# Patient Record
Sex: FEMALE | Employment: UNEMPLOYED
[De-identification: names, ages, dates, MRNs, and addresses within clinical notes are randomized per-mention and may not be internally consistent; named-entity substitution may affect disease eponyms.]

---

## 2022-07-24 ENCOUNTER — HEALTH MAINTENANCE LETTER (OUTPATIENT)
Age: 37
End: 2022-07-24

## 2022-07-25 ENCOUNTER — VIRTUAL VISIT (OUTPATIENT)
Dept: FAMILY MEDICINE | Facility: CLINIC | Age: 37
End: 2022-07-25
Payer: COMMERCIAL

## 2022-07-25 DIAGNOSIS — F32.81 PMDD (PREMENSTRUAL DYSPHORIC DISORDER): Primary | ICD-10-CM

## 2022-07-25 DIAGNOSIS — Z97.5 IUD (INTRAUTERINE DEVICE) IN PLACE: ICD-10-CM

## 2022-07-25 PROCEDURE — 99203 OFFICE O/P NEW LOW 30 MIN: CPT | Mod: 95 | Performed by: FAMILY MEDICINE

## 2022-07-25 RX ORDER — SERTRALINE HYDROCHLORIDE 25 MG/1
25 TABLET, FILM COATED ORAL DAILY
COMMUNITY
Start: 2022-07-25

## 2022-07-25 NOTE — PROGRESS NOTES
"Veena is a 37 year old who is being evaluated via a billable video visit.      How would you like to obtain your AVS? {AVS Preference:870422}  If the video visit is dropped, the invitation should be resent by: {video visit invitation (Optional) :769309}  Will anyone else be joining your video visit? {:613718}  {If patient encounters technical issues they should call [phone] :791154}        {PROVIDER CHARTING PREFERENCE:278740}    Subjective   Veena is a 37 year old{ACCOMPANIED BY STATEMENT (Optional):845457}, presenting for the following health issues:  No chief complaint on file.      History of Present Illness       Reason for visit:  Pre-screening evaluation    She eats 0-1 servings of fruits and vegetables daily.She consumes 1 sweetened beverage(s) daily.She exercises with enough effort to increase her heart rate 30 to 60 minutes per day.  She exercises with enough effort to increase her heart rate 4 days per week.   She is taking medications regularly.       {SUPERLIST (Optional):972461}  {additonal problems for provider to add (Optional):777653}    Review of Systems   {ROS COMP (Optional):969620}      Objective           Vitals:  No vitals were obtained today due to virtual visit.    Physical Exam   {video visit exam brief selected:249921::\"GENERAL: Healthy, alert and no distress\",\"EYES: Eyes grossly normal to inspection.  No discharge or erythema, or obvious scleral/conjunctival abnormalities.\",\"RESP: No audible wheeze, cough, or visible cyanosis.  No visible retractions or increased work of breathing.  \",\"SKIN: Visible skin clear. No significant rash, abnormal pigmentation or lesions.\",\"NEURO: Cranial nerves grossly intact.  Mentation and speech appropriate for age.\",\"PSYCH: Mentation appears normal, affect normal/bright, judgement and insight intact, normal speech and appearance well-groomed.\"}    {Diagnostic Test Results (Optional):231626}    {AMBULATORY ATTESTATION (Optional):906403}        Video-Visit " "Details    Video Start Time: {video visit start/end time for provider to select:258119}    Type of service:  Video Visit    Video End Time:{video visit start/end time for provider to select:624026}    Originating Location (pt. Location): {video visit patient location:231025::\"Home\"}    Distant Location (provider location):  [facility] [place]     Platform used for Video Visit: {Virtual Visit Platforms:048580::\"[employer]\"}    .  ..  "

## 2022-07-25 NOTE — PROGRESS NOTES
"Veena is a 37 year old who is being evaluated via a billable video visit.      How would you like to obtain your AVS? MyChart  If the video visit is dropped, the invitation should be resent by:   Will anyone else be joining your video visit? No    Assessment & Plan   1. PMDD (premenstrual dysphoric disorder): Self stated diagnosis.  IUD in place making timing of symptoms difficult.  Would like referral to psychiatry.  Has therapy appointment upcoming.  Referral given.  Follow-up as needed.  - Adult Mental Health  Referral; Future    2. IUD (intrauterine device) in place: Noted.    Francisco Nails MD  [facility]    This chart is completed utilizing dictation software; typos and/or incorrect word substitutions may unintentionally occur.       Subjective   Veena is a 37 year old, presenting for the following health issues:  pre-screening and No Show    HPI   Patient states she has a diagnosis of PMDD. Really rough days.  Unfortunately she does not get her period due to IUD use and she can never really figure out what part of her cycle she is on.  She states when she does start feeling symptoms she switches from Claritin to hydroxyzine.  This seems to help her anxiety based symptoms.     She does have Zoloft 25 mg available to her but has been less good about taking this.  She finds that she is on an SSRI for too long it actually makes symptoms worse over the long run.    She has been diagnosed with depression since age 19 as well.  She does not seem to struggle with this as much anymore.  She is also try medication such as Prozac.  She states she is a \"free spirit \".  She is a hard time dealing with toxic relationships with people around her and situations that he sometimes thrown in.    Would like referral to psychiatry as she has not found family practice to be helpful with her management of this.    Has upcoming appointment with therapist in September.    Review of " Systems   Constitutional, HEENT, cardiovascular, pulmonary, gi and gu systems are negative, except as otherwise noted.      Objective       Vitals:  No vitals were obtained today due to virtual visit.    Physical Exam   healthy, alert and no distress  Psych: Alert and oriented times 3; coherent speech, normal   rate and volume, able to articulate logical thoughts, able   to abstract reason.  Respiratory: No audible wheezing. Normal speech without having to stop and take a breath.    Labs: none        Telephone-Visit Details    Type of service: Telephone    12 minutes.

## 2022-08-01 ENCOUNTER — TRANSFERRED RECORDS (OUTPATIENT)
Dept: HEALTH INFORMATION MANAGEMENT | Facility: CLINIC | Age: 37
End: 2022-08-01

## 2022-08-01 LAB
ALT SERPL-CCNC: 17 U/L (ref 15–60)
AST SERPL-CCNC: 14 U/L (ref 15–37)
CREATININE (EXTERNAL): 0.83 MG/DL (ref 0.55–1.3)
GFR ESTIMATED (EXTERNAL): >60 ML/MIN/1.73M2
GLUCOSE (EXTERNAL): 92 MG/DL (ref 74–106)
HPV ABSTRACT: NORMAL
PAP-ABSTRACT: NORMAL
POTASSIUM (EXTERNAL): 4.6 MMOL/L (ref 3.5–5.1)

## 2022-08-03 ENCOUNTER — VIRTUAL VISIT (OUTPATIENT)
Dept: PSYCHOLOGY | Facility: CLINIC | Age: 37
End: 2022-08-03
Payer: COMMERCIAL

## 2022-08-03 DIAGNOSIS — F33.1 MAJOR DEPRESSIVE DISORDER, RECURRENT EPISODE, MODERATE (H): Primary | ICD-10-CM

## 2022-08-03 DIAGNOSIS — F41.1 GENERALIZED ANXIETY DISORDER: ICD-10-CM

## 2022-08-03 DIAGNOSIS — F41.0 PANIC DISORDER WITHOUT AGORAPHOBIA: ICD-10-CM

## 2022-08-03 PROCEDURE — 90791 PSYCH DIAGNOSTIC EVALUATION: CPT | Mod: 95 | Performed by: MARRIAGE & FAMILY THERAPIST

## 2022-08-03 ASSESSMENT — COLUMBIA-SUICIDE SEVERITY RATING SCALE - C-SSRS
1. HAVE YOU WISHED YOU WERE DEAD OR WISHED YOU COULD GO TO SLEEP AND NOT WAKE UP?: NO
2. HAVE YOU ACTUALLY HAD ANY THOUGHTS OF KILLING YOURSELF?: NO
ATTEMPT LIFETIME: NO

## 2022-08-03 ASSESSMENT — PATIENT HEALTH QUESTIONNAIRE - PHQ9: SUM OF ALL RESPONSES TO PHQ QUESTIONS 1-9: 8

## 2022-08-03 NOTE — Clinical Note
Client is getting started in the counseling center and will be working on behavioral activation and managing PMDD.  Thank you, Lia Stiles

## 2022-08-03 NOTE — PROGRESS NOTES
"[facility]  Provider Name:  Lia Stiles     Credentials:  MA, LMFT    PATIENT'S NAME: Veena Parsons  PREFERRED NAME:Veena  PRONOUNS: She/ Her  MRN: [id number]  : 1985  ADDRESS: [address] [place] [address]  [place] [address]  ACCT. NUMBER:  [id number]  DATE OF SERVICE: 22  START TIME: 9am  END TIME: 950am  PREFERRED PHONE: [phone]  May we leave a program related message: Yes  SERVICE MODALITY:  Video Visit:- Had to switch to phone due to sound issues and connection issues       Provider verified identity through the following two step process.  Patient provided:  Patient  and Patient address    Telemedicine Visit: The patient's condition can be safely assessed and treated via synchronous audio and visual telemedicine encounter.      Reason for Telemedicine Visit: Patient has requested telehealth visit    Originating Site (Patient Location): Patient's home    Distant Site (Provider Location): Provider Remote Setting- Home Office    Consent:  The patient/guardian has verbally consented to: the potential risks and benefits of telemedicine (video visit) versus in person care; bill my insurance or make self-payment for services provided; and responsibility for payment of non-covered services.     Patient would like the video invitation sent by:  My Chart    Mode of Communication:  Video Conference via Amwell    As the provider I attest to compliance with applicable laws and regulations related to telemedicine.    UNIVERSAL ADULT Mental Health DIAGNOSTIC ASSESSMENT    Identifying Information:  Patient is a 37 year old,   individual.    Patient was referred for an assessment by primary care providerself.  Patient attended the session alone.    Chief Complaint:   The reason for seeking services at this time is: \"PMDD\".  The problem(s) began 3/14/2004.    Patient has attempted to resolve these concerns in the past through medication, support groups and western and eastern medicine " .    Social/Family History:  Patient reported they grew up in other [place].  They were raised by biological mother  .  Parents  / .  Patient reported that their childhood was okay.  Patient described their current relationships with family of origin as close with some but has had to distance herself from others.      The patient describes their cultural background as .  Cultural influences and impact on patient's life structure, values, norms, and healthcare: Naturally cynical family.  Contextual influences on patient's health include: none noted.    These factors will be addressed in the Preliminary Treatment plan. Patient identified their preferred language to be English. Patient reported they do not need the assistance of an  or other support involved in therapy.     Patient reported had no significant delays in developmental tasks.   Patient's highest education level was some college  .  Patient identified the following learning problems: none reported.  Modifications will not be used to assist communication in therapy.  Patient reports they are  able to understand written materials.    Patient reported the following relationship history dating and marriage.  Patient's current relationship status is .   Patient identified their sexual orientation as heterosexual.  Patient reported having 1 child(marielena). Patient identified friends as part of their support system.  Patient identified the quality of these relationships as inconsistent,     Patient's current living/housing situation involves staying in own home/apartment.  The immediate members of family and household include Star Parsons, 43,Spouse and daughter who is 16 years old and they report that housing is stable.    Patient is currently unemployed.  Patient reports their finances are obtained through spouse. Patient does identify finances as a current stressor.      Patient reported that they have been  involved with the legal system.  Divorce. Patient does not report being under probation/ parole/ jurisdiction..    Patient's Strengths and Limitations:  Patient identified the following strengths or resources that will help them succeed in treatment: commitment to health and well being, friends / good social support, insight, intelligence, motivation, strong social skills and work ethic. Things that may interfere with the patient's success in treatment include: none identified.     Assessments:  The following assessments were completed by patient for this visit:  PHQ2:   PHQ-2 ( 1999 [employer]) 7/25/2022   Q1: Little interest or pleasure in doing things 0   Q2: Feeling down, depressed or hopeless 1   PHQ-2 Score 1   Q1: Little interest or pleasure in doing things Not at all   Q2: Feeling down, depressed or hopeless Several days   PHQ-2 Score 1     PHQ9:   PHQ-9 SCORE 8/3/2022   PHQ-9 Total Score 8     GAD2:   YADI-2 7/28/2022   Feeling nervous, anxious, or on edge 1   Not being able to stop or control worrying 0   YADI-2 Total Score 1     GAD7: No flowsheet data found.  PROMIS 10-Global Health (all questions and answers displayed):   PROMIS 10 7/28/2022   In general, would you say your health is: Good   In general, would you say your quality of life is: Good   In general, how would you rate your physical health? Good   In general, how would you rate your mental health, including your mood and your ability to think? Good   In general, how would you rate your satisfaction with your social activities and relationships? Good   In general, please rate how well you carry out your usual social activities and roles Fair   To what extent are you able to carry out your everyday physical activities such as walking, climbing stairs, carrying groceries, or moving a chair? Mostly   How often have you been bothered by emotional problems such as feeling anxious, depressed or irritable? Sometimes   How would you rate your fatigue on  average? Mild   How would you rate your pain on average?   0 = No Pain  to  10 = Worst Imaginable Pain 3   In general, would you say your health is: 3   In general, would you say your quality of life is: 3   In general, how would you rate your physical health? 3   In general, how would you rate your mental health, including your mood and your ability to think? 3   In general, how would you rate your satisfaction with your social activities and relationships? 3   In general, please rate how well you carry out your usual social activities and roles. (This includes activities at home, at work and in your community, and responsibilities as a parent, child, spouse, employee, friend, etc.) 2   To what extent are you able to carry out your everyday physical activities such as walking, climbing stairs, carrying groceries, or moving a chair? 4   In the past 7 days, how often have you been bothered by emotional problems such as feeling anxious, depressed, or irritable? 3   In the past 7 days, how would you rate your fatigue on average? 2   In the past 7 days, how would you rate your pain on average, where 0 means no pain, and 10 means worst imaginable pain? 3   Global Mental Health Score 12   Global Physical Health Score 15   PROMIS TOTAL - SUBSCORES 27     [place] Suicide Severity Rating Scale (Lifetime/Recent)  [place] Suicide Severity Rating (Lifetime/Recent) 8/3/2022   1. Wish to be Dead (Lifetime) 0   2. Non-Specific Active Suicidal Thoughts (Lifetime) 0   Actual Attempt (Lifetime) 0   Has subject engaged in non-suicidal self-injurious behavior? (Lifetime) 0   Calculated C-SSRS Risk Score (Lifetime/Recent) No Risk Indicated       Personal and Family Medical History:  Patient does report a family history of mental health concerns.  Patient reports family history is not on file..     Patient does report Mental Health Diagnosis and/or Treatment.  Patient Patient reported the following previous diagnoses which include(s):  an Anxiety Disorder and Depression.  Patient reported symptoms began years ago.   Patient has received mental health services in the past: medication management .  Psychiatric Hospitalizations: None.  Patient denies a history of civil commitment.  Patient is receiving other mental health services.  These include primary care provider.         Patient has had a physical exam to rule out medical causes for current symptoms.  Date of last physical exam was within the past year. Client was encouraged to follow up with PCP if symptoms were to develop. The patient has a [facility] Primary Care Provider, who is named Kendrick Redd..  Patient reports the following current medical concerns: PMDD.  Patient denies any issues with pain..   There are not significant appetite / nutritional concerns / weight changes.   Patient does not report a history of head injury / trauma / cognitive impairment.      Patient reports current meds as:   See Epic record for information     Medication Adherence:  Patient reports taking.  taking prescribed medications as prescribed.    Patient Allergies:  Not on File    Medical History:  No past medical history on file.      Current Mental Status Exam:   Appearance:                            Appropriate   Eye Contact:                           Good   Psychomotor Behavior:          Normal   Attitude:                                   Cooperative   Orientation:                             All  Speech              Rate / Production:       Normal/ Responsive              Volume:                       Normal   Mood:                                      Anxious   Affect:                                      Appropriate   Thought Content:                    Clear   Thought Form:                        Goal Directed   Insight:                                     Good     Substance Use:  Patient did not report a family history of substance use concerns; see medical history section for details.  Patient has  not received chemical dependency treatment in the past.  Patient has not ever been to detox.      Patient is not currently receiving any chemical dependency treatment.           Substance History of use Age of first use Date of last use     Pattern and duration of use (include amounts and frequency)   Alcohol currently use   19 8/1/2022    Cannabis   currently use 19 8/3/2022      Amphetamines   never used        Cocaine/crack    never used          Hallucinogens never used            Inhalants never used            Heroin never used            Other Opiates never used        Benzodiazepine   never used        Barbiturates never used        Over the counter meds never used        Caffeine currently use 17      Nicotine  never used        Other substances not listed above:  Identify:  never used          Patient reported the following problems as a result of their substance use: no problems, not applicable.    Substance Use: No symptoms    Based on the negative CAGE score and clinical interview there  are not indications of drug or alcohol abuse.      Significant Losses / Trauma / Abuse / Neglect Issues:   Patient did not serve in the .  There are indications or report of significant loss, trauma, abuse or neglect issues related to: Toxic relatives and family members.  Concerns for possible neglect are not present     Safety Assessment:   Patient denies current homicidal ideation and behaviors.  Patient denies current self-injurious ideation and behaviors.    Patient denied risk behaviors associated with substance use.  Patient denies any high risk behaviors associated with mental health symptoms.  Patient reports the following current concerns for their personal safety: None.  Patient reports there are firearms in the house.     yes, they are secured. The firearms are secured in a locked space.    History of Safety Concerns:  Patient denied a history of homicidal ideation.     Patient denied a history of  personal safety concerns.    Patient denied a history of assaultive behaviors.    Patient denied a history of sexual assault behaviors.     Patient denied a history of risk behaviors associated with substance use.  Patient denies any history of high risk behaviors associated with mental health symptoms.  Patient reports the following protective factors: safe and stable environment; effectively controls impulses; regular physical activity; help seeking behaviors when distressed; commitment to well being; sense of personal control or determination; access to a variety of clinical interventions and pets    Risk Plan:  See Recommendations for Safety and Risk Management Plan    Review of Symptoms per patient report:  Depression: Change in sleep, Lack of interest, Excessive or inappropriate guilt, Change in energy level, Difficulties concentrating, Psychomotor slowing or agitation, Feelings of hopelessness, Feelings of helplessness, Irritability, Feeling sad, down, or depressed, Withdrawn, Frequent crying and Anger outbursts  Kaitlynn:  Irritability, Racing thoughts, Restlessness and Distractibility  Psychosis: No Symptoms  Anxiety: Excessive worry, Nervousness, Sleep disturbance, Psychomotor agitation, Ruminations, Poor concentration, Irritability and Anger outbursts  Panic:  Palpitations, Shortness of breath, Tingling, Numbness and Sense of impending doom  Post Traumatic Stress Disorder:  Experienced traumatic event toxic family members    Eating Disorder: No Symptoms  ADD / ADHD:  No symptoms  Conduct Disorder: No symptoms  Autism Spectrum Disorder: No symptoms  Obsessive Compulsive Disorder: No Symptoms    Patient reports the following compulsive behaviors and treatment history: None/ does not apply     Diagnostic Criteria:   Generalized Anxiety Disorder  A. Excessive anxiety and worry about a number of events or activities (such as work or school performance).   B. The person finds it difficult to control the worry.  C.  Select 3 or more symptoms (required for diagnosis). Only one item is required in children.   - Restlessness or feeling keyed up or on edge.    - Being easily fatigued.    - Difficulty concentrating or mind going blank.    - Irritability.    - Muscle tension.    - Sleep disturbance (difficulty falling or staying asleep, or restless unsatisfying sleep).   D. The focus of the anxiety and worry is not confined to features of an Axis I disorder.  E. The anxiety, worry, or physical symptoms cause clinically significant distress or impairment in social, occupational, or other important areas of functioning.   F. The disturbance is not due to the direct physiological effects of a substance (e.g., a drug of abuse, a medication) or a general medical condition (e.g., hyperthyroidism) and does not occur exclusively during a Mood Disorder, a Psychotic Disorder, or a Pervasive Developmental Disorder.  Panic Disorder, A.Recurrent unexpected panic attacks. A panic attack is an abrupt surge of intense fear or intense discomfort that reaches a peak within minutes, and during which time four (or more) of the following symptoms occur: Chest pain , Chill / hot flashes, Feeling dizzy, unsteady, light-headed, or faint, Nausea or abdominal distress, Increased heart rate/ Palpitations, Paresthesias (numbness or tingling sensations), Shortness of breath, Sweating and Trembling / shaking, B.At least one of the attacks has been followed by 1 month (or more) of Persistent concern or worry about additional panic attacks or their consequences, C.The disturbance is not attributable to the physiological effects of a substance (e.g., a drug of abuse, a medication) or another medical condition (e.g., hyperthyroidism, cardiopulmonary disorders). and D.The disturbance is not better explained by another mental disorder  Major Depressive Disorder  A) Recurrent episode(s) - symptoms have been present during the same 2-week period and represent a change  "from previous functioning 5 or more symptoms (required for diagnosis)   - Depressed mood. Note: In children and adolescents, can be irritable mood.     - Diminished interest or pleasure in all, or almost all, activities.    - Decreased sleep.    - Psychomotor activity retardation.    - Fatigue or loss of energy.    - Feelings of worthlessness or excessive guilt.    - Diminished ability to think or concentrate, or indecisiveness.    - Recurrent thoughts of death (not just fear of dying), recurrent suicidal ideation without a specific plan, or a suicide attempt or a specific plan for committing suicide.   B) The symptoms cause clinically significant distress or impairment in social, occupational, or other important areas of functioning  C) The episode is not attributable to the physiological effects of a substance or to another medical condition  D) The occurence of major depressive episode is not better explained by other thought / psychotic disorders  E) There has never been a manic episode or hypomanic episode        Functional Status:  Patient reports the following functional impairments:  health maintenance, home life with family , relationship(s), self-care, social interactions and work / vocational responsibilities.     Nonprogrammatic care:  Patient is requesting basic services to address current mental health concerns.    Clinical Summary:  1. Reason for assessment: PMDD  .  2. Psychosocial, Cultural and Contextual Factors: \"Cynical Family.\"  3. Principal DSM5 Diagnoses  (Sustained by DSM5 Criteria Listed Above):                 296.32 (F33.1) Major Depressive Disorder, Recurrent Episode, Moderate _ and With mixed features  300.01 (F41.0) Panic Disorder  300.02 (F41.1) Generalized Anxiety Disorder   4. Other Diagnoses that is relevant to services:  PMDD  5. Provisional Diagnosis:  None  6. Prognosis: Expect Improvement.  7. Likely consequences of symptoms if not treated: Continued struggles with depression, " anxiety and PMDD.  8. Client strengths include:  caring, creative, educated, empathetic, goal-focused, good listener, insightful, intelligent, motivated, open to learning, open to suggestions / feedback, responsible parent, supportive, wants to learn and willing to ask questions .     Recommendations:     1. Plan for Safety and Risk Management:   Safety and Risk: Recommended that patient call 911 or go to the local ED should there be a change in any of these risk factors..          Report to child / adult protection services was NA.     2. Patient's identified no other concerns      3. Initial Treatment will focus on:    Developing coping skills   Managing symptoms of PMDD   Addressing some communication and relational patterns      4. Resources/Service Plan:    services are not indicated.   Modifications to assist communication are not indicated.   Additional disability accommodations are not indicated.      5. Collaboration:   Collaboration / coordination of treatment will be initiated with the following  support professionals: primary care physician.      6.  Referrals:   The following referral(s) will be initiated: None at this time     A Release of Information has been obtained for the following: primary care physician.     Emergency Contact was obtained.     7. CHI:    CHI:  Discussed the general effects of drugs and alcohol on health and well-being. Provider gave patient printed information about the effects of chemical use on their health and well being. Recommendations:  None .     8. Records:   These were reviewed at time of assessment.   Information in this assessment was obtained from the medical record and  provided by patient who is a good historian.    Patient will have open access to their mental health medical record.        Provider Name/ Credentials:  Lia Stiles MA, LMFT  August 4, 2022                        [facility]   Mental Health & Addiction Services      Progress Note - Initial Visit    Patient  Name:  Veena Parsons Date: 8-3-22         Service Type: Individual     Visit Start Time: 9am  Visit End Time: 950am    Visit #: 1    Attendees: Client attended alone    Service Modality:  Video Visit:-Had to switch to phone due to connection problems       Provider verified identity through the following two step process.  Patient provided:  Patient  and Patient address    Telemedicine Visit: The patient's condition can be safely assessed and treated via synchronous audio and visual telemedicine encounter.      Reason for Telemedicine Visit: Patient has requested telehealth visit    Originating Site (Patient Location): Patient's home    Distant Site (Provider Location): Provider Remote Setting- Home Office    Consent:  The patient/guardian has verbally consented to: the potential risks and benefits of telemedicine (video visit) versus in person care; bill my insurance or make self-payment for services provided; and responsibility for payment of non-covered services.     Patient would like the video invitation sent by:  My Chart    Mode of Communication:  Video Conference via Amwell    As the provider I attest to compliance with applicable laws and regulations related to telemedicine.       DATA:   Interactive Complexity: No   Crisis: No     Presenting Concerns/  Current Stressors:   -History of depression and anxiety   -PMDD that has been difficult to manage since being a teenager.     -Has great support from a social media support group.   -Rough days have decreased with medication regiment.     -Has not been able to work in four years due to symptoms.      ASSESSMENT:  Mental Status Assessment:  Appearance:   Appropriate   Eye Contact:   Good   Psychomotor Behavior: Normal   Attitude:   Cooperative   Orientation:   All  Speech   Rate / Production: Normal/ Responsive   Volume:  Normal   Mood:    Anxious   Affect:    Appropriate   Thought Content:  Clear    Thought Form:  Goal Directed   Insight:    Good       Safety Issues and Plan for Safety and Risk Management:     [place] Suicide Severity Rating Scale (Lifetime/Recent)  [place] Suicide Severity Rating (Lifetime/Recent) 8/3/2022   1. Wish to be Dead (Lifetime) 0   2. Non-Specific Active Suicidal Thoughts (Lifetime) 0   Actual Attempt (Lifetime) 0   Has subject engaged in non-suicidal self-injurious behavior? (Lifetime) 0   Calculated C-SSRS Risk Score (Lifetime/Recent) No Risk Indicated     Patient denies current fears or concerns for personal safety.  Patient denies current or recent suicidal ideation or behaviors.  Patient denies current or recent homicidal ideation or behaviors.  Patient denies current or recent self injurious behavior or ideation.  Patient denies other safety concerns.  Recommended that patient call 911 or go to the local ED should there be a change in any of these risk factors.  Patient reports there are firearms in the house. The firearms are secured in a locked space.     Diagnostic Criteria:  Generalized Anxiety Disorder  A. Excessive anxiety and worry about a number of events or activities (such as work or school performance).   B. The person finds it difficult to control the worry.  C. Select 3 or more symptoms (required for diagnosis). Only one item is required in children.   - Restlessness or feeling keyed up or on edge.    - Being easily fatigued.    - Difficulty concentrating or mind going blank.    - Irritability.    - Muscle tension.    - Sleep disturbance (difficulty falling or staying asleep, or restless unsatisfying sleep).   D. The focus of the anxiety and worry is not confined to features of an Axis I disorder.  E. The anxiety, worry, or physical symptoms cause clinically significant distress or impairment in social, occupational, or other important areas of functioning.   F. The disturbance is not due to the direct physiological effects of a substance (e.g., a drug of  abuse, a medication) or a general medical condition (e.g., hyperthyroidism) and does not occur exclusively during a Mood Disorder, a Psychotic Disorder, or a Pervasive Developmental Disorder.  Panic Disorder, A.Recurrent unexpected panic attacks. A panic attack is an abrupt surge of intense fear or intense discomfort that reaches a peak within minutes, and during which time four (or more) of the following symptoms occur: Chest pain , Chill / hot flashes, Feeling dizzy, unsteady, light-headed, or faint, Nausea or abdominal distress, Increased heart rate/ Palpitations, Paresthesias (numbness or tingling sensations), Shortness of breath, Sweating and Trembling / shaking, B.At least one of the attacks has been followed by 1 month (or more) of Persistent concern or worry about additional panic attacks or their consequences, C.The disturbance is not attributable to the physiological effects of a substance (e.g., a drug of abuse, a medication) or another medical condition (e.g., hyperthyroidism, cardiopulmonary disorders). and D.The disturbance is not better explained by another mental disorder  Major Depressive Disorder  A) Recurrent episode(s) - symptoms have been present during the same 2-week period and represent a change from previous functioning 5 or more symptoms (required for diagnosis)   - Depressed mood. Note: In children and adolescents, can be irritable mood.     - Diminished interest or pleasure in all, or almost all, activities.    - Decreased sleep.    - Psychomotor activity retardation.    - Fatigue or loss of energy.    - Feelings of worthlessness or excessive guilt.    - Diminished ability to think or concentrate, or indecisiveness.    - Recurrent thoughts of death (not just fear of dying), recurrent suicidal ideation without a specific plan, or a suicide attempt or a specific plan for committing suicide.   B) The symptoms cause clinically significant distress or impairment in social, occupational, or  other important areas of functioning  C) The episode is not attributable to the physiological effects of a substance or to another medical condition  D) The occurence of major depressive episode is not better explained by other thought / psychotic disorders  E) There has never been a manic episode or hypomanic episode      DSM5 Diagnoses: (Sustained by DSM5 Criteria Listed Above)  Diagnoses: 296.32 (F33.1) Major Depressive Disorder, Recurrent Episode, Moderate _ and With mixed features  300.01 (F41.0) Panic Disorder  300.02 (F41.1) Generalized Anxiety Disorder   Premenstrual Dysphoric Disorder   Psychosocial & Contextual Factors: Not able to work due to symptoms   WHODAS 2.0 (12 item):   WHODAS 2.0 Total Score 8/3/2022   Total Score 32   Total Score MyChart 32     Intervention:   Educated on treatment planning and started identifying goals and interventions for treatment plan  Collateral Reports Completed:  Routed note to PCP      PLAN: (Homework, other):  1. Provider will continue Diagnostic Assessment.  Patient was given the following to do until next session:  Continue to use supports through social media and follow medication regiment.  Review behavioral activation.      2. Provider recommended the following referrals: None at this time.      3.  Suicide Risk and Safety Concerns were assessed for Veena Parsons.    Patient meets the following risk assessment and triage: Patient denied any current/recent/lifetime history of suicidal ideation and/or behaviors.  No safety plan indicated at this time.       Lia Stiles, TH  August 3, 2022

## 2022-09-07 ENCOUNTER — VIRTUAL VISIT (OUTPATIENT)
Dept: PSYCHOLOGY | Facility: CLINIC | Age: 37
End: 2022-09-07
Payer: COMMERCIAL

## 2022-09-07 DIAGNOSIS — F41.0 PANIC DISORDER WITHOUT AGORAPHOBIA: ICD-10-CM

## 2022-09-07 DIAGNOSIS — F33.1 MAJOR DEPRESSIVE DISORDER, RECURRENT EPISODE, MODERATE (H): Primary | ICD-10-CM

## 2022-09-07 DIAGNOSIS — F41.1 GENERALIZED ANXIETY DISORDER: ICD-10-CM

## 2022-09-07 PROCEDURE — 90834 PSYTX W PT 45 MINUTES: CPT | Mod: 95 | Performed by: MARRIAGE & FAMILY THERAPIST

## 2022-09-07 NOTE — PROGRESS NOTES
[facility]                                     Progress Note    Patient Name: Veena Parsons  Date: 22         Service Type: Individual      Session Start Time: 9am  Session End Time: 950am     Session Length: 50min    Session #: 2    Attendees: Client attended alone    Service Modality:  Video Visit:      Provider verified identity through the following two step process.  Patient provided:  Patient  and Patient address    Telemedicine Visit: The patient's condition can be safely assessed and treated via synchronous audio and visual telemedicine encounter.      Reason for Telemedicine Visit: Patient has requested telehealth visit    Originating Site (Patient Location): Patient's home    Distant Site (Provider Location): Provider Remote Setting- Home Office    Consent:  The patient/guardian has verbally consented to: the potential risks and benefits of telemedicine (video visit) versus in person care; bill my insurance or make self-payment for services provided; and responsibility for payment of non-covered services.     Patient would like the video invitation sent by:  My Chart    Mode of Communication:  Video Conference via Amwell    As the provider I attest to compliance with applicable laws and regulations related to telemedicine.    DATA  Interactive Complexity: No  Crisis: No        Progress Since Last Session (Related to Symptoms / Goals / Homework):   Symptoms: Improving - Client has been working on managing symptoms of PMDD with success.  Working also on communication patterns with spouse.      Homework: Achieved / completed to satisfaction  Completed in session      Episode of Care Goals: Minimal progress - ACTION (Actively working towards change); Intervened by reinforcing change plan / affirming steps taken     Current / Ongoing Stressors and Concerns:   -Has struggled with PMDD for a number of years.   -Working on communication issues with spouse and finding shared  activities.     -Has some hard days in which some of the irritability will creep up.  Working on managing and setting personal boundaries.       Treatment Objective(s) Addressed in This Session:   Patient will develop a plan to help manage PMDD  Patient will work on ways to communicate patterns        Intervention:   Continue to use hydroxyzine and the allergy pill.     Bring up things she would like to do.  Encourage spouse to be more adventurous.     Doing really well identifying when there is a trauma trigger for her spouse and how his household growing up contributes to a lot of his personal struggles.      Assessments completed prior to visit:  The following assessments were completed by patient for this visit:  PHQ2:   PHQ-2 ( 1999 [employer]) 7/25/2022   Q1: Little interest or pleasure in doing things 0   Q2: Feeling down, depressed or hopeless 1   PHQ-2 Score 1   Q1: Little interest or pleasure in doing things Not at all   Q2: Feeling down, depressed or hopeless Several days   PHQ-2 Score 1     PHQ9:   PHQ-9 SCORE 8/3/2022   PHQ-9 Total Score 8     GAD2:   YADI-2 7/28/2022   Feeling nervous, anxious, or on edge 1   Not being able to stop or control worrying 0   YADI-2 Total Score 1     GAD7: No flowsheet data found.  PROMIS 10-Global Health (all questions and answers displayed):   PROMIS 10 7/28/2022   In general, would you say your health is: Good   In general, would you say your quality of life is: Good   In general, how would you rate your physical health? Good   In general, how would you rate your mental health, including your mood and your ability to think? Good   In general, how would you rate your satisfaction with your social activities and relationships? Good   In general, please rate how well you carry out your usual social activities and roles Fair   To what extent are you able to carry out your everyday physical activities such as walking, climbing stairs, carrying groceries, or moving a chair? Mostly    How often have you been bothered by emotional problems such as feeling anxious, depressed or irritable? Sometimes   How would you rate your fatigue on average? Mild   How would you rate your pain on average?   0 = No Pain  to  10 = Worst Imaginable Pain 3   In general, would you say your health is: 3   In general, would you say your quality of life is: 3   In general, how would you rate your physical health? 3   In general, how would you rate your mental health, including your mood and your ability to think? 3   In general, how would you rate your satisfaction with your social activities and relationships? 3   In general, please rate how well you carry out your usual social activities and roles. (This includes activities at home, at work and in your community, and responsibilities as a parent, child, spouse, employee, friend, etc.) 2   To what extent are you able to carry out your everyday physical activities such as walking, climbing stairs, carrying groceries, or moving a chair? 4   In the past 7 days, how often have you been bothered by emotional problems such as feeling anxious, depressed, or irritable? 3   In the past 7 days, how would you rate your fatigue on average? 2   In the past 7 days, how would you rate your pain on average, where 0 means no pain, and 10 means worst imaginable pain? 3   Global Mental Health Score 12   Global Physical Health Score 15   PROMIS TOTAL - SUBSCORES 27     [place] Suicide Severity Rating Scale (Lifetime/Recent)  [place] Suicide Severity Rating (Lifetime/Recent) 8/3/2022   1. Wish to be Dead (Lifetime) 0   2. Non-Specific Active Suicidal Thoughts (Lifetime) 0   Actual Attempt (Lifetime) 0   Has subject engaged in non-suicidal self-injurious behavior? (Lifetime) 0   Calculated C-SSRS Risk Score (Lifetime/Recent) No Risk Indicated         ASSESSMENT: Current Emotional / Mental Status (status of significant symptoms):   Risk status (Self / Other harm or suicidal  ideation)   Patient denies current fears or concerns for personal safety.   Patient denies current or recent suicidal ideation or behaviors.   Patient denies current or recent homicidal ideation or behaviors.   Patient denies current or recent self injurious behavior or ideation.   Patient denies other safety concerns.   Patient reports there has been no change in risk factors since their last session.     Patient reports there has been no change in protective factors since their last session.     Recommended that patient call 911 or go to the local ED should there be a change in any of these risk factors.     Appearance:   Appropriate    Eye Contact:   Good    Psychomotor Behavior: Normal    Attitude:   Cooperative    Orientation:   All   Speech    Rate / Production: Normal     Volume:  Normal    Mood:    Normal   Affect:    Appropriate    Thought Content:  Clear    Thought Form:  Coherent  Logical    Insight:    Good      Medication Review:   No changes to current psychiatric medication(s)     Medication Compliance:   Yes     Changes in Health Issues:   None reported     Chemical Use Review:   Substance Use: Chemical use reviewed, no active concerns identified      Tobacco Use: No current tobacco use.      Diagnosis:  296.32 (F33.1) Major Depressive Disorder, Recurrent Episode, Moderate _ and With mixed features  300.01 (F41.0) Panic Disorder  300.02 (F41.1) Generalized Anxiety Disorder   PMDD      Collateral Reports Completed:   Not Applicable    PLAN: (Patient Tasks / Therapist Tasks / Other)  Client will continue to work on the following goals:  -Continue with medication management for PMDD.  -Find more shared activities with spouse/ encourage him to be adventurous.    -Set some concrete boundaries.          NONA Hernandez                                                         ______________________________________________________________________    Individual Treatment Plan    Patient's Name: Veena RENETTA  Jaqueline  YOB: 1985    Date of Creation: 9-7-22  Date Treatment Plan Last Reviewed/Revised: 9-7-22    DSM5 Diagnoses:  296.32 (F33.1) Major Depressive Disorder, Recurrent Episode, Moderate _ and With mixed features  300.01 (F41.0) Panic Disorder  300.02 (F41.1) Generalized Anxiety Disorder   PMDD  Psychosocial / Contextual Factors: Some relational issues   PROMIS (reviewed every 90 days): 27    Referral / Collaboration:  Referral to another professional/service is not indicated at this time..    Anticipated number of session for this episode of care: 6-9 sessions  Anticipation frequency of session: Biweekly  Anticipated Duration of each session: 38-52 minutes  Treatment plan will be reviewed in 90 days or when goals have been changed.       MeasurableTreatment Goal(s) related to diagnosis / functional impairment(s)  Goal 1: Patient will address struggles with anxiety, depression and PMDD.    I will know I've met my goal when I am feeling less reactive through the month with the symptoms.      Objective #A (Patient Action)    Patient will work on establishing a concrete routine with self-care.  Status: New - Date: 9-7-22     Intervention(s)  Therapist will use CBT and motivational interviewing.      Objective #B  Patient will develop a plan to help manage PMDD  Status: New - Date: 9-7-22     Intervention(s)  Therapist will use solution focused therapy.    Objective #C  Patient will work on ways to communicate with narcissistic individuals.  Status: New - Date: 9-7-22     Intervention(s)  Therapist will teach communication skills.          Patient has reviewed and agreed to the above plan.      Lia Stiles, TH  September 7, 2022

## 2022-09-21 ENCOUNTER — VIRTUAL VISIT (OUTPATIENT)
Dept: PSYCHOLOGY | Facility: CLINIC | Age: 37
End: 2022-09-21
Payer: COMMERCIAL

## 2022-09-21 DIAGNOSIS — F41.0 PANIC DISORDER WITHOUT AGORAPHOBIA: ICD-10-CM

## 2022-09-21 DIAGNOSIS — F41.1 GENERALIZED ANXIETY DISORDER: ICD-10-CM

## 2022-09-21 DIAGNOSIS — F33.1 MAJOR DEPRESSIVE DISORDER, RECURRENT EPISODE, MODERATE (H): Primary | ICD-10-CM

## 2022-09-21 PROCEDURE — 90834 PSYTX W PT 45 MINUTES: CPT | Mod: 95 | Performed by: MARRIAGE & FAMILY THERAPIST

## 2022-09-21 NOTE — PROGRESS NOTES
[facility]                                     Progress Note    Patient Name: Veena Parsons  Date: 22         Service Type: Individual      Session Start Time: 10am  Session End Time: 1050am     Session Length: 50min    Session #: 3    Attendees: Client attended alone    Service Modality:  Video Visit:      Provider verified identity through the following two step process.  Patient provided:  Patient  and Patient address    Telemedicine Visit: The patient's condition can be safely assessed and treated via synchronous audio and visual telemedicine encounter.      Reason for Telemedicine Visit: Patient has requested telehealth visit    Originating Site (Patient Location): Patient's home    Distant Site (Provider Location): Provider Remote Setting- Home Office    Consent:  The patient/guardian has verbally consented to: the potential risks and benefits of telemedicine (video visit) versus in person care; bill my insurance or make self-payment for services provided; and responsibility for payment of non-covered services.     Patient would like the video invitation sent by:  My Chart    Mode of Communication:  Video Conference via Amwell    As the provider I attest to compliance with applicable laws and regulations related to telemedicine.    Treatment Plan- 22  CGI- 9  Phq-9 and YADI-7- See Epic  Promis- -22    DATA  Interactive Complexity: No  Crisis: No        Progress Since Last Session (Related to Symptoms / Goals / Homework):   Symptoms: Client reports it has been a hard couple of weeks with more symptoms of depression, anxiety and PMDD.  She has had to be in bed some days feeling non functional.      Homework: Achieved / completed to satisfaction  Completed in session      Episode of Care Goals: Minimal progress - ACTION (Actively working towards change); Intervened by reinforcing change plan / affirming steps taken     Current / Ongoing Stressors and Concerns:   -Has  struggled with PMDD for a number of years.  Has been working with chiropractic and message therapy on some holistic treatment options.     -Working on communication issues with spouse and finding shared activities.  Looking forward to some quality time this weekend while spouse is hunting, client is there for support.     -Has some hard days this month when symptoms are so strong, client cannot get out of bed and feels like all she can do is sleep.  Is very tearful during this time and can cry for hours.     -In the process of applying for disability.  Client is very driven but does not feel she could work a full time job due to her symptoms.         Treatment Objective(s) Addressed in This Session:   Patient will develop a plan to help manage PMDD  Patient will work on ways to communicate patterns   Self-care        Intervention:   Continue to use hydroxyzine and the allergy pill.     Work with chiropractic and message therapy on a holistic plan.     Plan to spend some quality time with spouse this weekend.       Continue to work on mindfulness around trauma triggers.      Assessments completed prior to visit:  The following assessments were completed by patient for this visit:  PHQ2:   PHQ-2 ( 1999 [employer]) 7/25/2022   Q1: Little interest or pleasure in doing things 0   Q2: Feeling down, depressed or hopeless 1   PHQ-2 Score 1   Q1: Little interest or pleasure in doing things Not at all   Q2: Feeling down, depressed or hopeless Several days   PHQ-2 Score 1     PHQ9:   PHQ-9 SCORE 8/3/2022   PHQ-9 Total Score 8     GAD2:   YADI-2 7/28/2022   Feeling nervous, anxious, or on edge 1   Not being able to stop or control worrying 0   YADI-2 Total Score 1     GAD7: No flowsheet data found.  PROMIS 10-Global Health (all questions and answers displayed):   PROMIS 10 7/28/2022   In general, would you say your health is: Good   In general, would you say your quality of life is: Good   In general, how would you rate your physical  health? Good   In general, how would you rate your mental health, including your mood and your ability to think? Good   In general, how would you rate your satisfaction with your social activities and relationships? Good   In general, please rate how well you carry out your usual social activities and roles Fair   To what extent are you able to carry out your everyday physical activities such as walking, climbing stairs, carrying groceries, or moving a chair? Mostly   How often have you been bothered by emotional problems such as feeling anxious, depressed or irritable? Sometimes   How would you rate your fatigue on average? Mild   How would you rate your pain on average?   0 = No Pain  to  10 = Worst Imaginable Pain 3   In general, would you say your health is: 3   In general, would you say your quality of life is: 3   In general, how would you rate your physical health? 3   In general, how would you rate your mental health, including your mood and your ability to think? 3   In general, how would you rate your satisfaction with your social activities and relationships? 3   In general, please rate how well you carry out your usual social activities and roles. (This includes activities at home, at work and in your community, and responsibilities as a parent, child, spouse, employee, friend, etc.) 2   To what extent are you able to carry out your everyday physical activities such as walking, climbing stairs, carrying groceries, or moving a chair? 4   In the past 7 days, how often have you been bothered by emotional problems such as feeling anxious, depressed, or irritable? 3   In the past 7 days, how would you rate your fatigue on average? 2   In the past 7 days, how would you rate your pain on average, where 0 means no pain, and 10 means worst imaginable pain? 3   Global Mental Health Score 12   Global Physical Health Score 15   PROMIS TOTAL - SUBSCORES 27     [place] Suicide Severity Rating Scale  (Lifetime/Recent)  [place] Suicide Severity Rating (Lifetime/Recent) 8/3/2022   1. Wish to be Dead (Lifetime) 0   2. Non-Specific Active Suicidal Thoughts (Lifetime) 0   Actual Attempt (Lifetime) 0   Has subject engaged in non-suicidal self-injurious behavior? (Lifetime) 0   Calculated C-SSRS Risk Score (Lifetime/Recent) No Risk Indicated         ASSESSMENT: Current Emotional / Mental Status (status of significant symptoms):   Risk status (Self / Other harm or suicidal ideation)   Patient denies current fears or concerns for personal safety.   Patient denies current or recent suicidal ideation or behaviors.   Patient denies current or recent homicidal ideation or behaviors.   Patient denies current or recent self injurious behavior or ideation.   Patient denies other safety concerns.   Patient reports there has been no change in risk factors since their last session.     Patient reports there has been no change in protective factors since their last session.     Recommended that patient call 911 or go to the local ED should there be a change in any of these risk factors.     Appearance:   Appropriate    Eye Contact:   Good    Psychomotor Behavior: Normal    Attitude:   Cooperative    Orientation:   All   Speech    Rate / Production: Normal     Volume:  Normal    Mood:    Anxious    Affect:    Tearful Worrisome    Thought Content:  Clear    Thought Form:  Coherent  Logical    Insight:    Good      Medication Review:   No changes to current psychiatric medication(s)     Medication Compliance:   Yes     Changes in Health Issues:   None reported     Chemical Use Review:   Substance Use: Chemical use reviewed, no active concerns identified      Tobacco Use: No current tobacco use.      Diagnosis:  296.32 (F33.1) Major Depressive Disorder, Recurrent Episode, Moderate _ and With mixed features  300.01 (F41.0) Panic Disorder  300.02 (F41.1) Generalized Anxiety Disorder   PMDD      Collateral Reports Completed:   Not  Applicable    PLAN: (Patient Tasks / Therapist Tasks / Other)  Client will continue to work on the following goals:  -Continue with medication management for PMDD, chiropractic and message therapy.    -Spend quality time with spouse this weekend.    -Set some concrete boundaries.    -On the really bad days, give self permission to relax.    -Continue to work on mindfulness around trauma triggers.          Lia Stiles,                                                          ______________________________________________________________________    Individual Treatment Plan    Patient's Name: Veena Parsons  YOB: 1985    Date of Creation: 9-7-22  Date Treatment Plan Last Reviewed/Revised: 9-7-22    DSM5 Diagnoses:  296.32 (F33.1) Major Depressive Disorder, Recurrent Episode, Moderate _ and With mixed features  300.01 (F41.0) Panic Disorder  300.02 (F41.1) Generalized Anxiety Disorder   PMDD  Psychosocial / Contextual Factors: Some relational issues   PROMIS (reviewed every 90 days): 27    Referral / Collaboration:  Referral to another professional/service is not indicated at this time..    Anticipated number of session for this episode of care: 6-9 sessions  Anticipation frequency of session: Biweekly  Anticipated Duration of each session: 38-52 minutes  Treatment plan will be reviewed in 90 days or when goals have been changed.       MeasurableTreatment Goal(s) related to diagnosis / functional impairment(s)  Goal 1: Patient will address struggles with anxiety, depression and PMDD.    I will know I've met my goal when I am feeling less reactive through the month with the symptoms.      Objective #A (Patient Action)    Patient will work on establishing a concrete routine with self-care.  Status: New - Date: 9-7-22     Intervention(s)  Therapist will use CBT and motivational interviewing.      Objective #B  Patient will develop a plan to help manage PMDD  Status: New - Date: 9-7-22      Intervention(s)  Therapist will use solution focused therapy.    Objective #C  Patient will work on ways to communicate with narcissistic individuals.  Status: New - Date: 9-7-22     Intervention(s)  Therapist will teach communication skills.          Patient has reviewed and agreed to the above plan.      Lia Stiles, TH  September 7, 2022

## 2022-09-29 ENCOUNTER — TELEPHONE (OUTPATIENT)
Dept: FAMILY MEDICINE | Facility: CLINIC | Age: 37
End: 2022-09-29

## 2022-09-29 NOTE — TELEPHONE ENCOUNTER
Abstract pap and hpv from [facility] 8/1/22.    Francisco Nails MD  [facility]

## 2022-10-03 ENCOUNTER — HEALTH MAINTENANCE LETTER (OUTPATIENT)
Age: 37
End: 2022-10-03

## 2022-10-05 ENCOUNTER — VIRTUAL VISIT (OUTPATIENT)
Dept: PSYCHOLOGY | Facility: CLINIC | Age: 37
End: 2022-10-05
Payer: COMMERCIAL

## 2022-10-05 DIAGNOSIS — F41.0 PANIC DISORDER WITHOUT AGORAPHOBIA: ICD-10-CM

## 2022-10-05 DIAGNOSIS — F33.1 MAJOR DEPRESSIVE DISORDER, RECURRENT EPISODE, MODERATE (H): Primary | ICD-10-CM

## 2022-10-05 DIAGNOSIS — F41.1 GENERALIZED ANXIETY DISORDER: ICD-10-CM

## 2022-10-05 PROCEDURE — 90834 PSYTX W PT 45 MINUTES: CPT | Mod: 95 | Performed by: MARRIAGE & FAMILY THERAPIST

## 2022-10-05 NOTE — PROGRESS NOTES
[facility]                                     Progress Note    Patient Name: Veena Parsons  Date: 10-5-22         Service Type: Individual      Session Start Time: 9am  Session End Time: 950am     Session Length: 50min    Session #: 4    Attendees: Client attended alone    Service Modality:  Video Visit:      Provider verified identity through the following two step process.  Patient provided:  Patient  and Patient address    Telemedicine Visit: The patient's condition can be safely assessed and treated via synchronous audio and visual telemedicine encounter.      Reason for Telemedicine Visit: Patient has requested telehealth visit    Originating Site (Patient Location): Patient's home    Distant Site (Provider Location): Provider Remote Setting- Home Office    Consent:  The patient/guardian has verbally consented to: the potential risks and benefits of telemedicine (video visit) versus in person care; bill my insurance or make self-payment for services provided; and responsibility for payment of non-covered services.     Patient would like the video invitation sent by:  My Chart    Mode of Communication:  Video Conference via Amwell    As the provider I attest to compliance with applicable laws and regulations related to telemedicine.    Treatment Plan- 9--22  CGI- 9--  Phq-9 and YADI-7- See Epic  Promis- 7-28-22    DATA  Interactive Complexity: No  Crisis: No        Progress Since Last Session (Related to Symptoms / Goals / Homework):   Symptoms: Client reports it has been a hard couple of days with more symptoms of depression, anxiety and PMDD.  She has been working on preparing for a trip with her daughter knowing that she will be in the PMDD time frame during the trip.      Homework: Achieved / completed to satisfaction  Completed in session      Episode of Care Goals: Minimal progress - ACTION (Actively working towards change); Intervened by reinforcing change plan /  affirming steps taken     Current / Ongoing Stressors and Concerns:   -Has struggled with PMDD for a number of years.  Has been working with chiropractic and message therapy on some holistic treatment options  Is entering into the week in which there is more intense symptoms and is going with her daughter to an event in [place] that she needs to prepare for.     -Working on communication issues with spouse and finding shared activities.     -Has some hard days this month when symptoms are so strong, client cannot get out of bed and feels like all she can do is sleep.  Is very tearful during this time and can cry for hours.     -In the process of applying for disability.  Client is very driven but does not feel she could work a full time job due to her symptoms.       -Feels that the PMDD became very intense after she suffered the loss of a miscarriage and her grandmother started to have more health issues and some family members put her in memory care.       Treatment Objective(s) Addressed in This Session:   Patient will develop a plan to help manage PMDD  Patient will work on ways to communicate patterns   Self-care        Intervention:   Continue to use hydroxyzine and the allergy pill.     Work with chiropractic and message therapy on a holistic plan.     Plan to spend some quality time with daughter and have some tools to use on the trip this weekend.        Continue to work on mindfulness around trauma triggers.  Able to process through having a miscarriage and when her grandmother became more ill.      Assessments completed prior to visit:  The following assessments were completed by patient for this visit:  PHQ2:   PHQ-2 ( 1999 [employer]) 7/25/2022   Q1: Little interest or pleasure in doing things 0   Q2: Feeling down, depressed or hopeless 1   PHQ-2 Score 1   Q1: Little interest or pleasure in doing things Not at all   Q2: Feeling down, depressed or hopeless Several days   PHQ-2 Score 1     PHQ9:   PHQ-9  SCORE 8/3/2022   PHQ-9 Total Score 8     GAD2:   YADI-2 7/28/2022   Feeling nervous, anxious, or on edge 1   Not being able to stop or control worrying 0   YADI-2 Total Score 1     GAD7: No flowsheet data found.  PROMIS 10-Global Health (all questions and answers displayed):   PROMIS 10 7/28/2022   In general, would you say your health is: Good   In general, would you say your quality of life is: Good   In general, how would you rate your physical health? Good   In general, how would you rate your mental health, including your mood and your ability to think? Good   In general, how would you rate your satisfaction with your social activities and relationships? Good   In general, please rate how well you carry out your usual social activities and roles Fair   To what extent are you able to carry out your everyday physical activities such as walking, climbing stairs, carrying groceries, or moving a chair? Mostly   How often have you been bothered by emotional problems such as feeling anxious, depressed or irritable? Sometimes   How would you rate your fatigue on average? Mild   How would you rate your pain on average?   0 = No Pain  to  10 = Worst Imaginable Pain 3   In general, would you say your health is: 3   In general, would you say your quality of life is: 3   In general, how would you rate your physical health? 3   In general, how would you rate your mental health, including your mood and your ability to think? 3   In general, how would you rate your satisfaction with your social activities and relationships? 3   In general, please rate how well you carry out your usual social activities and roles. (This includes activities at home, at work and in your community, and responsibilities as a parent, child, spouse, employee, friend, etc.) 2   To what extent are you able to carry out your everyday physical activities such as walking, climbing stairs, carrying groceries, or moving a chair? 4   In the past 7 days, how  often have you been bothered by emotional problems such as feeling anxious, depressed, or irritable? 3   In the past 7 days, how would you rate your fatigue on average? 2   In the past 7 days, how would you rate your pain on average, where 0 means no pain, and 10 means worst imaginable pain? 3   Global Mental Health Score 12   Global Physical Health Score 15   PROMIS TOTAL - SUBSCORES 27     [place] Suicide Severity Rating Scale (Lifetime/Recent)  [place] Suicide Severity Rating (Lifetime/Recent) 8/3/2022   1. Wish to be Dead (Lifetime) 0   2. Non-Specific Active Suicidal Thoughts (Lifetime) 0   Actual Attempt (Lifetime) 0   Has subject engaged in non-suicidal self-injurious behavior? (Lifetime) 0   Calculated C-SSRS Risk Score (Lifetime/Recent) No Risk Indicated         ASSESSMENT: Current Emotional / Mental Status (status of significant symptoms):   Risk status (Self / Other harm or suicidal ideation)   Patient denies current fears or concerns for personal safety.   Patient denies current or recent suicidal ideation or behaviors.   Patient denies current or recent homicidal ideation or behaviors.   Patient denies current or recent self injurious behavior or ideation.   Patient denies other safety concerns.   Patient reports there has been no change in risk factors since their last session.     Patient reports there has been no change in protective factors since their last session.     Recommended that patient call 911 or go to the local ED should there be a change in any of these risk factors.     Appearance:   Appropriate    Eye Contact:   Good    Psychomotor Behavior: Normal    Attitude:   Cooperative    Orientation:   All   Speech    Rate / Production: Normal     Volume:  Normal    Mood:    Anxious Depressed    Affect:    Tearful Worrisome    Thought Content:  Clear    Thought Form:  Coherent  Logical    Insight:    Good      Medication Review:   No changes to current psychiatric medication(s)     Medication  Compliance:   Yes     Changes in Health Issues:   None reported     Chemical Use Review:   Substance Use: Chemical use reviewed, no active concerns identified      Tobacco Use: No current tobacco use.      Diagnosis:  296.32 (F33.1) Major Depressive Disorder, Recurrent Episode, Moderate _ and With mixed features  300.01 (F41.0) Panic Disorder  300.02 (F41.1) Generalized Anxiety Disorder   PMDD      Collateral Reports Completed:   Not Applicable    PLAN: (Patient Tasks / Therapist Tasks / Other)  Client will continue to work on the following goals:  -Continue with medication management for PMDD, chiropractic and message therapy.    -Spend quality time with daughter this weekend.     -Set some concrete boundaries.    -On the really bad days, give self permission to relax.    -Continue to work on mindfulness around trauma triggers.    -Consider some volunteer opportunities.          Lia Stiles,                                                          ______________________________________________________________________    Individual Treatment Plan    Patient's Name: Veena Parsons  YOB: 1985    Date of Creation: 9-7-22  Date Treatment Plan Last Reviewed/Revised: 9-7-22    DSM5 Diagnoses:  296.32 (F33.1) Major Depressive Disorder, Recurrent Episode, Moderate _ and With mixed features  300.01 (F41.0) Panic Disorder  300.02 (F41.1) Generalized Anxiety Disorder   PMDD  Psychosocial / Contextual Factors: Some relational issues   PROMIS (reviewed every 90 days): 27    Referral / Collaboration:  Referral to another professional/service is not indicated at this time..    Anticipated number of session for this episode of care: 6-9 sessions  Anticipation frequency of session: Biweekly  Anticipated Duration of each session: 38-52 minutes  Treatment plan will be reviewed in 90 days or when goals have been changed.       MeasurableTreatment Goal(s) related to diagnosis / functional impairment(s)  Goal 1:  Patient will address struggles with anxiety, depression and PMDD.    I will know I've met my goal when I am feeling less reactive through the month with the symptoms.      Objective #A (Patient Action)    Patient will work on establishing a concrete routine with self-care.  Status: New - Date: 9-7-22     Intervention(s)  Therapist will use CBT and motivational interviewing.      Objective #B  Patient will develop a plan to help manage PMDD  Status: New - Date: 9-7-22     Intervention(s)  Therapist will use solution focused therapy.    Objective #C  Patient will work on ways to communicate with narcissistic individuals.  Status: New - Date: 9-7-22     Intervention(s)  Therapist will teach communication skills.          Patient has reviewed and agreed to the above plan.      Lia Stiles, TH September 7, 2022

## 2022-10-26 ENCOUNTER — VIRTUAL VISIT (OUTPATIENT)
Dept: PSYCHOLOGY | Facility: CLINIC | Age: 37
End: 2022-10-26
Payer: COMMERCIAL

## 2022-10-26 DIAGNOSIS — F33.1 MAJOR DEPRESSIVE DISORDER, RECURRENT EPISODE, MODERATE (H): Primary | ICD-10-CM

## 2022-10-26 DIAGNOSIS — F41.1 GENERALIZED ANXIETY DISORDER: ICD-10-CM

## 2022-10-26 DIAGNOSIS — F41.0 PANIC DISORDER WITHOUT AGORAPHOBIA: ICD-10-CM

## 2022-10-26 PROCEDURE — 90832 PSYTX W PT 30 MINUTES: CPT | Mod: 95 | Performed by: MARRIAGE & FAMILY THERAPIST

## 2022-10-26 ASSESSMENT — PATIENT HEALTH QUESTIONNAIRE - PHQ9
SUM OF ALL RESPONSES TO PHQ QUESTIONS 1-9: 14
10. IF YOU CHECKED OFF ANY PROBLEMS, HOW DIFFICULT HAVE THESE PROBLEMS MADE IT FOR YOU TO DO YOUR WORK, TAKE CARE OF THINGS AT HOME, OR GET ALONG WITH OTHER PEOPLE: EXTREMELY DIFFICULT
SUM OF ALL RESPONSES TO PHQ QUESTIONS 1-9: 14

## 2022-10-26 NOTE — PROGRESS NOTES
[facility]                                     Progress Note    Patient Name: Veena Parsons  Date: 10-26-22         Service Type: Individual      Session Start Time: 1030am  Session End Time: 11am     Session Length: 30min    Session #: 5    Attendees: Client attended alone    Service Modality:  Video Visit:Had to switch to phone as client was in the car       Provider verified identity through the following two step process.  Patient provided:  Patient  and Patient address    Telemedicine Visit: The patient's condition can be safely assessed and treated via synchronous audio and visual telemedicine encounter.      Reason for Telemedicine Visit: Patient has requested telehealth visit    Originating Site (Patient Location): Patient's home    Distant Site (Provider Location): Provider Remote Setting- Home Office    Consent:  The patient/guardian has verbally consented to: the potential risks and benefits of telemedicine (video visit) versus in person care; bill my insurance or make self-payment for services provided; and responsibility for payment of non-covered services.     Patient would like the video invitation sent by:  My Chart    Mode of Communication:  Video Conference via Amwell    As the provider I attest to compliance with applicable laws and regulations related to telemedicine.    Treatment Plan- 9-7-22  CGI- 9-7-22  Phq-9 and YADI-7- See Epic  Promis- 7-28-22    DATA  Interactive Complexity: No  Crisis: No        Progress Since Last Session (Related to Symptoms / Goals / Homework):   Symptoms: Client reports it has been a hard couple of days with more symptoms of depression, anxiety and PMDD.  She is in the process of getting ready to head to [place] as her mother is in the ICU.    Homework: Achieved / completed to satisfaction  Completed in session      Episode of Care Goals: Minimal progress - ACTION (Actively working towards change); Intervened by reinforcing change plan /  affirming steps taken     Current / Ongoing Stressors and Concerns:   -Has struggled with PMDD for a number of years.  Has been working with chiropractic and message therapy on some holistic treatment options       -Only able to do a short visit today as mother is in the ICU in [place] and client is headed down.    -Concerned for mother who now will have some ongoing health issues to confront.     -In the process of applying for disability.  Client is very driven but does not feel she could work a full time job due to her symptoms.       -Feels that the PMDD is now in a flare with all the unexpected stress.     -Worries that family will be negative when she arrives in [place].  Feels her decisions are often criticized.      Treatment Objective(s) Addressed in This Session:   Patient will develop a plan to help manage PMDD  Patient will work on ways to communicate patterns   Self-care   Setting limits with family members        Intervention:   Continue to use hydroxyzine and the allergy pill.     Stay hydrated in the drive down to [place].     Work with chiropractic and message therapy on a holistic plan.     Discussed body being in fight or flight mode.    Set some boundaries with family if they are being negative upon arrival.     Communicate with spouse that sometimes the constant questions can be too much.      Continue to work on mindfulness around trauma triggers.     Assessments completed prior to visit:  The following assessments were completed by patient for this visit:  PHQ2:   PHQ-2 ( 1999 [employer]) 10/26/2022 7/25/2022   Q1: Little interest or pleasure in doing things 2 0   Q2: Feeling down, depressed or hopeless 2 1   PHQ-2 Score 4 1   Q1: Little interest or pleasure in doing things More than half the days Not at all   Q2: Feeling down, depressed or hopeless More than half the days Several days   PHQ-2 Score 4 1     PHQ9:   PHQ-9 SCORE 8/3/2022 10/26/2022   PHQ-9 Total Score MyChart - 14 (Moderate  depression)   PHQ-9 Total Score 8 14     GAD2:   YADI-2 7/28/2022   Feeling nervous, anxious, or on edge 1   Not being able to stop or control worrying 0   YADI-2 Total Score 1     GAD7: No flowsheet data found.  PROMIS 10-Global Health (all questions and answers displayed):   PROMIS 10 7/28/2022   In general, would you say your health is: Good   In general, would you say your quality of life is: Good   In general, how would you rate your physical health? Good   In general, how would you rate your mental health, including your mood and your ability to think? Good   In general, how would you rate your satisfaction with your social activities and relationships? Good   In general, please rate how well you carry out your usual social activities and roles Fair   To what extent are you able to carry out your everyday physical activities such as walking, climbing stairs, carrying groceries, or moving a chair? Mostly   How often have you been bothered by emotional problems such as feeling anxious, depressed or irritable? Sometimes   How would you rate your fatigue on average? Mild   How would you rate your pain on average?   0 = No Pain  to  10 = Worst Imaginable Pain 3   In general, would you say your health is: 3   In general, would you say your quality of life is: 3   In general, how would you rate your physical health? 3   In general, how would you rate your mental health, including your mood and your ability to think? 3   In general, how would you rate your satisfaction with your social activities and relationships? 3   In general, please rate how well you carry out your usual social activities and roles. (This includes activities at home, at work and in your community, and responsibilities as a parent, child, spouse, employee, friend, etc.) 2   To what extent are you able to carry out your everyday physical activities such as walking, climbing stairs, carrying groceries, or moving a chair? 4   In the past 7 days, how  often have you been bothered by emotional problems such as feeling anxious, depressed, or irritable? 3   In the past 7 days, how would you rate your fatigue on average? 2   In the past 7 days, how would you rate your pain on average, where 0 means no pain, and 10 means worst imaginable pain? 3   Global Mental Health Score 12   Global Physical Health Score 15   PROMIS TOTAL - SUBSCORES 27     [place] Suicide Severity Rating Scale (Lifetime/Recent)  [place] Suicide Severity Rating (Lifetime/Recent) 8/3/2022   1. Wish to be Dead (Lifetime) 0   2. Non-Specific Active Suicidal Thoughts (Lifetime) 0   Actual Attempt (Lifetime) 0   Has subject engaged in non-suicidal self-injurious behavior? (Lifetime) 0   Calculated C-SSRS Risk Score (Lifetime/Recent) No Risk Indicated         ASSESSMENT: Current Emotional / Mental Status (status of significant symptoms):   Risk status (Self / Other harm or suicidal ideation)   Patient denies current fears or concerns for personal safety.   Patient denies current or recent suicidal ideation or behaviors.   Patient denies current or recent homicidal ideation or behaviors.   Patient denies current or recent self injurious behavior or ideation.   Patient denies other safety concerns.   Patient reports there has been no change in risk factors since their last session.     Patient reports there has been no change in protective factors since their last session.     Recommended that patient call 911 or go to the local ED should there be a change in any of these risk factors.     Appearance:   Appropriate    Eye Contact:   Good    Psychomotor Behavior: Normal    Attitude:   Cooperative    Orientation:   All   Speech    Rate / Production: Normal     Volume:  Normal    Mood:    Anxious Depressed    Affect:    Worrisome    Thought Content:  Clear    Thought Form:  Coherent  Logical    Insight:    Good      Medication Review:   No changes to current psychiatric medication(s)     Medication  Compliance:   Yes     Changes in Health Issues:   None reported     Chemical Use Review:   Substance Use: Chemical use reviewed, no active concerns identified      Tobacco Use: No current tobacco use.      Diagnosis:  296.32 (F33.1) Major Depressive Disorder, Recurrent Episode, Moderate _ and With mixed features  300.01 (F41.0) Panic Disorder  300.02 (F41.1) Generalized Anxiety Disorder   PMDD      Collateral Reports Completed:   Not Applicable    PLAN: (Patient Tasks / Therapist Tasks / Other)  Client will continue to work on the following goals:  -Continue with medication management for PMDD, chiropractic and message therapy.    -Stay hydrated on the way down to [place].    -Set some concrete boundaries.    -On the really bad days, give self permission to relax.    -Continue to work on mindfulness around trauma triggers.    -Be assertive with family if they are giving her a hard time about driving and not flying to [place].          Lia Stiles,                                                          ______________________________________________________________________    Individual Treatment Plan    Patient's Name: Veena Parsons  YOB: 1985    Date of Creation: 9-7-22  Date Treatment Plan Last Reviewed/Revised: 9-7-22    DSM5 Diagnoses:  296.32 (F33.1) Major Depressive Disorder, Recurrent Episode, Moderate _ and With mixed features  300.01 (F41.0) Panic Disorder  300.02 (F41.1) Generalized Anxiety Disorder   PMDD  Psychosocial / Contextual Factors: Some relational issues   PROMIS (reviewed every 90 days): 27    Referral / Collaboration:  Referral to another professional/service is not indicated at this time..    Anticipated number of session for this episode of care: 6-9 sessions  Anticipation frequency of session: Biweekly  Anticipated Duration of each session: 38-52 minutes  Treatment plan will be reviewed in 90 days or when goals have been changed.       MeasurableTreatment Goal(s)  related to diagnosis / functional impairment(s)  Goal 1: Patient will address struggles with anxiety, depression and PMDD.    I will know I've met my goal when I am feeling less reactive through the month with the symptoms.      Objective #A (Patient Action)    Patient will work on establishing a concrete routine with self-care.  Status: New - Date: 9-7-22     Intervention(s)  Therapist will use CBT and motivational interviewing.      Objective #B  Patient will develop a plan to help manage PMDD  Status: New - Date: 9-7-22     Intervention(s)  Therapist will use solution focused therapy.    Objective #C  Patient will work on ways to communicate with narcissistic individuals.  Status: New - Date: 9-7-22     Intervention(s)  Therapist will teach communication skills.          Patient has reviewed and agreed to the above plan.      Lia Stiles, TH September 7, 2022

## 2022-11-27 ASSESSMENT — ANXIETY QUESTIONNAIRES
8. IF YOU CHECKED OFF ANY PROBLEMS, HOW DIFFICULT HAVE THESE MADE IT FOR YOU TO DO YOUR WORK, TAKE CARE OF THINGS AT HOME, OR GET ALONG WITH OTHER PEOPLE?: EXTREMELY DIFFICULT
GAD7 TOTAL SCORE: 17
7. FEELING AFRAID AS IF SOMETHING AWFUL MIGHT HAPPEN: NEARLY EVERY DAY
5. BEING SO RESTLESS THAT IT IS HARD TO SIT STILL: MORE THAN HALF THE DAYS
4. TROUBLE RELAXING: SEVERAL DAYS
GAD7 TOTAL SCORE: 17
7. FEELING AFRAID AS IF SOMETHING AWFUL MIGHT HAPPEN: NEARLY EVERY DAY
IF YOU CHECKED OFF ANY PROBLEMS ON THIS QUESTIONNAIRE, HOW DIFFICULT HAVE THESE PROBLEMS MADE IT FOR YOU TO DO YOUR WORK, TAKE CARE OF THINGS AT HOME, OR GET ALONG WITH OTHER PEOPLE: EXTREMELY DIFFICULT
GAD7 TOTAL SCORE: 17
2. NOT BEING ABLE TO STOP OR CONTROL WORRYING: NEARLY EVERY DAY
3. WORRYING TOO MUCH ABOUT DIFFERENT THINGS: MORE THAN HALF THE DAYS
1. FEELING NERVOUS, ANXIOUS, OR ON EDGE: NEARLY EVERY DAY
6. BECOMING EASILY ANNOYED OR IRRITABLE: NEARLY EVERY DAY

## 2022-11-29 ASSESSMENT — PATIENT HEALTH QUESTIONNAIRE - PHQ9
SUM OF ALL RESPONSES TO PHQ QUESTIONS 1-9: 21
10. IF YOU CHECKED OFF ANY PROBLEMS, HOW DIFFICULT HAVE THESE PROBLEMS MADE IT FOR YOU TO DO YOUR WORK, TAKE CARE OF THINGS AT HOME, OR GET ALONG WITH OTHER PEOPLE: EXTREMELY DIFFICULT
SUM OF ALL RESPONSES TO PHQ QUESTIONS 1-9: 21

## 2022-11-30 ENCOUNTER — VIRTUAL VISIT (OUTPATIENT)
Dept: PSYCHOLOGY | Facility: CLINIC | Age: 37
End: 2022-11-30
Payer: COMMERCIAL

## 2022-11-30 DIAGNOSIS — F33.1 MAJOR DEPRESSIVE DISORDER, RECURRENT EPISODE, MODERATE (H): Primary | ICD-10-CM

## 2022-11-30 DIAGNOSIS — F41.0 PANIC DISORDER WITHOUT AGORAPHOBIA: ICD-10-CM

## 2022-11-30 DIAGNOSIS — F41.1 GENERALIZED ANXIETY DISORDER: ICD-10-CM

## 2022-11-30 PROCEDURE — 90834 PSYTX W PT 45 MINUTES: CPT | Mod: 95 | Performed by: MARRIAGE & FAMILY THERAPIST

## 2022-11-30 NOTE — PROGRESS NOTES
[facility]                                     Progress Note    Patient Name: Veena Parsons  Date: 11-30-22         Service Type: Individual      Session Start Time: 10am  Session End Time: 1050am     Session Length: 50min    Session #: 6    Attendees: Client attended alone    Service Modality:  Video Visit:      Telemedicine Visit: The patient's condition can be safely assessed and treated via synchronous audio and visual telemedicine encounter.      Reason for Telemedicine Visit: Patient has requested telehealth visit    Originating Site (Patient Location): Patient's home        Distant Location (provider location):  Off-site    Consent:  The patient/guardian has verbally consented to: the potential risks and benefits of telemedicine (video visit) versus in person care; bill my insurance or make self-payment for services provided; and responsibility for payment of non-covered services.     Mode of Communication:  Video Conference via [employer]    As the provider I attest to compliance with applicable laws and regulations related to telemedicine.  Treatment Plan- 9-7-22  CGI- 9-7-22  Phq-9 and YADI-7- See Epic  Promis- 11-27-22    DATA  Interactive Complexity: No  Crisis: No        Progress Since Last Session (Related to Symptoms / Goals / Homework):   Symptoms: Client reports it has been a hard couple of weeks.  Mother passed away, there was a relational conflict with her sister and spouse has not been supportive.      Homework: Achieved / completed to satisfaction  Completed in session      Episode of Care Goals: Minimal progress - ACTION (Actively working towards change); Intervened by reinforcing change plan / affirming steps taken     Current / Ongoing Stressors and Concerns:   -Has struggled with PMDD for a number of years.  Has been working with chiropractic and message therapy on some holistic treatment options      -Has had some suicidal thoughts tied to feeling overwhelmed,  loss of mom and not feeling supported.      -Mother did pass away.    -There was a relational conflict with sister while in [place] and a lot of hurtful comments were said.    -In the process of applying for disability.  Client is very driven but does not feel she could work a full time job due to her symptoms.       -Feels that the PMDD has been extreme the last couple of weeks.    -Has felt a lack of accomplishments due to PMDD.      Treatment Objective(s) Addressed in This Session:   Safety planning   Patient will develop a plan to help manage PMDD  Patient will work on ways to communicate patterns   Self-care   Setting limits with family members        Intervention:   Follow safety plan and use crisis resources.   Use support system.    Processed through the situation with her sister. Setting boundaries and being assertive.   Have spouse attend the next session.    Continue to use hydroxyzine and the allergy pill.      Work with chiropractic and message therapy on a holistic plan.      Communicate with spouse about the ongoing family stress and tieing up mothers affairs.      Continue to work on mindfulness around trauma triggers.     Assessments completed prior to visit:  The following assessments were completed by patient for this visit:  PHQ2:   PHQ-2 ( 1999 [employer]) 11/29/2022 10/26/2022 7/25/2022   Q1: Little interest or pleasure in doing things 3 2 0   Q2: Feeling down, depressed or hopeless 3 2 1   PHQ-2 Score 6 4 1   Q1: Little interest or pleasure in doing things Nearly every day More than half the days Not at all   Q2: Feeling down, depressed or hopeless Nearly every day More than half the days Several days   PHQ-2 Score 6 4 1     PHQ9:   PHQ-9 SCORE 8/3/2022 10/26/2022 11/29/2022   PHQ-9 Total Score MyChart - 14 (Moderate depression) 21 (Severe depression)   PHQ-9 Total Score 8 14 21     GAD2:   YADI-2 7/28/2022 11/27/2022   Feeling nervous, anxious, or on edge 1 3   Not being able to stop or control  worrying 0 3   YADI-2 Total Score 1 6     GAD7:   YADI-7 SCORE 11/27/2022   Total Score 17 (severe anxiety)   Total Score 17     PROMIS 10-Global Health (all questions and answers displayed):   PROMIS 10 7/28/2022 11/27/2022   In general, would you say your health is: Good Good   In general, would you say your quality of life is: Good Fair   In general, how would you rate your physical health? Good Good   In general, how would you rate your mental health, including your mood and your ability to think? Good Poor   In general, how would you rate your satisfaction with your social activities and relationships? Good Poor   In general, please rate how well you carry out your usual social activities and roles Fair Poor   To what extent are you able to carry out your everyday physical activities such as walking, climbing stairs, carrying groceries, or moving a chair? Mostly A little   How often have you been bothered by emotional problems such as feeling anxious, depressed or irritable? Sometimes Always   How would you rate your fatigue on average? Mild Moderate   How would you rate your pain on average?   0 = No Pain  to  10 = Worst Imaginable Pain 3 4   In general, would you say your health is: 3 3   In general, would you say your quality of life is: 3 2   In general, how would you rate your physical health? 3 3   In general, how would you rate your mental health, including your mood and your ability to think? 3 1   In general, how would you rate your satisfaction with your social activities and relationships? 3 1   In general, please rate how well you carry out your usual social activities and roles. (This includes activities at home, at work and in your community, and responsibilities as a parent, child, spouse, employee, friend, etc.) 2 1   To what extent are you able to carry out your everyday physical activities such as walking, climbing stairs, carrying groceries, or moving a chair? 4 2   In the past 7 days, how  often have you been bothered by emotional problems such as feeling anxious, depressed, or irritable? 3 5   In the past 7 days, how would you rate your fatigue on average? 2 3   In the past 7 days, how would you rate your pain on average, where 0 means no pain, and 10 means worst imaginable pain? 3 4   Global Mental Health Score 12 5   Global Physical Health Score 15 11   PROMIS TOTAL - SUBSCORES 27 16     [place] Suicide Severity Rating Scale (Lifetime/Recent)  [place] Suicide Severity Rating (Lifetime/Recent) 8/3/2022   1. Wish to be Dead (Lifetime) 0   2. Non-Specific Active Suicidal Thoughts (Lifetime) 0   Actual Attempt (Lifetime) 0   Has subject engaged in non-suicidal self-injurious behavior? (Lifetime) 0   Calculated C-SSRS Risk Score (Lifetime/Recent) No Risk Indicated         ASSESSMENT: Current Emotional / Mental Status (status of significant symptoms):   Risk status (Self / Other harm or suicidal ideation)   Patient denies current fears or concerns for personal safety.   Patient reports the following current or recent suicidal ideation or behaviors: -.Suicidal thoughts.  Triggers are recent loss, relational issues with spouse and feeling disregarded.     Patient denies current or recent homicidal ideation or behaviors.   Patient denies current or recent self injurious behavior or ideation.   Patient denies other safety concerns.   Patient reports there has been no change in risk factors since their last session.     Patient reports there has been no change in protective factors since their last session.     A safety and risk management plan has been developed including: Patient consented to co-developed safety plan on 11-30-22.  Safety and risk management plan was reviewed.   Patient agreed to use safety plan should any safety concerns arise.  A copy was made available to the patient.         [facility]                                       Veena Parsons     SAFETY PLAN:  Step 1:  "Warning signs / cues (Thoughts, images, mood, situation, behavior) that a crisis may be developing:    Thoughts: \"I don't matter\", \"People would be better off without me\", \"I'm a burden\", \"I can't do this anymore\", \"I just want this to end\" and \"Nothing makes it better\"    Images: flashbacks    Thinking Processes: ruminations (can't stop thinking about my problems): PMDD and loss of mother, racing thoughts and highly critical and negative thoughts: I cant do anything right    Mood: worsening depression, hopelessness, helplessness, intense worry, agitation and mood swings    Behaviors: isolating/withdrawing , can't stop crying, not taking care of myself, not taking care of my responsibilities, sleeping too much, not sleeping enough and increasing frequency and duration of dissociation    Situations: relationship problems, trauma  and medical condition / diagnosis: PMDD   Step 2: Coping strategies - Things I can do to take my mind off of my problems without contacting another person (relaxation technique, physical activity):    Distress Tolerance Strategies:  relaxation activities, play with my pet , listen to positive and upbeat music, sensory based activities/self-soothe with five senses, watch a funny movie, read a book, change body temperature (ice pack/cold water)  and paced breathing/progressive muscle relaxation    Physical Activities: go for a walk, gardening, meditation, deep breathing and stretching     Focus on helpful thoughts:  \"This is temporary\", \"I will get through this\", \"It always passes\" and \"Ride the wave\"  Step 3: People and social settings that provide distraction:   Name: Spouse Star   Name:  Spending time with daughter       movie theater, pet store/humane society and coffee shop   Step 4: Remind myself of people and things that are important to me and worth living for:    Daughter   Spouse  Pets     Step 5: When I am in crisis, I can ask these people to help me use my safety plan:   Name: " Spouse  Phone: [phone]      Step 6: Making the environment safe:     remove things I could use to hurt myself and be around others  Step 7: Professionals or agencies I can contact during a crisis:    Suicide Prevention Lifeline: Call or Text 214   Local Crisis Services:  [facility] [phone]      Call 911 or go to my nearest emergency department.   I helped develop this safety plan and agree to use it when needed.  I have been given a copy of this plan.      Client signature _________________________________________________________________  Today s date:  11/30/2022  Completed by Provider Name/ Credentials:  Lia Stiles MA, LMFT  November 30, 2022  Adapted from Safety Plan Template 2008 Eleonora Capone and Danny Carmona is reprinted with the express permission of the authors.  No portion of the Safety Plan Template may be reproduced without the express, written permission.  You can contact the authors at [email or web address] or [email or web address].    Name: Veena Parsons  YOB: 1985  Date: November 30, 2022   My primary care provider: Francisco Nails  My primary care clinic: [facility]   My prescriber: PCP  Other care team support:  Holistic medical provider    My Triggers:    Relational problems  Loss of mother recently  Financial stress      Additional People, Places, and Things that I can access for support:   Spouse  Daughter          What is important to me and makes life worth living: Family         GREEN    Good Control  1. I feel good  2. No suicidal thoughts   3. Can work, sleep and play      Action Steps  1. Self-care: balanced meals, exercising, sleep practices, etc.  2. Take your medications as prescribed.  3. Continue meetings with therapist and prescriber.  4.  Do the healthy things that I enjoy.             YELLO Getting Worse  I have ANY of these:  1. I do not feel good  2. Difficulty Concentrating  3. Sleep is changing  4. Increase/Change in my  thoughts to hurt self and/or others, but I can still manage and not act on it.   5. Not taking care of self.               Action Steps (in addition to the above):  1. Inform your therapist and psychiatric prescriber/PCP.  2. Keep taking your medications as prescribed.    3. Turn to people you can ask for help.  4. Use internal coping strategies -see below.  5. Create safe environment: Removing items I can hurt self with              RED  Get Help  If I have ANY of these:  1. Current and uncontrollable thoughts and/or behaviors to hurt self and/or others.   2. Crazy buzzing and spinning.     Actions to manage my safety  1. Contact your emergency person Star  2. Call or Text 095  3. Call my crisis team- [facility] [phone]  3. Or Call 951 or go to the emergency room right away        My Internal Coping Strategies include the following:  take a bath, belly breathing, arts and crafts, play with my pet, use my coping box, exercise and use my coping skills    [End for Brief Safety Plan]     Safety Concerns  How To Identify Situations That Make Your Mental Health Worse:  Triggers are things that make your mental health worse.  Look at the list below to help you find your triggers and what you can do about them.     1. Identify Early Warning Signs:    Sometimes symptoms return, even when people do their best to stay well. Symptoms can develop over a short period of time with little or no warning, but most of the time they emerge gradually over several weeks.  Early warning signs are changes that people experience when a relapse is starting. Some early warning signs are common and others are not as common.   Common Early Warning Signs:    Feeling tense or nervous, Eating less or eating more, Trouble sleeping -either too much or too little sleep, Feeling depressed or low, Feeling irritable, Feeling like not being around other people, Trouble concentrating and Urges to harm self     2. Identify action steps to  take when warning signs are noticed:    Taking Action- It is important to take action if you are experiencing early warning signs of a relapse.  The faster you act, the more likely it is that you can avoid a full relapse.  It is helpful to identify several specific ways to cope with symptoms.      The following is my list of symptoms and coping strategies that I can use when they are present:    Symptom Coping Strategies   Anxiety -Talk with someone in your support system and let him or her know how you are feeling.  -Use relaxation techniques such as deep breathing or imagery.  -Use positive affirmations to counteract negative self-talk such as  I am learning to let go of worry.    Depression - Schedule your day; include activities you have to do and activities you enjoy doing.  - Get some exercise - walk, run, bike, or swim.  - Give yourself credit for even the smallest things you get done.   Sleep Difficulties   - Go to sleep at the same time every day.  - Do something relaxing before bed, such as drinking herbal tea or listening to music.  - Avoid having discussions about upsetting topics before going to bed.   Delusions   - Distract yourself from the disturbing thought by doing something that requires your attention such as a puzzle.  - Check out your beliefs by talking to someone you trust.    Hallucinations   - Use headphones to listen to music.  - Tell voices to  stop  or say to yourself,  I am safe.   - Ignore the hallucinations as much as possible; focus on other things.   Concentration Difficulties - Minimize distractions so there is only one thing for you to focus on at a time.    - Ask the person you are having a conversation with to slow down or repeat things you are unsure of.            Appearance:   Appropriate    Eye Contact:   Good    Psychomotor Behavior: Normal    Attitude:   Cooperative    Orientation:   All   Speech    Rate / Production: Normal     Volume:  Normal    Mood:    Anxious Depressed  "Sad   Affect:    Worrisome Tearful   Thought Content:  Clear    Thought Form:  Coherent  Logical    Insight:    Good      Medication Review:   No changes to current psychiatric medication(s)     Medication Compliance:   Yes     Changes in Health Issues:   None reported     Chemical Use Review:   Substance Use: Chemical use reviewed, no active concerns identified      Tobacco Use: No current tobacco use.      Diagnosis:  296.32 (F33.1) Major Depressive Disorder, Recurrent Episode, Moderate _ and With mixed features  300.01 (F41.0) Panic Disorder  300.02 (F41.1) Generalized Anxiety Disorder   PMDD      Collateral Reports Completed:   Not Applicable    PLAN: (Patient Tasks / Therapist Tasks / Other)  Client will continue to work on the following goals:  -Follow safety plan and use crisis resources.    -Have Star attend next session/ bring up accountability and being on the same page- why does this upset him?  \"What do you want to do?\"  Having a reciprocal conversation.  How can we have a happier family.  \"Colaborate.\"  -Continue with medication management for PMDD, chiropractic and message therapy.    -Allow self to grieve the loss of mother.   -Set some concrete boundaries.    -Continue to work on mindfulness around trauma triggers.          Lia Stiles,                                                          ______________________________________________________________________    Individual Treatment Plan    Patient's Name: Veena Parsons  YOB: 1985    Date of Creation: 9-7-22  Date Treatment Plan Last Reviewed/Revised: 9-7-22    DSM5 Diagnoses:  296.32 (F33.1) Major Depressive Disorder, Recurrent Episode, Moderate _ and With mixed features  300.01 (F41.0) Panic Disorder  300.02 (F41.1) Generalized Anxiety Disorder   PMDD  Psychosocial / Contextual Factors: Some relational issues   PROMIS (reviewed every 90 days): 27    Referral / Collaboration:  Referral to another professional/service is " not indicated at this time..    Anticipated number of session for this episode of care: 6-9 sessions  Anticipation frequency of session: Biweekly  Anticipated Duration of each session: 38-52 minutes  Treatment plan will be reviewed in 90 days or when goals have been changed.       MeasurableTreatment Goal(s) related to diagnosis / functional impairment(s)  Goal 1: Patient will address struggles with anxiety, depression and PMDD.    I will know I've met my goal when I am feeling less reactive through the month with the symptoms.      Objective #A (Patient Action)    Patient will work on establishing a concrete routine with self-care.  Status: New - Date: 9-7-22     Intervention(s)  Therapist will use CBT and motivational interviewing.      Objective #B  Patient will develop a plan to help manage PMDD  Status: New - Date: 9-7-22     Intervention(s)  Therapist will use solution focused therapy.    Objective #C  Patient will work on ways to communicate with narcissistic individuals.  Status: New - Date: 9-7-22     Intervention(s)  Therapist will teach communication skills.          Patient has reviewed and agreed to the above plan.      Lia Stiles, TH September 7, 2022

## 2022-12-05 ENCOUNTER — VIRTUAL VISIT (OUTPATIENT)
Dept: PSYCHOLOGY | Facility: CLINIC | Age: 37
End: 2022-12-05
Payer: COMMERCIAL

## 2022-12-05 DIAGNOSIS — F41.1 GENERALIZED ANXIETY DISORDER: ICD-10-CM

## 2022-12-05 DIAGNOSIS — F33.1 MAJOR DEPRESSIVE DISORDER, RECURRENT EPISODE, MODERATE (H): Primary | ICD-10-CM

## 2022-12-05 DIAGNOSIS — F41.0 PANIC DISORDER WITHOUT AGORAPHOBIA: ICD-10-CM

## 2022-12-05 PROCEDURE — 90834 PSYTX W PT 45 MINUTES: CPT | Mod: 95 | Performed by: MARRIAGE & FAMILY THERAPIST
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
## 2022-12-14 ENCOUNTER — VIRTUAL VISIT (OUTPATIENT)
Dept: PSYCHOLOGY | Facility: CLINIC | Age: 37
End: 2022-12-14
Payer: COMMERCIAL

## 2022-12-14 DIAGNOSIS — F33.1 MAJOR DEPRESSIVE DISORDER, RECURRENT EPISODE, MODERATE (H): Primary | ICD-10-CM

## 2022-12-14 DIAGNOSIS — F41.1 GENERALIZED ANXIETY DISORDER: ICD-10-CM

## 2022-12-14 DIAGNOSIS — F41.0 PANIC DISORDER WITHOUT AGORAPHOBIA: ICD-10-CM

## 2022-12-14 PROCEDURE — 90834 PSYTX W PT 45 MINUTES: CPT | Mod: 95 | Performed by: MARRIAGE & FAMILY THERAPIST

## 2022-12-14 NOTE — PROGRESS NOTES
[facility]                                     Progress Note    Patient Name: Veena Parsons  Date: 12-14-22         Service Type: Individual      Session Start Time: 11am  Session End Time: 1150am     Session Length: 50min    Session #: 8    Attendees: Client and Spouse / Significant Other    Service Modality:  Video Visit:      Telemedicine Visit: The patient's condition can be safely assessed and treated via synchronous audio and visual telemedicine encounter.      Reason for Telemedicine Visit: Patient has requested telehealth visit    Originating Site (Patient Location): Patient's home        Distant Location (provider location):  Off-site    Consent:  The patient/guardian has verbally consented to: the potential risks and benefits of telemedicine (video visit) versus in person care; bill my insurance or make self-payment for services provided; and responsibility for payment of non-covered services.     Mode of Communication:  Video Conference via [employer]    As the provider I attest to compliance with applicable laws and regulations related to telemedicine.      Treatment Plan- 9-7-22  CGI- 9-7-22  Phq-9 and YADI-7- See Epic  Promis- 11-27-22    DATA  Interactive Complexity: No  Crisis: No        Progress Since Last Session (Related to Symptoms / Goals / Homework):   Symptoms: Client reports it has been a hard couple of weeks.  Spouse Star joins us today to discuss some of the difficulties.      Homework: Achieved / completed to satisfaction  Completed in session      Episode of Care Goals: Minimal progress - ACTION (Actively working towards change); Intervened by reinforcing change plan / affirming steps taken     Current / Ongoing Stressors and Concerns:   -Has struggled with PMDD for a number of years.  Has been working with chiropractic and message therapy on some holistic treatment options      -Has been working on managing mood but feels there is a lot of negativity in the  home and a lot of separation even when everyone is together.    -Sister is coming to visit for the holiday and there was the issue while they were down in [place].     -In the process of applying for disability.        -Feels that the PMDD has been extreme the last couple of weeks.    -Has been working on communication with spouse and communicating feelings.  Star agrees as a family they need to make some changes.     -Experiencing high financial strain.       Treatment Objective(s) Addressed in This Session:   Safety planning   Patient will develop a plan to help manage PMDD  Patient will work on ways to communicate patterns   Self-care   Setting limits with family members        Intervention:   Follow safety plan and use crisis resources.   Use support system.    Have sister over while she is here, but in her own home and with a planned activity.     Focus on family time on Tuesdays and no phones at the dinner table.     Continue to use hydroxyzine and the allergy pill.      Work with chiropractic and message therapy on a holistic plan.         Continue to work on mindfulness around trauma triggers.    Plan some fun gamed for the family holiday knowing that the celebration may need to adjust due to finances.       Assessments completed prior to visit:  The following assessments were completed by patient for this visit:  PHQ2:   PHQ-2 ( 1999 [employer]) 11/29/2022 10/26/2022 7/25/2022   Q1: Little interest or pleasure in doing things 3 2 0   Q2: Feeling down, depressed or hopeless 3 2 1   PHQ-2 Score 6 4 1   Q1: Little interest or pleasure in doing things Nearly every day More than half the days Not at all   Q2: Feeling down, depressed or hopeless Nearly every day More than half the days Several days   PHQ-2 Score 6 4 1     PHQ9:   PHQ-9 SCORE 8/3/2022 10/26/2022 11/29/2022   PHQ-9 Total Score MyChart - 14 (Moderate depression) 21 (Severe depression)   PHQ-9 Total Score 8 14 21     GAD2:   YADI-2 7/28/2022 11/27/2022    Feeling nervous, anxious, or on edge 1 3   Not being able to stop or control worrying 0 3   YADI-2 Total Score 1 6     GAD7:   YADI-7 SCORE 11/27/2022   Total Score 17 (severe anxiety)   Total Score 17     PROMIS 10-Global Health (all questions and answers displayed):   PROMIS 10 7/28/2022 11/27/2022   In general, would you say your health is: Good Good   In general, would you say your quality of life is: Good Fair   In general, how would you rate your physical health? Good Good   In general, how would you rate your mental health, including your mood and your ability to think? Good Poor   In general, how would you rate your satisfaction with your social activities and relationships? Good Poor   In general, please rate how well you carry out your usual social activities and roles Fair Poor   To what extent are you able to carry out your everyday physical activities such as walking, climbing stairs, carrying groceries, or moving a chair? Mostly A little   How often have you been bothered by emotional problems such as feeling anxious, depressed or irritable? Sometimes Always   How would you rate your fatigue on average? Mild Moderate   How would you rate your pain on average?   0 = No Pain  to  10 = Worst Imaginable Pain 3 4   In general, would you say your health is: 3 3   In general, would you say your quality of life is: 3 2   In general, how would you rate your physical health? 3 3   In general, how would you rate your mental health, including your mood and your ability to think? 3 1   In general, how would you rate your satisfaction with your social activities and relationships? 3 1   In general, please rate how well you carry out your usual social activities and roles. (This includes activities at home, at work and in your community, and responsibilities as a parent, child, spouse, employee, friend, etc.) 2 1   To what extent are you able to carry out your everyday physical activities such as walking, climbing  stairs, carrying groceries, or moving a chair? 4 2   In the past 7 days, how often have you been bothered by emotional problems such as feeling anxious, depressed, or irritable? 3 5   In the past 7 days, how would you rate your fatigue on average? 2 3   In the past 7 days, how would you rate your pain on average, where 0 means no pain, and 10 means worst imaginable pain? 3 4   Global Mental Health Score 12 5   Global Physical Health Score 15 11   PROMIS TOTAL - SUBSCORES 27 16     [place] Suicide Severity Rating Scale (Lifetime/Recent)  [place] Suicide Severity Rating (Lifetime/Recent) 8/3/2022   1. Wish to be Dead (Lifetime) 0   2. Non-Specific Active Suicidal Thoughts (Lifetime) 0   Actual Attempt (Lifetime) 0   Has subject engaged in non-suicidal self-injurious behavior? (Lifetime) 0   Calculated C-SSRS Risk Score (Lifetime/Recent) No Risk Indicated         ASSESSMENT: Current Emotional / Mental Status (status of significant symptoms):   Risk status (Self / Other harm or suicidal ideation)   Patient denies current fears or concerns for personal safety.   Patient denies current or recent suicidal ideation or behaviors.   Patient denies current or recent homicidal ideation or behaviors.   Patient denies current or recent self injurious behavior or ideation.   Patient denies other safety concerns.   Patient reports there has been no change in risk factors since their last session.     Patient reports there has been no change in protective factors since their last session.     A safety and risk management plan has been developed including: Patient consented to co-developed safety plan on 11-30-22.  Safety and risk management plan was reviewed.   Patient agreed to use safety plan should any safety concerns arise.  A copy was made available to the patient. Reviewed 12-14-22        [facility]                                       Veena Parsons     SAFETY PLAN:  Step 1: Warning signs / cues  "(Thoughts, images, mood, situation, behavior) that a crisis may be developing:    Thoughts: \"I don't matter\", \"People would be better off without me\", \"I'm a burden\", \"I can't do this anymore\", \"I just want this to end\" and \"Nothing makes it better\"    Images: flashbacks    Thinking Processes: ruminations (can't stop thinking about my problems): PMDD and loss of mother, racing thoughts and highly critical and negative thoughts: I cant do anything right    Mood: worsening depression, hopelessness, helplessness, intense worry, agitation and mood swings    Behaviors: isolating/withdrawing , can't stop crying, not taking care of myself, not taking care of my responsibilities, sleeping too much, not sleeping enough and increasing frequency and duration of dissociation    Situations: relationship problems, trauma  and medical condition / diagnosis: PMDD   Step 2: Coping strategies - Things I can do to take my mind off of my problems without contacting another person (relaxation technique, physical activity):    Distress Tolerance Strategies:  relaxation activities, play with my pet , listen to positive and upbeat music, sensory based activities/self-soothe with five senses, watch a funny movie, read a book, change body temperature (ice pack/cold water)  and paced breathing/progressive muscle relaxation    Physical Activities: go for a walk, gardening, meditation, deep breathing and stretching     Focus on helpful thoughts:  \"This is temporary\", \"I will get through this\", \"It always passes\" and \"Ride the wave\"  Step 3: People and social settings that provide distraction:   Name: Spouse Star   Name:  Spending time with daughter       movie theater, pet store/humane society and coffee shop   Step 4: Remind myself of people and things that are important to me and worth living for:    Daughter   Spouse  Pets     Step 5: When I am in crisis, I can ask these people to help me use my safety plan:   Name: Spouse  Phone: " [phone]      Step 6: Making the environment safe:     remove things I could use to hurt myself and be around others  Step 7: Professionals or agencies I can contact during a crisis:    Suicide Prevention Lifeline: Call or Text 691   Local Crisis Services:  [facility] [phone]      Call 911 or go to my nearest emergency department.   I helped develop this safety plan and agree to use it when needed.  I have been given a copy of this plan.      Client signature _________________________________________________________________  Today s date:  11/30/2022  Completed by Provider Name/ Credentials:  Lia Stiles MA, LMFT  November 30, 2022  Adapted from Safety Plan Template 2008 Eleonora Capone and Danny Carmona is reprinted with the express permission of the authors.  No portion of the Safety Plan Template may be reproduced without the express, written permission.  You can contact the authors at [email or web address] or [email or web address].    Name: Veena Parsons  YOB: 1985  Date: November 30, 2022   My primary care provider: Francisco Nails  My primary care clinic: [facility]   My prescriber: PCP  Other care team support:  Holistic medical provider    My Triggers:    Relational problems  Loss of mother recently  Financial stress      Additional People, Places, and Things that I can access for support:   Spouse  Daughter          What is important to me and makes life worth living: Family         GREEN    Good Control  1. I feel good  2. No suicidal thoughts   3. Can work, sleep and play      Action Steps  1. Self-care: balanced meals, exercising, sleep practices, etc.  2. Take your medications as prescribed.  3. Continue meetings with therapist and prescriber.  4.  Do the healthy things that I enjoy.             YELLO Getting Worse  I have ANY of these:  1. I do not feel good  2. Difficulty Concentrating  3. Sleep is changing  4. Increase/Change in my thoughts to  hurt self and/or others, but I can still manage and not act on it.   5. Not taking care of self.               Action Steps (in addition to the above):  1. Inform your therapist and psychiatric prescriber/PCP.  2. Keep taking your medications as prescribed.    3. Turn to people you can ask for help.  4. Use internal coping strategies -see below.  5. Create safe environment: Removing items I can hurt self with              RED  Get Help  If I have ANY of these:  1. Current and uncontrollable thoughts and/or behaviors to hurt self and/or others.   2. Crazy buzzing and spinning.     Actions to manage my safety  1. Contact your emergency person Star  2. Call or Text 802  3. Call my crisis team- [facility] [phone]  3. Or Call [phone] or go to the emergency room right away        My Internal Coping Strategies include the following:  take a bath, belly breathing, arts and crafts, play with my pet, use my coping box, exercise and use my coping skills    [End for Brief Safety Plan]     Safety Concerns  How To Identify Situations That Make Your Mental Health Worse:  Triggers are things that make your mental health worse.  Look at the list below to help you find your triggers and what you can do about them.     1. Identify Early Warning Signs:    Sometimes symptoms return, even when people do their best to stay well. Symptoms can develop over a short period of time with little or no warning, but most of the time they emerge gradually over several weeks.  Early warning signs are changes that people experience when a relapse is starting. Some early warning signs are common and others are not as common.   Common Early Warning Signs:    Feeling tense or nervous, Eating less or eating more, Trouble sleeping -either too much or too little sleep, Feeling depressed or low, Feeling irritable, Feeling like not being around other people, Trouble concentrating and Urges to harm self     2. Identify action steps to take when  warning signs are noticed:    Taking Action- It is important to take action if you are experiencing early warning signs of a relapse.  The faster you act, the more likely it is that you can avoid a full relapse.  It is helpful to identify several specific ways to cope with symptoms.      The following is my list of symptoms and coping strategies that I can use when they are present:    Symptom Coping Strategies   Anxiety -Talk with someone in your support system and let him or her know how you are feeling.  -Use relaxation techniques such as deep breathing or imagery.  -Use positive affirmations to counteract negative self-talk such as  I am learning to let go of worry.    Depression - Schedule your day; include activities you have to do and activities you enjoy doing.  - Get some exercise - walk, run, bike, or swim.  - Give yourself credit for even the smallest things you get done.   Sleep Difficulties   - Go to sleep at the same time every day.  - Do something relaxing before bed, such as drinking herbal tea or listening to music.  - Avoid having discussions about upsetting topics before going to bed.   Delusions   - Distract yourself from the disturbing thought by doing something that requires your attention such as a puzzle.  - Check out your beliefs by talking to someone you trust.    Hallucinations   - Use headphones to listen to music.  - Tell voices to  stop  or say to yourself,  I am safe.   - Ignore the hallucinations as much as possible; focus on other things.   Concentration Difficulties - Minimize distractions so there is only one thing for you to focus on at a time.    - Ask the person you are having a conversation with to slow down or repeat things you are unsure of.            Appearance:   Appropriate    Eye Contact:   Good    Psychomotor Behavior: Normal    Attitude:   Cooperative    Orientation:   All   Speech    Rate / Production: Normal     Volume:  Normal    Mood:    Anxious  Sad   Affect:    Worrisome    Thought Content:  Clear    Thought Form:  Coherent  Logical    Insight:    Good      Medication Review:   No changes to current psychiatric medication(s)     Medication Compliance:   Yes     Changes in Health Issues:   None reported     Chemical Use Review:   Substance Use: Chemical use reviewed, no active concerns identified      Tobacco Use: No current tobacco use.      Diagnosis:  296.32 (F33.1) Major Depressive Disorder, Recurrent Episode, Moderate _ and With mixed features  300.01 (F41.0) Panic Disorder  300.02 (F41.1) Generalized Anxiety Disorder   PMDD      Collateral Reports Completed:   Not Applicable    PLAN: (Patient Tasks / Therapist Tasks / Other)  Client will continue to work on the following goals:  -Follow safety plan and use crisis resources.    -Have more family time on Tuesdays and have no phones at the dinner table.    -Continue with medication management for PMDD, chiropractic and message therapy.    -Allow self to grieve the loss of mother.   -Set boundaries with certain individuals.    -Have sister over but with a planned activity and time limit.          Lia Stiles,                                                          ______________________________________________________________________    Individual Treatment Plan    Patient's Name: Veena Parsons  YOB: 1985    Date of Creation: 9-7-22  Date Treatment Plan Last Reviewed/Revised: 9-7-22    DSM5 Diagnoses:  296.32 (F33.1) Major Depressive Disorder, Recurrent Episode, Moderate _ and With mixed features  300.01 (F41.0) Panic Disorder  300.02 (F41.1) Generalized Anxiety Disorder   PMDD  Psychosocial / Contextual Factors: Some relational issues   PROMIS (reviewed every 90 days): 27    Referral / Collaboration:  Referral to another professional/service is not indicated at this time..    Anticipated number of session for this episode of care: 6-9 sessions  Anticipation frequency of  session: Biweekly  Anticipated Duration of each session: 38-52 minutes  Treatment plan will be reviewed in 90 days or when goals have been changed.       MeasurableTreatment Goal(s) related to diagnosis / functional impairment(s)  Goal 1: Patient will address struggles with anxiety, depression and PMDD.    I will know I've met my goal when I am feeling less reactive through the month with the symptoms.      Objective #A (Patient Action)    Patient will work on establishing a concrete routine with self-care.  Status: New - Date: 9-7-22     Intervention(s)  Therapist will use CBT and motivational interviewing.      Objective #B  Patient will develop a plan to help manage PMDD  Status: New - Date: 9-7-22     Intervention(s)  Therapist will use solution focused therapy.    Objective #C  Patient will work on ways to communicate with narcissistic individuals.  Status: New - Date: 9-7-22     Intervention(s)  Therapist will teach communication skills.          Patient has reviewed and agreed to the above plan.      Lia Stiles, TH September 7, 2022

## 2023-01-05 ENCOUNTER — VIRTUAL VISIT (OUTPATIENT)
Dept: PSYCHOLOGY | Facility: CLINIC | Age: 38
End: 2023-01-05
Payer: COMMERCIAL

## 2023-01-05 DIAGNOSIS — F41.0 PANIC DISORDER WITHOUT AGORAPHOBIA: ICD-10-CM

## 2023-01-05 DIAGNOSIS — F33.1 MAJOR DEPRESSIVE DISORDER, RECURRENT EPISODE, MODERATE (H): Primary | ICD-10-CM

## 2023-01-05 DIAGNOSIS — F41.1 GENERALIZED ANXIETY DISORDER: ICD-10-CM

## 2023-01-05 PROCEDURE — 90834 PSYTX W PT 45 MINUTES: CPT | Mod: 95 | Performed by: MARRIAGE & FAMILY THERAPIST

## 2023-01-05 ASSESSMENT — PATIENT HEALTH QUESTIONNAIRE - PHQ9
SUM OF ALL RESPONSES TO PHQ QUESTIONS 1-9: 16
SUM OF ALL RESPONSES TO PHQ QUESTIONS 1-9: 16
10. IF YOU CHECKED OFF ANY PROBLEMS, HOW DIFFICULT HAVE THESE PROBLEMS MADE IT FOR YOU TO DO YOUR WORK, TAKE CARE OF THINGS AT HOME, OR GET ALONG WITH OTHER PEOPLE: EXTREMELY DIFFICULT

## 2023-01-05 NOTE — PROGRESS NOTES
[facility]                                     Progress Note    Patient Name: Veena Parsons  Date: 1-5-23         Service Type: Individual      Session Start Time: 2pm Session End Time: 250pm     Session Length: 50min    Session #: 9    Attendees: Client    Service Modality:  Video Visit:      Telemedicine Visit: The patient's condition can be safely assessed and treated via synchronous audio and visual telemedicine encounter.      Reason for Telemedicine Visit: Patient has requested telehealth visit    Originating Site (Patient Location): Patient's home        Distant Location (provider location):  On-site    Consent:  The patient/guardian has verbally consented to: the potential risks and benefits of telemedicine (video visit) versus in person care; bill my insurance or make self-payment for services provided; and responsibility for payment of non-covered services.     Mode of Communication:  Video Conference via [employer]    As the provider I attest to compliance with applicable laws and regulations related to telemedicine.      Treatment Plan- 1-5-23  CGI- 1-5-23  Phq-9 and YADI-7- 1-5-23  Promis- 11-27-22    DATA  Interactive Complexity: No  Crisis: No        Progress Since Last Session (Related to Symptoms / Goals / Homework):   Symptoms: Client reports it has been a hard couple of weeks tied to Holiday stress and her sister visiting.      Homework: Achieved / completed to satisfaction  Completed in session      Episode of Care Goals: Minimal progress - ACTION (Actively working towards change); Intervened by reinforcing change plan / affirming steps taken     Current / Ongoing Stressors and Concerns:   -Has struggled with PMDD for a number of years.  Has been working with chiropractic and message therapy on some holistic treatment options      -Sister was negative over the holidays about cleaning out mothers home and was demanding and manipulative about the situation.  She also  "crossed the line with a [place] ornament that was special between clients daughter and grandmother.     -Look into reading the book 'My Love Cup Has A Hole In It.\"   -In the process of applying for disability.  Working on the next step to the process.    -Feels that the PMDD has been extreme and is debilitating two weeks out of the month.     -Has been working on communication with spouse and communicating feelings.     -Past trauma triggering thoughts about Star.   -Experiencing high financial strain.     -Suicidal thoughts but no plan or intent.  Triggers are financial strain, medical issues and relational stress.      Treatment Objective(s) Addressed in This Session:   Safety planning   Patient will develop a plan to help manage PMDD  Patient will work on ways to communicate patterns   Self-care   Setting limits with family members        Intervention:   Follow safety plan and use crisis resources.   Use support system.    Start to read the book There is a Hole in My Love Cup with Star.   Focus on family time on Tuesdays and no phones at the dinner table.     Continue to use hydroxyzine and the allergy pill.      Work with chiropractic and message therapy on a holistic plan.         Identify outloud when something is a trauma trigger.         Assessments completed prior to visit:  The following assessments were completed by patient for this visit:  PHQ2:   PHQ-2 ( 1999 [employer]) 1/5/2023 11/29/2022 10/26/2022 7/25/2022   Q1: Little interest or pleasure in doing things 2 3 2 0   Q2: Feeling down, depressed or hopeless 3 3 2 1   PHQ-2 Score 5 6 4 1   Q1: Little interest or pleasure in doing things More than half the days Nearly every day More than half the days Not at all   Q2: Feeling down, depressed or hopeless Nearly every day Nearly every day More than half the days Several days   PHQ-2 Score 5 6 4 1     PHQ9:   PHQ-9 SCORE 8/3/2022 10/26/2022 11/29/2022 1/5/2023   PHQ-9 Total Score MyChart - 14 (Moderate " depression) 21 (Severe depression) 16 (Moderately severe depression)   PHQ-9 Total Score 8 14 21 16     GAD2:   YADI-2 7/28/2022 11/27/2022   Feeling nervous, anxious, or on edge 1 3   Not being able to stop or control worrying 0 3   YADI-2 Total Score 1 6     GAD7:   YADI-7 SCORE 11/27/2022   Total Score 17 (severe anxiety)   Total Score 17     PROMIS 10-Global Health (all questions and answers displayed):   PROMIS 10 7/28/2022 11/27/2022   In general, would you say your health is: Good Good   In general, would you say your quality of life is: Good Fair   In general, how would you rate your physical health? Good Good   In general, how would you rate your mental health, including your mood and your ability to think? Good Poor   In general, how would you rate your satisfaction with your social activities and relationships? Good Poor   In general, please rate how well you carry out your usual social activities and roles Fair Poor   To what extent are you able to carry out your everyday physical activities such as walking, climbing stairs, carrying groceries, or moving a chair? Mostly A little   How often have you been bothered by emotional problems such as feeling anxious, depressed or irritable? Sometimes Always   How would you rate your fatigue on average? Mild Moderate   How would you rate your pain on average?   0 = No Pain  to  10 = Worst Imaginable Pain 3 4   In general, would you say your health is: 3 3   In general, would you say your quality of life is: 3 2   In general, how would you rate your physical health? 3 3   In general, how would you rate your mental health, including your mood and your ability to think? 3 1   In general, how would you rate your satisfaction with your social activities and relationships? 3 1   In general, please rate how well you carry out your usual social activities and roles. (This includes activities at home, at work and in your community, and responsibilities as a parent, child,  spouse, employee, friend, etc.) 2 1   To what extent are you able to carry out your everyday physical activities such as walking, climbing stairs, carrying groceries, or moving a chair? 4 2   In the past 7 days, how often have you been bothered by emotional problems such as feeling anxious, depressed, or irritable? 3 5   In the past 7 days, how would you rate your fatigue on average? 2 3   In the past 7 days, how would you rate your pain on average, where 0 means no pain, and 10 means worst imaginable pain? 3 4   Global Mental Health Score 12 5   Global Physical Health Score 15 11   PROMIS TOTAL - SUBSCORES 27 16     [place] Suicide Severity Rating Scale (Lifetime/Recent)  [place] Suicide Severity Rating (Lifetime/Recent) 8/3/2022   1. Wish to be Dead (Lifetime) 0   2. Non-Specific Active Suicidal Thoughts (Lifetime) 0   Actual Attempt (Lifetime) 0   Has subject engaged in non-suicidal self-injurious behavior? (Lifetime) 0   Calculated C-SSRS Risk Score (Lifetime/Recent) No Risk Indicated         ASSESSMENT: Current Emotional / Mental Status (status of significant symptoms):   Risk status (Self / Other harm or suicidal ideation)   Patient denies current fears or concerns for personal safety.   Patient reports the following current or recent suicidal ideation or behaviors: Trigers are relational stressors, trauma and finances .   Patient denies current or recent homicidal ideation or behaviors.   Patient denies current or recent self injurious behavior or ideation.   Patient denies other safety concerns.   Patient reports there has been no change in risk factors since their last session.     Patient reports there has been no change in protective factors since their last session.     A safety and risk management plan has been developed including: Patient consented to co-developed safety plan on 11-30-22.  Safety and risk management plan was reviewed.   Patient agreed to use safety plan should any safety concerns  "arise.  A copy was made available to the patient. Reviewed 1-5-22        [facility]                                       Veena Parsons     SAFETY PLAN:  Step 1: Warning signs / cues (Thoughts, images, mood, situation, behavior) that a crisis may be developing:    Thoughts: \"I don't matter\", \"People would be better off without me\", \"I'm a burden\", \"I can't do this anymore\", \"I just want this to end\" and \"Nothing makes it better\"    Images: flashbacks    Thinking Processes: ruminations (can't stop thinking about my problems): PMDD and loss of mother, racing thoughts and highly critical and negative thoughts: I cant do anything right    Mood: worsening depression, hopelessness, helplessness, intense worry, agitation and mood swings    Behaviors: isolating/withdrawing , can't stop crying, not taking care of myself, not taking care of my responsibilities, sleeping too much, not sleeping enough and increasing frequency and duration of dissociation    Situations: relationship problems, trauma  and medical condition / diagnosis: PMDD   Step 2: Coping strategies - Things I can do to take my mind off of my problems without contacting another person (relaxation technique, physical activity):    Distress Tolerance Strategies:  relaxation activities, play with my pet , listen to positive and upbeat music, sensory based activities/self-soothe with five senses, watch a funny movie, read a book, change body temperature (ice pack/cold water)  and paced breathing/progressive muscle relaxation    Physical Activities: go for a walk, gardening, meditation, deep breathing and stretching     Focus on helpful thoughts:  \"This is temporary\", \"I will get through this\", \"It always passes\" and \"Ride the wave\"  Step 3: People and social settings that provide distraction:   Name: Spouse Star   Name:  Spending time with daughter       movie theater, pet store/humane society and coffee shop   Step 4: Remind myself of people " and things that are important to me and worth living for:    Daughter   Spouse  Pets     Step 5: When I am in crisis, I can ask these people to help me use my safety plan:   Name: Spouse  Phone: [phone]      Step 6: Making the environment safe:     remove things I could use to hurt myself and be around others  Step 7: Professionals or agencies I can contact during a crisis:    Suicide Prevention Lifeline: Call or Text 925   Local Crisis Services:  [facility] [phone]      Call 911 or go to my nearest emergency department.   I helped develop this safety plan and agree to use it when needed.  I have been given a copy of this plan.      Client signature _________________________________________________________________  Today s date:  11/30/2022  Completed by Provider Name/ Credentials:  Lia Stiles MA, LMFT  November 30, 2022  Adapted from Safety Plan Template 2008 Eleonora Capone and Danny Carmona is reprinted with the express permission of the authors.  No portion of the Safety Plan Template may be reproduced without the express, written permission.  You can contact the authors at [email or web address] or [email or web address].    Name: Veena Parsons  YOB: 1985  Date: November 30, 2022   My primary care provider: Francisco Nails  My primary care clinic: [facility]   My prescriber: PCP  Other care team support:  Holistic medical provider    My Triggers:    Relational problems  Loss of mother recently  Financial stress      Additional People, Places, and Things that I can access for support:   Spouse  Daughter          What is important to me and makes life worth living: Family         GREEN    Good Control  1. I feel good  2. No suicidal thoughts   3. Can work, sleep and play      Action Steps  1. Self-care: balanced meals, exercising, sleep practices, etc.  2. Take your medications as prescribed.  3. Continue meetings with therapist and prescriber.  4.  Do the  healthy things that I enjoy.             YELLO Getting Worse  I have ANY of these:  1. I do not feel good  2. Difficulty Concentrating  3. Sleep is changing  4. Increase/Change in my thoughts to hurt self and/or others, but I can still manage and not act on it.   5. Not taking care of self.               Action Steps (in addition to the above):  1. Inform your therapist and psychiatric prescriber/PCP.  2. Keep taking your medications as prescribed.    3. Turn to people you can ask for help.  4. Use internal coping strategies -see below.  5. Create safe environment: Removing items I can hurt self with              RED  Get Help  If I have ANY of these:  1. Current and uncontrollable thoughts and/or behaviors to hurt self and/or others.   2. Crazy buzzing and spinning.     Actions to manage my safety  1. Contact your emergency person Star  2. Call or Text [phone]  3. Call my crisis team- [facility] [phone]  3. Or Call [phone] or go to the emergency room right away        My Internal Coping Strategies include the following:  take a bath, belly breathing, arts and crafts, play with my pet, use my coping box, exercise and use my coping skills    [End for Brief Safety Plan]     Safety Concerns  How To Identify Situations That Make Your Mental Health Worse:  Triggers are things that make your mental health worse.  Look at the list below to help you find your triggers and what you can do about them.     1. Identify Early Warning Signs:    Sometimes symptoms return, even when people do their best to stay well. Symptoms can develop over a short period of time with little or no warning, but most of the time they emerge gradually over several weeks.  Early warning signs are changes that people experience when a relapse is starting. Some early warning signs are common and others are not as common.   Common Early Warning Signs:    Feeling tense or nervous, Eating less or eating more, Trouble sleeping -either too much or too  little sleep, Feeling depressed or low, Feeling irritable, Feeling like not being around other people, Trouble concentrating and Urges to harm self     2. Identify action steps to take when warning signs are noticed:    Taking Action- It is important to take action if you are experiencing early warning signs of a relapse.  The faster you act, the more likely it is that you can avoid a full relapse.  It is helpful to identify several specific ways to cope with symptoms.      The following is my list of symptoms and coping strategies that I can use when they are present:    Symptom Coping Strategies   Anxiety -Talk with someone in your support system and let him or her know how you are feeling.  -Use relaxation techniques such as deep breathing or imagery.  -Use positive affirmations to counteract negative self-talk such as  I am learning to let go of worry.    Depression - Schedule your day; include activities you have to do and activities you enjoy doing.  - Get some exercise - walk, run, bike, or swim.  - Give yourself credit for even the smallest things you get done.   Sleep Difficulties   - Go to sleep at the same time every day.  - Do something relaxing before bed, such as drinking herbal tea or listening to music.  - Avoid having discussions about upsetting topics before going to bed.   Delusions   - Distract yourself from the disturbing thought by doing something that requires your attention such as a puzzle.  - Check out your beliefs by talking to someone you trust.    Hallucinations   - Use headphones to listen to music.  - Tell voices to  stop  or say to yourself,  I am safe.   - Ignore the hallucinations as much as possible; focus on other things.   Concentration Difficulties - Minimize distractions so there is only one thing for you to focus on at a time.    - Ask the person you are having a conversation with to slow down or repeat things you are unsure of.            Appearance:   Appropriate    Eye  Contact:   Good    Psychomotor Behavior: Normal    Attitude:   Cooperative    Orientation:   All   Speech    Rate / Production: Normal     Volume:  Normal    Mood:    Anxious Sad   Affect:    Worrisome    Thought Content:  Clear    Thought Form:  Coherent  Logical    Insight:    Good      Medication Review:   No changes to current psychiatric medication(s)     Medication Compliance:   Yes     Changes in Health Issues:   None reported     Chemical Use Review:   Substance Use: Chemical use reviewed, no active concerns identified      Tobacco Use: No current tobacco use.      Diagnosis:  296.32 (F33.1) Major Depressive Disorder, Recurrent Episode, Moderate _ and With mixed features  300.01 (F41.0) Panic Disorder  300.02 (F41.1) Generalized Anxiety Disorder   PMDD      Collateral Reports Completed:   Not Applicable    PLAN: (Patient Tasks / Therapist Tasks / Other)  Client will continue to work on the following goals:  -Follow safety plan and use crisis resources.    -Have more family time on Tuesdays and have no phones at the dinner table.    -Start to read the book with Star reading it as well.    -Allow self to grieve the loss of mother.   -Set boundaries with certain individuals.    -Recognize when something is a trauma trigger.          Lia Stiles,                                                          ______________________________________________________________________    Individual Treatment Plan    Patient's Name: Veena Parsons  YOB: 1985    Date of Creation: 9-7-22  Date Treatment Plan Last Reviewed/Revised: 1-5-23    DSM5 Diagnoses:  296.32 (F33.1) Major Depressive Disorder, Recurrent Episode, Moderate _ and With mixed features  300.01 (F41.0) Panic Disorder  300.02 (F41.1) Generalized Anxiety Disorder   PMDD  Psychosocial / Contextual Factors: Some relational issues   PROMIS (reviewed every 90 days): 27    Referral / Collaboration:  Referral to another professional/service is  not indicated at this time..    Anticipated number of session for this episode of care: 6-9 sessions  Anticipation frequency of session: Biweekly  Anticipated Duration of each session: 38-52 minutes  Treatment plan will be reviewed in 90 days or when goals have been changed.       MeasurableTreatment Goal(s) related to diagnosis / functional impairment(s)  Goal 1: Patient will address struggles with anxiety, depression and PMDD.    I will know I've met my goal when I am feeling less reactive through the month with the symptoms.      Objective #A (Patient Action)    Patient will work on establishing a concrete routine with self-care.  Status: Continued - Date(s): 1-5-23    Intervention(s)  Therapist will use CBT and motivational interviewing.      Objective #B  Patient will develop a plan to help manage PMDD  Status: Continued - Date(s): 1-5-23    Intervention(s)  Therapist will use solution focused therapy.    Objective #C  Patient will work on ways to communicate with narcissistic individuals.  Status: Continued - Date(s): 1-5-23    Intervention(s)  Therapist will teach communication skills.          Patient has reviewed and agreed to the above plan.      Lia Stiles, TH September 7, 2022

## 2023-01-18 ENCOUNTER — VIRTUAL VISIT (OUTPATIENT)
Dept: PSYCHOLOGY | Facility: CLINIC | Age: 38
End: 2023-01-18
Payer: COMMERCIAL

## 2023-01-18 DIAGNOSIS — F33.1 MAJOR DEPRESSIVE DISORDER, RECURRENT EPISODE, MODERATE (H): Primary | ICD-10-CM

## 2023-01-18 DIAGNOSIS — F41.1 GENERALIZED ANXIETY DISORDER: ICD-10-CM

## 2023-01-18 DIAGNOSIS — F41.0 PANIC DISORDER WITHOUT AGORAPHOBIA: ICD-10-CM

## 2023-01-18 PROCEDURE — 90834 PSYTX W PT 45 MINUTES: CPT | Mod: 95 | Performed by: MARRIAGE & FAMILY THERAPIST

## 2023-01-18 NOTE — PROGRESS NOTES
"[facility]                                     Progress Note    Patient Name: Veena Parsons  Date: 1-18-23         Service Type: Individual      Session Start Time: 11am Session End Time: 1150am     Session Length: 50min    Session #: 10    Attendees: Client    Service Modality:  Video Visit:      Telemedicine Visit: The patient's condition can be safely assessed and treated via synchronous audio and visual telemedicine encounter.      Reason for Telemedicine Visit: Patient has requested telehealth visit    Originating Site (Patient Location): Patient's home        Distant Location (provider location):  Off-site    Consent:  The patient/guardian has verbally consented to: the potential risks and benefits of telemedicine (video visit) versus in person care; bill my insurance or make self-payment for services provided; and responsibility for payment of non-covered services.     Mode of Communication:  Video Conference via Anvato    As the provider I attest to compliance with applicable laws and regulations related to telemedicine.      Treatment Plan- 1-5-23  CGI- 1-5-23  Phq-9 and YADI-7- 1-5-23  Promis- 11-27-22    DATA  Interactive Complexity: No  Crisis: No        Progress Since Last Session (Related to Symptoms / Goals / Homework):   Symptoms: Client reports she has felt more irritable and has been crying more often.      Homework: Achieved / completed to satisfaction  Completed in session      Episode of Care Goals: Minimal progress - ACTION (Actively working towards change); Intervened by reinforcing change plan / affirming steps taken     Current / Ongoing Stressors and Concerns:   -Has struggled with PMDD for a number of years.  Has been working with chiropractic and message therapy on some holistic treatment options- Continue    -Feels spouse Star can be \"All about himself.\"  Feels like expressing feelings becomes \"Her issue.\"  Gave an example of an issue the developed over " "dinner.     -Feels there is no collaboration at home.    -Look into reading the book 'My Love Cup Has A Hole In It.\"   -In the process of applying for disability.  Working on the next step to the process.    -Feels that the PMDD has continued to cause disruption in everyday life.      -Spouse Star is starting testosterone and patient is worried about side effects.    -Experiencing high financial strain.     -Suicidal thoughts are present but no plan or intent.    -Feels dismissed about various topics with spouse.  When she get upset, then that becomes the issue.      Treatment Objective(s) Addressed in This Session:  Safety planning   Patient will develop a plan to help manage PMDD  Patient will work on ways to communicate patterns   Self-care   Setting limits with family members        Intervention:   Follow safety plan and use crisis resources.   Use support system- Continued   Support Star in getting his own individual therapist.    Start to read the book There is a Hole in My Love Cup with Star.   Focus on family time on Tuesdays and no phones at the dinner table-Continued    Continue to use hydroxyzine and the allergy pill.      Work with chiropractic and message therapy on a holistic plan.         Say out loud \"This is important to me.'    Consider couples therapy again.            Assessments completed prior to visit:  The following assessments were completed by patient for this visit:  PHQ2:   PHQ-2 ( 1999 [employer]) 1/5/2023 11/29/2022 10/26/2022 7/25/2022   Q1: Little interest or pleasure in doing things 2 3 2 0   Q2: Feeling down, depressed or hopeless 3 3 2 1   PHQ-2 Score 5 6 4 1   Q1: Little interest or pleasure in doing things More than half the days Nearly every day More than half the days Not at all   Q2: Feeling down, depressed or hopeless Nearly every day Nearly every day More than half the days Several days   PHQ-2 Score 5 6 4 1     PHQ9:   PHQ-9 SCORE 8/3/2022 10/26/2022 11/29/2022 1/5/2023   PHQ-9 " Total Score MyChart - 14 (Moderate depression) 21 (Severe depression) 16 (Moderately severe depression)   PHQ-9 Total Score 8 14 21 16     GAD2:   YADI-2 7/28/2022 11/27/2022   Feeling nervous, anxious, or on edge 1 3   Not being able to stop or control worrying 0 3   YADI-2 Total Score 1 6     GAD7:   YADI-7 SCORE 11/27/2022   Total Score 17 (severe anxiety)   Total Score 17     PROMIS 10-Global Health (all questions and answers displayed):   PROMIS 10 7/28/2022 11/27/2022   In general, would you say your health is: Good Good   In general, would you say your quality of life is: Good Fair   In general, how would you rate your physical health? Good Good   In general, how would you rate your mental health, including your mood and your ability to think? Good Poor   In general, how would you rate your satisfaction with your social activities and relationships? Good Poor   In general, please rate how well you carry out your usual social activities and roles Fair Poor   To what extent are you able to carry out your everyday physical activities such as walking, climbing stairs, carrying groceries, or moving a chair? Mostly A little   How often have you been bothered by emotional problems such as feeling anxious, depressed or irritable? Sometimes Always   How would you rate your fatigue on average? Mild Moderate   How would you rate your pain on average?   0 = No Pain  to  10 = Worst Imaginable Pain 3 4   In general, would you say your health is: 3 3   In general, would you say your quality of life is: 3 2   In general, how would you rate your physical health? 3 3   In general, how would you rate your mental health, including your mood and your ability to think? 3 1   In general, how would you rate your satisfaction with your social activities and relationships? 3 1   In general, please rate how well you carry out your usual social activities and roles. (This includes activities at home, at work and in your community, and  responsibilities as a parent, child, spouse, employee, friend, etc.) 2 1   To what extent are you able to carry out your everyday physical activities such as walking, climbing stairs, carrying groceries, or moving a chair? 4 2   In the past 7 days, how often have you been bothered by emotional problems such as feeling anxious, depressed, or irritable? 3 5   In the past 7 days, how would you rate your fatigue on average? 2 3   In the past 7 days, how would you rate your pain on average, where 0 means no pain, and 10 means worst imaginable pain? 3 4   Global Mental Health Score 12 5   Global Physical Health Score 15 11   PROMIS TOTAL - SUBSCORES 27 16     [place] Suicide Severity Rating Scale (Lifetime/Recent)  [place] Suicide Severity Rating (Lifetime/Recent) 8/3/2022   1. Wish to be Dead (Lifetime) 0   2. Non-Specific Active Suicidal Thoughts (Lifetime) 0   Actual Attempt (Lifetime) 0   Has subject engaged in non-suicidal self-injurious behavior? (Lifetime) 0   Calculated C-SSRS Risk Score (Lifetime/Recent) No Risk Indicated         ASSESSMENT: Current Emotional / Mental Status (status of significant symptoms):   Risk status (Self / Other harm or suicidal ideation)   Patient denies current fears or concerns for personal safety.   Patient reports the following current or recent suicidal ideation or behaviors: Trigers are relational stressors, trauma and finances .     Patient denies current or recent homicidal ideation or behaviors.   Patient denies current or recent self injurious behavior or ideation.   Patient denies other safety concerns.   Patient reports there has been no change in risk factors since their last session.     Patient reports there has been no change in protective factors since their last session.     A safety and risk management plan has been developed including: Patient consented to co-developed safety plan on 11-30-22.  Safety and risk management plan was reviewed.   Patient agreed to use  "safety plan should any safety concerns arise.  A copy was made available to the patient. Reviewed 1-18-22        [facility]                                       Veena Parsons     SAFETY PLAN:  Step 1: Warning signs / cues (Thoughts, images, mood, situation, behavior) that a crisis may be developing:    Thoughts: \"I don't matter\", \"People would be better off without me\", \"I'm a burden\", \"I can't do this anymore\", \"I just want this to end\" and \"Nothing makes it better\"    Images: flashbacks    Thinking Processes: ruminations (can't stop thinking about my problems): PMDD and loss of mother, racing thoughts and highly critical and negative thoughts: I cant do anything right    Mood: worsening depression, hopelessness, helplessness, intense worry, agitation and mood swings    Behaviors: isolating/withdrawing , can't stop crying, not taking care of myself, not taking care of my responsibilities, sleeping too much, not sleeping enough and increasing frequency and duration of dissociation    Situations: relationship problems, trauma  and medical condition / diagnosis: PMDD   Step 2: Coping strategies - Things I can do to take my mind off of my problems without contacting another person (relaxation technique, physical activity):    Distress Tolerance Strategies:  relaxation activities, play with my pet , listen to positive and upbeat music, sensory based activities/self-soothe with five senses, watch a funny movie, read a book, change body temperature (ice pack/cold water)  and paced breathing/progressive muscle relaxation    Physical Activities: go for a walk, gardening, meditation, deep breathing and stretching     Focus on helpful thoughts:  \"This is temporary\", \"I will get through this\", \"It always passes\" and \"Ride the wave\"  Step 3: People and social settings that provide distraction:   Name: Spouse Star   Name:  Spending time with daughter       movie theater, pet store/humane society and " coffee shop   Step 4: Remind myself of people and things that are important to me and worth living for:    Daughter   Spouse  Pets     Step 5: When I am in crisis, I can ask these people to help me use my safety plan:   Name: Spouse  Phone: [phone]      Step 6: Making the environment safe:     remove things I could use to hurt myself and be around others  Step 7: Professionals or agencies I can contact during a crisis:    Suicide Prevention Lifeline: Call or Text 776   Local Crisis Services:  [facility] [phone]      Call 911 or go to my nearest emergency department.   I helped develop this safety plan and agree to use it when needed.  I have been given a copy of this plan.      Client signature _________________________________________________________________  Today s date:  11/30/2022  Completed by Provider Name/ Credentials:  Lia Stiles MA, LMFT  November 30, 2022  Adapted from Safety Plan Template 2008 Eleonora Capone and Danny Carmona is reprinted with the express permission of the authors.  No portion of the Safety Plan Template may be reproduced without the express, written permission.  You can contact the authors at [email or web address] or [email or web address].    Name: Veena Parsons  YOB: 1985  Date: November 30, 2022   My primary care provider: Francisco Nails  [facility]   My prescriber: PCP  Other care team support:  Holistic medical provider    My Triggers:    Relational problems  Loss of mother recently  Financial stress      Additional People, Places, and Things that I can access for support:   Spouse  Daughter          What is important to me and makes life worth living: Family         GREEN    Good Control  1. I feel good  2. No suicidal thoughts   3. Can work, sleep and play      Action Steps  1. Self-care: balanced meals, exercising, sleep practices, etc.  2. Take your medications as prescribed.  3. Continue meetings  with therapist and prescriber.  4.  Do the healthy things that I enjoy.             YELLO Getting Worse  I have ANY of these:  1. I do not feel good  2. Difficulty Concentrating  3. Sleep is changing  4. Increase/Change in my thoughts to hurt self and/or others, but I can still manage and not act on it.   5. Not taking care of self.               Action Steps (in addition to the above):  1. Inform your therapist and psychiatric prescriber/PCP.  2. Keep taking your medications as prescribed.    3. Turn to people you can ask for help.  4. Use internal coping strategies -see below.  5. Create safe environment: Removing items I can hurt self with              RED  Get Help  If I have ANY of these:  1. Current and uncontrollable thoughts and/or behaviors to hurt self and/or others.   2. Crazy buzzing and spinning.     Actions to manage my safety  1. Contact your emergency person Star  2. Call or Text 185  3. Call my crisis team- [facility] [phone]  3. Or Call 911 or go to the emergency room right away        My Internal Coping Strategies include the following:  take a bath, belly breathing, arts and crafts, play with my pet, use my coping box, exercise and use my coping skills    [End for Brief Safety Plan]     Safety Concerns  How To Identify Situations That Make Your Mental Health Worse:  Triggers are things that make your mental health worse.  Look at the list below to help you find your triggers and what you can do about them.     1. Identify Early Warning Signs:    Sometimes symptoms return, even when people do their best to stay well. Symptoms can develop over a short period of time with little or no warning, but most of the time they emerge gradually over several weeks.  Early warning signs are changes that people experience when a relapse is starting. Some early warning signs are common and others are not as common.   Common Early Warning Signs:    Feeling tense or nervous, Eating less or eating  more, Trouble sleeping -either too much or too little sleep, Feeling depressed or low, Feeling irritable, Feeling like not being around other people, Trouble concentrating and Urges to harm self     2. Identify action steps to take when warning signs are noticed:    Taking Action- It is important to take action if you are experiencing early warning signs of a relapse.  The faster you act, the more likely it is that you can avoid a full relapse.  It is helpful to identify several specific ways to cope with symptoms.      The following is my list of symptoms and coping strategies that I can use when they are present:    Symptom Coping Strategies   Anxiety -Talk with someone in your support system and let him or her know how you are feeling.  -Use relaxation techniques such as deep breathing or imagery.  -Use positive affirmations to counteract negative self-talk such as  I am learning to let go of worry.    Depression - Schedule your day; include activities you have to do and activities you enjoy doing.  - Get some exercise - walk, run, bike, or swim.  - Give yourself credit for even the smallest things you get done.   Sleep Difficulties   - Go to sleep at the same time every day.  - Do something relaxing before bed, such as drinking herbal tea or listening to music.  - Avoid having discussions about upsetting topics before going to bed.   Delusions   - Distract yourself from the disturbing thought by doing something that requires your attention such as a puzzle.  - Check out your beliefs by talking to someone you trust.    Hallucinations   - Use headphones to listen to music.  - Tell voices to  stop  or say to yourself,  I am safe.   - Ignore the hallucinations as much as possible; focus on other things.   Concentration Difficulties - Minimize distractions so there is only one thing for you to focus on at a time.    - Ask the person you are having a conversation with to slow down or repeat things you are unsure of.   "          Appearance:   Appropriate    Eye Contact:   Good    Psychomotor Behavior: Normal    Attitude:   Cooperative    Orientation:   All   Speech    Rate / Production: Normal     Volume:  Normal    Mood:    Anxious Sad Depressed    Affect:    Worrisome Tearful    Thought Content:  Clear    Thought Form:  Coherent  Logical    Insight:    Good      Medication Review:   No changes to current psychiatric medication(s)     Medication Compliance:   Yes     Changes in Health Issues:   None reported     Chemical Use Review:   Substance Use: Chemical use reviewed, no active concerns identified      Tobacco Use: No current tobacco use.      Diagnosis:  296.32 (F33.1) Major Depressive Disorder, Recurrent Episode, Moderate _ and With mixed features  300.01 (F41.0) Panic Disorder  300.02 (F41.1) Generalized Anxiety Disorder   PMDD      Collateral Reports Completed:   Not Applicable    PLAN: (Patient Tasks / Therapist Tasks / Other)  Client will continue to work on the following goals:  -Follow safety plan and use crisis resources-Continued   -Consider couples therapy.  -Support spouse with getting individual therapy.   -Have more family time on Tuesdays and have no phones at the dinner table.    -Start to read the book with Star reading it as well.    -Allow self to grieve the loss of mother.   -Say out loud \"This is important to me.\"        Lia Stiles,                                                          ______________________________________________________________________    Individual Treatment Plan    Patient's Name: Veena Parsons  YOB: 1985    Date of Creation: 9-7-22  Date Treatment Plan Last Reviewed/Revised: 1-5-23    DSM5 Diagnoses:  296.32 (F33.1) Major Depressive Disorder, Recurrent Episode, Moderate _ and With mixed features  300.01 (F41.0) Panic Disorder  300.02 (F41.1) Generalized Anxiety Disorder   PMDD  Psychosocial / Contextual Factors: Some relational issues   PROMIS (reviewed " every 90 days): 27    Referral / Collaboration:  Referral to another professional/service is not indicated at this time..    Anticipated number of session for this episode of care: 6-9 sessions  Anticipation frequency of session: Biweekly  Anticipated Duration of each session: 38-52 minutes  Treatment plan will be reviewed in 90 days or when goals have been changed.       MeasurableTreatment Goal(s) related to diagnosis / functional impairment(s)  Goal 1: Patient will address struggles with anxiety, depression and PMDD.    I will know I've met my goal when I am feeling less reactive through the month with the symptoms.      Objective #A (Patient Action)    Patient will work on establishing a concrete routine with self-care.  Status: Continued - Date(s): 1-5-23    Intervention(s)  Therapist will use CBT and motivational interviewing.      Objective #B  Patient will develop a plan to help manage PMDD  Status: Continued - Date(s): 1-5-23    Intervention(s)  Therapist will use solution focused therapy.    Objective #C  Patient will work on ways to communicate with narcissistic individuals.  Status: Continued - Date(s): 1-5-23    Intervention(s)  Therapist will teach communication skills.          Patient has reviewed and agreed to the above plan.      Lia Stiles, TH September 7, 2022

## 2023-01-25 ENCOUNTER — VIRTUAL VISIT (OUTPATIENT)
Dept: PSYCHOLOGY | Facility: CLINIC | Age: 38
End: 2023-01-25
Payer: COMMERCIAL

## 2023-01-25 DIAGNOSIS — F41.0 PANIC DISORDER WITHOUT AGORAPHOBIA: ICD-10-CM

## 2023-01-25 DIAGNOSIS — F41.1 GENERALIZED ANXIETY DISORDER: ICD-10-CM

## 2023-01-25 DIAGNOSIS — F33.1 MAJOR DEPRESSIVE DISORDER, RECURRENT EPISODE, MODERATE (H): Primary | ICD-10-CM

## 2023-01-25 PROCEDURE — 90834 PSYTX W PT 45 MINUTES: CPT | Mod: 95 | Performed by: MARRIAGE & FAMILY THERAPIST

## 2023-01-25 NOTE — PROGRESS NOTES
[facility]                                     Progress Note    Patient Name: Veena Parsons  Date: 1-25-23         Service Type: Individual      Session Start Time: 11am Session End Time: 1150am     Session Length: 50min    Session #: 11    Attendees: Client    Service Modality:  Video Visit:      Telemedicine Visit: The patient's condition can be safely assessed and treated via synchronous audio and visual telemedicine encounter.      Reason for Telemedicine Visit: Patient has requested telehealth visit    Originating Site (Patient Location): Patient's home        Distant Location (provider location):  Off-site    Consent:  The patient/guardian has verbally consented to: the potential risks and benefits of telemedicine (video visit) versus in person care; bill my insurance or make self-payment for services provided; and responsibility for payment of non-covered services.     Mode of Communication:  Video Conference via [email or web address]    As the provider I attest to compliance with applicable laws and regulations related to telemedicine.      Treatment Plan- 1-5-23  CGI- 1-5-23  Phq-9 and YADI-7- 1-5-23  Promis- 11-27-22    DATA  Interactive Complexity: No  Crisis: No        Progress Since Last Session (Related to Symptoms / Goals / Homework):   Symptoms: Client reports symptoms of depressed mood and anxiety.     Homework: Achieved / completed to satisfaction  Completed in session      Episode of Care Goals: Minimal progress - ACTION (Actively working towards change); Intervened by reinforcing change plan / affirming steps taken     Current / Ongoing Stressors and Concerns:   -Has struggled with PMDD for a number of years.  Has been working with chiropractic and message therapy on some holistic treatment options- Continue    -Client has a court date on April 4th for disability.     -Feels Star has been communicating more at home and reaching out during the day.     -Feels that the PMDD has  "continued to cause disruption in everyday life.   Feels this really takes a toll on her family.     -Experiencing high financial strain.     -Suicidal thoughts are present but no plan or intent.    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    -Working on an outline for her court case.      Treatment Objective(s) Addressed in This Session:  Safety planning   Patient will develop a plan to help manage PMDD  Patient will work on ways to communicate patterns   Self-care   Preparing for court date        Intervention:   Follow safety plan and use crisis resources.   Use support system- Continued   Complete outline for disability court case.    Start to read the book There is a Hole in My Love Cup with Star.   Focus on family time on Tuesdays and no phones at the dinner table-Continued    Continue to use hydroxyzine and the allergy pill.      Work with chiropractic and message therapy on a holistic plan.         Say out loud \"This is important to me.' Check in with Star throughout the day.    Consider couples therapy again.     Will work more on defining a new purpose.           Assessments completed prior to visit:  The following assessments were completed by patient for this visit:  PHQ2:   PHQ-2 ( 1999 [employer]) 1/5/2023 11/29/2022 10/26/2022 7/25/2022   Q1: Little interest or pleasure in doing things 2 3 2 0   Q2: Feeling down, depressed or hopeless 3 3 2 1   PHQ-2 Score 5 6 4 1   Q1: Little interest or pleasure in doing things More than half the days Nearly every day More than half the days Not at all   Q2: Feeling down, depressed or hopeless Nearly every day Nearly every day More than half the days Several days   PHQ-2 Score 5 6 4 1     PHQ9:   PHQ-9 SCORE 8/3/2022 10/26/2022 11/29/2022 1/5/2023   PHQ-9 Total Score MyChart - 14 (Moderate depression) 21 (Severe depression) 16 (Moderately severe depression)   PHQ-9 Total Score 8 14 21 16     GAD2:   YADI-2 7/28/2022 " 11/27/2022   Feeling nervous, anxious, or on edge 1 3   Not being able to stop or control worrying 0 3   YADI-2 Total Score 1 6     GAD7:   YADI-7 SCORE 11/27/2022   Total Score 17 (severe anxiety)   Total Score 17     PROMIS 10-Global Health (all questions and answers displayed):   PROMIS 10 7/28/2022 11/27/2022   In general, would you say your health is: Good Good   In general, would you say your quality of life is: Good Fair   In general, how would you rate your physical health? Good Good   In general, how would you rate your mental health, including your mood and your ability to think? Good Poor   In general, how would you rate your satisfaction with your social activities and relationships? Good Poor   In general, please rate how well you carry out your usual social activities and roles Fair Poor   To what extent are you able to carry out your everyday physical activities such as walking, climbing stairs, carrying groceries, or moving a chair? Mostly A little   How often have you been bothered by emotional problems such as feeling anxious, depressed or irritable? Sometimes Always   How would you rate your fatigue on average? Mild Moderate   How would you rate your pain on average?   0 = No Pain  to  10 = Worst Imaginable Pain 3 4   In general, would you say your health is: 3 3   In general, would you say your quality of life is: 3 2   In general, how would you rate your physical health? 3 3   In general, how would you rate your mental health, including your mood and your ability to think? 3 1   In general, how would you rate your satisfaction with your social activities and relationships? 3 1   In general, please rate how well you carry out your usual social activities and roles. (This includes activities at home, at work and in your community, and responsibilities as a parent, child, spouse, employee, friend, etc.) 2 1   To what extent are you able to carry out your everyday physical activities such as walking,  climbing stairs, carrying groceries, or moving a chair? 4 2   In the past 7 days, how often have you been bothered by emotional problems such as feeling anxious, depressed, or irritable? 3 5   In the past 7 days, how would you rate your fatigue on average? 2 3   In the past 7 days, how would you rate your pain on average, where 0 means no pain, and 10 means worst imaginable pain? 3 4   Global Mental Health Score 12 5   Global Physical Health Score 15 11   PROMIS TOTAL - SUBSCORES 27 16     [place] Suicide Severity Rating Scale (Lifetime/Recent)  [place] Suicide Severity Rating (Lifetime/Recent) 8/3/2022   1. Wish to be Dead (Lifetime) 0   2. Non-Specific Active Suicidal Thoughts (Lifetime) 0   Actual Attempt (Lifetime) 0   Has subject engaged in non-suicidal self-injurious behavior? (Lifetime) 0   Calculated C-SSRS Risk Score (Lifetime/Recent) No Risk Indicated         ASSESSMENT: Current Emotional / Mental Status (status of significant symptoms):   Risk status (Self / Other harm or suicidal ideation)   Patient denies current fears or concerns for personal safety.   Patient reports the following current or recent suicidal ideation or behaviors: Trigers are relational stressors, trauma and finances .     Patient denies current or recent homicidal ideation or behaviors.   Patient denies current or recent self injurious behavior or ideation.   Patient denies other safety concerns.   Patient reports there has been no change in risk factors since their last session.     Patient reports there has been no change in protective factors since their last session.     A safety and risk management plan has been developed including: Patient consented to co-developed safety plan on 11-30-22.  Safety and risk management plan was reviewed.   Patient agreed to use safety plan should any safety concerns arise.  A copy was made available to the patient. Reviewed 1-25-23        [facility]                               "Veena RENETTA Parsons     SAFETY PLAN:  Step 1: Warning signs / cues (Thoughts, images, mood, situation, behavior) that a crisis may be developing:    Thoughts: \"I don't matter\", \"People would be better off without me\", \"I'm a burden\", \"I can't do this anymore\", \"I just want this to end\" and \"Nothing makes it better\"    Images: flashbacks    Thinking Processes: ruminations (can't stop thinking about my problems): PMDD and loss of mother, racing thoughts and highly critical and negative thoughts: I cant do anything right    Mood: worsening depression, hopelessness, helplessness, intense worry, agitation and mood swings    Behaviors: isolating/withdrawing , can't stop crying, not taking care of myself, not taking care of my responsibilities, sleeping too much, not sleeping enough and increasing frequency and duration of dissociation    Situations: relationship problems, trauma  and medical condition / diagnosis: PMDD   Step 2: Coping strategies - Things I can do to take my mind off of my problems without contacting another person (relaxation technique, physical activity):    Distress Tolerance Strategies:  relaxation activities, play with my pet , listen to positive and upbeat music, sensory based activities/self-soothe with five senses, watch a funny movie, read a book, change body temperature (ice pack/cold water)  and paced breathing/progressive muscle relaxation    Physical Activities: go for a walk, gardening, meditation, deep breathing and stretching     Focus on helpful thoughts:  \"This is temporary\", \"I will get through this\", \"It always passes\" and \"Ride the wave\"  Step 3: People and social settings that provide distraction:   Name: Spouse Star   Name:  Spending time with daughter       movie theater, pet store/humane society and coffee shop   Step 4: Remind myself of people and things that are important to me and worth living for:    Daughter   Spouse  Pets     Step 5: When I am in crisis, I can ask " these people to help me use my safety plan:   Name: Spouse  Phone: [phone]      Step 6: Making the environment safe:     remove things I could use to hurt myself and be around others  Step 7: Professionals or agencies I can contact during a crisis:    Suicide Prevention Lifeline: Call or Text 339   Local Crisis Services:  [facility] [phone]      Call 911 or go to my nearest emergency department.   I helped develop this safety plan and agree to use it when needed.  I have been given a copy of this plan.      Client signature _________________________________________________________________  Today s date:  11/30/2022  Completed by Provider Name/ Credentials:  Lia Stiles MA, LMFT  November 30, 2022  Adapted from Safety Plan Template 2008 Eleonora Capone and Danny Carmona is reprinted with the express permission of the authors.  No portion of the Safety Plan Template may be reproduced without the express, written permission.  You can contact the authors at [email or web address] or [email or web address].    Name: Veena Parsons  YOB: 1985  Date: November 30, 2022   My primary care provider: Francisco Nails  My primary care clinic: [facility]   My prescriber: PCP  Other care team support:  Holistic medical provider    My Triggers:    Relational problems  Loss of mother recently  Financial stress      Additional People, Places, and Things that I can access for support:   Spouse  Daughter          What is important to me and makes life worth living: Family         GREEN    Good Control  1. I feel good  2. No suicidal thoughts   3. Can work, sleep and play      Action Steps  1. Self-care: balanced meals, exercising, sleep practices, etc.  2. Take your medications as prescribed.  3. Continue meetings with therapist and prescriber.  4.  Do the healthy things that I enjoy.             YELLO Getting Worse  I have ANY of these:  1. I do not feel good  2. Difficulty  Concentrating  3. Sleep is changing  4. Increase/Change in my thoughts to hurt self and/or others, but I can still manage and not act on it.   5. Not taking care of self.               Action Steps (in addition to the above):  1. Inform your therapist and psychiatric prescriber/PCP.  2. Keep taking your medications as prescribed.    3. Turn to people you can ask for help.  4. Use internal coping strategies -see below.  5. Create safe environment: Removing items I can hurt self with              RED  Get Help  If I have ANY of these:  1. Current and uncontrollable thoughts and/or behaviors to hurt self and/or others.   2. Crazy buzzing and spinning.     Actions to manage my safety  1. Contact your emergency person Star  2. Call or Text [phone]  3. Call my crisis team- [facility] [phone]  3. Or Call 321 or go to the emergency room right away        My Internal Coping Strategies include the following:  take a bath, belly breathing, arts and crafts, play with my pet, use my coping box, exercise and use my coping skills    [End for Brief Safety Plan]     Safety Concerns  How To Identify Situations That Make Your Mental Health Worse:  Triggers are things that make your mental health worse.  Look at the list below to help you find your triggers and what you can do about them.     1. Identify Early Warning Signs:    Sometimes symptoms return, even when people do their best to stay well. Symptoms can develop over a short period of time with little or no warning, but most of the time they emerge gradually over several weeks.  Early warning signs are changes that people experience when a relapse is starting. Some early warning signs are common and others are not as common.   Common Early Warning Signs:    Feeling tense or nervous, Eating less or eating more, Trouble sleeping -either too much or too little sleep, Feeling depressed or low, Feeling irritable, Feeling like not being around other people, Trouble  concentrating and Urges to harm self     2. Identify action steps to take when warning signs are noticed:    Taking Action- It is important to take action if you are experiencing early warning signs of a relapse.  The faster you act, the more likely it is that you can avoid a full relapse.  It is helpful to identify several specific ways to cope with symptoms.      The following is my list of symptoms and coping strategies that I can use when they are present:    Symptom Coping Strategies   Anxiety -Talk with someone in your support system and let him or her know how you are feeling.  -Use relaxation techniques such as deep breathing or imagery.  -Use positive affirmations to counteract negative self-talk such as  I am learning to let go of worry.    Depression - Schedule your day; include activities you have to do and activities you enjoy doing.  - Get some exercise - walk, run, bike, or swim.  - Give yourself credit for even the smallest things you get done.   Sleep Difficulties   - Go to sleep at the same time every day.  - Do something relaxing before bed, such as drinking herbal tea or listening to music.  - Avoid having discussions about upsetting topics before going to bed.   Delusions   - Distract yourself from the disturbing thought by doing something that requires your attention such as a puzzle.  - Check out your beliefs by talking to someone you trust.    Hallucinations   - Use headphones to listen to music.  - Tell voices to  stop  or say to yourself,  I am safe.   - Ignore the hallucinations as much as possible; focus on other things.   Concentration Difficulties - Minimize distractions so there is only one thing for you to focus on at a time.    - Ask the person you are having a conversation with to slow down or repeat things you are unsure of.            Appearance:   Appropriate    Eye Contact:   Good    Psychomotor Behavior: Normal    Attitude:   Cooperative    Orientation:   All   Speech    Rate /  "Production: Normal     Volume:  Normal    Mood:    Anxious Sad Depressed    Affect:    Worrisome Tearful    Thought Content:  Clear    Thought Form:  Coherent  Logical    Insight:    Good      Medication Review:   No changes to current psychiatric medication(s)     Medication Compliance:   Yes     Changes in Health Issues:   None reported     Chemical Use Review:   Substance Use: Chemical use reviewed, no active concerns identified      Tobacco Use: No current tobacco use.      Diagnosis:  296.32 (F33.1) Major Depressive Disorder, Recurrent Episode, Moderate _ and With mixed features  300.01 (F41.0) Panic Disorder  300.02 (F41.1) Generalized Anxiety Disorder   PMDD      Collateral Reports Completed:   Not Applicable    PLAN: (Patient Tasks / Therapist Tasks / Other)  Client will continue to work on the following goals:    -Follow safety plan and use crisis resources-Continued   -Consider couples therapy.  -Make an outline for the social security court date.   -Check in with Star throughout the day.   -Have more family time on Tuesdays and have no phones at the dinner table.    -Allow self to grieve the loss of mother.   -Say out loud \"This is important to me.\"        Lia Stiles,                                                          ______________________________________________________________________    Individual Treatment Plan    Patient's Name: Veena Parsons  YOB: 1985    Date of Creation: 9-7-22  Date Treatment Plan Last Reviewed/Revised: 1-5-23    DSM5 Diagnoses:  296.32 (F33.1) Major Depressive Disorder, Recurrent Episode, Moderate _ and With mixed features  300.01 (F41.0) Panic Disorder  300.02 (F41.1) Generalized Anxiety Disorder   PMDD  Psychosocial / Contextual Factors: Some relational issues   PROMIS (reviewed every 90 days): 27    Referral / Collaboration:  Referral to another professional/service is not indicated at this time..    Anticipated number of session for this " episode of care: 6-9 sessions  Anticipation frequency of session: Biweekly  Anticipated Duration of each session: 38-52 minutes  Treatment plan will be reviewed in 90 days or when goals have been changed.       MeasurableTreatment Goal(s) related to diagnosis / functional impairment(s)  Goal 1: Patient will address struggles with anxiety, depression and PMDD.    I will know I've met my goal when I am feeling less reactive through the month with the symptoms.      Objective #A (Patient Action)    Patient will work on establishing a concrete routine with self-care.  Status: Continued - Date(s): 1-5-23    Intervention(s)  Therapist will use CBT and motivational interviewing.      Objective #B  Patient will develop a plan to help manage PMDD  Status: Continued - Date(s): 1-5-23    Intervention(s)  Therapist will use solution focused therapy.    Objective #C  Patient will work on ways to communicate with narcissistic individuals.  Status: Continued - Date(s): 1-5-23    Intervention(s)  Therapist will teach communication skills.          Patient has reviewed and agreed to the above plan.      Lia Stiles, TH September 7, 2022

## 2023-02-02 ENCOUNTER — VIRTUAL VISIT (OUTPATIENT)
Dept: PSYCHOLOGY | Facility: CLINIC | Age: 38
End: 2023-02-02
Payer: COMMERCIAL

## 2023-02-02 DIAGNOSIS — F41.0 PANIC DISORDER WITHOUT AGORAPHOBIA: ICD-10-CM

## 2023-02-02 DIAGNOSIS — F33.1 MAJOR DEPRESSIVE DISORDER, RECURRENT EPISODE, MODERATE (H): Primary | ICD-10-CM

## 2023-02-02 DIAGNOSIS — F41.1 GENERALIZED ANXIETY DISORDER: ICD-10-CM

## 2023-02-02 PROCEDURE — 90834 PSYTX W PT 45 MINUTES: CPT | Mod: 95 | Performed by: MARRIAGE & FAMILY THERAPIST

## 2023-02-02 ASSESSMENT — ANXIETY QUESTIONNAIRES
3. WORRYING TOO MUCH ABOUT DIFFERENT THINGS: MORE THAN HALF THE DAYS
GAD7 TOTAL SCORE: 13
6. BECOMING EASILY ANNOYED OR IRRITABLE: SEVERAL DAYS
2. NOT BEING ABLE TO STOP OR CONTROL WORRYING: MORE THAN HALF THE DAYS
5. BEING SO RESTLESS THAT IT IS HARD TO SIT STILL: MORE THAN HALF THE DAYS
IF YOU CHECKED OFF ANY PROBLEMS ON THIS QUESTIONNAIRE, HOW DIFFICULT HAVE THESE PROBLEMS MADE IT FOR YOU TO DO YOUR WORK, TAKE CARE OF THINGS AT HOME, OR GET ALONG WITH OTHER PEOPLE: SOMEWHAT DIFFICULT
7. FEELING AFRAID AS IF SOMETHING AWFUL MIGHT HAPPEN: MORE THAN HALF THE DAYS
GAD7 TOTAL SCORE: 13
1. FEELING NERVOUS, ANXIOUS, OR ON EDGE: MORE THAN HALF THE DAYS

## 2023-02-02 ASSESSMENT — PATIENT HEALTH QUESTIONNAIRE - PHQ9
5. POOR APPETITE OR OVEREATING: MORE THAN HALF THE DAYS
SUM OF ALL RESPONSES TO PHQ QUESTIONS 1-9: 12

## 2023-02-02 NOTE — PROGRESS NOTES
[facility]                                     Progress Note    Patient Name: Veena Parsons  Date: 2-2-23         Service Type: Individual      Session Start Time: 10am Session End Time: 1050am     Session Length: 50min    Session #: 12    Attendees: Client    Service Modality:  Video Visit:      Telemedicine Visit: The patient's condition can be safely assessed and treated via synchronous audio and visual telemedicine encounter.      Reason for Telemedicine Visit: Patient has requested telehealth visit    Originating Site (Patient Location): Patient's home        Distant Location (provider location):  On-site    Consent:  The patient/guardian has verbally consented to: the potential risks and benefits of telemedicine (video visit) versus in person care; bill my insurance or make self-payment for services provided; and responsibility for payment of non-covered services.     Mode of Communication:  Video Conference via [employer]    As the provider I attest to compliance with applicable laws and regulations related to telemedicine.      Treatment Plan- 1-5-23  CGI- 1-5-23  Phq-9 and YADI-7- 2-2-23  Promis- 11-27-22    DATA  Interactive Complexity: No  Crisis: No        Progress Since Last Session (Related to Symptoms / Goals / Homework):   Symptoms: Client reports symptoms of depressed mood and anxiety. Had a more intense conversation with her daughter about their relationship.     Homework: Achieved / completed to satisfaction  Completed in session      Episode of Care Goals: Minimal progress - ACTION (Actively working towards change); Intervened by reinforcing change plan / affirming steps taken     Current / Ongoing Stressors and Concerns:   -Has struggled with PMDD for a number of years.  Has been working with chiropractic and message therapy on some holistic treatment options- Continue    -Had a more intense conversation with daughter Deborah about their relationship.    -Client has a  court date on April 4th for disability.     -Feels Star is very good at manipulating situations. Can make an event all about him very quickly.    -Experiencing high financial strain.  Worried utilities will be turned off in the Spring.    -Suicidal thoughts are better this week.    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    -Working on an outline for her court case.      Treatment Objective(s) Addressed in This Session:  Safety planning   Patient will develop a plan to help manage PMDD  Patient will work on ways to communicate patterns   Self-care   Preparing for court date        Intervention:   Follow safety plan and use crisis resources.   Use support system- Continued   Complete outline for disability court case.    Set some clear boundaries and be directly assertive with Star.     Focus on family time on Tuesdays and no phones at the dinner table-Continued    Continue to use hydroxyzine and the allergy pill.      Work with chiropractic and message therapy on a holistic plan.         Practice very direct communication.    Consider couples therapy again.     Will work more on defining a new purpose.           Assessments completed prior to visit:  The following assessments were completed by patient for this visit:  PHQ2:   PHQ-2 ( 1999 [employer]) 1/5/2023 11/29/2022 10/26/2022 7/25/2022   Q1: Little interest or pleasure in doing things 2 3 2 0   Q2: Feeling down, depressed or hopeless 3 3 2 1   PHQ-2 Score 5 6 4 1   Q1: Little interest or pleasure in doing things More than half the days Nearly every day More than half the days Not at all   Q2: Feeling down, depressed or hopeless Nearly every day Nearly every day More than half the days Several days   PHQ-2 Score 5 6 4 1     PHQ9:   PHQ-9 SCORE 8/3/2022 10/26/2022 11/29/2022 1/5/2023 2/2/2023   PHQ-9 Total Score MyChart - 14 (Moderate depression) 21 (Severe depression) 16 (Moderately severe depression) -   PHQ-9  Total Score 8 14 21 16 12     GAD2:   YADI-2 7/28/2022 11/27/2022   Feeling nervous, anxious, or on edge 1 3   Not being able to stop or control worrying 0 3   YADI-2 Total Score 1 6     GAD7:   YADI-7 SCORE 11/27/2022 2/2/2023   Total Score 17 (severe anxiety) -   Total Score 17 13     PROMIS 10-Global Health (all questions and answers displayed):   PROMIS 10 7/28/2022 11/27/2022   In general, would you say your health is: Good Good   In general, would you say your quality of life is: Good Fair   In general, how would you rate your physical health? Good Good   In general, how would you rate your mental health, including your mood and your ability to think? Good Poor   In general, how would you rate your satisfaction with your social activities and relationships? Good Poor   In general, please rate how well you carry out your usual social activities and roles Fair Poor   To what extent are you able to carry out your everyday physical activities such as walking, climbing stairs, carrying groceries, or moving a chair? Mostly A little   How often have you been bothered by emotional problems such as feeling anxious, depressed or irritable? Sometimes Always   How would you rate your fatigue on average? Mild Moderate   How would you rate your pain on average?   0 = No Pain  to  10 = Worst Imaginable Pain 3 4   In general, would you say your health is: 3 3   In general, would you say your quality of life is: 3 2   In general, how would you rate your physical health? 3 3   In general, how would you rate your mental health, including your mood and your ability to think? 3 1   In general, how would you rate your satisfaction with your social activities and relationships? 3 1   In general, please rate how well you carry out your usual social activities and roles. (This includes activities at home, at work and in your community, and responsibilities as a parent, child, spouse, employee, friend, etc.) 2 1   To what extent are you  able to carry out your everyday physical activities such as walking, climbing stairs, carrying groceries, or moving a chair? 4 2   In the past 7 days, how often have you been bothered by emotional problems such as feeling anxious, depressed, or irritable? 3 5   In the past 7 days, how would you rate your fatigue on average? 2 3   In the past 7 days, how would you rate your pain on average, where 0 means no pain, and 10 means worst imaginable pain? 3 4   Global Mental Health Score 12 5   Global Physical Health Score 15 11   PROMIS TOTAL - SUBSCORES 27 16     [place] Suicide Severity Rating Scale (Lifetime/Recent)  [place] Suicide Severity Rating (Lifetime/Recent) 8/3/2022   1. Wish to be Dead (Lifetime) 0   2. Non-Specific Active Suicidal Thoughts (Lifetime) 0   Actual Attempt (Lifetime) 0   Has subject engaged in non-suicidal self-injurious behavior? (Lifetime) 0   Calculated C-SSRS Risk Score (Lifetime/Recent) No Risk Indicated         ASSESSMENT: Current Emotional / Mental Status (status of significant symptoms):   Risk status (Self / Other harm or suicidal ideation)   Patient denies current fears or concerns for personal safety.   Patient denies current or recent suicidal ideation or behaviors.     Patient denies current or recent homicidal ideation or behaviors.   Patient denies current or recent self injurious behavior or ideation.   Patient denies other safety concerns.   Patient reports there has been no change in risk factors since their last session.     Patient reports there has been no change in protective factors since their last session.     A safety and risk management plan has been developed including: Patient consented to co-developed safety plan on 11-30-22.  Safety and risk management plan was reviewed.   Patient agreed to use safety plan should any safety concerns arise.  A copy was made available to the patient. Reviewed 2-2-23        [facility]                                  "Veena JACKSON Sujathaluis carloslaurel     SAFETY PLAN:  Step 1: Warning signs / cues (Thoughts, images, mood, situation, behavior) that a crisis may be developing:    Thoughts: \"I don't matter\", \"People would be better off without me\", \"I'm a burden\", \"I can't do this anymore\", \"I just want this to end\" and \"Nothing makes it better\"    Images: flashbacks    Thinking Processes: ruminations (can't stop thinking about my problems): PMDD and loss of mother, racing thoughts and highly critical and negative thoughts: I cant do anything right    Mood: worsening depression, hopelessness, helplessness, intense worry, agitation and mood swings    Behaviors: isolating/withdrawing , can't stop crying, not taking care of myself, not taking care of my responsibilities, sleeping too much, not sleeping enough and increasing frequency and duration of dissociation    Situations: relationship problems, trauma  and medical condition / diagnosis: PMDD   Step 2: Coping strategies - Things I can do to take my mind off of my problems without contacting another person (relaxation technique, physical activity):    Distress Tolerance Strategies:  relaxation activities, play with my pet , listen to positive and upbeat music, sensory based activities/self-soothe with five senses, watch a funny movie, read a book, change body temperature (ice pack/cold water)  and paced breathing/progressive muscle relaxation    Physical Activities: go for a walk, gardening, meditation, deep breathing and stretching     Focus on helpful thoughts:  \"This is temporary\", \"I will get through this\", \"It always passes\" and \"Ride the wave\"  Step 3: People and social settings that provide distraction:   Name: Spouse Star   Name:  Spending time with daughter       movie theater, pet store/humane society and coffee shop   Step 4: Remind myself of people and things that are important to me and worth living for:    Daughter   Spouse  Pets     Step 5: When I am in crisis, I can ask these " people to help me use my safety plan:   Name: Spouse  Phone: [phone]      Step 6: Making the environment safe:     remove things I could use to hurt myself and be around others  Step 7: Professionals or agencies I can contact during a crisis:    Suicide Prevention Lifeline: Call or Text 577   Local Crisis Services:  [facility] [phone]      Call 911 or go to my nearest emergency department.   I helped develop this safety plan and agree to use it when needed.  I have been given a copy of this plan.      Client signature _________________________________________________________________  Today s date:  11/30/2022  Completed by Provider Name/ Credentials:  Lia Stiles MA, LMFT  November 30, 2022  Adapted from Safety Plan Template 2008 Eleonora Capone and Danny Carmona is reprinted with the express permission of the authors.  No portion of the Safety Plan Template may be reproduced without the express, written permission.  You can contact the authors at [email or web address] or [email or web address].    Name: Veena Parsons  YOB: 1985  Date: November 30, 2022   My primary care provider: Francisco Nails  My primary care clinic: [facility]   My prescriber: PCP  Other care team support:  Holistic medical provider    My Triggers:    Relational problems  Loss of mother recently  Financial stress      Additional People, Places, and Things that I can access for support:   Spouse  Daughter          What is important to me and makes life worth living: Family         GREEN    Good Control  1. I feel good  2. No suicidal thoughts   3. Can work, sleep and play      Action Steps  1. Self-care: balanced meals, exercising, sleep practices, etc.  2. Take your medications as prescribed.  3. Continue meetings with therapist and prescriber.  4.  Do the healthy things that I enjoy.             YELLO Getting Worse  I have ANY of these:  1. I do not feel good  2. Difficulty  Concentrating  3. Sleep is changing  4. Increase/Change in my thoughts to hurt self and/or others, but I can still manage and not act on it.   5. Not taking care of self.               Action Steps (in addition to the above):  1. Inform your therapist and psychiatric prescriber/PCP.  2. Keep taking your medications as prescribed.    3. Turn to people you can ask for help.  4. Use internal coping strategies -see below.  5. Create safe environment: Removing items I can hurt self with              RED  Get Help  If I have ANY of these:  1. Current and uncontrollable thoughts and/or behaviors to hurt self and/or others.   2. Crazy buzzing and spinning.     Actions to manage my safety  1. Contact your emergency person Star  2. Call or Text 473  3. Call my crisis team- [facility] [phone]  3. Or Call [phone] or go to the emergency room right away        My Internal Coping Strategies include the following:  take a bath, belly breathing, arts and crafts, play with my pet, use my coping box, exercise and use my coping skills    [End for Brief Safety Plan]     Safety Concerns  How To Identify Situations That Make Your Mental Health Worse:  Triggers are things that make your mental health worse.  Look at the list below to help you find your triggers and what you can do about them.     1. Identify Early Warning Signs:    Sometimes symptoms return, even when people do their best to stay well. Symptoms can develop over a short period of time with little or no warning, but most of the time they emerge gradually over several weeks.  Early warning signs are changes that people experience when a relapse is starting. Some early warning signs are common and others are not as common.   Common Early Warning Signs:    Feeling tense or nervous, Eating less or eating more, Trouble sleeping -either too much or too little sleep, Feeling depressed or low, Feeling irritable, Feeling like not being around other people, Trouble  concentrating and Urges to harm self     2. Identify action steps to take when warning signs are noticed:    Taking Action- It is important to take action if you are experiencing early warning signs of a relapse.  The faster you act, the more likely it is that you can avoid a full relapse.  It is helpful to identify several specific ways to cope with symptoms.      The following is my list of symptoms and coping strategies that I can use when they are present:    Symptom Coping Strategies   Anxiety -Talk with someone in your support system and let him or her know how you are feeling.  -Use relaxation techniques such as deep breathing or imagery.  -Use positive affirmations to counteract negative self-talk such as  I am learning to let go of worry.    Depression - Schedule your day; include activities you have to do and activities you enjoy doing.  - Get some exercise - walk, run, bike, or swim.  - Give yourself credit for even the smallest things you get done.   Sleep Difficulties   - Go to sleep at the same time every day.  - Do something relaxing before bed, such as drinking herbal tea or listening to music.  - Avoid having discussions about upsetting topics before going to bed.   Delusions   - Distract yourself from the disturbing thought by doing something that requires your attention such as a puzzle.  - Check out your beliefs by talking to someone you trust.    Hallucinations   - Use headphones to listen to music.  - Tell voices to  stop  or say to yourself,  I am safe.   - Ignore the hallucinations as much as possible; focus on other things.   Concentration Difficulties - Minimize distractions so there is only one thing for you to focus on at a time.    - Ask the person you are having a conversation with to slow down or repeat things you are unsure of.            Appearance:   Appropriate    Eye Contact:   Good    Psychomotor Behavior: Normal    Attitude:   Cooperative    Orientation:   All   Speech    Rate /  Production: Normal     Volume:  Normal    Mood:    Anxious Depressed     Affect:    Worrisome    Thought Content:  Clear    Thought Form:  Coherent  Logical    Insight:    Good      Medication Review:   No changes to current psychiatric medication(s)     Medication Compliance:   Yes     Changes in Health Issues:   None reported     Chemical Use Review:   Substance Use: Chemical use reviewed, no active concerns identified      Tobacco Use: No current tobacco use.      Diagnosis:  296.32 (F33.1) Major Depressive Disorder, Recurrent Episode, Moderate _ and With mixed features  300.01 (F41.0) Panic Disorder  300.02 (F41.1) Generalized Anxiety Disorder   PMDD      Collateral Reports Completed:   Not Applicable    PLAN: (Patient Tasks / Therapist Tasks / Other)  Client will continue to work on the following goals:    -Follow safety plan and use crisis resources-Continued   -Consider couples therapy.  -Make an outline for the social security court date.   -Practice very direct communication with Star.   -Have more family time on Tuesdays and have no phones at the dinner table.    -Allow self to grieve the loss of mother.   -Continue with online support groups.         Lia Stiles,                                                          ______________________________________________________________________    Individual Treatment Plan    Patient's Name: Veena Parsons  YOB: 1985    Date of Creation: 9-7-22  Date Treatment Plan Last Reviewed/Revised: 1-5-23    DSM5 Diagnoses:  296.32 (F33.1) Major Depressive Disorder, Recurrent Episode, Moderate _ and With mixed features  300.01 (F41.0) Panic Disorder  300.02 (F41.1) Generalized Anxiety Disorder   PMDD  Psychosocial / Contextual Factors: Some relational issues   PROMIS (reviewed every 90 days): 27    Referral / Collaboration:  Referral to another professional/service is not indicated at this time..    Anticipated number of session for this episode  of care: 6-9 sessions  Anticipation frequency of session: Biweekly  Anticipated Duration of each session: 38-52 minutes  Treatment plan will be reviewed in 90 days or when goals have been changed.       MeasurableTreatment Goal(s) related to diagnosis / functional impairment(s)  Goal 1: Patient will address struggles with anxiety, depression and PMDD.    I will know I've met my goal when I am feeling less reactive through the month with the symptoms.      Objective #A (Patient Action)    Patient will work on establishing a concrete routine with self-care.  Status: Continued - Date(s): 1-5-23    Intervention(s)  Therapist will use CBT and motivational interviewing.      Objective #B  Patient will develop a plan to help manage PMDD  Status: Continued - Date(s): 1-5-23    Intervention(s)  Therapist will use solution focused therapy.    Objective #C  Patient will work on ways to communicate with narcissistic individuals.  Status: Continued - Date(s): 1-5-23    Intervention(s)  Therapist will teach communication skills.          Patient has reviewed and agreed to the above plan.      Lia Stiles, TH September 7, 2022

## 2023-02-15 ENCOUNTER — VIRTUAL VISIT (OUTPATIENT)
Dept: PSYCHOLOGY | Facility: CLINIC | Age: 38
End: 2023-02-15
Payer: COMMERCIAL

## 2023-02-15 DIAGNOSIS — F41.0 PANIC DISORDER WITHOUT AGORAPHOBIA: ICD-10-CM

## 2023-02-15 DIAGNOSIS — F41.1 GENERALIZED ANXIETY DISORDER: ICD-10-CM

## 2023-02-15 DIAGNOSIS — F33.1 MAJOR DEPRESSIVE DISORDER, RECURRENT EPISODE, MODERATE (H): Primary | ICD-10-CM

## 2023-02-15 PROCEDURE — 90834 PSYTX W PT 45 MINUTES: CPT | Mod: VID | Performed by: MARRIAGE & FAMILY THERAPIST

## 2023-02-15 NOTE — PROGRESS NOTES
"[facility]                                     Progress Note    Patient Name: Veena Parsons  Date: 2-15-23         Service Type: Individual      Session Start Time: 2pm Session End Time: 250pm     Session Length: 50min    Session #: 13    Attendees: Client    Service Modality:  Video Visit:      Telemedicine Visit: The patient's condition can be safely assessed and treated via synchronous audio and visual telemedicine encounter.      Reason for Telemedicine Visit: Patient has requested telehealth visit    Originating Site (Patient Location): Patient's home        Distant Location (provider location):  Off-site    Consent:  The patient/guardian has verbally consented to: the potential risks and benefits of telemedicine (video visit) versus in person care; bill my insurance or make self-payment for services provided; and responsibility for payment of non-covered services.     Mode of Communication:  Video Conference via [employer]    As the provider I attest to compliance with applicable laws and regulations related to telemedicine.      Treatment Plan- 1-5-23  CGI- 1-5-23  Phq-9 and YADI-7- 2-2-23  Promis- 11-27-22    DATA  Interactive Complexity: No  Crisis: No        Progress Since Last Session (Related to Symptoms / Goals / Homework):   Symptoms: Client reports symptoms of depressed mood and anxiety. Has been working on her response to certain subject matter.     Homework: Achieved / completed to satisfaction  Completed in session      Episode of Care Goals: Minimal progress - ACTION (Actively working towards change); Intervened by reinforcing change plan / affirming steps taken     Current / Ongoing Stressors and Concerns:   -Spouse got fired from job last week over thinking a rule was \"stupid.\"   -Has struggled with PMDD for a number of years.  Has been working with chiropractic and message therapy on some holistic treatment options- Continue    -Working on relationship with daughter. "    -Client has a court date on April 4th for disability.     -Feels Star is very good at manipulating situations. Does not take responsibility for his part of situations.     -Experiencing high financial strain.   -No suicidal thoughts this past week.      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    -Working on an outline for her court case.      Treatment Objective(s) Addressed in This Session:  Safety planning   Patient will develop a plan to help manage PMDD  Patient will work on ways to communicate patterns   Self-care   Preparing for court date        Intervention:   Follow safety plan and use crisis resources.   Use support system- Continued   Complete outline for disability court case.    Help support spouse with finding a new job.    Set some clear boundaries and be directly assertive with Star.     Focus on family time on Tuesdays and no phones at the dinner table-Continued    Continue to use hydroxyzine and the allergy pill.      Use some holistic approaches to health.        Practice very direct communication.    Consider couples therapy again.     Will work more on defining a new purpose.    Working on responding and not reacting.           Assessments completed prior to visit:  The following assessments were completed by patient for this visit:  PHQ2:   PHQ-2 ( 1999 [employer]) 1/5/2023 11/29/2022 10/26/2022 7/25/2022   Q1: Little interest or pleasure in doing things 2 3 2 0   Q2: Feeling down, depressed or hopeless 3 3 2 1   PHQ-2 Score 5 6 4 1   Q1: Little interest or pleasure in doing things More than half the days Nearly every day More than half the days Not at all   Q2: Feeling down, depressed or hopeless Nearly every day Nearly every day More than half the days Several days   PHQ-2 Score 5 6 4 1     PHQ9:   PHQ-9 SCORE 8/3/2022 10/26/2022 11/29/2022 1/5/2023 2/2/2023   PHQ-9 Total Score MyChart - 14 (Moderate depression) 21 (Severe depression) 16  (Moderately severe depression) -   PHQ-9 Total Score 8 14 21 16 12     GAD2:   YADI-2 7/28/2022 11/27/2022   Feeling nervous, anxious, or on edge 1 3   Not being able to stop or control worrying 0 3   YADI-2 Total Score 1 6     GAD7:   YADI-7 SCORE 11/27/2022 2/2/2023   Total Score 17 (severe anxiety) -   Total Score 17 13     PROMIS 10-Global Health (all questions and answers displayed):   PROMIS 10 7/28/2022 11/27/2022   In general, would you say your health is: Good Good   In general, would you say your quality of life is: Good Fair   In general, how would you rate your physical health? Good Good   In general, how would you rate your mental health, including your mood and your ability to think? Good Poor   In general, how would you rate your satisfaction with your social activities and relationships? Good Poor   In general, please rate how well you carry out your usual social activities and roles Fair Poor   To what extent are you able to carry out your everyday physical activities such as walking, climbing stairs, carrying groceries, or moving a chair? Mostly A little   How often have you been bothered by emotional problems such as feeling anxious, depressed or irritable? Sometimes Always   How would you rate your fatigue on average? Mild Moderate   How would you rate your pain on average?   0 = No Pain  to  10 = Worst Imaginable Pain 3 4   In general, would you say your health is: 3 3   In general, would you say your quality of life is: 3 2   In general, how would you rate your physical health? 3 3   In general, how would you rate your mental health, including your mood and your ability to think? 3 1   In general, how would you rate your satisfaction with your social activities and relationships? 3 1   In general, please rate how well you carry out your usual social activities and roles. (This includes activities at home, at work and in your community, and responsibilities as a parent, child, spouse, employee,  friend, etc.) 2 1   To what extent are you able to carry out your everyday physical activities such as walking, climbing stairs, carrying groceries, or moving a chair? 4 2   In the past 7 days, how often have you been bothered by emotional problems such as feeling anxious, depressed, or irritable? 3 5   In the past 7 days, how would you rate your fatigue on average? 2 3   In the past 7 days, how would you rate your pain on average, where 0 means no pain, and 10 means worst imaginable pain? 3 4   Global Mental Health Score 12 5   Global Physical Health Score 15 11   PROMIS TOTAL - SUBSCORES 27 16     Davie Suicide Severity Rating Scale (Lifetime/Recent)  Davie Suicide Severity Rating (Lifetime/Recent) 8/3/2022   1. Wish to be Dead (Lifetime) 0   2. Non-Specific Active Suicidal Thoughts (Lifetime) 0   Actual Attempt (Lifetime) 0   Has subject engaged in non-suicidal self-injurious behavior? (Lifetime) 0   Calculated C-SSRS Risk Score (Lifetime/Recent) No Risk Indicated         ASSESSMENT: Current Emotional / Mental Status (status of significant symptoms):   Risk status (Self / Other harm or suicidal ideation)   Patient denies current fears or concerns for personal safety.   Patient denies current or recent suicidal ideation or behaviors.     Patient denies current or recent homicidal ideation or behaviors.   Patient denies current or recent self injurious behavior or ideation.   Patient denies other safety concerns.   Patient reports there has been no change in risk factors since their last session.     Patient reports there has been no change in protective factors since their last session.     A safety and risk management plan has been developed including: Patient consented to co-developed safety plan on 11-30-22.  Safety and risk management plan was reviewed.   Patient agreed to use safety plan should any safety concerns arise.  A copy was made available to the patient. Reviewed 2-15-23        [facility]  "[facility]                                       Veena Parsons     SAFETY PLAN:  Step 1: Warning signs / cues (Thoughts, images, mood, situation, behavior) that a crisis may be developing:    Thoughts: \"I don't matter\", \"People would be better off without me\", \"I'm a burden\", \"I can't do this anymore\", \"I just want this to end\" and \"Nothing makes it better\"    Images: flashbacks    Thinking Processes: ruminations (can't stop thinking about my problems): PMDD and loss of mother, racing thoughts and highly critical and negative thoughts: I cant do anything right    Mood: worsening depression, hopelessness, helplessness, intense worry, agitation and mood swings    Behaviors: isolating/withdrawing , can't stop crying, not taking care of myself, not taking care of my responsibilities, sleeping too much, not sleeping enough and increasing frequency and duration of dissociation    Situations: relationship problems, trauma  and medical condition / diagnosis: PMDD   Step 2: Coping strategies - Things I can do to take my mind off of my problems without contacting another person (relaxation technique, physical activity):    Distress Tolerance Strategies:  relaxation activities, play with my pet , listen to positive and upbeat music, sensory based activities/self-soothe with five senses, watch a funny movie, read a book, change body temperature (ice pack/cold water)  and paced breathing/progressive muscle relaxation    Physical Activities: go for a walk, gardening, meditation, deep breathing and stretching     Focus on helpful thoughts:  \"This is temporary\", \"I will get through this\", \"It always passes\" and \"Ride the wave\"  Step 3: People and social settings that provide distraction:   Name: Spouse Star   Name:  Spending time with daughter       movie theater, pet store/humane society and coffee shop   Step 4: Remind myself of people and things that are important to me and worth living for:    Daughter "   Spouse  Pets     Step 5: When I am in crisis, I can ask these people to help me use my safety plan:   Name: Spouse  Phone: [phone]      Step 6: Making the environment safe:     remove things I could use to hurt myself and be around others  Step 7: Professionals or agencies I can contact during a crisis:    Suicide Prevention Lifeline: Call or Text 982   Local Crisis Services:  [facility] [phone]      Call 911 or go to my nearest emergency department.   I helped develop this safety plan and agree to use it when needed.  I have been given a copy of this plan.      Client signature _________________________________________________________________  Today s date:  11/30/2022  Completed by Provider Name/ Credentials:  Lia Stiles MA, LMFT  November 30, 2022  Adapted from Safety Plan Template 2008 Eleonora Capone and Danny Carmona is reprinted with the express permission of the authors.  No portion of the Safety Plan Template may be reproduced without the express, written permission.  You can contact the authors at [email or web address] or [email or web address].    Name: Veena Parsons  YOB: 1985  Date: November 30, 2022   My primary care provider: Francisco Nails  [facility]   My prescriber: PCP  Other care team support:  Holistic medical provider    My Triggers:    Relational problems  Loss of mother recently  Financial stress      Additional People, Places, and Things that I can access for support:   Spouse  Daughter          What is important to me and makes life worth living: Family         GREEN    Good Control  1. I feel good  2. No suicidal thoughts   3. Can work, sleep and play      Action Steps  1. Self-care: balanced meals, exercising, sleep practices, etc.  2. Take your medications as prescribed.  3. Continue meetings with therapist and prescriber.  4.  Do the healthy things that I enjoy.             YELLO Getting Worse  I have ANY  of these:  1. I do not feel good  2. Difficulty Concentrating  3. Sleep is changing  4. Increase/Change in my thoughts to hurt self and/or others, but I can still manage and not act on it.   5. Not taking care of self.               Action Steps (in addition to the above):  1. Inform your therapist and psychiatric prescriber/PCP.  2. Keep taking your medications as prescribed.    3. Turn to people you can ask for help.  4. Use internal coping strategies -see below.  5. Create safe environment: Removing items I can hurt self with              RED  Get Help  If I have ANY of these:  1. Current and uncontrollable thoughts and/or behaviors to hurt self and/or others.   2. Crazy buzzing and spinning.     Actions to manage my safety  1. Contact your emergency person Star  2. Call or Text 477  3. Call my crisis team- [facility] [phone]  3. Or Call [phone] or go to the emergency room right away        My Internal Coping Strategies include the following:  take a bath, belly breathing, arts and crafts, play with my pet, use my coping box, exercise and use my coping skills    [End for Brief Safety Plan]     Safety Concerns  How To Identify Situations That Make Your Mental Health Worse:  Triggers are things that make your mental health worse.  Look at the list below to help you find your triggers and what you can do about them.     1. Identify Early Warning Signs:    Sometimes symptoms return, even when people do their best to stay well. Symptoms can develop over a short period of time with little or no warning, but most of the time they emerge gradually over several weeks.  Early warning signs are changes that people experience when a relapse is starting. Some early warning signs are common and others are not as common.   Common Early Warning Signs:    Feeling tense or nervous, Eating less or eating more, Trouble sleeping -either too much or too little sleep, Feeling depressed or low, Feeling irritable, Feeling like  not being around other people, Trouble concentrating and Urges to harm self     2. Identify action steps to take when warning signs are noticed:    Taking Action- It is important to take action if you are experiencing early warning signs of a relapse.  The faster you act, the more likely it is that you can avoid a full relapse.  It is helpful to identify several specific ways to cope with symptoms.      The following is my list of symptoms and coping strategies that I can use when they are present:    Symptom Coping Strategies   Anxiety -Talk with someone in your support system and let him or her know how you are feeling.  -Use relaxation techniques such as deep breathing or imagery.  -Use positive affirmations to counteract negative self-talk such as  I am learning to let go of worry.    Depression - Schedule your day; include activities you have to do and activities you enjoy doing.  - Get some exercise - walk, run, bike, or swim.  - Give yourself credit for even the smallest things you get done.   Sleep Difficulties   - Go to sleep at the same time every day.  - Do something relaxing before bed, such as drinking herbal tea or listening to music.  - Avoid having discussions about upsetting topics before going to bed.   Delusions   - Distract yourself from the disturbing thought by doing something that requires your attention such as a puzzle.  - Check out your beliefs by talking to someone you trust.    Hallucinations   - Use headphones to listen to music.  - Tell voices to  stop  or say to yourself,  I am safe.   - Ignore the hallucinations as much as possible; focus on other things.   Concentration Difficulties - Minimize distractions so there is only one thing for you to focus on at a time.    - Ask the person you are having a conversation with to slow down or repeat things you are unsure of.            Appearance:   Appropriate    Eye Contact:   Good    Psychomotor Behavior: Normal    Attitude:   Cooperative     Orientation:   All   Speech    Rate / Production: Normal     Volume:  Normal    Mood:    Anxious Depressed     Affect:    Worrisome    Thought Content:  Clear    Thought Form:  Coherent  Logical    Insight:    Good      Medication Review:   No changes to current psychiatric medication(s)     Medication Compliance:   Yes     Changes in Health Issues:   None reported     Chemical Use Review:   Substance Use: Chemical use reviewed, no active concerns identified      Tobacco Use: No current tobacco use.      Diagnosis:  296.32 (F33.1) Major Depressive Disorder, Recurrent Episode, Moderate _ and With mixed features  300.01 (F41.0) Panic Disorder  300.02 (F41.1) Generalized Anxiety Disorder   PMDD      Collateral Reports Completed:   Not Applicable    PLAN: (Patient Tasks / Therapist Tasks / Other)  Client will continue to work on the following goals:    -Follow safety plan and use crisis resources-Continued   -Work on responding and not reacting.   -Consider couples therapy.  -Make an outline for the social security court date.   -Practice very direct communication with Star.   -Support Star with his job [employer].   -Have more family time on Tuesdays and have no phones at the dinner table.    -Allow self to grieve the loss of mother.   -Continue with online support groups.         Lia Stiles,                                                          ______________________________________________________________________    Individual Treatment Plan    Patient's Name: Veena Parsons  YOB: 1985    Date of Creation: 9-7-22  Date Treatment Plan Last Reviewed/Revised: 1-5-23    DSM5 Diagnoses:  296.32 (F33.1) Major Depressive Disorder, Recurrent Episode, Moderate _ and With mixed features  300.01 (F41.0) Panic Disorder  300.02 (F41.1) Generalized Anxiety Disorder   PMDD  Psychosocial / Contextual Factors: Some relational issues   PROMIS (reviewed every 90 days): 27    Referral / Collaboration:  Referral  to another professional/service is not indicated at this time..    Anticipated number of session for this episode of care: 6-9 sessions  Anticipation frequency of session: Biweekly  Anticipated Duration of each session: 38-52 minutes  Treatment plan will be reviewed in 90 days or when goals have been changed.       MeasurableTreatment Goal(s) related to diagnosis / functional impairment(s)  Goal 1: Patient will address struggles with anxiety, depression and PMDD.    I will know I've met my goal when I am feeling less reactive through the month with the symptoms.      Objective #A (Patient Action)    Patient will work on establishing a concrete routine with self-care.  Status: Continued - Date(s): 1-5-23    Intervention(s)  Therapist will use CBT and motivational interviewing.      Objective #B  Patient will develop a plan to help manage PMDD  Status: Continued - Date(s): 1-5-23    Intervention(s)  Therapist will use solution focused therapy.    Objective #C  Patient will work on ways to communicate with narcissistic individuals.  Status: Continued - Date(s): 1-5-23    Intervention(s)  Therapist will teach communication skills.          Patient has reviewed and agreed to the above plan.      Lia Stiles, TH September 7, 2022

## 2023-02-23 ENCOUNTER — VIRTUAL VISIT (OUTPATIENT)
Dept: PSYCHOLOGY | Facility: CLINIC | Age: 38
End: 2023-02-23
Payer: COMMERCIAL

## 2023-02-23 DIAGNOSIS — F41.1 GENERALIZED ANXIETY DISORDER: ICD-10-CM

## 2023-02-23 DIAGNOSIS — F41.0 PANIC DISORDER WITHOUT AGORAPHOBIA: ICD-10-CM

## 2023-02-23 DIAGNOSIS — F33.1 MAJOR DEPRESSIVE DISORDER, RECURRENT EPISODE, MODERATE (H): Primary | ICD-10-CM

## 2023-02-23 PROCEDURE — 90834 PSYTX W PT 45 MINUTES: CPT | Mod: VID | Performed by: MARRIAGE & FAMILY THERAPIST

## 2023-02-23 NOTE — PROGRESS NOTES
[facility]                                     Progress Note    Patient Name: Veena Parsons  Date: 2-23-23         Service Type: Individual      Session Start Time: 9am Session End Time: 950am     Session Length: 50min    Session #: 14    Attendees: Client    Service Modality:  Video Visit:      Telemedicine Visit: The patient's condition can be safely assessed and treated via synchronous audio and visual telemedicine encounter.      Reason for Telemedicine Visit: Patient has requested telehealth visit    Originating Site (Patient Location): Patient's home        Distant Location (provider location):  Off-site    Consent:  The patient/guardian has verbally consented to: the potential risks and benefits of telemedicine (video visit) versus in person care; bill my insurance or make self-payment for services provided; and responsibility for payment of non-covered services.     Mode of Communication:  Video Conference via [employer]    As the provider I attest to compliance with applicable laws and regulations related to telemedicine.      Treatment Plan- 1-5-23  CGI- 1-5-23  Phq-9 and YADI-7- 2-2-23  Promis- 11-27-22    DATA  Interactive Complexity: No  Crisis: No        Progress Since Last Session (Related to Symptoms / Goals / Homework):   Symptoms: Client reports symptoms of depressed mood and anxiety but has had some improvements this week.     Homework: Achieved / completed to satisfaction  Completed in session      Episode of Care Goals: Minimal progress - ACTION (Actively working towards change); Intervened by reinforcing change plan / affirming steps taken     Current / Ongoing Stressors and Concerns:   -Spouse has been interviewing for new positions.    -Has struggled with PMDD for a number of years.  Has been working with chiropractic and message therapy on some holistic treatment options- Continue    -Working on relationship with daughter and having more positive and directional  conversation.    -Client has a court date on April 4th for disability.     -Grew up with a mother who was a behavioral analysis and feels this really shaped her responses to situations.    -Experiencing high financial strain.   -No suicidal thoughts this past week.      -Working on an outline for her court case.    -Working on assertiveness skills.      Treatment Objective(s) Addressed in This Session:  Safety planning   Patient will develop a plan to help manage PMDD  Patient will work on ways to communicate patterns   Self-care   Preparing for court date        Intervention:   Follow safety plan and use crisis resources.   Use support system- Continued   Complete outline for disability court case and take notes.    Help support spouse with finding a new job.    Continuing with medication management.    Open communication with Star and Deborah at home.    Focus on family time on Tuesdays and no phones at the dinner table-Continued    Use some holistic approaches to health.        Consider couples therapy again.     Will work more on defining a new purpose.    Working on responding and not reacting and having personal control.        Assessments completed prior to visit:  The following assessments were completed by patient for this visit:  PHQ2:   PHQ-2 ( 1999 [employer]) 1/5/2023 11/29/2022 10/26/2022 7/25/2022   Q1: Little interest or pleasure in doing things 2 3 2 0   Q2: Feeling down, depressed or hopeless 3 3 2 1   PHQ-2 Score 5 6 4 1   Q1: Little interest or pleasure in doing things More than half the days Nearly every day More than half the days Not at all   Q2: Feeling down, depressed or hopeless Nearly every day Nearly every day More than half the days Several days   PHQ-2 Score 5 6 4 1     PHQ9:   PHQ-9 SCORE 8/3/2022 10/26/2022 11/29/2022 1/5/2023 2/2/2023   PHQ-9 Total Score MyChart - 14 (Moderate depression) 21 (Severe depression) 16 (Moderately severe depression) -   PHQ-9 Total Score 8 14 21 16 12     GAD2:    YADI-2 7/28/2022 11/27/2022   Feeling nervous, anxious, or on edge 1 3   Not being able to stop or control worrying 0 3   YADI-2 Total Score 1 6     GAD7:   YADI-7 SCORE 11/27/2022 2/2/2023   Total Score 17 (severe anxiety) -   Total Score 17 13     PROMIS 10-Global Health (all questions and answers displayed):   PROMIS 10 7/28/2022 11/27/2022   In general, would you say your health is: Good Good   In general, would you say your quality of life is: Good Fair   In general, how would you rate your physical health? Good Good   In general, how would you rate your mental health, including your mood and your ability to think? Good Poor   In general, how would you rate your satisfaction with your social activities and relationships? Good Poor   In general, please rate how well you carry out your usual social activities and roles Fair Poor   To what extent are you able to carry out your everyday physical activities such as walking, climbing stairs, carrying groceries, or moving a chair? Mostly A little   How often have you been bothered by emotional problems such as feeling anxious, depressed or irritable? Sometimes Always   How would you rate your fatigue on average? Mild Moderate   How would you rate your pain on average?   0 = No Pain  to  10 = Worst Imaginable Pain 3 4   In general, would you say your health is: 3 3   In general, would you say your quality of life is: 3 2   In general, how would you rate your physical health? 3 3   In general, how would you rate your mental health, including your mood and your ability to think? 3 1   In general, how would you rate your satisfaction with your social activities and relationships? 3 1   In general, please rate how well you carry out your usual social activities and roles. (This includes activities at home, at work and in your community, and responsibilities as a parent, child, spouse, employee, friend, etc.) 2 1   To what extent are you able to carry out your everyday  physical activities such as walking, climbing stairs, carrying groceries, or moving a chair? 4 2   In the past 7 days, how often have you been bothered by emotional problems such as feeling anxious, depressed, or irritable? 3 5   In the past 7 days, how would you rate your fatigue on average? 2 3   In the past 7 days, how would you rate your pain on average, where 0 means no pain, and 10 means worst imaginable pain? 3 4   Global Mental Health Score 12 5   Global Physical Health Score 15 11   PROMIS TOTAL - SUBSCORES 27 16     [place] Suicide Severity Rating Scale (Lifetime/Recent)  [place] Suicide Severity Rating (Lifetime/Recent) 8/3/2022   1. Wish to be Dead (Lifetime) 0   2. Non-Specific Active Suicidal Thoughts (Lifetime) 0   Actual Attempt (Lifetime) 0   Has subject engaged in non-suicidal self-injurious behavior? (Lifetime) 0   Calculated C-SSRS Risk Score (Lifetime/Recent) No Risk Indicated         ASSESSMENT: Current Emotional / Mental Status (status of significant symptoms):   Risk status (Self / Other harm or suicidal ideation)   Patient denies current fears or concerns for personal safety.   Patient denies current or recent suicidal ideation or behaviors.     Patient denies current or recent homicidal ideation or behaviors.   Patient denies current or recent self injurious behavior or ideation.   Patient denies other safety concerns.   Patient reports there has been no change in risk factors since their last session.     Patient reports there has been no change in protective factors since their last session.     A safety and risk management plan has been developed including: Patient consented to co-developed safety plan on 11-30-22.  Safety and risk management plan was reviewed.   Patient agreed to use safety plan should any safety concerns arise.  A copy was made available to the patient. Reviewed 2-23-23        [facility]                                       Veena JACKSON  "[facility]     SAFETY PLAN:  Step 1: Warning signs / cues (Thoughts, images, mood, situation, behavior) that a crisis may be developing:    Thoughts: \"I don't matter\", \"People would be better off without me\", \"I'm a burden\", \"I can't do this anymore\", \"I just want this to end\" and \"Nothing makes it better\"    Images: flashbacks    Thinking Processes: ruminations (can't stop thinking about my problems): PMDD and loss of mother, racing thoughts and highly critical and negative thoughts: I cant do anything right    Mood: worsening depression, hopelessness, helplessness, intense worry, agitation and mood swings    Behaviors: isolating/withdrawing , can't stop crying, not taking care of myself, not taking care of my responsibilities, sleeping too much, not sleeping enough and increasing frequency and duration of dissociation    Situations: relationship problems, trauma  and medical condition / diagnosis: PMDD   Step 2: Coping strategies - Things I can do to take my mind off of my problems without contacting another person (relaxation technique, physical activity):    Distress Tolerance Strategies:  relaxation activities, play with my pet , listen to positive and upbeat music, sensory based activities/self-soothe with five senses, watch a funny movie, read a book, change body temperature (ice pack/cold water)  and paced breathing/progressive muscle relaxation    Physical Activities: go for a walk, gardening, meditation, deep breathing and stretching     Focus on helpful thoughts:  \"This is temporary\", \"I will get through this\", \"It always passes\" and \"Ride the wave\"  Step 3: People and social settings that provide distraction:   Name: Spouse Star   Name:  Spending time with daughter       movie theater, pet store/humane society and coffee shop   Step 4: Remind myself of people and things that are important to me and worth living for:    Daughter   Spouse  Pets     Step 5: When I am in crisis, I can ask these people to " help me use my safety plan:   Name: Spouse  Phone: [phone]      Step 6: Making the environment safe:     remove things I could use to hurt myself and be around others  Step 7: Professionals or agencies I can contact during a crisis:    Suicide Prevention Lifeline: Call or Text 398   Local Crisis Services:  [facility] [phone]      Call 911 or go to my nearest emergency department.   I helped develop this safety plan and agree to use it when needed.  I have been given a copy of this plan.      Client signature _________________________________________________________________  Today s date:  11/30/2022  Completed by Provider Name/ Credentials:  Lia Stiles MA, LMFT  November 30, 2022  Adapted from Safety Plan Template 2008 Eleonora Capone and Danny Carmona is reprinted with the express permission of the authors.  No portion of the Safety Plan Template may be reproduced without the express, written permission.  You can contact the authors at [email or web address] or [email or web address].    Name: Veena Parsons  YOB: 1985  Date: November 30, 2022   My primary care provider: Francisco Nails  My primary care clinic: [facility]   My prescriber: PCP  Other care team support:  Holistic medical provider    My Triggers:    Relational problems  Loss of mother recently  Financial stress      Additional People, Places, and Things that I can access for support:   Spouse  Daughter          What is important to me and makes life worth living: Family         GREEN    Good Control  1. I feel good  2. No suicidal thoughts   3. Can work, sleep and play      Action Steps  1. Self-care: balanced meals, exercising, sleep practices, etc.  2. Take your medications as prescribed.  3. Continue meetings with therapist and prescriber.  4.  Do the healthy things that I enjoy.             YELLO Getting Worse  I have ANY of these:  1. I do not feel good  2. Difficulty Concentrating  3. Sleep is  changing  4. Increase/Change in my thoughts to hurt self and/or others, but I can still manage and not act on it.   5. Not taking care of self.               Action Steps (in addition to the above):  1. Inform your therapist and psychiatric prescriber/PCP.  2. Keep taking your medications as prescribed.    3. Turn to people you can ask for help.  4. Use internal coping strategies -see below.  5. Create safe environment: Removing items I can hurt self with              RED  Get Help  If I have ANY of these:  1. Current and uncontrollable thoughts and/or behaviors to hurt self and/or others.   2. Crazy buzzing and spinning.     Actions to manage my safety  1. Contact your emergency person Star  2. Call or Text [phone]  3. Call my crisis team- [facility] [phone]  3. Or Call 491 or go to the emergency room right away        My Internal Coping Strategies include the following:  take a bath, belly breathing, arts and crafts, play with my pet, use my coping box, exercise and use my coping skills    [End for Brief Safety Plan]     Safety Concerns  How To Identify Situations That Make Your Mental Health Worse:  Triggers are things that make your mental health worse.  Look at the list below to help you find your triggers and what you can do about them.     1. Identify Early Warning Signs:    Sometimes symptoms return, even when people do their best to stay well. Symptoms can develop over a short period of time with little or no warning, but most of the time they emerge gradually over several weeks.  Early warning signs are changes that people experience when a relapse is starting. Some early warning signs are common and others are not as common.   Common Early Warning Signs:    Feeling tense or nervous, Eating less or eating more, Trouble sleeping -either too much or too little sleep, Feeling depressed or low, Feeling irritable, Feeling like not being around other people, Trouble concentrating and Urges to harm  self     2. Identify action steps to take when warning signs are noticed:    Taking Action- It is important to take action if you are experiencing early warning signs of a relapse.  The faster you act, the more likely it is that you can avoid a full relapse.  It is helpful to identify several specific ways to cope with symptoms.      The following is my list of symptoms and coping strategies that I can use when they are present:    Symptom Coping Strategies   Anxiety -Talk with someone in your support system and let him or her know how you are feeling.  -Use relaxation techniques such as deep breathing or imagery.  -Use positive affirmations to counteract negative self-talk such as  I am learning to let go of worry.    Depression - Schedule your day; include activities you have to do and activities you enjoy doing.  - Get some exercise - walk, run, bike, or swim.  - Give yourself credit for even the smallest things you get done.   Sleep Difficulties   - Go to sleep at the same time every day.  - Do something relaxing before bed, such as drinking herbal tea or listening to music.  - Avoid having discussions about upsetting topics before going to bed.   Delusions   - Distract yourself from the disturbing thought by doing something that requires your attention such as a puzzle.  - Check out your beliefs by talking to someone you trust.    Hallucinations   - Use headphones to listen to music.  - Tell voices to  stop  or say to yourself,  I am safe.   - Ignore the hallucinations as much as possible; focus on other things.   Concentration Difficulties - Minimize distractions so there is only one thing for you to focus on at a time.    - Ask the person you are having a conversation with to slow down or repeat things you are unsure of.            Appearance:   Appropriate    Eye Contact:   Good    Psychomotor Behavior: Normal    Attitude:   Cooperative    Orientation:   All   Speech    Rate / Production: Normal      Volume:  Normal    Mood:    Anxious    Affect:    Worrisome    Thought Content:  Clear    Thought Form:  Coherent  Logical    Insight:    Good      Medication Review:   No changes to current psychiatric medication(s)     Medication Compliance:   Yes     Changes in Health Issues:   None reported     Chemical Use Review:   Substance Use: Chemical use reviewed, no active concerns identified      Tobacco Use: No current tobacco use.      Diagnosis:  296.32 (F33.1) Major Depressive Disorder, Recurrent Episode, Moderate _ and With mixed features  300.01 (F41.0) Panic Disorder  300.02 (F41.1) Generalized Anxiety Disorder   PMDD      Collateral Reports Completed:   Not Applicable    PLAN: (Patient Tasks / Therapist Tasks / Other)  Client will continue to work on the following goals:    -Follow safety plan and use crisis resources-Continued   -Work on responding and not reacting. Feeling more in control.   -Consider couples therapy.  -Make an outline for the social security court date and track symptoms.    -Practice very direct communication with Star.   -Have more in depth conversations with Deborah.  -Support Star with his job [employer]za.   -Have more family time on Tuesdays and have no phones at the dinner table.    -Allow self to grieve the loss of mother.   -Continue with online support groups.         Lia Stiles,                                                          ______________________________________________________________________    Individual Treatment Plan    Patient's Name: Veena Parsons  YOB: 1985    Date of Creation: 9-7-22  Date Treatment Plan Last Reviewed/Revised: 1-5-23    DSM5 Diagnoses:  296.32 (F33.1) Major Depressive Disorder, Recurrent Episode, Moderate _ and With mixed features  300.01 (F41.0) Panic Disorder  300.02 (F41.1) Generalized Anxiety Disorder   PMDD  Psychosocial / Contextual Factors: Some relational issues   PROMIS (reviewed every 90 days): 27    Referral /  Collaboration:  Referral to another professional/service is not indicated at this time..    Anticipated number of session for this episode of care: 6-9 sessions  Anticipation frequency of session: Biweekly  Anticipated Duration of each session: 38-52 minutes  Treatment plan will be reviewed in 90 days or when goals have been changed.       MeasurableTreatment Goal(s) related to diagnosis / functional impairment(s)  Goal 1: Patient will address struggles with anxiety, depression and PMDD.    I will know I've met my goal when I am feeling less reactive through the month with the symptoms.      Objective #A (Patient Action)    Patient will work on establishing a concrete routine with self-care.  Status: Continued - Date(s): 1-5-23    Intervention(s)  Therapist will use CBT and motivational interviewing.      Objective #B  Patient will develop a plan to help manage PMDD  Status: Continued - Date(s): 1-5-23    Intervention(s)  Therapist will use solution focused therapy.    Objective #C  Patient will work on ways to communicate with narcissistic individuals.  Status: Continued - Date(s): 1-5-23    Intervention(s)  Therapist will teach communication skills.          Patient has reviewed and agreed to the above plan.      Lia Stiles, TH September 7, 2022

## 2023-04-12 ENCOUNTER — VIRTUAL VISIT (OUTPATIENT)
Dept: PSYCHOLOGY | Facility: CLINIC | Age: 38
End: 2023-04-12
Payer: COMMERCIAL

## 2023-04-12 DIAGNOSIS — F33.1 MAJOR DEPRESSIVE DISORDER, RECURRENT EPISODE, MODERATE (H): Primary | ICD-10-CM

## 2023-04-12 DIAGNOSIS — F41.1 GENERALIZED ANXIETY DISORDER: ICD-10-CM

## 2023-04-12 DIAGNOSIS — F41.0 PANIC DISORDER WITHOUT AGORAPHOBIA: ICD-10-CM

## 2023-04-12 PROCEDURE — 90834 PSYTX W PT 45 MINUTES: CPT | Mod: VID | Performed by: MARRIAGE & FAMILY THERAPIST

## 2023-04-12 NOTE — PROGRESS NOTES
[facility]                                     Progress Note    Patient Name: Veena Parsons  Date: 4-12-23         Service Type: Individual      Session Start Time: 11am Session End Time: 1150am     Session Length: 50min    Session #: 15    Attendees: Client    Service Modality:  Video Visit:      Telemedicine Visit: The patient's condition can be safely assessed and treated via synchronous audio and visual telemedicine encounter.      Reason for Telemedicine Visit: Patient has requested telehealth visit    Originating Site (Patient Location): Patient's home        Distant Location (provider location):  Off-site    Consent:  The patient/guardian has verbally consented to: the potential risks and benefits of telemedicine (video visit) versus in person care; bill my insurance or make self-payment for services provided; and responsibility for payment of non-covered services.     Mode of Communication:  Video Conference via [employer]    As the provider I attest to compliance with applicable laws and regulations related to telemedicine.      Treatment Plan- 4-12-23  CGI- 4-12-23  Phq-9 and YADI-7- 4-12-23  Promis- 4-6-23    DATA  Interactive Complexity: No  Crisis: No        Progress Since Last Session (Related to Symptoms / Goals / Homework):   Symptoms: Client reports symptoms of depressed mood and anxiety and had a hard day on ITema.     Homework: Achieved / completed to satisfaction  Completed in session      Episode of Care Goals: Minimal progress - ACTION (Actively working towards change); Intervened by reinforcing change plan / affirming steps taken     Current / Ongoing Stressors and Concerns:   -Spouse has a new job but hours are mid day and this makes scheduling hard.    -Has struggled with PMDD for a number of years.  Has been working with chiropractic and message therapy on some holistic treatment options- Continue    -Had social security hearing and felt it went well.     -Has  done some volunteering at her daughters school but finds she cannot go in as often as she would like.    -Grew up with a mother who was a behavioral analysis and feels this really shaped her responses to situations.    -Experiencing high financial strain.   -No suicidal thoughts this past week.      -Working on assertiveness skills.    -Feeling she has been indecisive a lot this month.    -Electricity may be cut off tomorrow.    -Really missing her mom and finding herself crying every morning.       Treatment Objective(s) Addressed in This Session:  Safety planning   Patient will develop a plan to help manage PMDD  Patient will work on ways to communicate patterns   Self-care   Grief and loss       Intervention:   Follow safety plan and use crisis resources.   Use support system- Continued   Communicate with  on the hearing.    Volunteer at the school when able.    Help support spouse with his new position.    Continuing with medication management.    Open communication with Tessie at home.    Focus on family time on Tuesdays and no phones at the dinner table-Continued    Use some holistic approaches to health.         Will work more on defining a new purpose.    Working on responding and not reacting and having personal control.    Connect with [facility] on crisis assistance.    Consider a grief group either in person or on social media       Assessments completed prior to visit:  The following assessments were completed by patient for this visit:  PHQ2:       4/11/2023     9:00 PM 1/5/2023     1:35 PM 11/29/2022    12:07 PM 10/26/2022     8:26 AM 7/25/2022     2:10 PM   PHQ-2 ( 1999 [employer])   Q1: Little interest or pleasure in doing things 1 2 3 2 0   Q2: Feeling down, depressed or hopeless 1 3 3 2 1   PHQ-2 Score 2 5 6 4 1   Q1: Little interest or pleasure in doing things Several days More than half the days Nearly every day More than half the days Not at all   Q2: Feeling down, depressed or hopeless  Several days Nearly every day Nearly every day More than half the days Several days   PHQ-2 Score 2 5 6 4 1     PHQ9:       8/3/2022     9:14 AM 10/26/2022     8:26 AM 11/29/2022    12:07 PM 1/5/2023     1:35 PM 2/2/2023    10:13 AM   PHQ-9 SCORE   PHQ-9 Total Score MyChart  14 (Moderate depression) 21 (Severe depression) 16 (Moderately severe depression)    PHQ-9 Total Score 8 14 21 16 12     GAD2:       7/28/2022     8:28 AM 11/27/2022     5:03 AM 4/6/2023     8:12 AM   YADI-2   Feeling nervous, anxious, or on edge 1 3 1   Not being able to stop or control worrying 0 3 1   YADI-2 Total Score 1 6 2     GAD7:       11/27/2022     5:03 AM 2/2/2023    10:13 AM   YADI-7 SCORE   Total Score 17 (severe anxiety)    Total Score 17 13     PROMIS 10-Global Health (all questions and answers displayed):       7/28/2022     8:34 AM 11/27/2022     5:04 AM 4/6/2023     8:14 AM   PROMIS 10   In general, would you say your health is: Good Good Good   In general, would you say your quality of life is: Good Fair Good   In general, how would you rate your physical health? Good Good Good   In general, how would you rate your mental health, including your mood and your ability to think? Good Poor Fair   In general, how would you rate your satisfaction with your social activities and relationships? Good Poor Fair   In general, please rate how well you carry out your usual social activities and roles Fair Poor Poor   To what extent are you able to carry out your everyday physical activities such as walking, climbing stairs, carrying groceries, or moving a chair? Mostly A little Mostly   In the past 7 days, how often have you been bothered by emotional problems such as feeling anxious, depressed, or irritable? Sometimes Always Sometimes   In the past 7 days, how would you rate your fatigue on average? Mild Moderate Mild   In the past 7 days, how would you rate your pain on average, where 0 means no pain, and 10 means worst imaginable pain? 3  4 2   In general, would you say your health is: 3 3 3   In general, would you say your quality of life is: 3 2 3   In general, how would you rate your physical health? 3 3 3   In general, how would you rate your mental health, including your mood and your ability to think? 3 1 2   In general, how would you rate your satisfaction with your social activities and relationships? 3 1 2   In general, please rate how well you carry out your usual social activities and roles. (This includes activities at home, at work and in your community, and responsibilities as a parent, child, spouse, employee, friend, etc.) 2 1 1   To what extent are you able to carry out your everyday physical activities such as walking, climbing stairs, carrying groceries, or moving a chair? 4 2 4   In the past 7 days, how often have you been bothered by emotional problems such as feeling anxious, depressed, or irritable? 3 5 3   In the past 7 days, how would you rate your fatigue on average? 2 3 2   In the past 7 days, how would you rate your pain on average, where 0 means no pain, and 10 means worst imaginable pain? 3 4 2   Global Mental Health Score 12 5 10   Global Physical Health Score 15 11 15   PROMIS TOTAL - SUBSCORES 27 16 25     [place] Suicide Severity Rating Scale (Lifetime/Recent)      8/3/2022     9:17 AM   [place] Suicide Severity Rating (Lifetime/Recent)   1. Wish to be Dead (Lifetime) N   2. Non-Specific Active Suicidal Thoughts (Lifetime) N   Actual Attempt (Lifetime) N   Has subject engaged in non-suicidal self-injurious behavior? (Lifetime) N   Calculated C-SSRS Risk Score (Lifetime/Recent) No Risk Indicated         ASSESSMENT: Current Emotional / Mental Status (status of significant symptoms):   Risk status (Self / Other harm or suicidal ideation)   Patient denies current fears or concerns for personal safety.   Patient denies current or recent suicidal ideation or behaviors.     Patient denies current or recent homicidal  "ideation or behaviors.   Patient denies current or recent self injurious behavior or ideation.   Patient denies other safety concerns.   Patient reports there has been no change in risk factors since their last session.     Patient reports there has been no change in protective factors since their last session.     A safety and risk management plan has been developed including: Patient consented to co-developed safety plan on 11-30-22.  Safety and risk management plan was reviewed.   Patient agreed to use safety plan should any safety concerns arise.  A copy was made available to the patient. Reviewed 4-12-23        [facility]                                       Veena Parsons     SAFETY PLAN:  Step 1: Warning signs / cues (Thoughts, images, mood, situation, behavior) that a crisis may be developing:    Thoughts: \"I don't matter\", \"People would be better off without me\", \"I'm a burden\", \"I can't do this anymore\", \"I just want this to end\" and \"Nothing makes it better\"    Images: flashbacks    Thinking Processes: ruminations (can't stop thinking about my problems): PMDD and loss of mother, racing thoughts and highly critical and negative thoughts: I cant do anything right    Mood: worsening depression, hopelessness, helplessness, intense worry, agitation and mood swings    Behaviors: isolating/withdrawing , can't stop crying, not taking care of myself, not taking care of my responsibilities, sleeping too much, not sleeping enough and increasing frequency and duration of dissociation    Situations: relationship problems, trauma  and medical condition / diagnosis: PMDD   Step 2: Coping strategies - Things I can do to take my mind off of my problems without contacting another person (relaxation technique, physical activity):    Distress Tolerance Strategies:  relaxation activities, play with my pet , listen to positive and upbeat music, sensory based activities/self-soothe with five senses, watch " "a funny movie, read a book, change body temperature (ice pack/cold water)  and paced breathing/progressive muscle relaxation    Physical Activities: go for a walk, gardening, meditation, deep breathing and stretching     Focus on helpful thoughts:  \"This is temporary\", \"I will get through this\", \"It always passes\" and \"Ride the wave\"  Step 3: People and social settings that provide distraction:   Name: Spouse Star   Name:  Spending time with daughter       movie theater, pet store/humane society and coffee shop   Step 4: Remind myself of people and things that are important to me and worth living for:    Daughter   Spouse  Pets     Step 5: When I am in crisis, I can ask these people to help me use my safety plan:   Name: Spouse  Phone: [phone]      Step 6: Making the environment safe:     remove things I could use to hurt myself and be around others  Step 7: Professionals or agencies I can contact during a crisis:    Suicide Prevention Lifeline: Call or Text 680   Local Crisis Services:  [facility] [phone]      Call 911 or go to my nearest emergency department.   I helped develop this safety plan and agree to use it when needed.  I have been given a copy of this plan.      Client signature _________________________________________________________________  Today s date:  11/30/2022  Completed by Provider Name/ Credentials:  Lia Stiles MA, LMFT  November 30, 2022  Adapted from Safety Plan Template 2008 Eleonora Carmona is reprinted with the express permission of the authors.  No portion of the Safety Plan Template may be reproduced without the express, written permission.  You can contact the authors at [email or web address] or [email or web address].    Name: Veena Parsons  YOB: 1985  Date: November 30, 2022   My primary care provider: Francisco Nails  My primary care clinic: [facility]   My prescriber: PCP  Other care team support:  Holistic " medical provider    My Triggers:    Relational problems  Loss of mother recently  Financial stress      Additional People, Places, and Things that I can access for support:   Spouse  Daughter          What is important to me and makes life worth living: Family         GREEN    Good Control  1. I feel good  2. No suicidal thoughts   3. Can work, sleep and play      Action Steps  1. Self-care: balanced meals, exercising, sleep practices, etc.  2. Take your medications as prescribed.  3. Continue meetings with therapist and prescriber.  4.  Do the healthy things that I enjoy.             YELLO Getting Worse  I have ANY of these:  1. I do not feel good  2. Difficulty Concentrating  3. Sleep is changing  4. Increase/Change in my thoughts to hurt self and/or others, but I can still manage and not act on it.   5. Not taking care of self.               Action Steps (in addition to the above):  1. Inform your therapist and psychiatric prescriber/PCP.  2. Keep taking your medications as prescribed.    3. Turn to people you can ask for help.  4. Use internal coping strategies -see below.  5. Create safe environment: Removing items I can hurt self with              RED  Get Help  If I have ANY of these:  1. Current and uncontrollable thoughts and/or behaviors to hurt self and/or others.   2. Crazy buzzing and spinning.     Actions to manage my safety  1. Contact your emergency person Star  2. Call or Text 402  3. Call my crisis team- [facility] [phone]  3. Or Call [phone] or go to the emergency room right away        My Internal Coping Strategies include the following:  take a bath, belly breathing, arts and crafts, play with my pet, use my coping box, exercise and use my coping skills    [End for Brief Safety Plan]     Safety Concerns  How To Identify Situations That Make Your Mental Health Worse:  Triggers are things that make your mental health worse.  Look at the list below to help you find your triggers and what  you can do about them.     1. Identify Early Warning Signs:    Sometimes symptoms return, even when people do their best to stay well. Symptoms can develop over a short period of time with little or no warning, but most of the time they emerge gradually over several weeks.  Early warning signs are changes that people experience when a relapse is starting. Some early warning signs are common and others are not as common.   Common Early Warning Signs:    Feeling tense or nervous, Eating less or eating more, Trouble sleeping -either too much or too little sleep, Feeling depressed or low, Feeling irritable, Feeling like not being around other people, Trouble concentrating and Urges to harm self     2. Identify action steps to take when warning signs are noticed:    Taking Action- It is important to take action if you are experiencing early warning signs of a relapse.  The faster you act, the more likely it is that you can avoid a full relapse.  It is helpful to identify several specific ways to cope with symptoms.      The following is my list of symptoms and coping strategies that I can use when they are present:    Symptom Coping Strategies   Anxiety -Talk with someone in your support system and let him or her know how you are feeling.  -Use relaxation techniques such as deep breathing or imagery.  -Use positive affirmations to counteract negative self-talk such as  I am learning to let go of worry.    Depression - Schedule your day; include activities you have to do and activities you enjoy doing.  - Get some exercise - walk, run, bike, or swim.  - Give yourself credit for even the smallest things you get done.   Sleep Difficulties   - Go to sleep at the same time every day.  - Do something relaxing before bed, such as drinking herbal tea or listening to music.  - Avoid having discussions about upsetting topics before going to bed.   Delusions   - Distract yourself from the disturbing thought by doing something that  requires your attention such as a puzzle.  - Check out your beliefs by talking to someone you trust.    Hallucinations   - Use headphones to listen to music.  - Tell voices to  stop  or say to yourself,  I am safe.   - Ignore the hallucinations as much as possible; focus on other things.   Concentration Difficulties - Minimize distractions so there is only one thing for you to focus on at a time.    - Ask the person you are having a conversation with to slow down or repeat things you are unsure of.            Appearance:   Appropriate    Eye Contact:   Good    Psychomotor Behavior: Normal    Attitude:   Cooperative    Orientation:   All   Speech    Rate / Production: Normal     Volume:  Normal    Mood:    Anxious Depressed    Affect:    Worrisome    Thought Content:  Clear    Thought Form:  Coherent  Logical    Insight:    Good      Medication Review:   No changes to current psychiatric medication(s)     Medication Compliance:   Yes     Changes in Health Issues:   None reported     Chemical Use Review:   Substance Use: Chemical use reviewed, no active concerns identified      Tobacco Use: No current tobacco use.      Diagnosis:  296.32 (F33.1) Major Depressive Disorder, Recurrent Episode, Moderate _ and With mixed features  300.01 (F41.0) Panic Disorder  300.02 (F41.1) Generalized Anxiety Disorder   PMDD      Collateral Reports Completed:   Not Applicable    PLAN: (Patient Tasks / Therapist Tasks / Other)  Client will continue to work on the following goals:    -Follow safety plan and use crisis resources-Continued   -Work on responding and not reacting. Feeling more in control.   -Consider a grief group or social media grief group.   -Follow up with .   -Practice very direct communication with Star.   -Have more in depth conversations with Deborah.  -Support Star with new schedule.   -Have more family time on Tuesdays and have no phones at the dinner table.    -Volunteer at the school when body allows.    -Consider something that is for self around grief and loss- A bench or a tree.          Lia LOOJane Falconpranav, LMFT                                                         ______________________________________________________________________    Individual Treatment Plan    Patient's Name: Veena Parsons  YOB: 1985    Date of Creation: 9-7-22  Date Treatment Plan Last Reviewed/Revised: 4-12-23    DSM5 Diagnoses:  296.32 (F33.1) Major Depressive Disorder, Recurrent Episode, Moderate _ and With mixed features  300.01 (F41.0) Panic Disorder  300.02 (F41.1) Generalized Anxiety Disorder   PMDD  Psychosocial / Contextual Factors: Some relational issues   PROMIS (reviewed every 90 days): 25    Referral / Collaboration:  Referral to another professional/service is not indicated at this time..    Anticipated number of session for this episode of care: 6-9 sessions  Anticipation frequency of session: Biweekly  Anticipated Duration of each session: 38-52 minutes  Treatment plan will be reviewed in 90 days or when goals have been changed.       MeasurableTreatment Goal(s) related to diagnosis / functional impairment(s)  Goal 1: Patient will address struggles with anxiety, depression and PMDD.    I will know I've met my goal when I am feeling less reactive through the month with the symptoms.      Objective #A (Patient Action)    Patient will work on establishing a concrete routine with self-care.  Status: Continued - Date(s): 4-12-23    Intervention(s)  Therapist will use CBT and motivational interviewing.      Objective #B  Patient will develop a plan to help manage PMDD  Status: Continued - Date(s): 4-12-23    Intervention(s)  Therapist will use solution focused therapy.    Objective #C  Patient will work on ways to communicate with narcissistic individuals.  Status: Continued - Date(s): 4-12-23    Intervention(s)  Therapist will teach communication skills.          Patient has reviewed and agreed to the above  plan.      Lia Stiles, [facility]  September 7, 2022

## 2023-04-26 ENCOUNTER — VIRTUAL VISIT (OUTPATIENT)
Dept: PSYCHOLOGY | Facility: CLINIC | Age: 38
End: 2023-04-26
Payer: COMMERCIAL

## 2023-04-26 DIAGNOSIS — F41.1 GENERALIZED ANXIETY DISORDER: ICD-10-CM

## 2023-04-26 DIAGNOSIS — F33.1 MAJOR DEPRESSIVE DISORDER, RECURRENT EPISODE, MODERATE (H): Primary | ICD-10-CM

## 2023-04-26 DIAGNOSIS — F41.0 PANIC DISORDER WITHOUT AGORAPHOBIA: ICD-10-CM

## 2023-04-26 PROCEDURE — 90834 PSYTX W PT 45 MINUTES: CPT | Mod: VID | Performed by: MARRIAGE & FAMILY THERAPIST

## 2023-04-26 NOTE — PROGRESS NOTES
[facility]                                     Progress Note    Patient Name: Veena Parsons  Date: 4-26-23         Service Type: Individual      Session Start Time: 11am Session End Time: 1150am     Session Length: 50min    Session #: 16    Attendees: Client    Service Modality:  Video Visit:      Telemedicine Visit: The patient's condition can be safely assessed and treated via synchronous audio and visual telemedicine encounter.      Reason for Telemedicine Visit: Patient has requested telehealth visit    Originating Site (Patient Location): Patient's home        Distant Location (provider location):  Off-site    Consent:  The patient/guardian has verbally consented to: the potential risks and benefits of telemedicine (video visit) versus in person care; bill my insurance or make self-payment for services provided; and responsibility for payment of non-covered services.     Mode of Communication:  Video Conference via [employer]    As the provider I attest to compliance with applicable laws and regulations related to telemedicine.      Treatment Plan- 4-12-23  CGI- 4-12-23  Phq-9 and YADI-7- 4-12-23  Promis- 4-6-23    DATA  Interactive Complexity: No  Crisis: No        Progress Since Last Session (Related to Symptoms / Goals / Homework):   Symptoms: Client reports symptoms of depressed mood and anxiety and has been working on a concrete daily schedule.      Homework: Achieved / completed to satisfaction  Completed in session      Episode of Care Goals: Minimal progress - ACTION (Actively working towards change); Intervened by reinforcing change plan / affirming steps taken     Current / Ongoing Stressors and Concerns:   -Spouse is enjoying his new job.    -Has struggled with PMDD for a number of years.  Has been working with chiropractic and message therapy on some holistic treatment options- Continue    -Had social security hearing and waiting on the outcome.    -Has felt relationship  with spouse has been on a better page.    -Grew up with a mother who was a behavioral analysis and feels this really shaped her responses to situations.    -Experiencing high financial strain.   -No suicidal thoughts this past week.      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    -Really missing her mom and finding herself crying every morning.     -On mothers side of family, there is a lot of manipulation.      Treatment Objective(s) Addressed in This Session:  Safety planning   Patient will develop a plan to help manage PMDD  Patient will work on ways to communicate patterns   Self-care   Grief and loss       Intervention:   Follow safety plan and use crisis resources.   Use support system- Continued   Waiting for outcome on  The hearing.    Volunteer at the school when able.    Take time with spouse on his breaks to see each other in the evening.    Continuing with medication management.    Open communication with Tessie at home.    Focus on family time on Tuesdays and no phones at the dinner table-Continued    Use some holistic approaches to health.         Will do door dash when body allows.    Working on responding and not reacting and having personal control.    Connect with [facility] on crisis assistance.        Assessments completed prior to visit:  The following assessments were completed by patient for this visit:  PHQ2:       4/11/2023     9:00 PM 1/5/2023     1:35 PM 11/29/2022    12:07 PM 10/26/2022     8:26 AM 7/25/2022     2:10 PM   PHQ-2 ( 1999 [employer])   Q1: Little interest or pleasure in doing things 1 2 3 2 0   Q2: Feeling down, depressed or hopeless 1 3 3 2 1   PHQ-2 Score 2 5 6 4 1   Q1: Little interest or pleasure in doing things Several days More than half the days Nearly every day More than half the days Not at all   Q2: Feeling down, depressed or hopeless Several days Nearly every day Nearly every day More than half the days Several days   PHQ-2 Score 2 5 6 4 1      PHQ9:       8/3/2022     9:14 AM 10/26/2022     8:26 AM 11/29/2022    12:07 PM 1/5/2023     1:35 PM 2/2/2023    10:13 AM   PHQ-9 SCORE   PHQ-9 Total Score Justina  14 (Moderate depression) 21 (Severe depression) 16 (Moderately severe depression)    PHQ-9 Total Score 8 14 21 16 12     GAD2:       7/28/2022     8:28 AM 11/27/2022     5:03 AM 4/6/2023     8:12 AM   YADI-2   Feeling nervous, anxious, or on edge 1 3 1   Not being able to stop or control worrying 0 3 1   YADI-2 Total Score 1 6 2     GAD7:       11/27/2022     5:03 AM 2/2/2023    10:13 AM   YADI-7 SCORE   Total Score 17 (severe anxiety)    Total Score 17 13     PROMIS 10-Global Health (all questions and answers displayed):       7/28/2022     8:34 AM 11/27/2022     5:04 AM 4/6/2023     8:14 AM   PROMIS 10   In general, would you say your health is: Good Good Good   In general, would you say your quality of life is: Good Fair Good   In general, how would you rate your physical health? Good Good Good   In general, how would you rate your mental health, including your mood and your ability to think? Good Poor Fair   In general, how would you rate your satisfaction with your social activities and relationships? Good Poor Fair   In general, please rate how well you carry out your usual social activities and roles Fair Poor Poor   To what extent are you able to carry out your everyday physical activities such as walking, climbing stairs, carrying groceries, or moving a chair? Mostly A little Mostly   In the past 7 days, how often have you been bothered by emotional problems such as feeling anxious, depressed, or irritable? Sometimes Always Sometimes   In the past 7 days, how would you rate your fatigue on average? Mild Moderate Mild   In the past 7 days, how would you rate your pain on average, where 0 means no pain, and 10 means worst imaginable pain? 3 4 2   In general, would you say your health is: 3 3 3   In general, would you say your quality of life is: 3  2 3   In general, how would you rate your physical health? 3 3 3   In general, how would you rate your mental health, including your mood and your ability to think? 3 1 2   In general, how would you rate your satisfaction with your social activities and relationships? 3 1 2   In general, please rate how well you carry out your usual social activities and roles. (This includes activities at home, at work and in your community, and responsibilities as a parent, child, spouse, employee, friend, etc.) 2 1 1   To what extent are you able to carry out your everyday physical activities such as walking, climbing stairs, carrying groceries, or moving a chair? 4 2 4   In the past 7 days, how often have you been bothered by emotional problems such as feeling anxious, depressed, or irritable? 3 5 3   In the past 7 days, how would you rate your fatigue on average? 2 3 2   In the past 7 days, how would you rate your pain on average, where 0 means no pain, and 10 means worst imaginable pain? 3 4 2   Global Mental Health Score 12 5 10   Global Physical Health Score 15 11 15   PROMIS TOTAL - SUBSCORES 27 16 25     Assumption Suicide Severity Rating Scale (Lifetime/Recent)      8/3/2022     9:17 AM   Assumption Suicide Severity Rating (Lifetime/Recent)   1. Wish to be Dead (Lifetime) N   2. Non-Specific Active Suicidal Thoughts (Lifetime) N   Actual Attempt (Lifetime) N   Has subject engaged in non-suicidal self-injurious behavior? (Lifetime) N   Calculated C-SSRS Risk Score (Lifetime/Recent) No Risk Indicated         ASSESSMENT: Current Emotional / Mental Status (status of significant symptoms):   Risk status (Self / Other harm or suicidal ideation)   Patient denies current fears or concerns for personal safety.   Patient denies current or recent suicidal ideation or behaviors.     Patient denies current or recent homicidal ideation or behaviors.   Patient denies current or recent self injurious behavior or ideation.   Patient denies  "other safety concerns.   Patient reports there has been no change in risk factors since their last session.     Patient reports there has been no change in protective factors since their last session.     A safety and risk management plan has been developed including: Patient consented to co-developed safety plan on 11-30-22.  Safety and risk management plan was reviewed.   Patient agreed to use safety plan should any safety concerns arise.  A copy was made available to the patient. Reviewed 4-26-24        [facility]                                       Veena Parsons     SAFETY PLAN:  Step 1: Warning signs / cues (Thoughts, images, mood, situation, behavior) that a crisis may be developing:    Thoughts: \"I don't matter\", \"People would be better off without me\", \"I'm a burden\", \"I can't do this anymore\", \"I just want this to end\" and \"Nothing makes it better\"    Images: flashbacks    Thinking Processes: ruminations (can't stop thinking about my problems): PMDD and loss of mother, racing thoughts and highly critical and negative thoughts: I cant do anything right    Mood: worsening depression, hopelessness, helplessness, intense worry, agitation and mood swings    Behaviors: isolating/withdrawing , can't stop crying, not taking care of myself, not taking care of my responsibilities, sleeping too much, not sleeping enough and increasing frequency and duration of dissociation    Situations: relationship problems, trauma  and medical condition / diagnosis: PMDD   Step 2: Coping strategies - Things I can do to take my mind off of my problems without contacting another person (relaxation technique, physical activity):    Distress Tolerance Strategies:  relaxation activities, play with my pet , listen to positive and upbeat music, sensory based activities/self-soothe with five senses, watch a funny movie, read a book, change body temperature (ice pack/cold water)  and paced breathing/progressive " "muscle relaxation    Physical Activities: go for a walk, gardening, meditation, deep breathing and stretching     Focus on helpful thoughts:  \"This is temporary\", \"I will get through this\", \"It always passes\" and \"Ride the wave\"  Step 3: People and social settings that provide distraction:   Name: Sarah Graves   Name:  Spending time with daughter       movie theater, pet store/humane society and coffee shop   Step 4: Remind myself of people and things that are important to me and worth living for:    Daughter   Spouse  Pets     Step 5: When I am in crisis, I can ask these people to help me use my safety plan:   Name: Spouse  Phone: [phone]      Step 6: Making the environment safe:     remove things I could use to hurt myself and be around others  Step 7: Professionals or agencies I can contact during a crisis:    Suicide Prevention Lifeline: Call or Text 980   Local Crisis Services:  [facility] [phone]      Call 911 or go to my nearest emergency department.   I helped develop this safety plan and agree to use it when needed.  I have been given a copy of this plan.      Client signature _________________________________________________________________  Today s date:  11/30/2022  Completed by Provider Name/ Credentials:  Lia Stiles MA, LMFT  November 30, 2022  Adapted from Safety Plan Template 2008 Eleonora Capone and Danny Carmona is reprinted with the express permission of the authors.  No portion of the Safety Plan Template may be reproduced without the express, written permission.  You can contact the authors at [email or web address] or [email or web address].    Name: Veena Parsons  YOB: 1985  Date: November 30, 2022   My primary care provider: Francisco Nails  My primary care clinic: [facility]   My prescriber: PCP  Other care team support:  Holistic medical provider    My Triggers:    Relational problems  Loss of mother recently  Financial stress      " Additional People, Places, and Things that I can access for support:   Spouse  Daughter          What is important to me and makes life worth living: Family         GREEN    Good Control  1. I feel good  2. No suicidal thoughts   3. Can work, sleep and play      Action Steps  1. Self-care: balanced meals, exercising, sleep practices, etc.  2. Take your medications as prescribed.  3. Continue meetings with therapist and prescriber.  4.  Do the healthy things that I enjoy.             YELLO Getting Worse  I have ANY of these:  1. I do not feel good  2. Difficulty Concentrating  3. Sleep is changing  4. Increase/Change in my thoughts to hurt self and/or others, but I can still manage and not act on it.   5. Not taking care of self.               Action Steps (in addition to the above):  1. Inform your therapist and psychiatric prescriber/PCP.  2. Keep taking your medications as prescribed.    3. Turn to people you can ask for help.  4. Use internal coping strategies -see below.  5. Create safe environment: Removing items I can hurt self with              RED  Get Help  If I have ANY of these:  1. Current and uncontrollable thoughts and/or behaviors to hurt self and/or others.   2. Crazy buzzing and spinning.     Actions to manage my safety  1. Contact your emergency person Star  2. Call or Text 694  3. Call my crisis team- [facility] [phone]  3. Or Call 941 or go to the emergency room right away        My Internal Coping Strategies include the following:  take a bath, belly breathing, arts and crafts, play with my pet, use my coping box, exercise and use my coping skills    [End for Brief Safety Plan]     Safety Concerns  How To Identify Situations That Make Your Mental Health Worse:  Triggers are things that make your mental health worse.  Look at the list below to help you find your triggers and what you can do about them.     1. Identify Early Warning Signs:    Sometimes symptoms return, even when  people do their best to stay well. Symptoms can develop over a short period of time with little or no warning, but most of the time they emerge gradually over several weeks.  Early warning signs are changes that people experience when a relapse is starting. Some early warning signs are common and others are not as common.   Common Early Warning Signs:    Feeling tense or nervous, Eating less or eating more, Trouble sleeping -either too much or too little sleep, Feeling depressed or low, Feeling irritable, Feeling like not being around other people, Trouble concentrating and Urges to harm self     2. Identify action steps to take when warning signs are noticed:    Taking Action- It is important to take action if you are experiencing early warning signs of a relapse.  The faster you act, the more likely it is that you can avoid a full relapse.  It is helpful to identify several specific ways to cope with symptoms.      The following is my list of symptoms and coping strategies that I can use when they are present:    Symptom Coping Strategies   Anxiety -Talk with someone in your support system and let him or her know how you are feeling.  -Use relaxation techniques such as deep breathing or imagery.  -Use positive affirmations to counteract negative self-talk such as  I am learning to let go of worry.    Depression - Schedule your day; include activities you have to do and activities you enjoy doing.  - Get some exercise - walk, run, bike, or swim.  - Give yourself credit for even the smallest things you get done.   Sleep Difficulties   - Go to sleep at the same time every day.  - Do something relaxing before bed, such as drinking herbal tea or listening to music.  - Avoid having discussions about upsetting topics before going to bed.   Delusions   - Distract yourself from the disturbing thought by doing something that requires your attention such as a puzzle.  - Check out your beliefs by talking to someone you trust.     Hallucinations   - Use headphones to listen to music.  - Tell voices to  stop  or say to yourself,  I am safe.   - Ignore the hallucinations as much as possible; focus on other things.   Concentration Difficulties - Minimize distractions so there is only one thing for you to focus on at a time.    - Ask the person you are having a conversation with to slow down or repeat things you are unsure of.            Appearance:   Appropriate    Eye Contact:   Good    Psychomotor Behavior: Normal    Attitude:   Cooperative    Orientation:   All   Speech    Rate / Production: Normal     Volume:  Normal    Mood:    Anxious Depressed    Affect:    Worrisome    Thought Content:  Clear    Thought Form:  Coherent  Logical    Insight:    Good      Medication Review:   No changes to current psychiatric medication(s)     Medication Compliance:   Yes     Changes in Health Issues:   None reported     Chemical Use Review:   Substance Use: Chemical use reviewed, no active concerns identified      Tobacco Use: No current tobacco use.      Diagnosis:  296.32 (F33.1) Major Depressive Disorder, Recurrent Episode, Moderate _ and With mixed features  300.01 (F41.0) Panic Disorder  300.02 (F41.1) Generalized Anxiety Disorder   PMDD      Collateral Reports Completed:   Not Applicable    PLAN: (Patient Tasks / Therapist Tasks / Other)  Client will continue to work on the following goals:    -Follow safety plan and use crisis resources-Continued   -Work on responding and not reacting. Feeling more in control.  -Get support on grief and loss.  -Follow up with .   -Spend time with Star on his breaks.   -Have more in depth conversations with Deborah.  -Have a good concrete daily schedule.    -Have more family time on Tuesdays and have no phones at the dinner table.    -Volunteer at the school when body allows.   -Consider support networks for grief.   -Consider couples therapy for some unresolved issues.         Lia Stiles, NATACHA                                                          ______________________________________________________________________    Individual Treatment Plan    Patient's Name: Veena Parsons  YOB: 1985    Date of Creation: 9-7-22  Date Treatment Plan Last Reviewed/Revised: 4-12-23    DSM5 Diagnoses:  296.32 (F33.1) Major Depressive Disorder, Recurrent Episode, Moderate _ and With mixed features  300.01 (F41.0) Panic Disorder  300.02 (F41.1) Generalized Anxiety Disorder   PMDD  Psychosocial / Contextual Factors: Some relational issues   PROMIS (reviewed every 90 days): 25    Referral / Collaboration:  Referral to another professional/service is not indicated at this time..    Anticipated number of session for this episode of care: 6-9 sessions  Anticipation frequency of session: Biweekly  Anticipated Duration of each session: 38-52 minutes  Treatment plan will be reviewed in 90 days or when goals have been changed.       MeasurableTreatment Goal(s) related to diagnosis / functional impairment(s)  Goal 1: Patient will address struggles with anxiety, depression and PMDD.    I will know I've met my goal when I am feeling less reactive through the month with the symptoms.      Objective #A (Patient Action)    Patient will work on establishing a concrete routine with self-care.  Status: Continued - Date(s): 4-12-23    Intervention(s)  Therapist will use CBT and motivational interviewing.      Objective #B  Patient will develop a plan to help manage PMDD  Status: Continued - Date(s): 4-12-23    Intervention(s)  Therapist will use solution focused therapy.    Objective #C  Patient will work on ways to communicate with narcissistic individuals.  Status: Continued - Date(s): 4-12-23    Intervention(s)  Therapist will teach communication skills.          Patient has reviewed and agreed to the above plan.      Lia Stiles, NATACHA  September 7, 2022

## 2023-05-10 ENCOUNTER — VIRTUAL VISIT (OUTPATIENT)
Dept: PSYCHOLOGY | Facility: CLINIC | Age: 38
End: 2023-05-10
Payer: COMMERCIAL

## 2023-05-10 DIAGNOSIS — F41.0 PANIC DISORDER WITHOUT AGORAPHOBIA: ICD-10-CM

## 2023-05-10 DIAGNOSIS — F41.1 GENERALIZED ANXIETY DISORDER: ICD-10-CM

## 2023-05-10 DIAGNOSIS — F33.1 MAJOR DEPRESSIVE DISORDER, RECURRENT EPISODE, MODERATE (H): Primary | ICD-10-CM

## 2023-05-10 PROCEDURE — 90834 PSYTX W PT 45 MINUTES: CPT | Mod: VID | Performed by: MARRIAGE & FAMILY THERAPIST

## 2023-05-10 NOTE — PROGRESS NOTES
[facility]                                     Progress Note    Patient Name: Veena Parsons  Date: 5-10-23         Service Type: Individual      Session Start Time: 1120am Session End Time: 12pm     Session Length: 40min    Session #: 17    Attendees: Client    Service Modality:  Video Visit:      Telemedicine Visit: The patient's condition can be safely assessed and treated via synchronous audio and visual telemedicine encounter.      Reason for Telemedicine Visit: Patient has requested telehealth visit    Originating Site (Patient Location): Patient's home        Distant Location (provider location):  Off-site    Consent:  The patient/guardian has verbally consented to: the potential risks and benefits of telemedicine (video visit) versus in person care; bill my insurance or make self-payment for services provided; and responsibility for payment of non-covered services.     Mode of Communication:  Video Conference via Mapp    As the provider I attest to compliance with applicable laws and regulations related to telemedicine.      Treatment Plan- 4-12-23  CGI- 4-12-23  Phq-9 and YADI-7- 4-12-23  Promis- 4-6-23    DATA  Interactive Complexity: No  Crisis: No        Progress Since Last Session (Related to Symptoms / Goals / Homework):   Symptoms: Client reports symptoms of depressed mood and anxiety.  She was approved for disability.      Homework: Achieved / completed to satisfaction  Completed in session      Episode of Care Goals: Minimal progress - ACTION (Actively working towards change); Intervened by reinforcing change plan / affirming steps taken     Current / Ongoing Stressors and Concerns:   -Veena did get approved for disability.     -Has struggled with PMDD for a number of years.  Has been working with chiropractic and message therapy on some holistic treatment options- Continue    -Extended family is visiting for mothers service and to help with clean out of a property.   Worried about what this will look like and what issues may pop up.    -Has felt relationship with spouse has been on a better page.   -Experiencing high financial strain but will be getting back pay.    -No suicidal thoughts this past week.      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   -Really missing her mom.     Treatment Objective(s) Addressed in This Session:  Safety planning   Patient will develop a plan to help manage PMDD  Patient will work on ways to communicate patterns   Self-care   Grief and loss       Intervention:   Follow safety plan and use crisis resources.   Use support system- Continued   Able to process through wining her social security disability hearing.    Take time with spouse on his breaks to see each other in the evening.    Continuing with medication management.    Start to prepare for visit with extended family.    Focus on family time on Tuesdays and no phones at the dinner table-Continued    Use some holistic approaches to health.         Will do door dash when body allows.    Respond and not react.        Assessments completed prior to visit:  The following assessments were completed by patient for this visit:  PHQ2:       4/11/2023     9:00 PM 1/5/2023     1:35 PM 11/29/2022    12:07 PM 10/26/2022     8:26 AM 7/25/2022     2:10 PM   PHQ-2 ( 1999 [employer])   Q1: Little interest or pleasure in doing things 1 2 3 2 0   Q2: Feeling down, depressed or hopeless 1 3 3 2 1   PHQ-2 Score 2 5 6 4 1   Q1: Little interest or pleasure in doing things Several days More than half the days Nearly every day More than half the days Not at all   Q2: Feeling down, depressed or hopeless Several days Nearly every day Nearly every day More than half the days Several days   PHQ-2 Score 2 5 6 4 1     PHQ9:       8/3/2022     9:14 AM 10/26/2022     8:26 AM 11/29/2022    12:07 PM 1/5/2023     1:35 PM 2/2/2023    10:13 AM   PHQ-9 SCORE   PHQ-9 Total Score Justina  14 (Moderate depression) 21 (Severe depression) 16  (Moderately severe depression)    PHQ-9 Total Score 8 14 21 16 12     GAD2:       7/28/2022     8:28 AM 11/27/2022     5:03 AM 4/6/2023     8:12 AM   YADI-2   Feeling nervous, anxious, or on edge 1 3 1   Not being able to stop or control worrying 0 3 1   YADI-2 Total Score 1 6 2     GAD7:       11/27/2022     5:03 AM 2/2/2023    10:13 AM   YADI-7 SCORE   Total Score 17 (severe anxiety)    Total Score 17 13     PROMIS 10-Global Health (all questions and answers displayed):       7/28/2022     8:34 AM 11/27/2022     5:04 AM 4/6/2023     8:14 AM   PROMIS 10   In general, would you say your health is: Good Good Good   In general, would you say your quality of life is: Good Fair Good   In general, how would you rate your physical health? Good Good Good   In general, how would you rate your mental health, including your mood and your ability to think? Good Poor Fair   In general, how would you rate your satisfaction with your social activities and relationships? Good Poor Fair   In general, please rate how well you carry out your usual social activities and roles Fair Poor Poor   To what extent are you able to carry out your everyday physical activities such as walking, climbing stairs, carrying groceries, or moving a chair? Mostly A little Mostly   In the past 7 days, how often have you been bothered by emotional problems such as feeling anxious, depressed, or irritable? Sometimes Always Sometimes   In the past 7 days, how would you rate your fatigue on average? Mild Moderate Mild   In the past 7 days, how would you rate your pain on average, where 0 means no pain, and 10 means worst imaginable pain? 3 4 2   In general, would you say your health is: 3 3 3   In general, would you say your quality of life is: 3 2 3   In general, how would you rate your physical health? 3 3 3   In general, how would you rate your mental health, including your mood and your ability to think? 3 1 2   In general, how would you rate your  satisfaction with your social activities and relationships? 3 1 2   In general, please rate how well you carry out your usual social activities and roles. (This includes activities at home, at work and in your community, and responsibilities as a parent, child, spouse, employee, friend, etc.) 2 1 1   To what extent are you able to carry out your everyday physical activities such as walking, climbing stairs, carrying groceries, or moving a chair? 4 2 4   In the past 7 days, how often have you been bothered by emotional problems such as feeling anxious, depressed, or irritable? 3 5 3   In the past 7 days, how would you rate your fatigue on average? 2 3 2   In the past 7 days, how would you rate your pain on average, where 0 means no pain, and 10 means worst imaginable pain? 3 4 2   Global Mental Health Score 12 5 10   Global Physical Health Score 15 11 15   PROMIS TOTAL - SUBSCORES 27 16 25     [place] Suicide Severity Rating Scale (Lifetime/Recent)      8/3/2022     9:17 AM   [place] Suicide Severity Rating (Lifetime/Recent)   1. Wish to be Dead (Lifetime) N   2. Non-Specific Active Suicidal Thoughts (Lifetime) N   Actual Attempt (Lifetime) N   Has subject engaged in non-suicidal self-injurious behavior? (Lifetime) N   Calculated C-SSRS Risk Score (Lifetime/Recent) No Risk Indicated         ASSESSMENT: Current Emotional / Mental Status (status of significant symptoms):   Risk status (Self / Other harm or suicidal ideation)   Patient denies current fears or concerns for personal safety.   Patient denies current or recent suicidal ideation or behaviors.     Patient denies current or recent homicidal ideation or behaviors.   Patient denies current or recent self injurious behavior or ideation.   Patient denies other safety concerns.   Patient reports there has been no change in risk factors since their last session.     Patient reports there has been no change in protective factors since their last session.     A  "safety and risk management plan has been developed including: Patient consented to co-developed safety plan on 11-30-22.  Safety and risk management plan was reviewed.   Patient agreed to use safety plan should any safety concerns arise.  A copy was made available to the patient. Reviewed 5-10-23        [facility]                                       Veena Parsons     SAFETY PLAN:  Step 1: Warning signs / cues (Thoughts, images, mood, situation, behavior) that a crisis may be developing:    Thoughts: \"I don't matter\", \"People would be better off without me\", \"I'm a burden\", \"I can't do this anymore\", \"I just want this to end\" and \"Nothing makes it better\"    Images: flashbacks    Thinking Processes: ruminations (can't stop thinking about my problems): PMDD and loss of mother, racing thoughts and highly critical and negative thoughts: I cant do anything right    Mood: worsening depression, hopelessness, helplessness, intense worry, agitation and mood swings    Behaviors: isolating/withdrawing , can't stop crying, not taking care of myself, not taking care of my responsibilities, sleeping too much, not sleeping enough and increasing frequency and duration of dissociation    Situations: relationship problems, trauma  and medical condition / diagnosis: PMDD   Step 2: Coping strategies - Things I can do to take my mind off of my problems without contacting another person (relaxation technique, physical activity):    Distress Tolerance Strategies:  relaxation activities, play with my pet , listen to positive and upbeat music, sensory based activities/self-soothe with five senses, watch a funny movie, read a book, change body temperature (ice pack/cold water)  and paced breathing/progressive muscle relaxation    Physical Activities: go for a walk, gardening, meditation, deep breathing and stretching     Focus on helpful thoughts:  \"This is temporary\", \"I will get through this\", \"It always passes\" " "and \"Ride the wave\"  Step 3: People and social settings that provide distraction:   Name: Spouse Star   Name:  Spending time with daughter       movie theater, pet store/humane society and coffee shop   Step 4: Remind myself of people and things that are important to me and worth living for:    Daughter   Spouse  Pets     Step 5: When I am in crisis, I can ask these people to help me use my safety plan:   Name: Spouse  Phone: [phone]      Step 6: Making the environment safe:     remove things I could use to hurt myself and be around others  Step 7: Professionals or agencies I can contact during a crisis:    Suicide Prevention Lifeline: Call or Text 132   Local Crisis Services:  [facility] [phone]      Call 911 or go to my nearest emergency department.   I helped develop this safety plan and agree to use it when needed.  I have been given a copy of this plan.      Client signature _________________________________________________________________  Today s date:  11/30/2022  Completed by Provider Name/ Credentials:  Lia Stiles MA, LMFT  November 30, 2022  Adapted from Safety Plan Template 2008 Eleonora Capone and Danny Carmona is reprinted with the express permission of the authors.  No portion of the Safety Plan Template may be reproduced without the express, written permission.  You can contact the authors at [email or web address] or [email or web address].    Name: Veena Parsons  YOB: 1985  Date: November 30, 2022   My primary care provider: Francisco Nails  My primary care clinic:  [facility]   My prescriber: PCP  Other care team support:  Holistic medical provider    My Triggers:    Relational problems  Loss of mother recently  Financial stress      Additional People, Places, and Things that I can access for support:   Spouse  Daughter          What is important to me and makes life worth living: Family         GREEN    Good Control  1. I feel good  2. No " suicidal thoughts   3. Can work, sleep and play      Action Steps  1. Self-care: balanced meals, exercising, sleep practices, etc.  2. Take your medications as prescribed.  3. Continue meetings with therapist and prescriber.  4.  Do the healthy things that I enjoy.             YELLO Getting Worse  I have ANY of these:  1. I do not feel good  2. Difficulty Concentrating  3. Sleep is changing  4. Increase/Change in my thoughts to hurt self and/or others, but I can still manage and not act on it.   5. Not taking care of self.               Action Steps (in addition to the above):  1. Inform your therapist and psychiatric prescriber/PCP.  2. Keep taking your medications as prescribed.    3. Turn to people you can ask for help.  4. Use internal coping strategies -see below.  5. Create safe environment: Removing items I can hurt self with              RED  Get Help  If I have ANY of these:  1. Current and uncontrollable thoughts and/or behaviors to hurt self and/or others.   2. Crazy buzzing and spinning.     Actions to manage my safety  1. Contact your emergency person Star  2. Call or Text 210  3. Call my crisis team- [facility] [phone]  3. Or Call 911 or go to the emergency room right away        My Internal Coping Strategies include the following:  take a bath, belly breathing, arts and crafts, play with my pet, use my coping box, exercise and use my coping skills    [End for Brief Safety Plan]     Safety Concerns  How To Identify Situations That Make Your Mental Health Worse:  Triggers are things that make your mental health worse.  Look at the list below to help you find your triggers and what you can do about them.     1. Identify Early Warning Signs:    Sometimes symptoms return, even when people do their best to stay well. Symptoms can develop over a short period of time with little or no warning, but most of the time they emerge gradually over several weeks.  Early warning signs are changes that  people experience when a relapse is starting. Some early warning signs are common and others are not as common.   Common Early Warning Signs:    Feeling tense or nervous, Eating less or eating more, Trouble sleeping -either too much or too little sleep, Feeling depressed or low, Feeling irritable, Feeling like not being around other people, Trouble concentrating and Urges to harm self     2. Identify action steps to take when warning signs are noticed:    Taking Action- It is important to take action if you are experiencing early warning signs of a relapse.  The faster you act, the more likely it is that you can avoid a full relapse.  It is helpful to identify several specific ways to cope with symptoms.      The following is my list of symptoms and coping strategies that I can use when they are present:    Symptom Coping Strategies   Anxiety -Talk with someone in your support system and let him or her know how you are feeling.  -Use relaxation techniques such as deep breathing or imagery.  -Use positive affirmations to counteract negative self-talk such as  I am learning to let go of worry.    Depression - Schedule your day; include activities you have to do and activities you enjoy doing.  - Get some exercise - walk, run, bike, or swim.  - Give yourself credit for even the smallest things you get done.   Sleep Difficulties   - Go to sleep at the same time every day.  - Do something relaxing before bed, such as drinking herbal tea or listening to music.  - Avoid having discussions about upsetting topics before going to bed.   Delusions   - Distract yourself from the disturbing thought by doing something that requires your attention such as a puzzle.  - Check out your beliefs by talking to someone you trust.    Hallucinations   - Use headphones to listen to music.  - Tell voices to  stop  or say to yourself,  I am safe.   - Ignore the hallucinations as much as possible; focus on other things.   Concentration  Difficulties - Minimize distractions so there is only one thing for you to focus on at a time.    - Ask the person you are having a conversation with to slow down or repeat things you are unsure of.            Appearance:   Appropriate    Eye Contact:   Good    Psychomotor Behavior: Normal    Attitude:   Cooperative    Orientation:   All   Speech    Rate / Production: Normal     Volume:  Normal    Mood:    Anxious    Affect:    Worrisome    Thought Content:  Clear    Thought Form:  Coherent  Logical    Insight:    Good      Medication Review:   No changes to current psychiatric medication(s)     Medication Compliance:   Yes     Changes in Health Issues:   None reported     Chemical Use Review:   Substance Use: Chemical use reviewed, no active concerns identified      Tobacco Use: No current tobacco use.      Diagnosis:  296.32 (F33.1) Major Depressive Disorder, Recurrent Episode, Moderate _ and With mixed features  300.01 (F41.0) Panic Disorder  300.02 (F41.1) Generalized Anxiety Disorder   PMDD      Collateral Reports Completed:   Not Applicable    PLAN: (Patient Tasks / Therapist Tasks / Other)  Client will continue to work on the following goals:    -Follow safety plan and use crisis resources-Continued   -Work on responding and not reacting. Feeling more in control.  -Prepare for visit with extended family.   -Get support on grief and loss.  -Spend time with Star on his breaks.   -Start to plan for mothers service.    -Have a good concrete daily schedule.   -Consider support networks for grief.           NATACHA Hernandez                                                         ______________________________________________________________________    Individual Treatment Plan    Patient's Name: Veena Parsons  YOB: 1985    Date of Creation: 9-7-22  Date Treatment Plan Last Reviewed/Revised: 4-12-23    DSM5 Diagnoses:  296.32 (F33.1) Major Depressive Disorder, Recurrent Episode, Moderate _  and With mixed features  300.01 (F41.0) Panic Disorder  300.02 (F41.1) Generalized Anxiety Disorder   PMDD  Psychosocial / Contextual Factors: Some relational issues   PROMIS (reviewed every 90 days): 25    Referral / Collaboration:  Referral to another professional/service is not indicated at this time..    Anticipated number of session for this episode of care: 6-9 sessions  Anticipation frequency of session: Biweekly  Anticipated Duration of each session: 38-52 minutes  Treatment plan will be reviewed in 90 days or when goals have been changed.       MeasurableTreatment Goal(s) related to diagnosis / functional impairment(s)  Goal 1: Patient will address struggles with anxiety, depression and PMDD.    I will know I've met my goal when I am feeling less reactive through the month with the symptoms.      Objective #A (Patient Action)    Patient will work on establishing a concrete routine with self-care.  Status: Continued - Date(s): 4-12-23    Intervention(s)  Therapist will use CBT and motivational interviewing.      Objective #B  Patient will develop a plan to help manage PMDD  Status: Continued - Date(s): 4-12-23    Intervention(s)  Therapist will use solution focused therapy.    Objective #C  Patient will work on ways to communicate with narcissistic individuals.  Status: Continued - Date(s): 4-12-23    Intervention(s)  Therapist will teach communication skills.          Patient has reviewed and agreed to the above plan.      Lia Stiles, NATACHA  September 7, 2022

## 2023-05-24 ENCOUNTER — VIRTUAL VISIT (OUTPATIENT)
Dept: PSYCHOLOGY | Facility: CLINIC | Age: 38
End: 2023-05-24
Payer: COMMERCIAL

## 2023-05-24 DIAGNOSIS — F33.1 MAJOR DEPRESSIVE DISORDER, RECURRENT EPISODE, MODERATE (H): Primary | ICD-10-CM

## 2023-05-24 DIAGNOSIS — F41.1 GENERALIZED ANXIETY DISORDER: ICD-10-CM

## 2023-05-24 DIAGNOSIS — F41.0 PANIC DISORDER WITHOUT AGORAPHOBIA: ICD-10-CM

## 2023-05-24 PROCEDURE — 90832 PSYTX W PT 30 MINUTES: CPT | Mod: VID | Performed by: MARRIAGE & FAMILY THERAPIST

## 2023-05-24 NOTE — PROGRESS NOTES
[facility]                                     Progress Note    Patient Name: Veena Parsons  Date: 5-24-23         Service Type: Individual      Session Start Time: 12pm Session End Time: 1230pm     Session Length: 30min    Session #: 18    Attendees: Client    Service Modality:  Video Visit:      Telemedicine Visit: The patient's condition can be safely assessed and treated via synchronous audio and visual telemedicine encounter.      Reason for Telemedicine Visit: Patient has requested telehealth visit    Originating Site (Patient Location): Patient's home        Distant Location (provider location):  Off-site    Consent:  The patient/guardian has verbally consented to: the potential risks and benefits of telemedicine (video visit) versus in person care; bill my insurance or make self-payment for services provided; and responsibility for payment of non-covered services.     Mode of Communication:  Video Conference via [employer]    As the provider I attest to compliance with applicable laws and regulations related to telemedicine.      Treatment Plan- 4-12-23  CGI- 4-12-23  Phq-9 and YADI-7- 4-12-23  Promis- 4-6-23    DATA  Interactive Complexity: No  Crisis: No        Progress Since Last Session (Related to Symptoms / Goals / Homework):   Symptoms: Client reports she has been having a good week despite some family stressors.      Homework: Achieved / completed to satisfaction  Completed in session      Episode of Care Goals: Satisfactory progress - ACTION (Actively working towards change); Intervened by reinforcing change plan / affirming steps taken     Current / Ongoing Stressors and Concerns:   -Veena did get approved for disability.     -Has struggled with PMDD for a number of years.  Has been working with chiropractic and message therapy on some holistic treatment options- Continue    -Extended family is visiting for mothers service and they are cleaning out a property.  Sister was  not nice to her teenaged daughter and her daughter sent a text and said she is done with her aunt.     -Has felt relationship with spouse has been on a better page.   -Experiencing high financial strain but will be getting back pay.    -No suicidal thoughts this past week.      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   -Really missing her mom and has the service this weekend.       Treatment Objective(s) Addressed in This Session:  Safety planning   Patient will develop a plan to help manage PMDD  Patient will work on ways to communicate patterns   Self-care   Grief and loss       Intervention:   Follow safety plan and use crisis resources.   Use support system- Continued   Able to process through what she is doing to disengage from her sister.      Take time with spouse on his breaks to see each other in the evening.    Continuing with medication management.    Attend mothers service on Saturday.     Focus on family time on Tuesdays and no phones at the dinner table-Continued    Use some holistic approaches to health.         Will do door dash when body allows.    Respond and not react.        Assessments completed prior to visit:  The following assessments were completed by patient for this visit:  PHQ2:       4/11/2023     9:00 PM 1/5/2023     1:35 PM 11/29/2022    12:07 PM 10/26/2022     8:26 AM 7/25/2022     2:10 PM   PHQ-2 ( 1999 [employer])   Q1: Little interest or pleasure in doing things 1 2 3 2 0   Q2: Feeling down, depressed or hopeless 1 3 3 2 1   PHQ-2 Score 2 5 6 4 1   Q1: Little interest or pleasure in doing things Several days More than half the days Nearly every day More than half the days Not at all   Q2: Feeling down, depressed or hopeless Several days Nearly every day Nearly every day More than half the days Several days   PHQ-2 Score 2 5 6 4 1     PHQ9:       8/3/2022     9:14 AM 10/26/2022     8:26 AM 11/29/2022    12:07 PM 1/5/2023     1:35 PM 2/2/2023    10:13 AM   PHQ-9 SCORE   PHQ-9 Total Score  Justina  14 (Moderate depression) 21 (Severe depression) 16 (Moderately severe depression)    PHQ-9 Total Score 8 14 21 16 12     GAD2:       7/28/2022     8:28 AM 11/27/2022     5:03 AM 4/6/2023     8:12 AM   YADI-2   Feeling nervous, anxious, or on edge 1 3 1   Not being able to stop or control worrying 0 3 1   YADI-2 Total Score 1 6 2     GAD7:       11/27/2022     5:03 AM 2/2/2023    10:13 AM   YADI-7 SCORE   Total Score 17 (severe anxiety)    Total Score 17 13     PROMIS 10-Global Health (all questions and answers displayed):       7/28/2022     8:34 AM 11/27/2022     5:04 AM 4/6/2023     8:14 AM   PROMIS 10   In general, would you say your health is: Good Good Good   In general, would you say your quality of life is: Good Fair Good   In general, how would you rate your physical health? Good Good Good   In general, how would you rate your mental health, including your mood and your ability to think? Good Poor Fair   In general, how would you rate your satisfaction with your social activities and relationships? Good Poor Fair   In general, please rate how well you carry out your usual social activities and roles Fair Poor Poor   To what extent are you able to carry out your everyday physical activities such as walking, climbing stairs, carrying groceries, or moving a chair? Mostly A little Mostly   In the past 7 days, how often have you been bothered by emotional problems such as feeling anxious, depressed, or irritable? Sometimes Always Sometimes   In the past 7 days, how would you rate your fatigue on average? Mild Moderate Mild   In the past 7 days, how would you rate your pain on average, where 0 means no pain, and 10 means worst imaginable pain? 3 4 2   In general, would you say your health is: 3 3 3   In general, would you say your quality of life is: 3 2 3   In general, how would you rate your physical health? 3 3 3   In general, how would you rate your mental health, including your mood and your ability to  think? 3 1 2   In general, how would you rate your satisfaction with your social activities and relationships? 3 1 2   In general, please rate how well you carry out your usual social activities and roles. (This includes activities at home, at work and in your community, and responsibilities as a parent, child, spouse, employee, friend, etc.) 2 1 1   To what extent are you able to carry out your everyday physical activities such as walking, climbing stairs, carrying groceries, or moving a chair? 4 2 4   In the past 7 days, how often have you been bothered by emotional problems such as feeling anxious, depressed, or irritable? 3 5 3   In the past 7 days, how would you rate your fatigue on average? 2 3 2   In the past 7 days, how would you rate your pain on average, where 0 means no pain, and 10 means worst imaginable pain? 3 4 2   Global Mental Health Score 12 5 10   Global Physical Health Score 15 11 15   PROMIS TOTAL - SUBSCORES 27 16 25     [place] Suicide Severity Rating Scale (Lifetime/Recent)      8/3/2022     9:17 AM   [place] Suicide Severity Rating (Lifetime/Recent)   1. Wish to be Dead (Lifetime) N   2. Non-Specific Active Suicidal Thoughts (Lifetime) N   Actual Attempt (Lifetime) N   Has subject engaged in non-suicidal self-injurious behavior? (Lifetime) N   Calculated C-SSRS Risk Score (Lifetime/Recent) No Risk Indicated         ASSESSMENT: Current Emotional / Mental Status (status of significant symptoms):   Risk status (Self / Other harm or suicidal ideation)   Patient denies current fears or concerns for personal safety.   Patient denies current or recent suicidal ideation or behaviors.     Patient denies current or recent homicidal ideation or behaviors.   Patient denies current or recent self injurious behavior or ideation.   Patient denies other safety concerns.   Patient reports there has been no change in risk factors since their last session.     Patient reports there has been no change in  "protective factors since their last session.     A safety and risk management plan has been developed including: Patient consented to co-developed safety plan on 11-30-22.  Safety and risk management plan was reviewed.   Patient agreed to use safety plan should any safety concerns arise.  A copy was made available to the patient. Reviewed 5-24-23        [facility]                                       Veena Parsons     SAFETY PLAN:  Step 1: Warning signs / cues (Thoughts, images, mood, situation, behavior) that a crisis may be developing:    Thoughts: \"I don't matter\", \"People would be better off without me\", \"I'm a burden\", \"I can't do this anymore\", \"I just want this to end\" and \"Nothing makes it better\"    Images: flashbacks    Thinking Processes: ruminations (can't stop thinking about my problems): PMDD and loss of mother, racing thoughts and highly critical and negative thoughts: I cant do anything right    Mood: worsening depression, hopelessness, helplessness, intense worry, agitation and mood swings    Behaviors: isolating/withdrawing , can't stop crying, not taking care of myself, not taking care of my responsibilities, sleeping too much, not sleeping enough and increasing frequency and duration of dissociation    Situations: relationship problems, trauma  and medical condition / diagnosis: PMDD   Step 2: Coping strategies - Things I can do to take my mind off of my problems without contacting another person (relaxation technique, physical activity):    Distress Tolerance Strategies:  relaxation activities, play with my pet , listen to positive and upbeat music, sensory based activities/self-soothe with five senses, watch a funny movie, read a book, change body temperature (ice pack/cold water)  and paced breathing/progressive muscle relaxation    Physical Activities: go for a walk, gardening, meditation, deep breathing and stretching     Focus on helpful thoughts:  \"This is " "temporary\", \"I will get through this\", \"It always passes\" and \"Ride the wave\"  Step 3: People and social settings that provide distraction:   Name: Sarah Graves   Name:  Spending time with daughter       movie theater, pet store/humane society and coffee shop   Step 4: Remind myself of people and things that are important to me and worth living for:    Daughter   Spouse  Pets     Step 5: When I am in crisis, I can ask these people to help me use my safety plan:   Name: Spouse  Phone: [phone]      Step 6: Making the environment safe:     remove things I could use to hurt myself and be around others  Step 7: Professionals or agencies I can contact during a crisis:    Suicide Prevention Lifeline: Call or Text 335   [facility] Crisis Services:  [facility] [phone]      Call 911 or go to my nearest emergency department.   I helped develop this safety plan and agree to use it when needed.  I have been given a copy of this plan.      Client signature _________________________________________________________________  Today s date:  11/30/2022  Completed by Provider Name/ Credentials:  Lia Stiles MA, LMFT  November 30, 2022  Adapted from Safety Plan Template 2008 Eleonora Capone and Danny Carmona is reprinted with the express permission of the authors.  No portion of the Safety Plan Template may be reproduced without the express, written permission.  You can contact the authors at [email or web address] or [email or web address].    Name: Veena Parsons  YOB: 1985  Date: November 30, 2022   My primary care provider: Francisco Nails  My primary care clinic:  [facility]   My prescriber: PCP  Other care team support:  Holistic medical provider    My Triggers:    Relational problems  Loss of mother recently  Financial stress      Additional People, Places, and Things that I can access for support:   Spouse  Daughter          What is important to me and makes life worth living: " Family         GREEN    Good Control  1. I feel good  2. No suicidal thoughts   3. Can work, sleep and play      Action Steps  1. Self-care: balanced meals, exercising, sleep practices, etc.  2. Take your medications as prescribed.  3. Continue meetings with therapist and prescriber.  4.  Do the healthy things that I enjoy.             YELLO Getting Worse  I have ANY of these:  1. I do not feel good  2. Difficulty Concentrating  3. Sleep is changing  4. Increase/Change in my thoughts to hurt self and/or others, but I can still manage and not act on it.   5. Not taking care of self.               Action Steps (in addition to the above):  1. Inform your therapist and psychiatric prescriber/PCP.  2. Keep taking your medications as prescribed.    3. Turn to people you can ask for help.  4. Use internal coping strategies -see below.  5. Create safe environment: Removing items I can hurt self with              RED  Get Help  If I have ANY of these:  1. Current and uncontrollable thoughts and/or behaviors to hurt self and/or others.   2. Crazy buzzing and spinning.     Actions to manage my safety  1. Contact your emergency person Star  2. Call or Text 305  3. Call my crisis team- [facility] [phone]  3. Or Call [phone] or go to the emergency room right away        My Internal Coping Strategies include the following:  take a bath, belly breathing, arts and crafts, play with my pet, use my coping box, exercise and use my coping skills    [End for Brief Safety Plan]     Safety Concerns  How To Identify Situations That Make Your Mental Health Worse:  Triggers are things that make your mental health worse.  Look at the list below to help you find your triggers and what you can do about them.     1. Identify Early Warning Signs:    Sometimes symptoms return, even when people do their best to stay well. Symptoms can develop over a short period of time with little or no warning, but most of the time they emerge gradually  over several weeks.  Early warning signs are changes that people experience when a relapse is starting. Some early warning signs are common and others are not as common.   Common Early Warning Signs:    Feeling tense or nervous, Eating less or eating more, Trouble sleeping -either too much or too little sleep, Feeling depressed or low, Feeling irritable, Feeling like not being around other people, Trouble concentrating and Urges to harm self     2. Identify action steps to take when warning signs are noticed:    Taking Action- It is important to take action if you are experiencing early warning signs of a relapse.  The faster you act, the more likely it is that you can avoid a full relapse.  It is helpful to identify several specific ways to cope with symptoms.      The following is my list of symptoms and coping strategies that I can use when they are present:    Symptom Coping Strategies   Anxiety -Talk with someone in your support system and let him or her know how you are feeling.  -Use relaxation techniques such as deep breathing or imagery.  -Use positive affirmations to counteract negative self-talk such as  I am learning to let go of worry.    Depression - Schedule your day; include activities you have to do and activities you enjoy doing.  - Get some exercise - walk, run, bike, or swim.  - Give yourself credit for even the smallest things you get done.   Sleep Difficulties   - Go to sleep at the same time every day.  - Do something relaxing before bed, such as drinking herbal tea or listening to music.  - Avoid having discussions about upsetting topics before going to bed.   Delusions   - Distract yourself from the disturbing thought by doing something that requires your attention such as a puzzle.  - Check out your beliefs by talking to someone you trust.    Hallucinations   - Use headphones to listen to music.  - Tell voices to  stop  or say to yourself,  I am safe.   - Ignore the hallucinations as much  as possible; focus on other things.   Concentration Difficulties - Minimize distractions so there is only one thing for you to focus on at a time.    - Ask the person you are having a conversation with to slow down or repeat things you are unsure of.            Appearance:   Appropriate    Eye Contact:   Good    Psychomotor Behavior: Normal    Attitude:   Cooperative    Orientation:   All   Speech    Rate / Production: Normal     Volume:  Normal    Mood:    Anxious    Affect:    Worrisome    Thought Content:  Clear    Thought Form:  Coherent  Logical    Insight:    Good      Medication Review:   No changes to current psychiatric medication(s)     Medication Compliance:   Yes     Changes in Health Issues:   None reported     Chemical Use Review:   Substance Use: Chemical use reviewed, no active concerns identified      Tobacco Use: No current tobacco use.      Diagnosis:  296.32 (F33.1) Major Depressive Disorder, Recurrent Episode, Moderate _ and With mixed features  300.01 (F41.0) Panic Disorder  300.02 (F41.1) Generalized Anxiety Disorder   PMDD      Collateral Reports Completed:   Not Applicable    PLAN: (Patient Tasks / Therapist Tasks / Other)  Client will continue to work on the following goals:    -Follow safety plan and use crisis resources-Continued   -Work on responding and not reacting. Feeling more in control.  -Continue to have boundaries with sister.    -Get support on grief and loss.  -Spend time with Star on his breaks.   -Attend mothers service on Saturday.    -Have a good concrete daily schedule.   -Consider support networks for grief.           NATACHA Hernandez                                                         ______________________________________________________________________    Individual Treatment Plan    Patient's Name: Veena Parsons  YOB: 1985    Date of Creation: 9-7-22  Date Treatment Plan Last Reviewed/Revised: 4-12-23    DSM5 Diagnoses:  296.32 (F33.1)  Major Depressive Disorder, Recurrent Episode, Moderate _ and With mixed features  300.01 (F41.0) Panic Disorder  300.02 (F41.1) Generalized Anxiety Disorder   PMDD  Psychosocial / Contextual Factors: Some relational issues   PROMIS (reviewed every 90 days): 25    Referral / Collaboration:  Referral to another professional/service is not indicated at this time..    Anticipated number of session for this episode of care: 6-9 sessions  Anticipation frequency of session: Biweekly  Anticipated Duration of each session: 38-52 minutes  Treatment plan will be reviewed in 90 days or when goals have been changed.       MeasurableTreatment Goal(s) related to diagnosis / functional impairment(s)  Goal 1: Patient will address struggles with anxiety, depression and PMDD.    I will know I've met my goal when I am feeling less reactive through the month with the symptoms.      Objective #A (Patient Action)    Patient will work on establishing a concrete routine with self-care.  Status: Continued - Date(s): 4-12-23    Intervention(s)  Therapist will use CBT and motivational interviewing.      Objective #B  Patient will develop a plan to help manage PMDD  Status: Continued - Date(s): 4-12-23    Intervention(s)  Therapist will use solution focused therapy.    Objective #C  Patient will work on ways to communicate with narcissistic individuals.  Status: Continued - Date(s): 4-12-23    Intervention(s)  Therapist will teach communication skills.          Patient has reviewed and agreed to the above plan.      Lia Stiles, NATACHA  September 7, 2022

## 2023-06-01 ENCOUNTER — VIRTUAL VISIT (OUTPATIENT)
Dept: PSYCHOLOGY | Facility: CLINIC | Age: 38
End: 2023-06-01
Payer: COMMERCIAL

## 2023-06-01 DIAGNOSIS — F33.1 MAJOR DEPRESSIVE DISORDER, RECURRENT EPISODE, MODERATE (H): Primary | ICD-10-CM

## 2023-06-01 DIAGNOSIS — F41.1 GENERALIZED ANXIETY DISORDER: ICD-10-CM

## 2023-06-01 DIAGNOSIS — F41.0 PANIC DISORDER WITHOUT AGORAPHOBIA: ICD-10-CM

## 2023-06-01 PROCEDURE — 90834 PSYTX W PT 45 MINUTES: CPT | Mod: VID | Performed by: MARRIAGE & FAMILY THERAPIST

## 2023-06-01 ASSESSMENT — ANXIETY QUESTIONNAIRES
1. FEELING NERVOUS, ANXIOUS, OR ON EDGE: MORE THAN HALF THE DAYS
6. BECOMING EASILY ANNOYED OR IRRITABLE: SEVERAL DAYS
IF YOU CHECKED OFF ANY PROBLEMS ON THIS QUESTIONNAIRE, HOW DIFFICULT HAVE THESE PROBLEMS MADE IT FOR YOU TO DO YOUR WORK, TAKE CARE OF THINGS AT HOME, OR GET ALONG WITH OTHER PEOPLE: SOMEWHAT DIFFICULT
3. WORRYING TOO MUCH ABOUT DIFFERENT THINGS: MORE THAN HALF THE DAYS
GAD7 TOTAL SCORE: 12
7. FEELING AFRAID AS IF SOMETHING AWFUL MIGHT HAPPEN: SEVERAL DAYS
GAD7 TOTAL SCORE: 12
5. BEING SO RESTLESS THAT IT IS HARD TO SIT STILL: MORE THAN HALF THE DAYS
2. NOT BEING ABLE TO STOP OR CONTROL WORRYING: MORE THAN HALF THE DAYS

## 2023-06-01 ASSESSMENT — PATIENT HEALTH QUESTIONNAIRE - PHQ9
SUM OF ALL RESPONSES TO PHQ QUESTIONS 1-9: 15
5. POOR APPETITE OR OVEREATING: MORE THAN HALF THE DAYS

## 2023-06-01 NOTE — PROGRESS NOTES
[facility]                                     Progress Note    Patient Name: Veena Parsons  Date: 6-1-23         Service Type: Individual      Session Start Time: 10am Session End Time: 1050am     Session Length: 50min    Session #: 19    Attendees: Client    Service Modality:  Video Visit:      Telemedicine Visit: The patient's condition can be safely assessed and treated via synchronous audio and visual telemedicine encounter.      Reason for Telemedicine Visit: Patient has requested telehealth visit    Originating Site (Patient Location): Patient's home        Distant Location (provider location):  On-site    Consent:  The patient/guardian has verbally consented to: the potential risks and benefits of telemedicine (video visit) versus in person care; bill my insurance or make self-payment for services provided; and responsibility for payment of non-covered services.     Mode of Communication:  Video Conference via [employer]    As the provider I attest to compliance with applicable laws and regulations related to telemedicine.      Treatment Plan- 4-12-23  CGI- 4-12-23  Phq-9 and YADI-7- 6-1-23  Promis- 4-6-23    DATA  Interactive Complexity: No  Crisis: No        Progress Since Last Session (Related to Symptoms / Goals / Homework):   Symptoms: Client reports she made it through a challenging week with her family and was able to participate in her mothers service.       Homework: Achieved / completed to satisfaction  Completed in session      Episode of Care Goals: Satisfactory progress - ACTION (Actively working towards change); Intervened by reinforcing change plan / affirming steps taken     Current / Ongoing Stressors and Concerns:   -Veena did get approved for disability.     -Has struggled with PMDD for a number of years.  Has been working with chiropractic and message therapy on some holistic treatment options- Continue    -Daughter has been having more issues with her menstrral  cycle.  Worried she could also have some of her issues developing.    -Veena felt she did a great job dealing with her sister Renae while she was here for the . Mothers service went well.    -Has felt relationship with spouse has been on a better page.    -No suicidal thoughts this past week.      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   -Daughter Deborah made a really cool memory plane ticket for grandma's .     -Had a nice surprise from Star for their anniversary.      Treatment Objective(s) Addressed in This Session:  Safety planning   Patient will develop a plan to help manage PMDD  Patient will work on ways to communicate patterns   Self-care   Grief and loss       Intervention:   Follow safety plan and use crisis resources.   Use support system- Continued   Acknowledged being proud of self on how she managed the relationship with her sister while she was hear last week.     Take time with spouse on his breaks to see each other in the evening.    Continuing with medication management.    Continue to grieve the loss of mother.    Focus on family time on  and no phones at the dinner table-Continued    Use some holistic approaches to health.         Will do door dash when body allows.    Respond and not react.        Assessments completed prior to visit:  The following assessments were completed by patient for this visit:  PHQ2:       2023     9:00 PM 2023     1:35 PM 2022    12:07 PM 10/26/2022     8:26 AM 2022     2:10 PM   PHQ-2 (  [employer])   Q1: Little interest or pleasure in doing things 1 2 3 2 0   Q2: Feeling down, depressed or hopeless 1 3 3 2 1   PHQ-2 Score 2 5 6 4 1   Q1: Little interest or pleasure in doing things Several days More than half the days Nearly every day More than half the days Not at all   Q2: Feeling down, depressed or hopeless Several days Nearly every day Nearly every day More than half the days Several days   PHQ-2 Score 2 5 6 4 1     PHQ9:        8/3/2022     9:14 AM 10/26/2022     8:26 AM 11/29/2022    12:07 PM 1/5/2023     1:35 PM 2/2/2023    10:13 AM 6/1/2023    10:07 AM   PHQ-9 SCORE   PHQ-9 Total Score Mattt  14 (Moderate depression) 21 (Severe depression) 16 (Moderately severe depression)     PHQ-9 Total Score 8 14 21 16 12 15     GAD2:       7/28/2022     8:28 AM 11/27/2022     5:03 AM 4/6/2023     8:12 AM   YADI-2   Feeling nervous, anxious, or on edge 1 3 1   Not being able to stop or control worrying 0 3 1   YADI-2 Total Score 1 6 2     GAD7:       11/27/2022     5:03 AM 2/2/2023    10:13 AM 6/1/2023    10:07 AM   YADI-7 SCORE   Total Score 17 (severe anxiety)     Total Score 17 13 12     PROMIS 10-Global Health (all questions and answers displayed):       7/28/2022     8:34 AM 11/27/2022     5:04 AM 4/6/2023     8:14 AM   PROMIS 10   In general, would you say your health is: Good Good Good   In general, would you say your quality of life is: Good Fair Good   In general, how would you rate your physical health? Good Good Good   In general, how would you rate your mental health, including your mood and your ability to think? Good Poor Fair   In general, how would you rate your satisfaction with your social activities and relationships? Good Poor Fair   In general, please rate how well you carry out your usual social activities and roles Fair Poor Poor   To what extent are you able to carry out your everyday physical activities such as walking, climbing stairs, carrying groceries, or moving a chair? Mostly A little Mostly   In the past 7 days, how often have you been bothered by emotional problems such as feeling anxious, depressed, or irritable? Sometimes Always Sometimes   In the past 7 days, how would you rate your fatigue on average? Mild Moderate Mild   In the past 7 days, how would you rate your pain on average, where 0 means no pain, and 10 means worst imaginable pain? 3 4 2   In general, would you say your health is: 3 3 3   In general, would  you say your quality of life is: 3 2 3   In general, how would you rate your physical health? 3 3 3   In general, how would you rate your mental health, including your mood and your ability to think? 3 1 2   In general, how would you rate your satisfaction with your social activities and relationships? 3 1 2   In general, please rate how well you carry out your usual social activities and roles. (This includes activities at home, at work and in your community, and responsibilities as a parent, child, spouse, employee, friend, etc.) 2 1 1   To what extent are you able to carry out your everyday physical activities such as walking, climbing stairs, carrying groceries, or moving a chair? 4 2 4   In the past 7 days, how often have you been bothered by emotional problems such as feeling anxious, depressed, or irritable? 3 5 3   In the past 7 days, how would you rate your fatigue on average? 2 3 2   In the past 7 days, how would you rate your pain on average, where 0 means no pain, and 10 means worst imaginable pain? 3 4 2   Global Mental Health Score 12 5 10   Global Physical Health Score 15 11 15   PROMIS TOTAL - SUBSCORES 27 16 25     [place] Suicide Severity Rating Scale (Lifetime/Recent)      8/3/2022     9:17 AM   [place] Suicide Severity Rating (Lifetime/Recent)   1. Wish to be Dead (Lifetime) N   2. Non-Specific Active Suicidal Thoughts (Lifetime) N   Actual Attempt (Lifetime) N   Has subject engaged in non-suicidal self-injurious behavior? (Lifetime) N   Calculated C-SSRS Risk Score (Lifetime/Recent) No Risk Indicated         ASSESSMENT: Current Emotional / Mental Status (status of significant symptoms):   Risk status (Self / Other harm or suicidal ideation)   Patient denies current fears or concerns for personal safety.   Patient denies current or recent suicidal ideation or behaviors.     Patient denies current or recent homicidal ideation or behaviors.   Patient denies current or recent self injurious  "behavior or ideation.   Patient denies other safety concerns.   Patient reports there has been no change in risk factors since their last session.     Patient reports there has been no change in protective factors since their last session.     A safety and risk management plan has been developed including: Patient consented to co-developed safety plan on 11-30-22.  Safety and risk management plan was reviewed.   Patient agreed to use safety plan should any safety concerns arise.  A copy was made available to the patient. Reviewed 6-1-23        [facility]                                       Veena Parsons     SAFETY PLAN:  Step 1: Warning signs / cues (Thoughts, images, mood, situation, behavior) that a crisis may be developing:    Thoughts: \"I don't matter\", \"People would be better off without me\", \"I'm a burden\", \"I can't do this anymore\", \"I just want this to end\" and \"Nothing makes it better\"    Images: flashbacks    Thinking Processes: ruminations (can't stop thinking about my problems): PMDD and loss of mother, racing thoughts and highly critical and negative thoughts: I cant do anything right    Mood: worsening depression, hopelessness, helplessness, intense worry, agitation and mood swings    Behaviors: isolating/withdrawing , can't stop crying, not taking care of myself, not taking care of my responsibilities, sleeping too much, not sleeping enough and increasing frequency and duration of dissociation    Situations: relationship problems, trauma  and medical condition / diagnosis: PMDD   Step 2: Coping strategies - Things I can do to take my mind off of my problems without contacting another person (relaxation technique, physical activity):    Distress Tolerance Strategies:  relaxation activities, play with my pet , listen to positive and upbeat music, sensory based activities/self-soothe with five senses, watch a funny movie, read a book, change body temperature (ice pack/cold water) " " and paced breathing/progressive muscle relaxation    Physical Activities: go for a walk, gardening, meditation, deep breathing and stretching     Focus on helpful thoughts:  \"This is temporary\", \"I will get through this\", \"It always passes\" and \"Ride the wave\"  Step 3: People and social settings that provide distraction:   Name: Sarah Graves   Name:  Spending time with daughter       movie theater, pet store/humane society and coffee shop   Step 4: Remind myself of people and things that are important to me and worth living for:    Daughter   Spouse  Pets     Step 5: When I am in crisis, I can ask these people to help me use my safety plan:   Name: Spouse  Phone: [phone]      Step 6: Making the environment safe:     remove things I could use to hurt myself and be around others  Step 7: Professionals or agencies I can contact during a crisis:    Suicide Prevention Lifeline: Call or Text 505   Local Crisis Services:  [facility] [phone]      Call 911 or go to my nearest emergency department.   I helped develop this safety plan and agree to use it when needed.  I have been given a copy of this plan.      Client signature _________________________________________________________________  Today s date:  11/30/2022  Completed by Provider Name/ Credentials:  Lia Stiles MA, LMFT  November 30, 2022  Adapted from Safety Plan Template 2008 Eleonora Capone and Danny Carmona is reprinted with the express permission of the authors.  No portion of the Safety Plan Template may be reproduced without the express, written permission.  You can contact the authors at [email or web address] or [email or web address].    Name: Veena Parsons  YOB: 1985  Date: November 30, 2022   My primary care provider: Francisco Nails  My primary care clinic: [facility]   My prescriber: PCP  Other care team support:  Holistic medical provider    My Triggers:    Relational problems  Loss of mother " recently  Financial stress      Additional People, Places, and Things that I can access for support:   Spouse  Daughter          What is important to me and makes life worth living: Family         GREEN    Good Control  1. I feel good  2. No suicidal thoughts   3. Can work, sleep and play      Action Steps  1. Self-care: balanced meals, exercising, sleep practices, etc.  2. Take your medications as prescribed.  3. Continue meetings with therapist and prescriber.  4.  Do the healthy things that I enjoy.             YELLO Getting Worse  I have ANY of these:  1. I do not feel good  2. Difficulty Concentrating  3. Sleep is changing  4. Increase/Change in my thoughts to hurt self and/or others, but I can still manage and not act on it.   5. Not taking care of self.               Action Steps (in addition to the above):  1. Inform your therapist and psychiatric prescriber/PCP.  2. Keep taking your medications as prescribed.    3. Turn to people you can ask for help.  4. Use internal coping strategies -see below.  5. Create safe environment: Removing items I can hurt self with              RED  Get Help  If I have ANY of these:  1. Current and uncontrollable thoughts and/or behaviors to hurt self and/or others.   2. Crazy buzzing and spinning.     Actions to manage my safety  1. Contact your emergency person Star  2. Call or Text [phone]  3. Call my crisis team- [facility] [phone]  3. Or Call [phone] or go to the emergency room right away        My Internal Coping Strategies include the following:  take a bath, belly breathing, arts and crafts, play with my pet, use my coping box, exercise and use my coping skills    [End for Brief Safety Plan]     Safety Concerns  How To Identify Situations That Make Your Mental Health Worse:  Triggers are things that make your mental health worse.  Look at the list below to help you find your triggers and what you can do about them.     1. Identify Early Warning Signs:    Sometimes  symptoms return, even when people do their best to stay well. Symptoms can develop over a short period of time with little or no warning, but most of the time they emerge gradually over several weeks.  Early warning signs are changes that people experience when a relapse is starting. Some early warning signs are common and others are not as common.   Common Early Warning Signs:    Feeling tense or nervous, Eating less or eating more, Trouble sleeping -either too much or too little sleep, Feeling depressed or low, Feeling irritable, Feeling like not being around other people, Trouble concentrating and Urges to harm self     2. Identify action steps to take when warning signs are noticed:    Taking Action- It is important to take action if you are experiencing early warning signs of a relapse.  The faster you act, the more likely it is that you can avoid a full relapse.  It is helpful to identify several specific ways to cope with symptoms.      The following is my list of symptoms and coping strategies that I can use when they are present:    Symptom Coping Strategies   Anxiety -Talk with someone in your support system and let him or her know how you are feeling.  -Use relaxation techniques such as deep breathing or imagery.  -Use positive affirmations to counteract negative self-talk such as  I am learning to let go of worry.    Depression - Schedule your day; include activities you have to do and activities you enjoy doing.  - Get some exercise - walk, run, bike, or swim.  - Give yourself credit for even the smallest things you get done.   Sleep Difficulties   - Go to sleep at the same time every day.  - Do something relaxing before bed, such as drinking herbal tea or listening to music.  - Avoid having discussions about upsetting topics before going to bed.   Delusions   - Distract yourself from the disturbing thought by doing something that requires your attention such as a puzzle.  - Check out your beliefs by  talking to someone you trust.    Hallucinations   - Use headphones to listen to music.  - Tell voices to  stop  or say to yourself,  I am safe.   - Ignore the hallucinations as much as possible; focus on other things.   Concentration Difficulties - Minimize distractions so there is only one thing for you to focus on at a time.    - Ask the person you are having a conversation with to slow down or repeat things you are unsure of.            Appearance:   Appropriate    Eye Contact:   Good    Psychomotor Behavior: Normal    Attitude:   Cooperative    Orientation:   All   Speech    Rate / Production: Normal     Volume:  Normal    Mood:    Anxious    Affect:    Worrisome    Thought Content:  Clear    Thought Form:  Coherent  Logical    Insight:    Good      Medication Review:   No changes to current psychiatric medication(s)     Medication Compliance:   Yes     Changes in Health Issues:   None reported     Chemical Use Review:   Substance Use: Chemical use reviewed, no active concerns identified      Tobacco Use: No current tobacco use.      Diagnosis:  296.32 (F33.1) Major Depressive Disorder, Recurrent Episode, Moderate _ and With mixed features  300.01 (F41.0) Panic Disorder  300.02 (F41.1) Generalized Anxiety Disorder   PMDD      Collateral Reports Completed:   Not Applicable    PLAN: (Patient Tasks / Therapist Tasks / Other)  Client will continue to work on the following goals:    -Follow safety plan and use crisis resources-Continued   -Work on responding and not reacting. Feeling more in control.  -Continue to have boundaries with sister.  Keep communication very simple.   -Get support on grief and loss.  -Spend time with Star on his breaks.  -Have a good concrete daily schedule.   -Consider support networks for grief.           NATACHA Hernandez                                                         ______________________________________________________________________    Individual Treatment  Plan    Patient's Name: Veena Parsons  YOB: 1985    Date of Creation: 9-7-22  Date Treatment Plan Last Reviewed/Revised: 4-12-23    DSM5 Diagnoses:  296.32 (F33.1) Major Depressive Disorder, Recurrent Episode, Moderate _ and With mixed features  300.01 (F41.0) Panic Disorder  300.02 (F41.1) Generalized Anxiety Disorder   PMDD  Psychosocial / Contextual Factors: Some relational issues   PROMIS (reviewed every 90 days): 25    Referral / Collaboration:  Referral to another professional/service is not indicated at this time..    Anticipated number of session for this episode of care: 6-9 sessions  Anticipation frequency of session: Biweekly  Anticipated Duration of each session: 38-52 minutes  Treatment plan will be reviewed in 90 days or when goals have been changed.       MeasurableTreatment Goal(s) related to diagnosis / functional impairment(s)  Goal 1: Patient will address struggles with anxiety, depression and PMDD.    I will know I've met my goal when I am feeling less reactive through the month with the symptoms.      Objective #A (Patient Action)    Patient will work on establishing a concrete routine with self-care.  Status: Continued - Date(s): 4-12-23    Intervention(s)  Therapist will use CBT and motivational interviewing.      Objective #B  Patient will develop a plan to help manage PMDD  Status: Continued - Date(s): 4-12-23    Intervention(s)  Therapist will use solution focused therapy.    Objective #C  Patient will work on ways to communicate with narcissistic individuals.  Status: Continued - Date(s): 4-12-23    Intervention(s)  Therapist will teach communication skills.          Patient has reviewed and agreed to the above plan.      Lia Stiles, NATACHA  September 7, 2022

## 2023-06-15 ENCOUNTER — VIRTUAL VISIT (OUTPATIENT)
Dept: PSYCHOLOGY | Facility: CLINIC | Age: 38
End: 2023-06-15
Payer: COMMERCIAL

## 2023-06-15 DIAGNOSIS — F33.1 MAJOR DEPRESSIVE DISORDER, RECURRENT EPISODE, MODERATE (H): Primary | ICD-10-CM

## 2023-06-15 DIAGNOSIS — F41.1 GENERALIZED ANXIETY DISORDER: ICD-10-CM

## 2023-06-15 DIAGNOSIS — F41.0 PANIC DISORDER WITHOUT AGORAPHOBIA: ICD-10-CM

## 2023-06-15 PROCEDURE — 90834 PSYTX W PT 45 MINUTES: CPT | Mod: VID | Performed by: MARRIAGE & FAMILY THERAPIST

## 2023-06-15 NOTE — PROGRESS NOTES
[facility]                                     Progress Note    Patient Name: Veena Parsons  Date: 6-15-23         Service Type: Individual      Session Start Time: 10am Session End Time: 1050am     Session Length: 50min    Session #: 20    Attendees: Client    Service Modality:  Video Visit:      Telemedicine Visit: The patient's condition can be safely assessed and treated via synchronous audio and visual telemedicine encounter.      Reason for Telemedicine Visit: Patient has requested telehealth visit    Originating Site (Patient Location): Patient's home        Distant Location (provider location):  On-site    Consent:  The patient/guardian has verbally consented to: the potential risks and benefits of telemedicine (video visit) versus in person care; bill my insurance or make self-payment for services provided; and responsibility for payment of non-covered services.     Mode of Communication:  Video Conference via [employer]    As the provider I attest to compliance with applicable laws and regulations related to telemedicine.      Treatment Plan- 4-12-23  CGI- 4-12-23  Phq-9 and YADI-7- 6-1-23  Promis- 4-6-23    DATA  Interactive Complexity: No  Crisis: No        Progress Since Last Session (Related to Symptoms / Goals / Homework):   Symptoms: Client reports confronting issues with anxiety and depressed mood.       Homework: Achieved / completed to satisfaction  Completed in session      Episode of Care Goals: Satisfactory progress - ACTION (Actively working towards change); Intervened by reinforcing change plan / affirming steps taken     Current / Ongoing Stressors and Concerns:   -Veena did get approved for disability.     -Has struggled with PMDD for a number of years.  Has been working with chiropractic and message therapy on some holistic treatment options- Continue    -Daughter is doing some advocacy work this week and got accepted to a week long program.   -Has felt  relationship with spouse has been on a better page but he did send a hurtful text yesterday when Veena thought she was trying to come up with a proactive plan with family working more as a team and appreciating time with one another. Veena feels he has a lot of control issues and has to be on the top tier.    -No suicidal thoughts this past week.      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        Treatment Objective(s) Addressed in This Session:  Safety planning   Patient will develop a plan to help manage PMDD  Patient will work on ways to communicate patterns   Self-care   Grief and loss       Intervention:   Follow safety plan and use crisis resources.   Use support system- Continued   Have a conversation with Star about if he is having a hard time with her making improvement.     Continuing with medication management.    Continue to grieve the loss of mother.    Encourage Star to get his own services.    Use some holistic approaches to health.         Will do door dash when body allows.    Respond and not react.        Assessments completed prior to visit:  The following assessments were completed by patient for this visit:  PHQ2:       4/11/2023     9:00 PM 1/5/2023     1:35 PM 11/29/2022    12:07 PM 10/26/2022     8:26 AM 7/25/2022     2:10 PM   PHQ-2 ( 1999 [employer])   Q1: Little interest or pleasure in doing things 1 2 3 2 0   Q2: Feeling down, depressed or hopeless 1 3 3 2 1   PHQ-2 Score 2 5 6 4 1   Q1: Little interest or pleasure in doing things Several days More than half the days Nearly every day More than half the days Not at all   Q2: Feeling down, depressed or hopeless Several days Nearly every day Nearly every day More than half the days Several days   PHQ-2 Score 2 5 6 4 1     PHQ9:       8/3/2022     9:14 AM 10/26/2022     8:26 AM 11/29/2022    12:07 PM 1/5/2023     1:35 PM 2/2/2023    10:13 AM 6/1/2023    10:07 AM   PHQ-9 SCORE   PHQ-9 Total Score [facility]  14 (Moderate depression) 21 (Severe  depression) 16 (Moderately severe depression)     PHQ-9 Total Score 8 14 21 16 12 15     GAD2:       7/28/2022     8:28 AM 11/27/2022     5:03 AM 4/6/2023     8:12 AM   YADI-2   Feeling nervous, anxious, or on edge 1 3 1   Not being able to stop or control worrying 0 3 1   YADI-2 Total Score 1 6 2     GAD7:       11/27/2022     5:03 AM 2/2/2023    10:13 AM 6/1/2023    10:07 AM   YADI-7 SCORE   Total Score 17 (severe anxiety)     Total Score 17 13 12     PROMIS 10-Global Health (all questions and answers displayed):       7/28/2022     8:34 AM 11/27/2022     5:04 AM 4/6/2023     8:14 AM   PROMIS 10   In general, would you say your health is: Good Good Good   In general, would you say your quality of life is: Good Fair Good   In general, how would you rate your physical health? Good Good Good   In general, how would you rate your mental health, including your mood and your ability to think? Good Poor Fair   In general, how would you rate your satisfaction with your social activities and relationships? Good Poor Fair   In general, please rate how well you carry out your usual social activities and roles Fair Poor Poor   To what extent are you able to carry out your everyday physical activities such as walking, climbing stairs, carrying groceries, or moving a chair? Mostly A little Mostly   In the past 7 days, how often have you been bothered by emotional problems such as feeling anxious, depressed, or irritable? Sometimes Always Sometimes   In the past 7 days, how would you rate your fatigue on average? Mild Moderate Mild   In the past 7 days, how would you rate your pain on average, where 0 means no pain, and 10 means worst imaginable pain? 3 4 2   In general, would you say your health is: 3 3 3   In general, would you say your quality of life is: 3 2 3   In general, how would you rate your physical health? 3 3 3   In general, how would you rate your mental health, including your mood and your ability to think? 3 1 2    In general, how would you rate your satisfaction with your social activities and relationships? 3 1 2   In general, please rate how well you carry out your usual social activities and roles. (This includes activities at home, at work and in your community, and responsibilities as a parent, child, spouse, employee, friend, etc.) 2 1 1   To what extent are you able to carry out your everyday physical activities such as walking, climbing stairs, carrying groceries, or moving a chair? 4 2 4   In the past 7 days, how often have you been bothered by emotional problems such as feeling anxious, depressed, or irritable? 3 5 3   In the past 7 days, how would you rate your fatigue on average? 2 3 2   In the past 7 days, how would you rate your pain on average, where 0 means no pain, and 10 means worst imaginable pain? 3 4 2   Global Mental Health Score 12 5 10   Global Physical Health Score 15 11 15   PROMIS TOTAL - SUBSCORES 27 16 25     [place] Suicide Severity Rating Scale (Lifetime/Recent)      8/3/2022     9:17 AM   [place] Suicide Severity Rating (Lifetime/Recent)   1. Wish to be Dead (Lifetime) N   2. Non-Specific Active Suicidal Thoughts (Lifetime) N   Actual Attempt (Lifetime) N   Has subject engaged in non-suicidal self-injurious behavior? (Lifetime) N   Calculated C-SSRS Risk Score (Lifetime/Recent) No Risk Indicated         ASSESSMENT: Current Emotional / Mental Status (status of significant symptoms):   Risk status (Self / Other harm or suicidal ideation)   Patient denies current fears or concerns for personal safety.   Patient denies current or recent suicidal ideation or behaviors.     Patient denies current or recent homicidal ideation or behaviors.   Patient denies current or recent self injurious behavior or ideation.   Patient denies other safety concerns.   Patient reports there has been no change in risk factors since their last session.     Patient reports there has been no change in protective  "factors since their last session.     A safety and risk management plan has been developed including: Patient consented to co-developed safety plan on 11-30-22.  Safety and risk management plan was reviewed.   Patient agreed to use safety plan should any safety concerns arise.  A copy was made available to the patient. Reviewed 6-15-23        [facility]                                       Veena Parsons     SAFETY PLAN:  Step 1: Warning signs / cues (Thoughts, images, mood, situation, behavior) that a crisis may be developing:    Thoughts: \"I don't matter\", \"People would be better off without me\", \"I'm a burden\", \"I can't do this anymore\", \"I just want this to end\" and \"Nothing makes it better\"    Images: flashbacks    Thinking Processes: ruminations (can't stop thinking about my problems): PMDD and loss of mother, racing thoughts and highly critical and negative thoughts: I cant do anything right    Mood: worsening depression, hopelessness, helplessness, intense worry, agitation and mood swings    Behaviors: isolating/withdrawing , can't stop crying, not taking care of myself, not taking care of my responsibilities, sleeping too much, not sleeping enough and increasing frequency and duration of dissociation    Situations: relationship problems, trauma  and medical condition / diagnosis: PMDD   Step 2: Coping strategies - Things I can do to take my mind off of my problems without contacting another person (relaxation technique, physical activity):    Distress Tolerance Strategies:  relaxation activities, play with my pet , listen to positive and upbeat music, sensory based activities/self-soothe with five senses, watch a funny movie, read a book, change body temperature (ice pack/cold water)  and paced breathing/progressive muscle relaxation    Physical Activities: go for a walk, gardening, meditation, deep breathing and stretching     Focus on helpful thoughts:  \"This is temporary\", \"I will " "get through this\", \"It always passes\" and \"Ride the wave\"  Step 3: People and social settings that provide distraction:   Name: Spouse Star   Name:  Spending time with daughter       movie theater, pet store/humane society and coffee shop   Step 4: Remind myself of people and things that are important to me and worth living for:    Daughter   Spouse  Pets     Step 5: When I am in crisis, I can ask these people to help me use my safety plan:   Name: Spouse  Phone: [phone]      Step 6: Making the environment safe:     remove things I could use to hurt myself and be around others  Step 7: Professionals or agencies I can contact during a crisis:    Suicide Prevention Lifeline: Call or Text 093   [facility]:  [facility] [phone]      Call 911 or go to my nearest emergency department.   I helped develop this safety plan and agree to use it when needed.  I have been given a copy of this plan.      Client signature _________________________________________________________________  Today s date:  11/30/2022  Completed by Provider Name/ Credentials:  Lia Stiles MA, LMFT  November 30, 2022  Adapted from Safety Plan Template 2008 Eleonora Capone and Danny Carmona is reprinted with the express permission of the authors.  No portion of the Safety Plan Template may be reproduced without the express, written permission.  You can contact the authors at [email or web address] or [email or web address].    Name: Veena Parsons  YOB: 1985  Date: November 30, 2022   My primary care provider: Francisco Nails  My primary care clinic: [facility]   My prescriber: PCP  Other care team support:  Holistic medical provider    My Triggers:    Relational problems  Loss of mother recently  Financial stress      Additional People, Places, and Things that I can access for support:   Spouse  Daughter          What is important to me and makes life worth living: Family         GREEN    " Good Control  1. I feel good  2. No suicidal thoughts   3. Can work, sleep and play      Action Steps  1. Self-care: balanced meals, exercising, sleep practices, etc.  2. Take your medications as prescribed.  3. Continue meetings with therapist and prescriber.  4.  Do the healthy things that I enjoy.             YELLO Getting Worse  I have ANY of these:  1. I do not feel good  2. Difficulty Concentrating  3. Sleep is changing  4. Increase/Change in my thoughts to hurt self and/or others, but I can still manage and not act on it.   5. Not taking care of self.               Action Steps (in addition to the above):  1. Inform your therapist and psychiatric prescriber/PCP.  2. Keep taking your medications as prescribed.    3. Turn to people you can ask for help.  4. Use internal coping strategies -see below.  5. Create safe environment: Removing items I can hurt self with              RED  Get Help  If I have ANY of these:  1. Current and uncontrollable thoughts and/or behaviors to hurt self and/or others.   2. Crazy buzzing and spinning.     Actions to manage my safety  1. Contact your emergency person Star  2. Call or Text 030  3. Call my crisis team- [facility] [phone]  3. Or Call 911 or go to the emergency room right away        My Internal Coping Strategies include the following:  take a bath, belly breathing, arts and crafts, play with my pet, use my coping box, exercise and use my coping skills    [End for Brief Safety Plan]     Safety Concerns  How To Identify Situations That Make Your Mental Health Worse:  Triggers are things that make your mental health worse.  Look at the list below to help you find your triggers and what you can do about them.     1. Identify Early Warning Signs:    Sometimes symptoms return, even when people do their best to stay well. Symptoms can develop over a short period of time with little or no warning, but most of the time they emerge gradually over several weeks.   Early warning signs are changes that people experience when a relapse is starting. Some early warning signs are common and others are not as common.   Common Early Warning Signs:    Feeling tense or nervous, Eating less or eating more, Trouble sleeping -either too much or too little sleep, Feeling depressed or low, Feeling irritable, Feeling like not being around other people, Trouble concentrating and Urges to harm self     2. Identify action steps to take when warning signs are noticed:    Taking Action- It is important to take action if you are experiencing early warning signs of a relapse.  The faster you act, the more likely it is that you can avoid a full relapse.  It is helpful to identify several specific ways to cope with symptoms.      The following is my list of symptoms and coping strategies that I can use when they are present:    Symptom Coping Strategies   Anxiety -Talk with someone in your support system and let him or her know how you are feeling.  -Use relaxation techniques such as deep breathing or imagery.  -Use positive affirmations to counteract negative self-talk such as  I am learning to let go of worry.    Depression - Schedule your day; include activities you have to do and activities you enjoy doing.  - Get some exercise - walk, run, bike, or swim.  - Give yourself credit for even the smallest things you get done.   Sleep Difficulties   - Go to sleep at the same time every day.  - Do something relaxing before bed, such as drinking herbal tea or listening to music.  - Avoid having discussions about upsetting topics before going to bed.   Delusions   - Distract yourself from the disturbing thought by doing something that requires your attention such as a puzzle.  - Check out your beliefs by talking to someone you trust.    Hallucinations   - Use headphones to listen to music.  - Tell voices to  stop  or say to yourself,  I am safe.   - Ignore the hallucinations as much as possible; focus on  other things.   Concentration Difficulties - Minimize distractions so there is only one thing for you to focus on at a time.    - Ask the person you are having a conversation with to slow down or repeat things you are unsure of.            Appearance:   Appropriate    Eye Contact:   Good    Psychomotor Behavior: Normal    Attitude:   Cooperative    Orientation:   All   Speech    Rate / Production: Normal     Volume:  Normal    Mood:    Anxious Upset   Affect:    Worrisome Tearful   Thought Content:  Clear    Thought Form:  Coherent  Logical    Insight:    Good      Medication Review:   No changes to current psychiatric medication(s)     Medication Compliance:   Yes     Changes in Health Issues:   None reported     Chemical Use Review:   Substance Use: Chemical use reviewed, no active concerns identified      Tobacco Use: No current tobacco use.      Diagnosis:  296.32 (F33.1) Major Depressive Disorder, Recurrent Episode, Moderate _ and With mixed features  300.01 (F41.0) Panic Disorder  300.02 (F41.1) Generalized Anxiety Disorder   PMDD      Collateral Reports Completed:   Not Applicable    PLAN: (Patient Tasks / Therapist Tasks / Other)  Client will continue to work on the following goals:    -Follow safety plan and use crisis resources-Continued   -Work on responding and not reacting. Feeling more in control.  -Continue to have boundaries with sister.  Keep communication very simple.   -Get support on grief and loss.  -Spend time with Star on his breaks.  -Have a good concrete daily schedule.   -Consider support networks for grief.           NATACHA Hernandez                                                         ______________________________________________________________________    Individual Treatment Plan    Patient's Name: Veena Parsons  YOB: 1985    Date of Creation: 9-7-22  Date Treatment Plan Last Reviewed/Revised: 4-12-23    DSM5 Diagnoses:  296.32 (F33.1) Major Depressive  Disorder, Recurrent Episode, Moderate _ and With mixed features  300.01 (F41.0) Panic Disorder  300.02 (F41.1) Generalized Anxiety Disorder   PMDD  Psychosocial / Contextual Factors: Some relational issues   PROMIS (reviewed every 90 days): 25    Referral / Collaboration:  Referral to another professional/service is not indicated at this time..    Anticipated number of session for this episode of care: 6-9 sessions  Anticipation frequency of session: Biweekly  Anticipated Duration of each session: 38-52 minutes  Treatment plan will be reviewed in 90 days or when goals have been changed.       MeasurableTreatment Goal(s) related to diagnosis / functional impairment(s)  Goal 1: Patient will address struggles with anxiety, depression and PMDD.    I will know I've met my goal when I am feeling less reactive through the month with the symptoms.      Objective #A (Patient Action)    Patient will work on establishing a concrete routine with self-care.  Status: Continued - Date(s): 4-12-23    Intervention(s)  Therapist will use CBT and motivational interviewing.      Objective #B  Patient will develop a plan to help manage PMDD  Status: Continued - Date(s): 4-12-23    Intervention(s)  Therapist will use solution focused therapy.    Objective #C  Patient will work on ways to communicate with narcissistic individuals.  Status: Continued - Date(s): 4-12-23    Intervention(s)  Therapist will teach communication skills.          Patient has reviewed and agreed to the above plan.      Lia Stiles, NATACHA  September 7, 2022

## 2023-06-19 ENCOUNTER — VIRTUAL VISIT (OUTPATIENT)
Dept: PSYCHOLOGY | Facility: CLINIC | Age: 38
End: 2023-06-19
Payer: COMMERCIAL

## 2023-06-19 DIAGNOSIS — F41.0 PANIC DISORDER WITHOUT AGORAPHOBIA: ICD-10-CM

## 2023-06-19 DIAGNOSIS — F33.1 MAJOR DEPRESSIVE DISORDER, RECURRENT EPISODE, MODERATE (H): Primary | ICD-10-CM

## 2023-06-19 DIAGNOSIS — F41.1 GENERALIZED ANXIETY DISORDER: ICD-10-CM

## 2023-06-19 PROCEDURE — 90832 PSYTX W PT 30 MINUTES: CPT | Mod: 95 | Performed by: MARRIAGE & FAMILY THERAPIST

## 2023-06-19 NOTE — PROGRESS NOTES
"    [facility]                                     Progress Note    Patient Name: Veena Parsons  Date: 23         Service Type: Individual      Session Start Time: 325pm Session End Time: 4pm     Session Length: 35min    Session #: 21    Attendees: Client    Service Modality:  Telephone      Provider verified identity through the following two step process.  Patient provided:  Patient  and Patient address    Telephone Visit: The patient's condition can be safely assessed and treated via synchronous audio telemedicine encounter.      Reason for Audio Telemedicine Visit: Patient has requested telehealth visit    Originating Site (Patient Location): Patient's home    Distant Site (Provider Location): Provider Remote Setting- Home Office    Consent:  The patient/guardian has verbally consented to:     1. The potential risks and benefits of telemedicine (telephone visit) versus in person care;    The patient has been notified of the following:      \"We have found that certain health care needs can be provided without the need for a face to face visit.  This service lets us provide the care you need with a phone conversation.       I will have full access to your [facility] medical record during this entire phone call.   I will be taking notes for your medical record.      Since this is like an office visit, we will bill your insurance company for this service.       There are potential benefits and risks of telephone visits (e.g. limits to patient confidentiality) that differ from in-person visits.?Confidentiality still applies for telephone services, and nobody will record the visit.  It is important to be in a quiet, private space that is free of distractions (including cell phone or other devices) during the visit.??      If during the course of the call I believe a telephone visit is not appropriate, you will not be charged for this service\"     Consent has been obtained for this " service by care team member: Yes   Treatment Plan- 4-12-23  CGI- 4-12-23  Phq-9 and YADI-7- 6-1-23  Promis- 4-6-23    DATA  Interactive Complexity: No  Crisis: No        Progress Since Last Session (Related to Symptoms / Goals / Homework):   Symptoms: Client reports confronting issues with anxiety and depressed mood.   She called to get in for a crisis fill in today.      Homework: Achieved / completed to satisfaction  Completed in session      Episode of Care Goals: Minimal progress - ACTION (Actively working towards change); Intervened by reinforcing change plan / affirming steps taken     Current / Ongoing Stressors and Concerns:   -Veena did get approved for disability.     -Has struggled with PMDD for a number of years.  Has been working with chiropractic and message therapy on some holistic treatment options- Continue    -A lot of stress over the weekend with spouse and fathers day.  None of the kids came to visit and he took this out on Veena. Veena felt she put a lot of effort into making the day positive.    -Suicidal thoughts but no plan or intent.    -Attempting to do some door dashing and some activities outdoors.          Treatment Objective(s) Addressed in This Session:  Safety planning   Patient will develop a plan to help manage PMDD  Patient will work on ways to communicate /patterns   Self-care   Relational issues        Intervention:   Follow safety plan and use crisis resources.   Use support system- Continued   Attempt to have some more serious conversations with Star.     Continuing with medication management.    Continue to grieve the loss of mother.    Encourage Star to get his own services.    Use some holistic approaches to health.         Will do door dash when body allows.    Respond and not react.    Attempt to get outside on nice days to do things in the yard.           Assessments completed prior to visit:  The following assessments were completed by patient for this visit:  PHQ2:        4/11/2023     9:00 PM 1/5/2023     1:35 PM 11/29/2022    12:07 PM 10/26/2022     8:26 AM 7/25/2022     2:10 PM   PHQ-2 ( 1999 [employer])   Q1: Little interest or pleasure in doing things 1 2 3 2 0   Q2: Feeling down, depressed or hopeless 1 3 3 2 1   PHQ-2 Score 2 5 6 4 1   Q1: Little interest or pleasure in doing things Several days More than half the days Nearly every day More than half the days Not at all   Q2: Feeling down, depressed or hopeless Several days Nearly every day Nearly every day More than half the days Several days   PHQ-2 Score 2 5 6 4 1     PHQ9:       8/3/2022     9:14 AM 10/26/2022     8:26 AM 11/29/2022    12:07 PM 1/5/2023     1:35 PM 2/2/2023    10:13 AM 6/1/2023    10:07 AM   PHQ-9 SCORE   PHQ-9 Total Score MyChart  14 (Moderate depression) 21 (Severe depression) 16 (Moderately severe depression)     PHQ-9 Total Score 8 14 21 16 12 15     GAD2:       7/28/2022     8:28 AM 11/27/2022     5:03 AM 4/6/2023     8:12 AM   YADI-2   Feeling nervous, anxious, or on edge 1 3 1   Not being able to stop or control worrying 0 3 1   YADI-2 Total Score 1 6 2     GAD7:       11/27/2022     5:03 AM 2/2/2023    10:13 AM 6/1/2023    10:07 AM   YADI-7 SCORE   Total Score 17 (severe anxiety)     Total Score 17 13 12     PROMIS 10-Global Health (all questions and answers displayed):       7/28/2022     8:34 AM 11/27/2022     5:04 AM 4/6/2023     8:14 AM   PROMIS 10   In general, would you say your health is: Good Good Good   In general, would you say your quality of life is: Good Fair Good   In general, how would you rate your physical health? Good Good Good   In general, how would you rate your mental health, including your mood and your ability to think? Good Poor Fair   In general, how would you rate your satisfaction with your social activities and relationships? Good Poor Fair   In general, please rate how well you carry out your usual social activities and roles Fair Poor Poor   To what extent are you able to  carry out your everyday physical activities such as walking, climbing stairs, carrying groceries, or moving a chair? Mostly A little Mostly   In the past 7 days, how often have you been bothered by emotional problems such as feeling anxious, depressed, or irritable? Sometimes Always Sometimes   In the past 7 days, how would you rate your fatigue on average? Mild Moderate Mild   In the past 7 days, how would you rate your pain on average, where 0 means no pain, and 10 means worst imaginable pain? 3 4 2   In general, would you say your health is: 3 3 3   In general, would you say your quality of life is: 3 2 3   In general, how would you rate your physical health? 3 3 3   In general, how would you rate your mental health, including your mood and your ability to think? 3 1 2   In general, how would you rate your satisfaction with your social activities and relationships? 3 1 2   In general, please rate how well you carry out your usual social activities and roles. (This includes activities at home, at work and in your community, and responsibilities as a parent, child, spouse, employee, friend, etc.) 2 1 1   To what extent are you able to carry out your everyday physical activities such as walking, climbing stairs, carrying groceries, or moving a chair? 4 2 4   In the past 7 days, how often have you been bothered by emotional problems such as feeling anxious, depressed, or irritable? 3 5 3   In the past 7 days, how would you rate your fatigue on average? 2 3 2   In the past 7 days, how would you rate your pain on average, where 0 means no pain, and 10 means worst imaginable pain? 3 4 2   Global Mental Health Score 12 5 10   Global Physical Health Score 15 11 15   PROMIS TOTAL - SUBSCORES 27 16 25     [place] Suicide Severity Rating Scale (Lifetime/Recent)      8/3/2022     9:17 AM   [place] Suicide Severity Rating (Lifetime/Recent)   1. Wish to be Dead (Lifetime) N   2. Non-Specific Active Suicidal Thoughts  "(Lifetime) N   Actual Attempt (Lifetime) N   Has subject engaged in non-suicidal self-injurious behavior? (Lifetime) N   Calculated C-SSRS Risk Score (Lifetime/Recent) No Risk Indicated         ASSESSMENT: Current Emotional / Mental Status (status of significant symptoms):   Risk status (Self / Other harm or suicidal ideation)   Patient denies current fears or concerns for personal safety.   Patient reports the following current or recent suicidal ideation or behaviors: -Suicidal thoughts with no plan or intent.  Triggers are relational issues with spouse.     Patient denies current or recent homicidal ideation or behaviors.   Patient denies current or recent self injurious behavior or ideation.   Patient denies other safety concerns.   Patient reports there has been no change in risk factors since their last session.     Patient reports there has been no change in protective factors since their last session.     A safety and risk management plan has been developed including: Patient consented to co-developed safety plan on 11-30-22.  Safety and risk management plan was reviewed.   Patient agreed to use safety plan should any safety concerns arise.  A copy was made available to the patient. Reviewed 6-19-23        [facility]                                       Veena Parsons     SAFETY PLAN:  Step 1: Warning signs / cues (Thoughts, images, mood, situation, behavior) that a crisis may be developing:    Thoughts: \"I don't matter\", \"People would be better off without me\", \"I'm a burden\", \"I can't do this anymore\", \"I just want this to end\" and \"Nothing makes it better\"    Images: flashbacks    Thinking Processes: ruminations (can't stop thinking about my problems): PMDD and loss of mother, racing thoughts and highly critical and negative thoughts: I cant do anything right    Mood: worsening depression, hopelessness, helplessness, intense worry, agitation and mood swings    Behaviors: " "isolating/withdrawing , can't stop crying, not taking care of myself, not taking care of my responsibilities, sleeping too much, not sleeping enough and increasing frequency and duration of dissociation    Situations: relationship problems, trauma  and medical condition / diagnosis: PMDD   Step 2: Coping strategies - Things I can do to take my mind off of my problems without contacting another person (relaxation technique, physical activity):    Distress Tolerance Strategies:  relaxation activities, play with my pet , listen to positive and upbeat music, sensory based activities/self-soothe with five senses, watch a funny movie, read a book, change body temperature (ice pack/cold water)  and paced breathing/progressive muscle relaxation    Physical Activities: go for a walk, gardening, meditation, deep breathing and stretching     Focus on helpful thoughts:  \"This is temporary\", \"I will get through this\", \"It always passes\" and \"Ride the wave\"  Step 3: People and social settings that provide distraction:   Name: Spouse Star   Name:  Spending time with daughter       movie theater, pet store/humane society and coffee shop   Step 4: Remind myself of people and things that are important to me and worth living for:    Daughter   Spouse  Pets     Step 5: When I am in crisis, I can ask these people to help me use my safety plan:   Name: Spouse  Phone: [phone]      Step 6: Making the environment safe:     remove things I could use to hurt myself and be around others  Step 7: Professionals or agencies I can contact during a crisis:    Suicide Prevention Lifeline: Call or Text 324   Local Crisis Services:  [facility] [phone]      Call 911 or go to my nearest emergency department.   I helped develop this safety plan and agree to use it when needed.  I have been given a copy of this plan.      Client signature _________________________________________________________________  Today s date:  " 11/30/2022  Completed by Provider Name/ Credentials:  Lia Stiles MA, LMFT  November 30, 2022  Adapted from Safety Plan Template 2008 Eleonora Capone and Danny Carmona is reprinted with the express permission of the authors.  No portion of the Safety Plan Template may be reproduced without the express, written permission.  You can contact the authors at [email or web address] or [email or web address].    Name: Veena Parsons  YOB: 1985  Date: November 30, 2022   My primary care provider: Francisco Nails  My primary care clinic: [facility]   My prescriber: PCP  Other care team support:  Holistic medical provider    My Triggers:    Relational problems  Loss of mother recently  Financial stress      Additional People, Places, and Things that I can access for support:   Spouse  Daughter          What is important to me and makes life worth living: Family         GREEN    Good Control  1. I feel good  2. No suicidal thoughts   3. Can work, sleep and play      Action Steps  1. Self-care: balanced meals, exercising, sleep practices, etc.  2. Take your medications as prescribed.  3. Continue meetings with therapist and prescriber.  4.  Do the healthy things that I enjoy.             YELLO Getting Worse  I have ANY of these:  1. I do not feel good  2. Difficulty Concentrating  3. Sleep is changing  4. Increase/Change in my thoughts to hurt self and/or others, but I can still manage and not act on it.   5. Not taking care of self.               Action Steps (in addition to the above):  1. Inform your therapist and psychiatric prescriber/PCP.  2. Keep taking your medications as prescribed.    3. Turn to people you can ask for help.  4. Use internal coping strategies -see below.  5. Create safe environment: Removing items I can hurt self with              RED  Get Help  If I have ANY of these:  1. Current and uncontrollable thoughts and/or behaviors to hurt self and/or others.   2. Crazy  buzzing and spinning.     Actions to manage my safety  1. Contact your emergency person Star  2. Call or Text [phone]  3. Call my crisis team- [facility] [phone]  3. Or Call 911 or go to the emergency room right away        My Internal Coping Strategies include the following:  take a bath, belly breathing, arts and crafts, play with my pet, use my coping box, exercise and use my coping skills    [End for Brief Safety Plan]     Safety Concerns  How To Identify Situations That Make Your Mental Health Worse:  Triggers are things that make your mental health worse.  Look at the list below to help you find your triggers and what you can do about them.     1. Identify Early Warning Signs:    Sometimes symptoms return, even when people do their best to stay well. Symptoms can develop over a short period of time with little or no warning, but most of the time they emerge gradually over several weeks.  Early warning signs are changes that people experience when a relapse is starting. Some early warning signs are common and others are not as common.   Common Early Warning Signs:    Feeling tense or nervous, Eating less or eating more, Trouble sleeping -either too much or too little sleep, Feeling depressed or low, Feeling irritable, Feeling like not being around other people, Trouble concentrating and Urges to harm self     2. Identify action steps to take when warning signs are noticed:    Taking Action- It is important to take action if you are experiencing early warning signs of a relapse.  The faster you act, the more likely it is that you can avoid a full relapse.  It is helpful to identify several specific ways to cope with symptoms.      The following is my list of symptoms and coping strategies that I can use when they are present:    Symptom Coping Strategies   Anxiety -Talk with someone in your support system and let him or her know how you are feeling.  -Use relaxation techniques such as deep breathing or  imagery.  -Use positive affirmations to counteract negative self-talk such as  I am learning to let go of worry.    Depression - Schedule your day; include activities you have to do and activities you enjoy doing.  - Get some exercise - walk, run, bike, or swim.  - Give yourself credit for even the smallest things you get done.   Sleep Difficulties   - Go to sleep at the same time every day.  - Do something relaxing before bed, such as drinking herbal tea or listening to music.  - Avoid having discussions about upsetting topics before going to bed.   Delusions   - Distract yourself from the disturbing thought by doing something that requires your attention such as a puzzle.  - Check out your beliefs by talking to someone you trust.    Hallucinations   - Use headphones to listen to music.  - Tell voices to  stop  or say to yourself,  I am safe.   - Ignore the hallucinations as much as possible; focus on other things.   Concentration Difficulties - Minimize distractions so there is only one thing for you to focus on at a time.    - Ask the person you are having a conversation with to slow down or repeat things you are unsure of.            Appearance:   Unable to assess    Eye Contact:   Unable to assess    Psychomotor Behavior: Unable to assess    Attitude:   Cooperative    Orientation:   All   Speech    Rate / Production: Normal     Volume:  Normal    Mood:    Anxious Upset   Affect:    Unable to assess    Thought Content:  Clear    Thought Form:  Coherent  Logical    Insight:    Good      Medication Review:   No changes to current psychiatric medication(s)     Medication Compliance:   Yes     Changes in Health Issues:   None reported     Chemical Use Review:   Substance Use: Chemical use reviewed, no active concerns identified      Tobacco Use: No current tobacco use.      Diagnosis:  296.32 (F33.1) Major Depressive Disorder, Recurrent Episode, Moderate _ and With mixed features  300.01 (F41.0) Panic  Disorder  300.02 (F41.1) Generalized Anxiety Disorder   PMDD      Collateral Reports Completed:   Not Applicable    PLAN: (Patient Tasks / Therapist Tasks / Other)  Client will continue to work on the following goals:    -Follow safety plan and use crisis resources-Continued   -Work on responding and not reacting. Feeling more in control.  -Attempt to have a more serious conversation with Star. Encourage Star to get his own services.   -Continue to have boundaries with sister.  Keep communication very simple.   -Get support on grief and loss.  -Have a good concrete daily schedule.   -Consider support networks for grief.           Lia Stiles, [facility]                                                         ______________________________________________________________________    Individual Treatment Plan    Patient's Name: Veena Parsons  YOB: 1985    Date of Creation: 9-7-22  Date Treatment Plan Last Reviewed/Revised: 4-12-23    DSM5 Diagnoses:  296.32 (F33.1) Major Depressive Disorder, Recurrent Episode, Moderate _ and With mixed features  300.01 (F41.0) Panic Disorder  300.02 (F41.1) Generalized Anxiety Disorder   PMDD  Psychosocial / Contextual Factors: Some relational issues   PROMIS (reviewed every 90 days): 25    Referral / Collaboration:  Referral to another professional/service is not indicated at this time..    Anticipated number of session for this episode of care: 6-9 sessions  Anticipation frequency of session: Biweekly  Anticipated Duration of each session: 38-52 minutes  Treatment plan will be reviewed in 90 days or when goals have been changed.       MeasurableTreatment Goal(s) related to diagnosis / functional impairment(s)  Goal 1: Patient will address struggles with anxiety, depression and PMDD.    I will know I've met my goal when I am feeling less reactive through the month with the symptoms.      Objective #A (Patient Action)    Patient will work on establishing a concrete  routine with self-care.  Status: Continued - Date(s): 4-12-23    Intervention(s)  Therapist will use CBT and motivational interviewing.      Objective #B  Patient will develop a plan to help manage PMDD  Status: Continued - Date(s): 4-12-23    Intervention(s)  Therapist will use solution focused therapy.    Objective #C  Patient will work on ways to communicate with narcissistic individuals.  Status: Continued - Date(s): 4-12-23    Intervention(s)  Therapist will teach communication skills.          Patient has reviewed and agreed to the above plan.      Lia Stiles, NATACHA  September 7, 2022

## 2023-06-22 ENCOUNTER — VIRTUAL VISIT (OUTPATIENT)
Dept: PSYCHOLOGY | Facility: CLINIC | Age: 38
End: 2023-06-22
Payer: COMMERCIAL

## 2023-06-22 DIAGNOSIS — F33.1 MAJOR DEPRESSIVE DISORDER, RECURRENT EPISODE, MODERATE (H): Primary | ICD-10-CM

## 2023-06-22 DIAGNOSIS — F41.1 GENERALIZED ANXIETY DISORDER: ICD-10-CM

## 2023-06-22 DIAGNOSIS — F41.0 PANIC DISORDER WITHOUT AGORAPHOBIA: ICD-10-CM

## 2023-06-22 PROCEDURE — 90834 PSYTX W PT 45 MINUTES: CPT | Mod: VID | Performed by: MARRIAGE & FAMILY THERAPIST

## 2023-06-22 NOTE — PROGRESS NOTES
[facility]                                     Progress Note    Patient Name: eVena Parsons  Date: 6-22-23         Service Type: Individual      Session Start Time: 9am Session End Time: 950am     Session Length: 50min    Session #: 22    Attendees: Client    Service Modality:  Video    Telemedicine Visit: The patient's condition can be safely assessed and treated via synchronous audio and visual telemedicine encounter.      Reason for Telemedicine Visit: Patient has requested telehealth visit    Originating Site (Patient Location): Patient's home        Distant Location (provider location):  On-site    Consent:  The patient/guardian has verbally consented to: the potential risks and benefits of telemedicine (video visit) versus in person care; bill my insurance or make self-payment for services provided; and responsibility for payment of non-covered services.     Mode of Communication:  Video Conference via [employer]    As the provider I attest to compliance with applicable laws and regulations related to telemedicine.      Treatment Plan- 4-12-23  CGI- 4-12-23  Phq-9 and YADI-7- 6-1-23  Promis- 4-6-23    DATA  Interactive Complexity: No  Crisis: No        Progress Since Last Session (Related to Symptoms / Goals / Homework):   Symptoms: Client reports confronting issues with anxiety and depressed mood.   She has been feeling a little better since the crisis fill in earlier this week.     Homework: Achieved / completed to satisfaction  Completed in session      Episode of Care Goals: Minimal progress - ACTION (Actively working towards change); Intervened by reinforcing change plan / affirming steps taken     Current / Ongoing Stressors and Concerns:   -Veena did get approved for disability.     -Has struggled with PMDD for a number of years.  Has been working with chiropractic and message therapy on some holistic treatment options- Continue    -Working on responding and not reacting this  week.    -Suicidal thoughts have improved through the week.    -Working on building relationship with daughter Deborah so she opens up more at home.    -Attempting to do some door dashing and some activities outdoors.          Treatment Objective(s) Addressed in This Session:  Safety planning   Patient will develop a plan to help manage PMDD  Patient will work on ways to communicate /patterns   Self-care   Relational issues        Intervention:   Follow safety plan and use crisis resources.   Use support system- Continued   Respond and not react to Star.    Continuing with medication management.    Continue to grieve the loss of mother.    Encourage Star to get his own services.    Have some more feelings conversations with Star.    Use some holistic approaches to health.         Will do door dash when body allows.    Attempt to get outside on nice days to do things in the yard.           Assessments completed prior to visit:  The following assessments were completed by patient for this visit:  PHQ2:       4/11/2023     9:00 PM 1/5/2023     1:35 PM 11/29/2022    12:07 PM 10/26/2022     8:26 AM 7/25/2022     2:10 PM   PHQ-2 ( 1999 [employer])   Q1: Little interest or pleasure in doing things 1 2 3 2 0   Q2: Feeling down, depressed or hopeless 1 3 3 2 1   PHQ-2 Score 2 5 6 4 1   Q1: Little interest or pleasure in doing things Several days More than half the days Nearly every day More than half the days Not at all   Q2: Feeling down, depressed or hopeless Several days Nearly every day Nearly every day More than half the days Several days   PHQ-2 Score 2 5 6 4 1     PHQ9:       8/3/2022     9:14 AM 10/26/2022     8:26 AM 11/29/2022    12:07 PM 1/5/2023     1:35 PM 2/2/2023    10:13 AM 6/1/2023    10:07 AM   PHQ-9 SCORE   PHQ-9 Total Score MyChart  14 (Moderate depression) 21 (Severe depression) 16 (Moderately severe depression)     PHQ-9 Total Score 8 14 21 16 12 15     GAD2:       7/28/2022     8:28 AM 11/27/2022     5:03 AM  4/6/2023     8:12 AM   YADI-2   Feeling nervous, anxious, or on edge 1 3 1   Not being able to stop or control worrying 0 3 1   YADI-2 Total Score 1 6 2     GAD7:       11/27/2022     5:03 AM 2/2/2023    10:13 AM 6/1/2023    10:07 AM   YADI-7 SCORE   Total Score 17 (severe anxiety)     Total Score 17 13 12     PROMIS 10-Global Health (all questions and answers displayed):       7/28/2022     8:34 AM 11/27/2022     5:04 AM 4/6/2023     8:14 AM   PROMIS 10   In general, would you say your health is: Good Good Good   In general, would you say your quality of life is: Good Fair Good   In general, how would you rate your physical health? Good Good Good   In general, how would you rate your mental health, including your mood and your ability to think? Good Poor Fair   In general, how would you rate your satisfaction with your social activities and relationships? Good Poor Fair   In general, please rate how well you carry out your usual social activities and roles Fair Poor Poor   To what extent are you able to carry out your everyday physical activities such as walking, climbing stairs, carrying groceries, or moving a chair? Mostly A little Mostly   In the past 7 days, how often have you been bothered by emotional problems such as feeling anxious, depressed, or irritable? Sometimes Always Sometimes   In the past 7 days, how would you rate your fatigue on average? Mild Moderate Mild   In the past 7 days, how would you rate your pain on average, where 0 means no pain, and 10 means worst imaginable pain? 3 4 2   In general, would you say your health is: 3 3 3   In general, would you say your quality of life is: 3 2 3   In general, how would you rate your physical health? 3 3 3   In general, how would you rate your mental health, including your mood and your ability to think? 3 1 2   In general, how would you rate your satisfaction with your social activities and relationships? 3 1 2   In general, please rate how well you carry  out your usual social activities and roles. (This includes activities at home, at work and in your community, and responsibilities as a parent, child, spouse, employee, friend, etc.) 2 1 1   To what extent are you able to carry out your everyday physical activities such as walking, climbing stairs, carrying groceries, or moving a chair? 4 2 4   In the past 7 days, how often have you been bothered by emotional problems such as feeling anxious, depressed, or irritable? 3 5 3   In the past 7 days, how would you rate your fatigue on average? 2 3 2   In the past 7 days, how would you rate your pain on average, where 0 means no pain, and 10 means worst imaginable pain? 3 4 2   Global Mental Health Score 12 5 10   Global Physical Health Score 15 11 15   PROMIS TOTAL - SUBSCORES 27 16 25     [place] Suicide Severity Rating Scale (Lifetime/Recent)      8/3/2022     9:17 AM   [place] Suicide Severity Rating (Lifetime/Recent)   1. Wish to be Dead (Lifetime) N   2. Non-Specific Active Suicidal Thoughts (Lifetime) N   Actual Attempt (Lifetime) N   Has subject engaged in non-suicidal self-injurious behavior? (Lifetime) N   Calculated C-SSRS Risk Score (Lifetime/Recent) No Risk Indicated         ASSESSMENT: Current Emotional / Mental Status (status of significant symptoms):   Risk status (Self / Other harm or suicidal ideation)   Patient denies current fears or concerns for personal safety.   Patient denies current or recent suicidal ideation or behaviors.     Patient denies current or recent homicidal ideation or behaviors.   Patient denies current or recent self injurious behavior or ideation.   Patient denies other safety concerns.   Patient reports there has been no change in risk factors since their last session.     Patient reports there has been no change in protective factors since their last session.     A safety and risk management plan has been developed including: Patient consented to co-developed safety plan on  "11-30-22.  Safety and risk management plan was reviewed.   Patient agreed to use safety plan should any safety concerns arise.  A copy was made available to the patient. Reviewed 6-22-23        [facility]                                       Veena Parsons     SAFETY PLAN:  Step 1: Warning signs / cues (Thoughts, images, mood, situation, behavior) that a crisis may be developing:    Thoughts: \"I don't matter\", \"People would be better off without me\", \"I'm a burden\", \"I can't do this anymore\", \"I just want this to end\" and \"Nothing makes it better\"    Images: flashbacks    Thinking Processes: ruminations (can't stop thinking about my problems): PMDD and loss of mother, racing thoughts and highly critical and negative thoughts: I cant do anything right    Mood: worsening depression, hopelessness, helplessness, intense worry, agitation and mood swings    Behaviors: isolating/withdrawing , can't stop crying, not taking care of myself, not taking care of my responsibilities, sleeping too much, not sleeping enough and increasing frequency and duration of dissociation    Situations: relationship problems, trauma  and medical condition / diagnosis: PMDD   Step 2: Coping strategies - Things I can do to take my mind off of my problems without contacting another person (relaxation technique, physical activity):    Distress Tolerance Strategies:  relaxation activities, play with my pet , listen to positive and upbeat music, sensory based activities/self-soothe with five senses, watch a funny movie, read a book, change body temperature (ice pack/cold water)  and paced breathing/progressive muscle relaxation    Physical Activities: go for a walk, gardening, meditation, deep breathing and stretching     Focus on helpful thoughts:  \"This is temporary\", \"I will get through this\", \"It always passes\" and \"Ride the wave\"  Step 3: People and social settings that provide distraction:   Name: Spouse Star   Name:  " Spending time with daughter       movie theater, pet store/humane society and coffee shop   Step 4: Remind myself of people and things that are important to me and worth living for:    Daughter   Spouse  Pets     Step 5: When I am in crisis, I can ask these people to help me use my safety plan:   Name: Spouse  Phone: [phone]      Step 6: Making the environment safe:     remove things I could use to hurt myself and be around others  Step 7: Professionals or agencies I can contact during a crisis:    Suicide Prevention Lifeline: Call or Text 524   Local Crisis Services:  [facility] [phone]      Call 911 or go to my nearest emergency department.   I helped develop this safety plan and agree to use it when needed.  I have been given a copy of this plan.      Client signature _________________________________________________________________  Today s date:  11/30/2022  Completed by Provider Name/ Credentials:  Lia Stiles MA, LMFT  November 30, 2022  Adapted from Safety Plan Template 2008 Eleonora Capone and Danny Carmona is reprinted with the express permission of the authors.  No portion of the Safety Plan Template may be reproduced without the express, written permission.  You can contact the authors at [email or web address] or [email or web address].    Name: Veena Parsons  YOB: 1985  Date: November 30, 2022   My primary care provider: Francisco Nails  My primary care clinic: [facility]   My prescriber: PCP  Other care team support:  Holistic medical provider    My Triggers:    Relational problems  Loss of mother recently  Financial stress      Additional People, Places, and Things that I can access for support:   Spouse  Daughter          What is important to me and makes life worth living: Family         GREEN    Good Control  1. I feel good  2. No suicidal thoughts   3. Can work, sleep and play      Action Steps  1. Self-care: balanced meals, exercising, sleep  practices, etc.  2. Take your medications as prescribed.  3. Continue meetings with therapist and prescriber.  4.  Do the healthy things that I enjoy.             YELLO Getting Worse  I have ANY of these:  1. I do not feel good  2. Difficulty Concentrating  3. Sleep is changing  4. Increase/Change in my thoughts to hurt self and/or others, but I can still manage and not act on it.   5. Not taking care of self.               Action Steps (in addition to the above):  1. Inform your therapist and psychiatric prescriber/PCP.  2. Keep taking your medications as prescribed.    3. Turn to people you can ask for help.  4. Use internal coping strategies -see below.  5. Create safe environment: Removing items I can hurt self with              RED  Get Help  If I have ANY of these:  1. Current and uncontrollable thoughts and/or behaviors to hurt self and/or others.   2. Crazy buzzing and spinning.     Actions to manage my safety  1. Contact your emergency person Star  2. Call or Text [phone]  3. Call my crisis team- [facility] [phone]  3. Or Call [phone] or go to the emergency room right away        My Internal Coping Strategies include the following:  take a bath, belly breathing, arts and crafts, play with my pet, use my coping box, exercise and use my coping skills    [End for Brief Safety Plan]     Safety Concerns  How To Identify Situations That Make Your Mental Health Worse:  Triggers are things that make your mental health worse.  Look at the list below to help you find your triggers and what you can do about them.     1. Identify Early Warning Signs:    Sometimes symptoms return, even when people do their best to stay well. Symptoms can develop over a short period of time with little or no warning, but most of the time they emerge gradually over several weeks.  Early warning signs are changes that people experience when a relapse is starting. Some early warning signs are common and others are not as common.    Common Early Warning Signs:    Feeling tense or nervous, Eating less or eating more, Trouble sleeping -either too much or too little sleep, Feeling depressed or low, Feeling irritable, Feeling like not being around other people, Trouble concentrating and Urges to harm self     2. Identify action steps to take when warning signs are noticed:    Taking Action- It is important to take action if you are experiencing early warning signs of a relapse.  The faster you act, the more likely it is that you can avoid a full relapse.  It is helpful to identify several specific ways to cope with symptoms.      The following is my list of symptoms and coping strategies that I can use when they are present:    Symptom Coping Strategies   Anxiety -Talk with someone in your support system and let him or her know how you are feeling.  -Use relaxation techniques such as deep breathing or imagery.  -Use positive affirmations to counteract negative self-talk such as  I am learning to let go of worry.    Depression - Schedule your day; include activities you have to do and activities you enjoy doing.  - Get some exercise - walk, run, bike, or swim.  - Give yourself credit for even the smallest things you get done.   Sleep Difficulties   - Go to sleep at the same time every day.  - Do something relaxing before bed, such as drinking herbal tea or listening to music.  - Avoid having discussions about upsetting topics before going to bed.   Delusions   - Distract yourself from the disturbing thought by doing something that requires your attention such as a puzzle.  - Check out your beliefs by talking to someone you trust.    Hallucinations   - Use headphones to listen to music.  - Tell voices to  stop  or say to yourself,  I am safe.   - Ignore the hallucinations as much as possible; focus on other things.   Concentration Difficulties - Minimize distractions so there is only one thing for you to focus on at a time.    - Ask the person you  are having a conversation with to slow down or repeat things you are unsure of.            Appearance:   Normal   Eye Contact:   Good   Psychomotor Behavior: Normal    Attitude:   Cooperative    Orientation:   All   Speech    Rate / Production: Normal     Volume:  Normal    Mood:    Anxious    Affect:    Normal    Thought Content:  Clear    Thought Form:  Coherent  Logical    Insight:    Good      Medication Review:   No changes to current psychiatric medication(s)     Medication Compliance:   Yes     Changes in Health Issues:   None reported     Chemical Use Review:   Substance Use: Chemical use reviewed, no active concerns identified      Tobacco Use: No current tobacco use.      Diagnosis:  296.32 (F33.1) Major Depressive Disorder, Recurrent Episode, Moderate _ and With mixed features  300.01 (F41.0) Panic Disorder  300.02 (F41.1) Generalized Anxiety Disorder   PMDD      Collateral Reports Completed:   Not Applicable    PLAN: (Patient Tasks / Therapist Tasks / Other)  Client will continue to work on the following goals:    -Follow safety plan and use crisis resources-Continued   -Work on responding and not reacting. Feeling more in control.  -Attempt to have some feelings conversations with Deborah.   -Continue to have boundaries with sister.  Keep communication very simple.   -Get support on grief and loss.  -Have a good concrete daily schedule.           NATACHA Hernandez                                                         ______________________________________________________________________    Individual Treatment Plan    Patient's Name: Veena Parsons  YOB: 1985    Date of Creation: 9-7-22  Date Treatment Plan Last Reviewed/Revised: 4-12-23    DSM5 Diagnoses:  296.32 (F33.1) Major Depressive Disorder, Recurrent Episode, Moderate _ and With mixed features  300.01 (F41.0) Panic Disorder  300.02 (F41.1) Generalized Anxiety Disorder   PMDD  Psychosocial / Contextual Factors: Some  relational issues   PROMIS (reviewed every 90 days): 25    Referral / Collaboration:  Referral to another professional/service is not indicated at this time..    Anticipated number of session for this episode of care: 6-9 sessions  Anticipation frequency of session: Biweekly  Anticipated Duration of each session: 38-52 minutes  Treatment plan will be reviewed in 90 days or when goals have been changed.       MeasurableTreatment Goal(s) related to diagnosis / functional impairment(s)  Goal 1: Patient will address struggles with anxiety, depression and PMDD.    I will know I've met my goal when I am feeling less reactive through the month with the symptoms.      Objective #A (Patient Action)    Patient will work on establishing a concrete routine with self-care.  Status: Continued - Date(s): 4-12-23    Intervention(s)  Therapist will use CBT and motivational interviewing.      Objective #B  Patient will develop a plan to help manage PMDD  Status: Continued - Date(s): 4-12-23    Intervention(s)  Therapist will use solution focused therapy.    Objective #C  Patient will work on ways to communicate with narcissistic individuals.  Status: Continued - Date(s): 4-12-23    Intervention(s)  Therapist will teach communication skills.          Patient has reviewed and agreed to the above plan.      Lia Stiles, TANIAFT  September 7, 2022

## 2023-07-05 ENCOUNTER — VIRTUAL VISIT (OUTPATIENT)
Dept: PSYCHOLOGY | Facility: CLINIC | Age: 38
End: 2023-07-05
Payer: COMMERCIAL

## 2023-07-05 DIAGNOSIS — F33.1 MAJOR DEPRESSIVE DISORDER, RECURRENT EPISODE, MODERATE (H): Primary | ICD-10-CM

## 2023-07-05 DIAGNOSIS — F41.1 GENERALIZED ANXIETY DISORDER: ICD-10-CM

## 2023-07-05 DIAGNOSIS — F41.0 PANIC DISORDER WITHOUT AGORAPHOBIA: ICD-10-CM

## 2023-07-05 PROCEDURE — 90834 PSYTX W PT 45 MINUTES: CPT | Mod: VID | Performed by: MARRIAGE & FAMILY THERAPIST

## 2023-07-05 NOTE — PROGRESS NOTES
[facility]                                     Progress Note    Patient Name: Veena Parsons  Date: 7-5-23         Service Type: Individual      Session Start Time:  2pm     Session End Time:    250pm     Session Length: 50min    Session #: 23    Attendees: Client    Service Modality:  Video    Telemedicine Visit: The patient's condition can be safely assessed and treated via synchronous audio and visual telemedicine encounter.      Reason for Telemedicine Visit: Patient has requested telehealth visit    Originating Site (Patient Location): Patient's home        Distant Location (provider location):  Off-site    Consent:  The patient/guardian has verbally consented to: the potential risks and benefits of telemedicine (video visit) versus in person care; bill my insurance or make self-payment for services provided; and responsibility for payment of non-covered services.     Mode of Communication:  Video Conference via [employer]    As the provider I attest to compliance with applicable laws and regulations related to telemedicine.      Treatment Plan- 4-12-23  CGI- 4-12-23  Phq-9 and YADI-7- 6-1-23  Promis- 4-6-23    DATA  Interactive Complexity: No  Crisis: No        Progress Since Last Session (Related to Symptoms / Goals / Homework):   Symptoms: Client reports confronting issues with anxiety and depressed mood.       Homework: Achieved / completed to satisfaction  Completed in session      Episode of Care Goals: Satisfactory progress - ACTION (Actively working towards change); Intervened by reinforcing change plan / affirming steps taken     Current / Ongoing Stressors and Concerns:   -Veena did get approved for disability.     -Has struggled with PMDD for a number of years.  Has been working with chiropractic and message therapy on some holistic treatment options- Continue    -Working on responding and not reacting this week. Recognizing progress with this with Star.    -Suicidal  thoughts- None this week.    -Working on building relationship with daughter Deborah so she opens up more at home.    -Attempting to do some door dashing and some activities outdoors.     -Working on some home improvement projects.         Treatment Objective(s) Addressed in This Session:  Safety planning   Patient will develop a plan to help manage PMDD  Patient will work on ways to communicate /patterns   Self-care   Relational issues        Intervention:   Follow safety plan and use crisis resources.   Use support system- Continued   Respond and not react to Star.  Continue to work on some projects together.    Continuing with medication management.   Encourage Star to get his own services.    Have some more feelings conversations with Star.    Use some holistic approaches to health.         Will do door dash when body allows.     Assessments completed prior to visit:  The following assessments were completed by patient for this visit:  PHQ2:       4/11/2023     9:00 PM 1/5/2023     1:35 PM 11/29/2022    12:07 PM 10/26/2022     8:26 AM 7/25/2022     2:10 PM   PHQ-2 ( 1999 [employer])   Q1: Little interest or pleasure in doing things 1 2 3 2 0   Q2: Feeling down, depressed or hopeless 1 3 3 2 1   PHQ-2 Score 2 5 6 4 1   Q1: Little interest or pleasure in doing things Several days More than half the days Nearly every day More than half the days Not at all   Q2: Feeling down, depressed or hopeless Several days Nearly every day Nearly every day More than half the days Several days   PHQ-2 Score 2 5 6 4 1     PHQ9:       8/3/2022     9:14 AM 10/26/2022     8:26 AM 11/29/2022    12:07 PM 1/5/2023     1:35 PM 2/2/2023    10:13 AM 6/1/2023    10:07 AM   PHQ-9 SCORE   PHQ-9 Total Score MyChart  14 (Moderate depression) 21 (Severe depression) 16 (Moderately severe depression)     PHQ-9 Total Score 8 14 21 16 12 15     GAD2:       7/28/2022     8:28 AM 11/27/2022     5:03 AM 4/6/2023     8:12 AM   YADI-2   Feeling nervous, anxious,  or on edge 1 3 1   Not being able to stop or control worrying 0 3 1   YADI-2 Total Score 1 6 2     GAD7:       11/27/2022     5:03 AM 2/2/2023    10:13 AM 6/1/2023    10:07 AM   YADI-7 SCORE   Total Score 17 (severe anxiety)     Total Score 17 13 12     PROMIS 10-Global Health (all questions and answers displayed):       7/28/2022     8:34 AM 11/27/2022     5:04 AM 4/6/2023     8:14 AM   PROMIS 10   In general, would you say your health is: Good Good Good   In general, would you say your quality of life is: Good Fair Good   In general, how would you rate your physical health? Good Good Good   In general, how would you rate your mental health, including your mood and your ability to think? Good Poor Fair   In general, how would you rate your satisfaction with your social activities and relationships? Good Poor Fair   In general, please rate how well you carry out your usual social activities and roles Fair Poor Poor   To what extent are you able to carry out your everyday physical activities such as walking, climbing stairs, carrying groceries, or moving a chair? Mostly A little Mostly   In the past 7 days, how often have you been bothered by emotional problems such as feeling anxious, depressed, or irritable? Sometimes Always Sometimes   In the past 7 days, how would you rate your fatigue on average? Mild Moderate Mild   In the past 7 days, how would you rate your pain on average, where 0 means no pain, and 10 means worst imaginable pain? 3 4 2   In general, would you say your health is: 3 3 3   In general, would you say your quality of life is: 3 2 3   In general, how would you rate your physical health? 3 3 3   In general, how would you rate your mental health, including your mood and your ability to think? 3 1 2   In general, how would you rate your satisfaction with your social activities and relationships? 3 1 2   In general, please rate how well you carry out your usual social activities and roles. (This  includes activities at home, at work and in your community, and responsibilities as a parent, child, spouse, employee, friend, etc.) 2 1 1   To what extent are you able to carry out your everyday physical activities such as walking, climbing stairs, carrying groceries, or moving a chair? 4 2 4   In the past 7 days, how often have you been bothered by emotional problems such as feeling anxious, depressed, or irritable? 3 5 3   In the past 7 days, how would you rate your fatigue on average? 2 3 2   In the past 7 days, how would you rate your pain on average, where 0 means no pain, and 10 means worst imaginable pain? 3 4 2   Global Mental Health Score 12 5 10   Global Physical Health Score 15 11 15   PROMIS TOTAL - SUBSCORES 27 16 25     [place] Suicide Severity Rating Scale (Lifetime/Recent)      8/3/2022     9:17 AM   [place] Suicide Severity Rating (Lifetime/Recent)   1. Wish to be Dead (Lifetime) N   2. Non-Specific Active Suicidal Thoughts (Lifetime) N   Actual Attempt (Lifetime) N   Has subject engaged in non-suicidal self-injurious behavior? (Lifetime) N   Calculated C-SSRS Risk Score (Lifetime/Recent) No Risk Indicated         ASSESSMENT: Current Emotional / Mental Status (status of significant symptoms):   Risk status (Self / Other harm or suicidal ideation)   Patient denies current fears or concerns for personal safety.   Patient denies current or recent suicidal ideation or behaviors.     Patient denies current or recent homicidal ideation or behaviors.   Patient denies current or recent self injurious behavior or ideation.   Patient denies other safety concerns.   Patient reports there has been no change in risk factors since their last session.     Patient reports there has been no change in protective factors since their last session.     A safety and risk management plan has been developed including: Patient consented to co-developed safety plan on 11-30-22.  Safety and risk management plan was  "reviewed.   Patient agreed to use safety plan should any safety concerns arise.  A copy was made available to the patient. Reviewed 7-5-23        [facility]                                       Veena Parsons     SAFETY PLAN:  Step 1: Warning signs / cues (Thoughts, images, mood, situation, behavior) that a crisis may be developing:    Thoughts: \"I don't matter\", \"People would be better off without me\", \"I'm a burden\", \"I can't do this anymore\", \"I just want this to end\" and \"Nothing makes it better\"    Images: flashbacks    Thinking Processes: ruminations (can't stop thinking about my problems): PMDD and loss of mother, racing thoughts and highly critical and negative thoughts: I cant do anything right    Mood: worsening depression, hopelessness, helplessness, intense worry, agitation and mood swings    Behaviors: isolating/withdrawing , can't stop crying, not taking care of myself, not taking care of my responsibilities, sleeping too much, not sleeping enough and increasing frequency and duration of dissociation    Situations: relationship problems, trauma  and medical condition / diagnosis: PMDD   Step 2: Coping strategies - Things I can do to take my mind off of my problems without contacting another person (relaxation technique, physical activity):    Distress Tolerance Strategies:  relaxation activities, play with my pet , listen to positive and upbeat music, sensory based activities/self-soothe with five senses, watch a funny movie, read a book, change body temperature (ice pack/cold water)  and paced breathing/progressive muscle relaxation    Physical Activities: go for a walk, gardening, meditation, deep breathing and stretching     Focus on helpful thoughts:  \"This is temporary\", \"I will get through this\", \"It always passes\" and \"Ride the wave\"  Step 3: People and social settings that provide distraction:   Name: Spouse tSar   Name:  Spending time with daughter       movie theater, " pet store/[employer] and coffee shop   Step 4: Remind myself of people and things that are important to me and worth living for:    Daughter   Spouse  Pets     Step 5: When I am in crisis, I can ask these people to help me use my safety plan:   Name: Spouse  Phone: [phone]      Step 6: Making the environment safe:     remove things I could use to hurt myself and be around others  Step 7: Professionals or agencies I can contact during a crisis:    Suicide Prevention Lifeline: Call or Text 119   Local Crisis Services:  [facility] [phone]      Call 911 or go to my nearest emergency department.   I helped develop this safety plan and agree to use it when needed.  I have been given a copy of this plan.      Client signature _________________________________________________________________  Today s date:  11/30/2022  Completed by Provider Name/ Credentials:  Lia Stiles MA, LMFT  November 30, 2022  Adapted from Safety Plan Template 2008 Eleonora Capone and Danny Carmona is reprinted with the express permission of the authors.  No portion of the Safety Plan Template may be reproduced without the express, written permission.  You can contact the authors at [email or web address] or [email or web address].    Name: Veena Parsons  YOB: 1985  Date: November 30, 2022   My primary care provider: Francisco Nails  My primary care clinic: [facility]   My prescriber: PCP  Other care team support:  Holistic medical provider    My Triggers:    Relational problems  Loss of mother recently  Financial stress      Additional People, Places, and Things that I can access for support:   Spouse  Daughter          What is important to me and makes life worth living: Family         GREEN    Good Control  1. I feel good  2. No suicidal thoughts   3. Can work, sleep and play      Action Steps  1. Self-care: balanced meals, exercising, sleep practices, etc.  2. Take your medications as  prescribed.  3. Continue meetings with therapist and prescriber.  4.  Do the healthy things that I enjoy.             YELLO Getting Worse  I have ANY of these:  1. I do not feel good  2. Difficulty Concentrating  3. Sleep is changing  4. Increase/Change in my thoughts to hurt self and/or others, but I can still manage and not act on it.   5. Not taking care of self.               Action Steps (in addition to the above):  1. Inform your therapist and psychiatric prescriber/PCP.  2. Keep taking your medications as prescribed.    3. Turn to people you can ask for help.  4. Use internal coping strategies -see below.  5. Create safe environment: Removing items I can hurt self with              RED  Get Help  If I have ANY of these:  1. Current and uncontrollable thoughts and/or behaviors to hurt self and/or others.   2. Crazy buzzing and spinning.     Actions to manage my safety  1. Contact your emergency person Star  2. Call or Text 011  3. Call my crisis team- [facility] [phone]  3. Or Call 911 or go to the emergency room right away        My Internal Coping Strategies include the following:  take a bath, belly breathing, arts and crafts, play with my pet, use my coping box, exercise and use my coping skills    [End for Brief Safety Plan]     Safety Concerns  How To Identify Situations That Make Your Mental Health Worse:  Triggers are things that make your mental health worse.  Look at the list below to help you find your triggers and what you can do about them.     1. Identify Early Warning Signs:    Sometimes symptoms return, even when people do their best to stay well. Symptoms can develop over a short period of time with little or no warning, but most of the time they emerge gradually over several weeks.  Early warning signs are changes that people experience when a relapse is starting. Some early warning signs are common and others are not as common.   Common Early Warning Signs:    Feeling tense or  nervous, Eating less or eating more, Trouble sleeping -either too much or too little sleep, Feeling depressed or low, Feeling irritable, Feeling like not being around other people, Trouble concentrating and Urges to harm self     2. Identify action steps to take when warning signs are noticed:    Taking Action- It is important to take action if you are experiencing early warning signs of a relapse.  The faster you act, the more likely it is that you can avoid a full relapse.  It is helpful to identify several specific ways to cope with symptoms.      The following is my list of symptoms and coping strategies that I can use when they are present:    Symptom Coping Strategies   Anxiety -Talk with someone in your support system and let him or her know how you are feeling.  -Use relaxation techniques such as deep breathing or imagery.  -Use positive affirmations to counteract negative self-talk such as  I am learning to let go of worry.    Depression - Schedule your day; include activities you have to do and activities you enjoy doing.  - Get some exercise - walk, run, bike, or swim.  - Give yourself credit for even the smallest things you get done.   Sleep Difficulties   - Go to sleep at the same time every day.  - Do something relaxing before bed, such as drinking herbal tea or listening to music.  - Avoid having discussions about upsetting topics before going to bed.   Delusions   - Distract yourself from the disturbing thought by doing something that requires your attention such as a puzzle.  - Check out your beliefs by talking to someone you trust.    Hallucinations   - Use headphones to listen to music.  - Tell voices to  stop  or say to yourself,  I am safe.   - Ignore the hallucinations as much as possible; focus on other things.   Concentration Difficulties - Minimize distractions so there is only one thing for you to focus on at a time.    - Ask the person you are having a conversation with to slow down or  repeat things you are unsure of.            Appearance:   Normal   Eye Contact:   Good   Psychomotor Behavior: Normal    Attitude:   Cooperative    Orientation:   All   Speech    Rate / Production: Normal     Volume:  Normal    Mood:    Anxious    Affect:    Normal    Thought Content:  Clear    Thought Form:  Coherent  Logical    Insight:    Good      Medication Review:   No changes to current psychiatric medication(s)     Medication Compliance:   Yes     Changes in Health Issues:   None reported     Chemical Use Review:   Substance Use: Chemical use reviewed, no active concerns identified      Tobacco Use: No current tobacco use.      Diagnosis:  296.32 (F33.1) Major Depressive Disorder, Recurrent Episode, Moderate _ and With mixed features  300.01 (F41.0) Panic Disorder  300.02 (F41.1) Generalized Anxiety Disorder   PMDD      Collateral Reports Completed:   Not Applicable    PLAN: (Patient Tasks / Therapist Tasks / Other)  Client will continue to work on the following goals:    -Follow safety plan and use crisis resources-Continued   -Work on responding and not reacting. Feeling more in control.  -Attempt to have some feelings conversations with Deborah.   -Continue to have boundaries with sister.  Keep communication very simple.   -Get support on grief and loss.  -Have a good concrete daily schedule.   -Plan to do some home improvement projects with Star.           NATACHA Hernandez                                                         ______________________________________________________________________    Individual Treatment Plan    Patient's Name: Veena Parsons  YOB: 1985    Date of Creation: 9-7-22  Date Treatment Plan Last Reviewed/Revised: 4-12-23    DSM5 Diagnoses:  296.32 (F33.1) Major Depressive Disorder, Recurrent Episode, Moderate _ and With mixed features  300.01 (F41.0) Panic Disorder  300.02 (F41.1) Generalized Anxiety Disorder   PMDD  Psychosocial / Contextual Factors: Some  relational issues   PROMIS (reviewed every 90 days): 25    Referral / Collaboration:  Referral to another professional/service is not indicated at this time..    Anticipated number of session for this episode of care: 6-9 sessions  Anticipation frequency of session: Biweekly  Anticipated Duration of each session: 38-52 minutes  Treatment plan will be reviewed in 90 days or when goals have been changed.       MeasurableTreatment Goal(s) related to diagnosis / functional impairment(s)  Goal 1: Patient will address struggles with anxiety, depression and PMDD.    I will know I've met my goal when I am feeling less reactive through the month with the symptoms.      Objective #A (Patient Action)    Patient will work on establishing a concrete routine with self-care.  Status: Continued - Date(s): 4-12-23    Intervention(s)  Therapist will use CBT and motivational interviewing.      Objective #B  Patient will develop a plan to help manage PMDD  Status: Continued - Date(s): 4-12-23    Intervention(s)  Therapist will use solution focused therapy.    Objective #C  Patient will work on ways to communicate with narcissistic individuals.  Status: Continued - Date(s): 4-12-23    Intervention(s)  Therapist will teach communication skills.          Patient has reviewed and agreed to the above plan.      Lia Stiles, TANIAFT  September 7, 2022

## 2023-07-17 ENCOUNTER — VIRTUAL VISIT (OUTPATIENT)
Dept: PSYCHOLOGY | Facility: CLINIC | Age: 38
End: 2023-07-17
Payer: COMMERCIAL

## 2023-07-17 DIAGNOSIS — F41.0 PANIC DISORDER WITHOUT AGORAPHOBIA: ICD-10-CM

## 2023-07-17 DIAGNOSIS — F33.1 MAJOR DEPRESSIVE DISORDER, RECURRENT EPISODE, MODERATE (H): Primary | ICD-10-CM

## 2023-07-17 DIAGNOSIS — F41.1 GENERALIZED ANXIETY DISORDER: ICD-10-CM

## 2023-07-17 PROCEDURE — 90834 PSYTX W PT 45 MINUTES: CPT | Mod: VID | Performed by: MARRIAGE & FAMILY THERAPIST

## 2023-07-17 ASSESSMENT — ANXIETY QUESTIONNAIRES
6. BECOMING EASILY ANNOYED OR IRRITABLE: MORE THAN HALF THE DAYS
GAD7 TOTAL SCORE: 13
5. BEING SO RESTLESS THAT IT IS HARD TO SIT STILL: MORE THAN HALF THE DAYS
IF YOU CHECKED OFF ANY PROBLEMS ON THIS QUESTIONNAIRE, HOW DIFFICULT HAVE THESE PROBLEMS MADE IT FOR YOU TO DO YOUR WORK, TAKE CARE OF THINGS AT HOME, OR GET ALONG WITH OTHER PEOPLE: VERY DIFFICULT
GAD7 TOTAL SCORE: 13
7. FEELING AFRAID AS IF SOMETHING AWFUL MIGHT HAPPEN: SEVERAL DAYS
3. WORRYING TOO MUCH ABOUT DIFFERENT THINGS: MORE THAN HALF THE DAYS
1. FEELING NERVOUS, ANXIOUS, OR ON EDGE: MORE THAN HALF THE DAYS
2. NOT BEING ABLE TO STOP OR CONTROL WORRYING: MORE THAN HALF THE DAYS

## 2023-07-17 ASSESSMENT — PATIENT HEALTH QUESTIONNAIRE - PHQ9
SUM OF ALL RESPONSES TO PHQ QUESTIONS 1-9: 16
5. POOR APPETITE OR OVEREATING: MORE THAN HALF THE DAYS

## 2023-07-17 NOTE — PROGRESS NOTES
[facility]                                     Progress Note    Patient Name: Veena Parsons  Date: 7-17-23         Service Type: Individual      Session Start Time:  11am    Session End Time:    1150am     Session Length: 50min    Session #: 24    Attendees: Client    Service Modality:  Video    Telemedicine Visit: The patient's condition can be safely assessed and treated via synchronous audio and visual telemedicine encounter.      Reason for Telemedicine Visit: Patient has requested telehealth visit    Originating Site (Patient Location): Patient's home        Distant Location (provider location):  Off-site    Consent:  The patient/guardian has verbally consented to: the potential risks and benefits of telemedicine (video visit) versus in person care; bill my insurance or make self-payment for services provided; and responsibility for payment of non-covered services.     Mode of Communication:  Video Conference via [employer]    As the provider I attest to compliance with applicable laws and regulations related to telemedicine.      Treatment Plan- 7-17-23  CGI- 7-17-23  Phq-9 and YADI-7- 7-17-23  Promis- 4-6-23    DATA  Interactive Complexity: No  Crisis: No        Progress Since Last Session (Related to Symptoms / Goals / Homework):   Symptoms: Client reports confronting issues with anxiety and depressed mood.   Has had a hard week with PMDD.    Homework: Achieved / completed to satisfaction  Completed in session      Episode of Care Goals: Satisfactory progress - ACTION (Actively working towards change); Intervened by reinforcing change plan / affirming steps taken     Current / Ongoing Stressors and Concerns:   -Veena presley get approved for disability.     -Has struggled with PMDD for a number of years.  Has been working with chiropractic and message therapy on some holistic treatment options- Continue    -Having a bad week with PMDD symptoms.     -Working on responding and not  reacting this week.    -Reports needing some down time but had a week of running around a lot.     -Had a rough time with Star yesterday and felt like he was not empathetic about PMDD.  Feels he forgets about the diagnosis.    -Working on building relationship with daughter Deborah so she opens up more at home.    -Attempting to do some door dashing and some activities outdoors.     -Working on some home improvement projects.    -Working on being in the moment.         Treatment Objective(s) Addressed in This Session:  Safety planning   Patient will develop a plan to help manage PMDD  Patient will work on ways to communicate /patterns   Self-care   Relational issues        Intervention:   Follow safety plan and use crisis resources.   Use support system- Continued   Respond and not react to Star.  Work on some projects with one another.    Continuing with medication management.   Encourage Star to get his own services.    Build relationship with Deborah.    Use some holistic approaches to health.         Will do door dash when body allows.     Assessments completed prior to visit:  The following assessments were completed by patient for this visit:  PHQ2:       4/11/2023     9:00 PM 1/5/2023     1:35 PM 11/29/2022    12:07 PM 10/26/2022     8:26 AM 7/25/2022     2:10 PM   PHQ-2 ( 1999 [employer])   Q1: Little interest or pleasure in doing things 1 2 3 2 0   Q2: Feeling down, depressed or hopeless 1 3 3 2 1   PHQ-2 Score 2 5 6 4 1   Q1: Little interest or pleasure in doing things Several days More than half the days Nearly every day More than half the days Not at all   Q2: Feeling down, depressed or hopeless Several days Nearly every day Nearly every day More than half the days Several days   PHQ-2 Score 2 5 6 4 1     PHQ9:       8/3/2022     9:14 AM 10/26/2022     8:26 AM 11/29/2022    12:07 PM 1/5/2023     1:35 PM 2/2/2023    10:13 AM 6/1/2023    10:07 AM 7/17/2023    11:15 AM   PHQ-9 SCORE   PHQ-9 Total Score Justina  14  (Moderate depression) 21 (Severe depression) 16 (Moderately severe depression)      PHQ-9 Total Score 8 14 21 16 12 15 16     GAD2:       7/28/2022     8:28 AM 11/27/2022     5:03 AM 4/6/2023     8:12 AM   YADI-2   Feeling nervous, anxious, or on edge 1 3 1   Not being able to stop or control worrying 0 3 1   YADI-2 Total Score 1 6 2     GAD7:       11/27/2022     5:03 AM 2/2/2023    10:13 AM 6/1/2023    10:07 AM 7/17/2023    11:15 AM   YADI-7 SCORE   Total Score 17 (severe anxiety)      Total Score 17 13 12 13     PROMIS 10-Global Health (all questions and answers displayed):       7/28/2022     8:34 AM 11/27/2022     5:04 AM 4/6/2023     8:14 AM   PROMIS 10   In general, would you say your health is: Good Good Good   In general, would you say your quality of life is: Good Fair Good   In general, how would you rate your physical health? Good Good Good   In general, how would you rate your mental health, including your mood and your ability to think? Good Poor Fair   In general, how would you rate your satisfaction with your social activities and relationships? Good Poor Fair   In general, please rate how well you carry out your usual social activities and roles Fair Poor Poor   To what extent are you able to carry out your everyday physical activities such as walking, climbing stairs, carrying groceries, or moving a chair? Mostly A little Mostly   In the past 7 days, how often have you been bothered by emotional problems such as feeling anxious, depressed, or irritable? Sometimes Always Sometimes   In the past 7 days, how would you rate your fatigue on average? Mild Moderate Mild   In the past 7 days, how would you rate your pain on average, where 0 means no pain, and 10 means worst imaginable pain? 3 4 2   In general, would you say your health is: 3 3 3   In general, would you say your quality of life is: 3 2 3   In general, how would you rate your physical health? 3 3 3   In general, how would you rate your mental  health, including your mood and your ability to think? 3 1 2   In general, how would you rate your satisfaction with your social activities and relationships? 3 1 2   In general, please rate how well you carry out your usual social activities and roles. (This includes activities at home, at work and in your community, and responsibilities as a parent, child, spouse, employee, friend, etc.) 2 1 1   To what extent are you able to carry out your everyday physical activities such as walking, climbing stairs, carrying groceries, or moving a chair? 4 2 4   In the past 7 days, how often have you been bothered by emotional problems such as feeling anxious, depressed, or irritable? 3 5 3   In the past 7 days, how would you rate your fatigue on average? 2 3 2   In the past 7 days, how would you rate your pain on average, where 0 means no pain, and 10 means worst imaginable pain? 3 4 2   Global Mental Health Score 12 5 10   Global Physical Health Score 15 11 15   PROMIS TOTAL - SUBSCORES 27 16 25     [place] Suicide Severity Rating Scale (Lifetime/Recent)      8/3/2022     9:17 AM   [place] Suicide Severity Rating (Lifetime/Recent)   1. Wish to be Dead (Lifetime) N   2. Non-Specific Active Suicidal Thoughts (Lifetime) N   Actual Attempt (Lifetime) N   Has subject engaged in non-suicidal self-injurious behavior? (Lifetime) N   Calculated C-SSRS Risk Score (Lifetime/Recent) No Risk Indicated         ASSESSMENT: Current Emotional / Mental Status (status of significant symptoms):   Risk status (Self / Other harm or suicidal ideation)   Patient denies current fears or concerns for personal safety.   Patient denies current or recent suicidal ideation or behaviors.     Patient denies current or recent homicidal ideation or behaviors.   Patient denies current or recent self injurious behavior or ideation.   Patient denies other safety concerns.   Patient reports there has been no change in risk factors since their last session.   "   Patient reports there has been no change in protective factors since their last session.     A safety and risk management plan has been developed including: Patient consented to co-developed safety plan on 11-30-22.  Safety and risk management plan was reviewed.   Patient agreed to use safety plan should any safety concerns arise.  A copy was made available to the patient. Reviewed 7-17-23        [facility]                                       Veena Parsons     SAFETY PLAN:  Step 1: Warning signs / cues (Thoughts, images, mood, situation, behavior) that a crisis may be developing:    Thoughts: \"I don't matter\", \"People would be better off without me\", \"I'm a burden\", \"I can't do this anymore\", \"I just want this to end\" and \"Nothing makes it better\"    Images: flashbacks    Thinking Processes: ruminations (can't stop thinking about my problems): PMDD and loss of mother, racing thoughts and highly critical and negative thoughts: I cant do anything right    Mood: worsening depression, hopelessness, helplessness, intense worry, agitation and mood swings    Behaviors: isolating/withdrawing , can't stop crying, not taking care of myself, not taking care of my responsibilities, sleeping too much, not sleeping enough and increasing frequency and duration of dissociation    Situations: relationship problems, trauma  and medical condition / diagnosis: PMDD   Step 2: Coping strategies - Things I can do to take my mind off of my problems without contacting another person (relaxation technique, physical activity):    Distress Tolerance Strategies:  relaxation activities, play with my pet , listen to positive and upbeat music, sensory based activities/self-soothe with five senses, watch a funny movie, read a book, change body temperature (ice pack/cold water)  and paced breathing/progressive muscle relaxation    Physical Activities: go for a walk, gardening, meditation, deep breathing and stretching " "    Focus on helpful thoughts:  \"This is temporary\", \"I will get through this\", \"It always passes\" and \"Ride the wave\"  Step 3: People and social settings that provide distraction:   Name: Sarah Graves   Name:  Spending time with daughter       movie theater, pet store/humane society and coffee shop   Step 4: Remind myself of people and things that are important to me and worth living for:    Daughter   Spouse  Pets     Step 5: When I am in crisis, I can ask these people to help me use my safety plan:   Name: Spouse  Phone: [phone]      Step 6: Making the environment safe:     remove things I could use to hurt myself and be around others  Step 7: Professionals or agencies I can contact during a crisis:    Suicide Prevention Lifeline: Call or Text 474   [facility] Crisis [facility]:  [facility] [phone]      Call 911 or go to my nearest emergency department.   I helped develop this safety plan and agree to use it when needed.  I have been given a copy of this plan.      Client signature _________________________________________________________________  Today s date:  11/30/2022  Completed by Provider Name/ Credentials:  Lia Stiles MA, LMFT  November 30, 2022  Adapted from Safety Plan Template 2008 Eleonora Capone and Danny Carmona is reprinted with the express permission of the authors.  No portion of the Safety Plan Template may be reproduced without the express, written permission.  You can contact the authors at [email or web address] or [email or web address], located within [facility].    Name: Veena Parsons  YOB: 1985  Date: November 30, 2022   My primary care provider: Francisco Nails  [facility]   My prescriber: PCP  Other care team support:  Holistic medical provider    My Triggers:    Relational problems  Loss of mother recently  Financial stress      Additional People, Places, and Things that I can access for support:   Spouse  Daughter          What is important " to me and makes life worth living: Family         GREEN    Good Control  1. I feel good  2. No suicidal thoughts   3. Can work, sleep and play      Action Steps  1. Self-care: balanced meals, exercising, sleep practices, etc.  2. Take your medications as prescribed.  3. Continue meetings with therapist and prescriber.  4.  Do the healthy things that I enjoy.             YELLO Getting Worse  I have ANY of these:  1. I do not feel good  2. Difficulty Concentrating  3. Sleep is changing  4. Increase/Change in my thoughts to hurt self and/or others, but I can still manage and not act on it.   5. Not taking care of self.               Action Steps (in addition to the above):  1. Inform your therapist and psychiatric prescriber/PCP.  2. Keep taking your medications as prescribed.    3. Turn to people you can ask for help.  4. Use internal coping strategies -see below.  5. Create safe environment: Removing items I can hurt self with              RED  Get Help  If I have ANY of these:  1. Current and uncontrollable thoughts and/or behaviors to hurt self and/or others.   2. Crazy buzzing and spinning.     Actions to manage my safety  1. Contact your emergency person Star  2. Call or Text 070  3. Call my crisis team- [facility] [phone]  3. Or Call 911 or go to the emergency room right away        My Internal Coping Strategies include the following:  take a bath, belly breathing, arts and crafts, play with my pet, use my coping box, exercise and use my coping skills    [End for Brief Safety Plan]     Safety Concerns  How To Identify Situations That Make Your Mental Health Worse:  Triggers are things that make your mental health worse.  Look at the list below to help you find your triggers and what you can do about them.     1. Identify Early Warning Signs:    Sometimes symptoms return, even when people do their best to stay well. Symptoms can develop over a short period of time with little or no warning, but most  of the time they emerge gradually over several weeks.  Early warning signs are changes that people experience when a relapse is starting. Some early warning signs are common and others are not as common.   Common Early Warning Signs:    Feeling tense or nervous, Eating less or eating more, Trouble sleeping -either too much or too little sleep, Feeling depressed or low, Feeling irritable, Feeling like not being around other people, Trouble concentrating and Urges to harm self     2. Identify action steps to take when warning signs are noticed:    Taking Action- It is important to take action if you are experiencing early warning signs of a relapse.  The faster you act, the more likely it is that you can avoid a full relapse.  It is helpful to identify several specific ways to cope with symptoms.      The following is my list of symptoms and coping strategies that I can use when they are present:    Symptom Coping Strategies   Anxiety -Talk with someone in your support system and let him or her know how you are feeling.  -Use relaxation techniques such as deep breathing or imagery.  -Use positive affirmations to counteract negative self-talk such as  I am learning to let go of worry.    Depression - Schedule your day; include activities you have to do and activities you enjoy doing.  - Get some exercise - walk, run, bike, or swim.  - Give yourself credit for even the smallest things you get done.   Sleep Difficulties   - Go to sleep at the same time every day.  - Do something relaxing before bed, such as drinking herbal tea or listening to music.  - Avoid having discussions about upsetting topics before going to bed.   Delusions   - Distract yourself from the disturbing thought by doing something that requires your attention such as a puzzle.  - Check out your beliefs by talking to someone you trust.    Hallucinations   - Use headphones to listen to music.  - Tell voices to  stop  or say to yourself,  I am safe.   -  Ignore the hallucinations as much as possible; focus on other things.   Concentration Difficulties - Minimize distractions so there is only one thing for you to focus on at a time.    - Ask the person you are having a conversation with to slow down or repeat things you are unsure of.            Appearance:   Normal   Eye Contact:   Good   Psychomotor Behavior: Normal    Attitude:   Cooperative    Orientation:   All   Speech    Rate / Production: Normal     Volume:  Normal    Mood:    Anxious Upset   Affect:    Tearful   Thought Content:  Clear    Thought Form:  Coherent  Logical    Insight:    Good      Medication Review:   No changes to current psychiatric medication(s)     Medication Compliance:   Yes     Changes in Health Issues:   None reported     Chemical Use Review:   Substance Use: Chemical use reviewed, no active concerns identified      Tobacco Use: No current tobacco use.      Diagnosis:  296.32 (F33.1) Major Depressive Disorder, Recurrent Episode, Moderate _ and With mixed features  300.01 (F41.0) Panic Disorder  300.02 (F41.1) Generalized Anxiety Disorder   PMDD      Collateral Reports Completed:   Not Applicable    PLAN: (Patient Tasks / Therapist Tasks / Other)  Client will continue to work on the following goals:    -Follow safety plan and use crisis resources-Continued   -Work on responding and not reacting. Feeling more in control.  -Attempt to have some feelings conversations with Deborah and build the relationship.   -Encourage Star to get his own services.   -Get support on grief and loss.  -Have a good concrete daily schedule.   -Plan to do some home improvement projects with Star.           Lia Stiles, TANIAFT                                                         ______________________________________________________________________    Individual Treatment Plan    Patient's Name: Veena Parsons  YOB: 1985    Date of Creation: 9-7-22  Date Treatment Plan Last  Reviewed/Revised: 7-17-23    DSM5 Diagnoses:  296.32 (F33.1) Major Depressive Disorder, Recurrent Episode, Moderate _ and With mixed features  300.01 (F41.0) Panic Disorder  300.02 (F41.1) Generalized Anxiety Disorder   PMDD  Psychosocial / Contextual Factors: Some relational issues   PROMIS (reviewed every 90 days): 25    Referral / Collaboration:  Referral to another professional/service is not indicated at this time..    Anticipated number of session for this episode of care: 6-9 sessions  Anticipation frequency of session: Biweekly  Anticipated Duration of each session: 38-52 minutes  Treatment plan will be reviewed in 90 days or when goals have been changed.       MeasurableTreatment Goal(s) related to diagnosis / functional impairment(s)  Goal 1: Patient will address struggles with anxiety, depression and PMDD.    I will know I've met my goal when I am feeling less reactive through the month with the symptoms.      Objective #A (Patient Action)    Patient will work on establishing a concrete routine with self-care.  Status: Continued - Date(s): 7-17-23    Intervention(s)  Therapist will use CBT and motivational interviewing.      Objective #B  Patient will develop a plan to help manage PMDD  Status: Continued - Date(s): 7-17-23    Intervention(s)  Therapist will use solution focused therapy.    Objective #C  Patient will work on ways to communicate with narcissistic individuals.  Status: Continued - Date(s): 7-17-23    Intervention(s)  Therapist will teach communication skills.          Patient has reviewed and agreed to the above plan.      Lia Stiles, TANIAFT  September 7, 2022

## 2023-08-07 ENCOUNTER — VIRTUAL VISIT (OUTPATIENT)
Dept: PSYCHOLOGY | Facility: CLINIC | Age: 38
End: 2023-08-07
Payer: COMMERCIAL

## 2023-08-07 DIAGNOSIS — F41.1 GENERALIZED ANXIETY DISORDER: ICD-10-CM

## 2023-08-07 DIAGNOSIS — F33.1 MAJOR DEPRESSIVE DISORDER, RECURRENT EPISODE, MODERATE (H): Primary | ICD-10-CM

## 2023-08-07 DIAGNOSIS — F41.0 PANIC DISORDER WITHOUT AGORAPHOBIA: ICD-10-CM

## 2023-08-07 PROCEDURE — 90834 PSYTX W PT 45 MINUTES: CPT | Mod: VID | Performed by: MARRIAGE & FAMILY THERAPIST

## 2023-08-07 ASSESSMENT — ANXIETY QUESTIONNAIRES
6. BECOMING EASILY ANNOYED OR IRRITABLE: SEVERAL DAYS
IF YOU CHECKED OFF ANY PROBLEMS ON THIS QUESTIONNAIRE, HOW DIFFICULT HAVE THESE PROBLEMS MADE IT FOR YOU TO DO YOUR WORK, TAKE CARE OF THINGS AT HOME, OR GET ALONG WITH OTHER PEOPLE: SOMEWHAT DIFFICULT
4. TROUBLE RELAXING: SEVERAL DAYS
GAD7 TOTAL SCORE: 5
3. WORRYING TOO MUCH ABOUT DIFFERENT THINGS: SEVERAL DAYS
1. FEELING NERVOUS, ANXIOUS, OR ON EDGE: SEVERAL DAYS
5. BEING SO RESTLESS THAT IT IS HARD TO SIT STILL: NOT AT ALL
7. FEELING AFRAID AS IF SOMETHING AWFUL MIGHT HAPPEN: NOT AT ALL
GAD7 TOTAL SCORE: 5
2. NOT BEING ABLE TO STOP OR CONTROL WORRYING: SEVERAL DAYS

## 2023-08-07 NOTE — PROGRESS NOTES
[facility]                                     Progress Note    Patient Name: Veena Parsons  Date: 8-7-23         Service Type: Individual      Session Start Time:  12pm    Session End Time:    1250pm     Session Length: 50min    Session #: 25    Attendees: Client    Service Modality:  Video    Telemedicine Visit: The patient's condition can be safely assessed and treated via synchronous audio and visual telemedicine encounter.      Reason for Telemedicine Visit: Patient has requested telehealth visit    Originating Site (Patient Location): Patient's home        Distant Location (provider location):  Off-site    Consent:  The patient/guardian has verbally consented to: the potential risks and benefits of telemedicine (video visit) versus in person care; bill my insurance or make self-payment for services provided; and responsibility for payment of non-covered services.     Mode of Communication:  Video Conference via [employer]    As the provider I attest to compliance with applicable laws and regulations related to telemedicine.      Treatment Plan- 7-17-23  CGI- 7-17-23  Phq-9 and YADI-7- 8-7-23  Promis- 8-7-23    DATA  Interactive Complexity: No  Crisis: No        Progress Since Last Session (Related to Symptoms / Goals / Homework):   Symptoms: Client reports confronting issues with anxiety and depressed mood.   Has had a couple good weeks since last session.      Homework: Achieved / completed to satisfaction  Completed in session      Episode of Care Goals: Satisfactory progress - ACTION (Actively working towards change); Intervened by reinforcing change plan / affirming steps taken     Current / Ongoing Stressors and Concerns:   -Veena presley get approved for disability.     -Has struggled with PMDD for a number of years.  Has been working with chiropractic and message therapy on some holistic treatment options- Continue    -Symptoms of PMDD have been better the last two weeks.     -Working on responding and not reacting this week.    -Spent the weekend with friends and felt Star was disengaged.    -Made the comment that she is being her own advocate and sticking up for herself.    -Working on building relationship with daughter Deborah so she opens up more at home. Deborah asked to move in with a friend her senior year.    -Attempting to do some door dashing and some activities outdoors.     -Working on some home improvement projects.    -Working on being in the moment.         Treatment Objective(s) Addressed in This Session:  Safety planning   Patient will develop a plan to help manage PMDD  Patient will work on ways to communicate /patterns   Self-care   Relational issues        Intervention:   Follow safety plan and use crisis resources.   Use support system- Continued   Respond and not react to Star.  Use assertiveness skills.    Working on having more playing interaction with others.    Continuing with medication management.   Encourage Star to get his own services.    Build relationship with Deborah and talk more about the living arrangement plan.     Use some holistic approaches to health.         Will do door dash when body allows.     Assessments completed prior to visit:  The following assessments were completed by patient for this visit:  PHQ2:       8/7/2023    11:02 AM 4/11/2023     9:00 PM 1/5/2023     1:35 PM 11/29/2022    12:07 PM 10/26/2022     8:26 AM 7/25/2022     2:10 PM   PHQ-2 ( 1999 [employer])   Q1: Little interest or pleasure in doing things 1 1 2 3 2 0   Q2: Feeling down, depressed or hopeless 1 1 3 3 2 1   PHQ-2 Score 2 2 5 6 4 1   Q1: Little interest or pleasure in doing things Several days Several days More than half the days Nearly every day More than half the days Not at all   Q2: Feeling down, depressed or hopeless Several days Several days Nearly every day Nearly every day More than half the days Several days   PHQ-2 Score 2 2 5 6 4 1     PHQ9:       8/3/2022     9:14  AM 10/26/2022     8:26 AM 11/29/2022    12:07 PM 1/5/2023     1:35 PM 2/2/2023    10:13 AM 6/1/2023    10:07 AM 7/17/2023    11:15 AM   PHQ-9 SCORE   PHQ-9 Total Score Mattt  14 (Moderate depression) 21 (Severe depression) 16 (Moderately severe depression)      PHQ-9 Total Score 8 14 21 16 12 15 16     GAD2:       7/28/2022     8:28 AM 11/27/2022     5:03 AM 4/6/2023     8:12 AM 8/7/2023    11:04 AM   YADI-2   Feeling nervous, anxious, or on edge 1 3 1 1   Not being able to stop or control worrying 0 3 1 2   YADI-2 Total Score 1 6 2 3     GAD7:       11/27/2022     5:03 AM 2/2/2023    10:13 AM 6/1/2023    10:07 AM 7/17/2023    11:15 AM 8/7/2023    11:04 AM   YADI-7 SCORE   Total Score 17 (severe anxiety)    5 (mild anxiety)   Total Score 17 13 12 13 5     PROMIS 10-Global Health (all questions and answers displayed):       7/28/2022     8:34 AM 11/27/2022     5:04 AM 4/6/2023     8:14 AM 8/7/2023    11:05 AM   PROMIS 10   In general, would you say your health is: Good Good Good Good   In general, would you say your quality of life is: Good Fair Good Good   In general, how would you rate your physical health? Good Good Good Fair   In general, how would you rate your mental health, including your mood and your ability to think? Good Poor Fair Fair   In general, how would you rate your satisfaction with your social activities and relationships? Good Poor Fair Good   In general, please rate how well you carry out your usual social activities and roles Fair Poor Poor Good   To what extent are you able to carry out your everyday physical activities such as walking, climbing stairs, carrying groceries, or moving a chair? Mostly A little Mostly Moderately   In the past 7 days, how often have you been bothered by emotional problems such as feeling anxious, depressed, or irritable? Sometimes Always Sometimes Sometimes   In the past 7 days, how would you rate your fatigue on average? Mild Moderate Mild Moderate   In the past 7  days, how would you rate your pain on average, where 0 means no pain, and 10 means worst imaginable pain? 3 4 2 4   In general, would you say your health is: 3 3 3 3   In general, would you say your quality of life is: 3 2 3 3   In general, how would you rate your physical health? 3 3 3 2   In general, how would you rate your mental health, including your mood and your ability to think? 3 1 2 2   In general, how would you rate your satisfaction with your social activities and relationships? 3 1 2 3   In general, please rate how well you carry out your usual social activities and roles. (This includes activities at home, at work and in your community, and responsibilities as a parent, child, spouse, employee, friend, etc.) 2 1 1 3   To what extent are you able to carry out your everyday physical activities such as walking, climbing stairs, carrying groceries, or moving a chair? 4 2 4 3   In the past 7 days, how often have you been bothered by emotional problems such as feeling anxious, depressed, or irritable? 3 5 3 3   In the past 7 days, how would you rate your fatigue on average? 2 3 2 3   In the past 7 days, how would you rate your pain on average, where 0 means no pain, and 10 means worst imaginable pain? 3 4 2 4   Global Mental Health Score 12 5 10 11   Global Physical Health Score 15 11 15 11   PROMIS TOTAL - SUBSCORES 27 16 25 22     [place] Suicide Severity Rating Scale (Lifetime/Recent)      8/3/2022     9:17 AM   [place] Suicide Severity Rating (Lifetime/Recent)   Q1 Wish to be Dead (Lifetime) N   Q2 Non-Specific Active Suicidal Thoughts (Lifetime) N   Actual Attempt (Lifetime) N   Has subject engaged in non-suicidal self-injurious behavior? (Lifetime) N   Calculated C-SSRS Risk Score (Lifetime/Recent) No Risk Indicated         ASSESSMENT: Current Emotional / Mental Status (status of significant symptoms):   Risk status (Self / Other harm or suicidal ideation)   Patient denies current fears or concerns  "for personal safety.   Patient denies current or recent suicidal ideation or behaviors.     Patient denies current or recent homicidal ideation or behaviors.   Patient denies current or recent self injurious behavior or ideation.   Patient denies other safety concerns.   Patient reports there has been no change in risk factors since their last session.     Patient reports there has been no change in protective factors since their last session.     A safety and risk management plan has been developed including: Patient consented to co-developed safety plan on 11-30-22.  Safety and risk management plan was reviewed.   Patient agreed to use safety plan should any safety concerns arise.  A copy was made available to the patient. Reviewed 8-7-23        [facility]                                       Veena Parsons     SAFETY PLAN:  Step 1: Warning signs / cues (Thoughts, images, mood, situation, behavior) that a crisis may be developing:  Thoughts: \"I don't matter\", \"People would be better off without me\", \"I'm a burden\", \"I can't do this anymore\", \"I just want this to end\" and \"Nothing makes it better\"  Images: flashbacks  Thinking Processes: ruminations (can't stop thinking about my problems): PMDD and loss of mother, racing thoughts and highly critical and negative thoughts: I cant do anything right  Mood: worsening depression, hopelessness, helplessness, intense worry, agitation and mood swings  Behaviors: isolating/withdrawing , can't stop crying, not taking care of myself, not taking care of my responsibilities, sleeping too much, not sleeping enough and increasing frequency and duration of dissociation  Situations: relationship problems, trauma  and medical condition / diagnosis: PMDD    Step 2: Coping strategies - Things I can do to take my mind off of my problems without contacting another person (relaxation technique, physical activity):  Distress Tolerance Strategies:  relaxation " "activities, play with my pet , listen to positive and upbeat music, sensory based activities/self-soothe with five senses, watch a funny movie, read a book, change body temperature (ice pack/cold water)  and paced breathing/progressive muscle relaxation  Physical Activities: go for a walk, gardening, meditation, deep breathing and stretching   Focus on helpful thoughts:  \"This is temporary\", \"I will get through this\", \"It always passes\" and \"Ride the wave\"  Step 3: People and social settings that provide distraction:   Name: Spouse Star   Name:  Spending time with daughter     movie theater, pet store/humane society and coffee shop   Step 4: Remind myself of people and things that are important to me and worth living for:    Daughter   Spouse  Pets     Step 5: When I am in crisis, I can ask these people to help me use my safety plan:   Name: Spouse  Phone: [phone]      Step 6: Making the environment safe:   remove things I could use to hurt myself and be around others  Step 7: Professionals or agencies I can contact during a crisis:  Suicide Prevention Lifeline: Call or Text 988   Local Crisis Services:  [facility] [phone]      Call 911 or go to my nearest emergency department.   I helped develop this safety plan and agree to use it when needed.  I have been given a copy of this plan.      Client signature _________________________________________________________________  Today s date:  11/30/2022  Completed by Provider Name/ Credentials:  Lia Stiles MA, LMFT  November 30, 2022  Adapted from Safety Plan Template 2008 Eleonora Capone and Danny Carmona is reprinted with the express permission of the authors.  No portion of the Safety Plan Template may be reproduced without the express, written permission.  You can contact the authors at [email or web address] or [email or web address].    Name: Veena Solaresmel  YOB: 1985  Date: November 30, 2022   My primary care provider: " Francisco Nails  [facility]   My prescriber: PCP  Other care team support:  Holistic medical provider    My Triggers:    Relational problems  Loss of mother recently  Financial stress      Additional People, Places, and Things that I can access for support:   Spouse  Daughter          What is important to me and makes life worth living: Family         GREEN    Good Control  1. I feel good  2. No suicidal thoughts   3. Can work, sleep and play      Action Steps  1. Self-care: balanced meals, exercising, sleep practices, etc.  2. Take your medications as prescribed.  3. Continue meetings with therapist and prescriber.  4.  Do the healthy things that I enjoy.             YELLO Getting Worse  I have ANY of these:  1. I do not feel good  2. Difficulty Concentrating  3. Sleep is changing  4. Increase/Change in my thoughts to hurt self and/or others, but I can still manage and not act on it.   5. Not taking care of self.               Action Steps (in addition to the above):  1. Inform your therapist and psychiatric prescriber/PCP.  2. Keep taking your medications as prescribed.    3. Turn to people you can ask for help.  4. Use internal coping strategies -see below.  5. Create safe environment: Removing items I can hurt self with              RED  Get Help  If I have ANY of these:  1. Current and uncontrollable thoughts and/or behaviors to hurt self and/or others.   2. Crazy buzzing and spinning.     Actions to manage my safety  1. Contact your emergency person Star  2. Call or Text 284  3. Call my crisis team- [facility] [phone]  3. Or Call 911 or go to the emergency room right away        My Internal Coping Strategies include the following:  take a bath, belly breathing, arts and crafts, play with my pet, use my coping box, exercise and use my coping skills    [End for Brief Safety Plan]     Safety Concerns  How To Identify Situations That Make Your Mental Health Worse:   Triggers are things that make your mental health worse.  Look at the list below to help you find your triggers and what you can do about them.     1. Identify Early Warning Signs:    Sometimes symptoms return, even when people do their best to stay well. Symptoms can develop over a short period of time with little or no warning, but most of the time they emerge gradually over several weeks.  Early warning signs are changes that people experience when a relapse is starting. Some early warning signs are common and others are not as common.   Common Early Warning Signs:    Feeling tense or nervous, Eating less or eating more, Trouble sleeping -either too much or too little sleep, Feeling depressed or low, Feeling irritable, Feeling like not being around other people, Trouble concentrating and Urges to harm self     2. Identify action steps to take when warning signs are noticed:    Taking Action- It is important to take action if you are experiencing early warning signs of a relapse.  The faster you act, the more likely it is that you can avoid a full relapse.  It is helpful to identify several specific ways to cope with symptoms.      The following is my list of symptoms and coping strategies that I can use when they are present:    Symptom Coping Strategies   Anxiety -Talk with someone in your support system and let him or her know how you are feeling.  -Use relaxation techniques such as deep breathing or imagery.  -Use positive affirmations to counteract negative self-talk such as  I am learning to let go of worry.    Depression - Schedule your day; include activities you have to do and activities you enjoy doing.  - Get some exercise - walk, run, bike, or swim.  - Give yourself credit for even the smallest things you get done.   Sleep Difficulties   - Go to sleep at the same time every day.  - Do something relaxing before bed, such as drinking herbal tea or listening to music.  - Avoid having discussions about  upsetting topics before going to bed.   Delusions   - Distract yourself from the disturbing thought by doing something that requires your attention such as a puzzle.  - Check out your beliefs by talking to someone you trust.    Hallucinations   - Use headphones to listen to music.  - Tell voices to  stop  or say to yourself,  I am safe.   - Ignore the hallucinations as much as possible; focus on other things.   Concentration Difficulties - Minimize distractions so there is only one thing for you to focus on at a time.    - Ask the person you are having a conversation with to slow down or repeat things you are unsure of.            Appearance:   Normal   Eye Contact:   Good   Psychomotor Behavior: Normal    Attitude:   Cooperative    Orientation:   All   Speech    Rate / Production: Normal     Volume:  Normal    Mood:    Anxious    Affect:    Appropriate    Thought Content:  Clear    Thought Form:  Coherent  Logical    Insight:    Good      Medication Review:   No changes to current psychiatric medication(s)     Medication Compliance:   Yes     Changes in Health Issues:   None reported     Chemical Use Review:   Substance Use: Chemical use reviewed, no active concerns identified      Tobacco Use: No current tobacco use.      Diagnosis:  296.32 (F33.1) Major Depressive Disorder, Recurrent Episode, Moderate _ and With mixed features  300.01 (F41.0) Panic Disorder  300.02 (F41.1) Generalized Anxiety Disorder   PMDD      Collateral Reports Completed:   Not Applicable    PLAN: (Patient Tasks / Therapist Tasks / Other)  Client will continue to work on the following goals:    -Follow safety plan and use crisis resources-Continued   -Work on responding and not reacting. Use assertiveness skills.   -Attempt to have some feelings conversations with Deborah and build the relationship.  Solidify living arrangement plan.   -Encourage Star to get his own services.   -Get support on grief and loss.  -Have a good concrete daily  schedule.   -Work on including more fun into life.           Lia Stiles, LMFT                                                         ______________________________________________________________________    Individual Treatment Plan    Patient's Name: Veena Parsons  YOB: 1985    Date of Creation: 9-7-22  Date Treatment Plan Last Reviewed/Revised: 7-17-23    DSM5 Diagnoses:  296.32 (F33.1) Major Depressive Disorder, Recurrent Episode, Moderate _ and With mixed features  300.01 (F41.0) Panic Disorder  300.02 (F41.1) Generalized Anxiety Disorder   PMDD  Psychosocial / Contextual Factors: Some relational issues   PROMIS (reviewed every 90 days): 25    Referral / Collaboration:  Referral to another professional/service is not indicated at this time..    Anticipated number of session for this episode of care: 6-9 sessions  Anticipation frequency of session: Biweekly  Anticipated Duration of each session: 38-52 minutes  Treatment plan will be reviewed in 90 days or when goals have been changed.       MeasurableTreatment Goal(s) related to diagnosis / functional impairment(s)  Goal 1: Patient will address struggles with anxiety, depression and PMDD.    I will know I've met my goal when I am feeling less reactive through the month with the symptoms.      Objective #A (Patient Action)    Patient will work on establishing a concrete routine with self-care.  Status: Continued - Date(s): 7-17-23    Intervention(s)  Therapist will use CBT and motivational interviewing.      Objective #B  Patient will develop a plan to help manage PMDD  Status: Continued - Date(s): 7-17-23    Intervention(s)  Therapist will use solution focused therapy.    Objective #C  Patient will work on ways to communicate with narcissistic individuals.  Status: Continued - Date(s): 7-17-23    Intervention(s)  Therapist will teach communication skills.          Patient has reviewed and agreed to the above plan.      Lia Stiles,  [facility]  September 7, 2022

## 2023-08-23 ENCOUNTER — VIRTUAL VISIT (OUTPATIENT)
Dept: PSYCHOLOGY | Facility: CLINIC | Age: 38
End: 2023-08-23
Payer: COMMERCIAL

## 2023-08-23 DIAGNOSIS — F41.1 GENERALIZED ANXIETY DISORDER: ICD-10-CM

## 2023-08-23 DIAGNOSIS — F33.1 MAJOR DEPRESSIVE DISORDER, RECURRENT EPISODE, MODERATE (H): Primary | ICD-10-CM

## 2023-08-23 DIAGNOSIS — F41.0 PANIC DISORDER WITHOUT AGORAPHOBIA: ICD-10-CM

## 2023-08-23 PROCEDURE — 90834 PSYTX W PT 45 MINUTES: CPT | Mod: VID | Performed by: MARRIAGE & FAMILY THERAPIST

## 2023-08-23 NOTE — PROGRESS NOTES
[facility]                                     Progress Note    Patient Name: Veena Parsons  Date: 8-23-23         Service Type: Individual      Session Start Time:  11am    Session End Time:    1150am     Session Length: 50min    Session #: 26    Attendees: Client and spouse Star     Service Modality:  Video    Telemedicine Visit: The patient's condition can be safely assessed and treated via synchronous audio and visual telemedicine encounter.      Reason for Telemedicine Visit: Patient has requested telehealth visit    Originating Site (Patient Location): Patient's home        Distant Location (provider location):  Off-site    Consent:  The patient/guardian has verbally consented to: the potential risks and benefits of telemedicine (video visit) versus in person care; bill my insurance or make self-payment for services provided; and responsibility for payment of non-covered services.     Mode of Communication:  Video Conference via iLink    As the provider I attest to compliance with applicable laws and regulations related to telemedicine.      Treatment Plan- 7-17-23  CGI- 7-17-23  Phq-9 and YADI-7- 8-23-23  Promis- 8-7-23    DATA  Interactive Complexity: No  Crisis: No        Progress Since Last Session (Related to Symptoms / Goals / Homework):   Symptoms: Client reports confronting issues with anxiety and depressed mood.   Reports more relational issues this week.      Homework: Achieved / completed to satisfaction  Completed in session      Episode of Care Goals: Satisfactory progress - ACTION (Actively working towards change); Intervened by reinforcing change plan / affirming steps taken     Current / Ongoing Stressors and Concerns:   -Veena presley get approved for disability.     -Has struggled with PMDD for a number of years.  Has been working with chiropractic and message therapy on some holistic treatment options- Continue    -Working on communication issues at home.      -Reports lack of respect on boundaries among the family.     -Working on responding and not reacting this week.    -Made the comment that she is being her own advocate and sticking up for herself.    -Working on building relationship with daughter Deborah so she opens up more at home.  She can be really disengaged.    -Attempting to do some door dashing and some activities outdoors.     -Working on being in the moment.         Treatment Objective(s) Addressed in This Session:  Safety planning   Patient will develop a plan to help manage PMDD  Patient will work on ways to communicate /patterns   Self-care   Relational issues        Intervention:   Follow safety plan and use crisis resources.   Use support system- Continued   Respond and not react to Star.  Use assertiveness skills.    Have good boundaries among the family members.   Have consistent rules among the kids.    Show more interest to Deborah and give her compliments.    Continuing with medication management.   Encourage Star to get his own services.    Use some holistic approaches to health.         Will do door dash when body allows.     Assessments completed prior to visit:  The following assessments were completed by patient for this visit:  PHQ2:       8/23/2023    10:14 AM 8/7/2023    11:02 AM 4/11/2023     9:00 PM 1/5/2023     1:35 PM 11/29/2022    12:07 PM 10/26/2022     8:26 AM 7/25/2022     2:10 PM   PHQ-2 ( 1999 [employer])   Q1: Little interest or pleasure in doing things 1 1 1 2 3 2 0   Q2: Feeling down, depressed or hopeless 1 1 1 3 3 2 1   PHQ-2 Score 2 2 2 5 6 4 1   Q1: Little interest or pleasure in doing things Several days Several days Several days More than half the days Nearly every day More than half the days Not at all   Q2: Feeling down, depressed or hopeless Several days Several days Several days Nearly every day Nearly every day More than half the days Several days   PHQ-2 Score 2 2 2 5 6 4 1     PHQ9:       8/3/2022     9:14 AM  10/26/2022     8:26 AM 11/29/2022    12:07 PM 1/5/2023     1:35 PM 2/2/2023    10:13 AM 6/1/2023    10:07 AM 7/17/2023    11:15 AM   PHQ-9 SCORE   PHQ-9 Total Score Mattt  14 (Moderate depression) 21 (Severe depression) 16 (Moderately severe depression)      PHQ-9 Total Score 8 14 21 16 12 15 16     GAD2:       7/28/2022     8:28 AM 11/27/2022     5:03 AM 4/6/2023     8:12 AM 8/7/2023    11:04 AM 8/23/2023    10:14 AM   YADI-2   Feeling nervous, anxious, or on edge 1 3 1 1 1   Not being able to stop or control worrying 0 3 1 2 1   YADI-2 Total Score 1 6 2 3 2     GAD7:       11/27/2022     5:03 AM 2/2/2023    10:13 AM 6/1/2023    10:07 AM 7/17/2023    11:15 AM 8/7/2023    11:04 AM   YADI-7 SCORE   Total Score 17 (severe anxiety)    5 (mild anxiety)   Total Score 17 13 12 13 5     PROMIS 10-Global Health (all questions and answers displayed):       7/28/2022     8:34 AM 11/27/2022     5:04 AM 4/6/2023     8:14 AM 8/7/2023    11:05 AM 8/23/2023    10:14 AM   PROMIS 10   In general, would you say your health is: Good Good Good Good Good   In general, would you say your quality of life is: Good Fair Good Good Fair   In general, how would you rate your physical health? Good Good Good Fair Good   In general, how would you rate your mental health, including your mood and your ability to think? Good Poor Fair Fair Fair   In general, how would you rate your satisfaction with your social activities and relationships? Good Poor Fair Good Fair   In general, please rate how well you carry out your usual social activities and roles Fair Poor Poor Good Poor   To what extent are you able to carry out your everyday physical activities such as walking, climbing stairs, carrying groceries, or moving a chair? Mostly A little Mostly Moderately Moderately   In the past 7 days, how often have you been bothered by emotional problems such as feeling anxious, depressed, or irritable? Sometimes Always Sometimes Sometimes Often   In the past 7  days, how would you rate your fatigue on average? Mild Moderate Mild Moderate Moderate   In the past 7 days, how would you rate your pain on average, where 0 means no pain, and 10 means worst imaginable pain? 3 4 2 4 6   In general, would you say your health is: 3 3 3 3 3   In general, would you say your quality of life is: 3 2 3 3 2   In general, how would you rate your physical health? 3 3 3 2 3   In general, how would you rate your mental health, including your mood and your ability to think? 3 1 2 2 2   In general, how would you rate your satisfaction with your social activities and relationships? 3 1 2 3 2   In general, please rate how well you carry out your usual social activities and roles. (This includes activities at home, at work and in your community, and responsibilities as a parent, child, spouse, employee, friend, etc.) 2 1 1 3 1   To what extent are you able to carry out your everyday physical activities such as walking, climbing stairs, carrying groceries, or moving a chair? 4 2 4 3 3   In the past 7 days, how often have you been bothered by emotional problems such as feeling anxious, depressed, or irritable? 3 5 3 3 4   In the past 7 days, how would you rate your fatigue on average? 2 3 2 3 3   In the past 7 days, how would you rate your pain on average, where 0 means no pain, and 10 means worst imaginable pain? 3 4 2 4 6   Global Mental Health Score 12 5 10 11 8   Global Physical Health Score 15 11 15 11 12   PROMIS TOTAL - SUBSCORES 27 16 25 22 20     [place] Suicide Severity Rating Scale (Lifetime/Recent)      8/3/2022     9:17 AM   [place] Suicide Severity Rating (Lifetime/Recent)   Q1 Wish to be Dead (Lifetime) N   Q2 Non-Specific Active Suicidal Thoughts (Lifetime) N   Actual Attempt (Lifetime) N   Has subject engaged in non-suicidal self-injurious behavior? (Lifetime) N   Calculated C-SSRS Risk Score (Lifetime/Recent) No Risk Indicated         ASSESSMENT: Current Emotional / Mental Status  "(status of significant symptoms):   Risk status (Self / Other harm or suicidal ideation)   Patient denies current fears or concerns for personal safety.   Patient denies current or recent suicidal ideation or behaviors.     Patient denies current or recent homicidal ideation or behaviors.   Patient denies current or recent self injurious behavior or ideation.   Patient denies other safety concerns.   Patient reports there has been no change in risk factors since their last session.     Patient reports there has been no change in protective factors since their last session.     A safety and risk management plan has been developed including: Patient consented to co-developed safety plan on 11-30-22.  Safety and risk management plan was reviewed.   Patient agreed to use safety plan should any safety concerns arise.  A copy was made available to the patient. Reviewed 8-23-23        [facility]                                       Veena Parsons     SAFETY PLAN:  Step 1: Warning signs / cues (Thoughts, images, mood, situation, behavior) that a crisis may be developing:  Thoughts: \"I don't matter\", \"People would be better off without me\", \"I'm a burden\", \"I can't do this anymore\", \"I just want this to end\" and \"Nothing makes it better\"  Images: flashbacks  Thinking Processes: ruminations (can't stop thinking about my problems): PMDD and loss of mother, racing thoughts and highly critical and negative thoughts: I cant do anything right  Mood: worsening depression, hopelessness, helplessness, intense worry, agitation and mood swings  Behaviors: isolating/withdrawing , can't stop crying, not taking care of myself, not taking care of my responsibilities, sleeping too much, not sleeping enough and increasing frequency and duration of dissociation  Situations: relationship problems, trauma  and medical condition / diagnosis: PMDD    Step 2: Coping strategies - Things I can do to take my mind off of my " "problems without contacting another person (relaxation technique, physical activity):  Distress Tolerance Strategies:  relaxation activities, play with my pet , listen to positive and upbeat music, sensory based activities/self-soothe with five senses, watch a funny movie, read a book, change body temperature (ice pack/cold water)  and paced breathing/progressive muscle relaxation  Physical Activities: go for a walk, gardening, meditation, deep breathing and stretching   Focus on helpful thoughts:  \"This is temporary\", \"I will get through this\", \"It always passes\" and \"Ride the wave\"  Step 3: People and social settings that provide distraction:   Name: Spouse Star   Name:  Spending time with daughter     movie theater, pet store/humane society and coffee shop   Step 4: Remind myself of people and things that are important to me and worth living for:    Daughter   Spouse  Pets     Step 5: When I am in crisis, I can ask these people to help me use my safety plan:   Name: Spouse  Phone: [phone]      Step 6: Making the environment safe:   remove things I could use to hurt myself and be around others  Step 7: Professionals or agencies I can contact during a crisis:  Suicide Prevention Lifeline: Call or Text 584   Local Crisis Services:  [facility] [phone]      Call 911 or go to my nearest emergency department.   I helped develop this safety plan and agree to use it when needed.  I have been given a copy of this plan.      Client signature _________________________________________________________________  Today s date:  11/30/2022  Completed by Provider Name/ Credentials:  Lia Stiles MA, LMFT  November 30, 2022  Adapted from Safety Plan Template 2008 Eleonora Capone and Danny Carmona is reprinted with the express permission of the authors.  No portion of the Safety Plan Template may be reproduced without the express, written permission.  You can contact the authors at [email or web address] or " [email or web address].    Name: Veena Parsons  YOB: 1985  Date: November 30, 2022   My primary care provider: Francisco Nails  My primary care clinic:  [facility]   My prescriber: PCP  Other care team support:  Holistic medical provider    My Triggers:    Relational problems  Loss of mother recently  Financial stress      Additional People, Places, and Things that I can access for support:   Spouse  Daughter          What is important to me and makes life worth living: Family         GREEN    Good Control  1. I feel good  2. No suicidal thoughts   3. Can work, sleep and play      Action Steps  1. Self-care: balanced meals, exercising, sleep practices, etc.  2. Take your medications as prescribed.  3. Continue meetings with therapist and prescriber.  4.  Do the healthy things that I enjoy.             YELLO Getting Worse  I have ANY of these:  1. I do not feel good  2. Difficulty Concentrating  3. Sleep is changing  4. Increase/Change in my thoughts to hurt self and/or others, but I can still manage and not act on it.   5. Not taking care of self.               Action Steps (in addition to the above):  1. Inform your therapist and psychiatric prescriber/PCP.  2. Keep taking your medications as prescribed.    3. Turn to people you can ask for help.  4. Use internal coping strategies -see below.  5. Create safe environment: Removing items I can hurt self with              RED  Get Help  If I have ANY of these:  1. Current and uncontrollable thoughts and/or behaviors to hurt self and/or others.   2. Crazy buzzing and spinning.     Actions to manage my safety  1. Contact your emergency person Star  2. Call or Text 520  3. Call my crisis team- [facility] [phone]  3. Or Call 061 or go to the emergency room right away        My Internal Coping Strategies include the following:  take a bath, belly breathing, arts and crafts, play with my pet, use my coping box, exercise and  use my coping skills    [End for Brief Safety Plan]     Safety Concerns  How To Identify Situations That Make Your Mental Health Worse:  Triggers are things that make your mental health worse.  Look at the list below to help you find your triggers and what you can do about them.     1. Identify Early Warning Signs:    Sometimes symptoms return, even when people do their best to stay well. Symptoms can develop over a short period of time with little or no warning, but most of the time they emerge gradually over several weeks.  Early warning signs are changes that people experience when a relapse is starting. Some early warning signs are common and others are not as common.   Common Early Warning Signs:    Feeling tense or nervous, Eating less or eating more, Trouble sleeping -either too much or too little sleep, Feeling depressed or low, Feeling irritable, Feeling like not being around other people, Trouble concentrating and Urges to harm self     2. Identify action steps to take when warning signs are noticed:    Taking Action- It is important to take action if you are experiencing early warning signs of a relapse.  The faster you act, the more likely it is that you can avoid a full relapse.  It is helpful to identify several specific ways to cope with symptoms.      The following is my list of symptoms and coping strategies that I can use when they are present:    Symptom Coping Strategies   Anxiety -Talk with someone in your support system and let him or her know how you are feeling.  -Use relaxation techniques such as deep breathing or imagery.  -Use positive affirmations to counteract negative self-talk such as  I am learning to let go of worry.    Depression - Schedule your day; include activities you have to do and activities you enjoy doing.  - Get some exercise - walk, run, bike, or swim.  - Give yourself credit for even the smallest things you get done.   Sleep Difficulties   - Go to sleep at the same time  every day.  - Do something relaxing before bed, such as drinking herbal tea or listening to music.  - Avoid having discussions about upsetting topics before going to bed.   Delusions   - Distract yourself from the disturbing thought by doing something that requires your attention such as a puzzle.  - Check out your beliefs by talking to someone you trust.    Hallucinations   - Use headphones to listen to music.  - Tell voices to  stop  or say to yourself,  I am safe.   - Ignore the hallucinations as much as possible; focus on other things.   Concentration Difficulties - Minimize distractions so there is only one thing for you to focus on at a time.    - Ask the person you are having a conversation with to slow down or repeat things you are unsure of.            Appearance:   Normal   Eye Contact:   Good   Psychomotor Behavior: Normal    Attitude:   Cooperative    Orientation:   All   Speech    Rate / Production: Normal     Volume:  Normal    Mood:    Anxious    Affect:    Worrisome    Thought Content:  Clear    Thought Form:  Coherent  Logical    Insight:    Good      Medication Review:   No changes to current psychiatric medication(s)     Medication Compliance:   Yes     Changes in Health Issues:   None reported     Chemical Use Review:   Substance Use: Chemical use reviewed, no active concerns identified      Tobacco Use: No current tobacco use.      Diagnosis:  296.32 (F33.1) Major Depressive Disorder, Recurrent Episode, Moderate _ and With mixed features  300.01 (F41.0) Panic Disorder  300.02 (F41.1) Generalized Anxiety Disorder   PMDD      Collateral Reports Completed:   Not Applicable    PLAN: (Patient Tasks / Therapist Tasks / Other)  Client will continue to work on the following goals:    -Follow safety plan and use crisis resources-Continued   -Work on responding and not reacting. Use assertiveness skills.   -Attempt to have some feelings conversations with Deborah and build the relationship.  Have family  boundaries.   -Show interest in Deborah's investments.  -Encourage Star to get his own services.   -Get support on grief and loss.  -Have a good concrete daily schedule.   -Work on including more fun into life as a family.           Lia Stiles, LMFT                                                         ______________________________________________________________________    Individual Treatment Plan    Patient's Name: Veena Parsons  YOB: 1985    Date of Creation: 9-7-22  Date Treatment Plan Last Reviewed/Revised: 7-17-23    DSM5 Diagnoses:  296.32 (F33.1) Major Depressive Disorder, Recurrent Episode, Moderate _ and With mixed features  300.01 (F41.0) Panic Disorder  300.02 (F41.1) Generalized Anxiety Disorder   PMDD  Psychosocial / Contextual Factors: Some relational issues   PROMIS (reviewed every 90 days): 25    Referral / Collaboration:  Referral to another professional/service is not indicated at this time..    Anticipated number of session for this episode of care: 6-9 sessions  Anticipation frequency of session: Biweekly  Anticipated Duration of each session: 38-52 minutes  Treatment plan will be reviewed in 90 days or when goals have been changed.       MeasurableTreatment Goal(s) related to diagnosis / functional impairment(s)  Goal 1: Patient will address struggles with anxiety, depression and PMDD.    I will know I've met my goal when I am feeling less reactive through the month with the symptoms.      Objective #A (Patient Action)    Patient will work on establishing a concrete routine with self-care.  Status: Continued - Date(s): 7-17-23    Intervention(s)  Therapist will use CBT and motivational interviewing.      Objective #B  Patient will develop a plan to help manage PMDD  Status: Continued - Date(s): 7-17-23    Intervention(s)  Therapist will use solution focused therapy.    Objective #C  Patient will work on ways to communicate with narcissistic individuals.  Status:  Continued - Date(s): 7-17-23    Intervention(s)  Therapist will teach communication skills.          Patient has reviewed and agreed to the above plan.      Lia Stiles, NATACHA  September 7, 2022

## 2023-09-07 ENCOUNTER — VIRTUAL VISIT (OUTPATIENT)
Dept: PSYCHOLOGY | Facility: CLINIC | Age: 38
End: 2023-09-07
Payer: COMMERCIAL

## 2023-09-07 DIAGNOSIS — F41.0 PANIC DISORDER WITHOUT AGORAPHOBIA: ICD-10-CM

## 2023-09-07 DIAGNOSIS — F33.1 MAJOR DEPRESSIVE DISORDER, RECURRENT EPISODE, MODERATE (H): Primary | ICD-10-CM

## 2023-09-07 DIAGNOSIS — F41.1 GENERALIZED ANXIETY DISORDER: ICD-10-CM

## 2023-09-07 PROCEDURE — 90834 PSYTX W PT 45 MINUTES: CPT | Mod: VID | Performed by: MARRIAGE & FAMILY THERAPIST
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
## 2023-09-19 ASSESSMENT — PATIENT HEALTH QUESTIONNAIRE - PHQ9
SUM OF ALL RESPONSES TO PHQ QUESTIONS 1-9: 15
SUM OF ALL RESPONSES TO PHQ QUESTIONS 1-9: 15
10. IF YOU CHECKED OFF ANY PROBLEMS, HOW DIFFICULT HAVE THESE PROBLEMS MADE IT FOR YOU TO DO YOUR WORK, TAKE CARE OF THINGS AT HOME, OR GET ALONG WITH OTHER PEOPLE: EXTREMELY DIFFICULT

## 2023-09-19 ASSESSMENT — ANXIETY QUESTIONNAIRES
7. FEELING AFRAID AS IF SOMETHING AWFUL MIGHT HAPPEN: NEARLY EVERY DAY
IF YOU CHECKED OFF ANY PROBLEMS ON THIS QUESTIONNAIRE, HOW DIFFICULT HAVE THESE PROBLEMS MADE IT FOR YOU TO DO YOUR WORK, TAKE CARE OF THINGS AT HOME, OR GET ALONG WITH OTHER PEOPLE: EXTREMELY DIFFICULT
1. FEELING NERVOUS, ANXIOUS, OR ON EDGE: NEARLY EVERY DAY
5. BEING SO RESTLESS THAT IT IS HARD TO SIT STILL: NEARLY EVERY DAY
4. TROUBLE RELAXING: NEARLY EVERY DAY
GAD7 TOTAL SCORE: 21
GAD7 TOTAL SCORE: 21
2. NOT BEING ABLE TO STOP OR CONTROL WORRYING: NEARLY EVERY DAY
6. BECOMING EASILY ANNOYED OR IRRITABLE: NEARLY EVERY DAY
3. WORRYING TOO MUCH ABOUT DIFFERENT THINGS: NEARLY EVERY DAY

## 2023-09-20 ENCOUNTER — VIRTUAL VISIT (OUTPATIENT)
Dept: PSYCHOLOGY | Facility: CLINIC | Age: 38
End: 2023-09-20
Payer: COMMERCIAL

## 2023-09-20 DIAGNOSIS — F33.1 MAJOR DEPRESSIVE DISORDER, RECURRENT EPISODE, MODERATE (H): Primary | ICD-10-CM

## 2023-09-20 DIAGNOSIS — F41.1 GENERALIZED ANXIETY DISORDER: ICD-10-CM

## 2023-09-20 DIAGNOSIS — F41.0 PANIC DISORDER WITHOUT AGORAPHOBIA: ICD-10-CM

## 2023-09-20 PROCEDURE — 90834 PSYTX W PT 45 MINUTES: CPT | Mod: VID | Performed by: MARRIAGE & FAMILY THERAPIST

## 2023-09-20 NOTE — PROGRESS NOTES
[facility]                                     Progress Note    Patient Name: Veena Parsons  Date: 9-20-23         Service Type: Individual      Session Start Time:  11am    Session End Time:    1150am     Session Length: 50min    Session #: 28    Attendees: Client     Service Modality:  Video    Telemedicine Visit: The patient's condition can be safely assessed and treated via synchronous audio and visual telemedicine encounter.      Reason for Telemedicine Visit: Patient has requested telehealth visit    Originating Site (Patient Location): Patient's home        Distant Location (provider location):  Off-site    Consent:  The patient/guardian has verbally consented to: the potential risks and benefits of telemedicine (video visit) versus in person care; bill my insurance or make self-payment for services provided; and responsibility for payment of non-covered services.     Mode of Communication:  Video Conference via Daylife    As the provider I attest to compliance with applicable laws and regulations related to telemedicine.      Treatment Plan- 7-17-23  CGI- 7-17-23  Phq-9 and YADI-7- 9-20-23  Promis- 8-7-23    DATA  Interactive Complexity: No  Crisis: No        Progress Since Last Session (Related to Symptoms / Goals / Homework):   Symptoms: Client reports confronting issues with anxiety and depressed mood.  She has had a hard week and reports some suicidal thoughts.      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    -Has struggled with PMDD for a number of years.  Has been working with chiropractic and message therapy on  some holistic treatment options- Continue    -Suicidal thoughts tied to feeling bored during the day and feels there is a lack of purpose right now with life in general.  No plan or intent.    -More dissociation this week.    -Trying to keep in mind wanting to have fun at home and looking forward to being around family but no quality time spent the last two weeks.    -Veena feels the family always come to her with the negative.    -Working on responding and not reacting this week.    -Made the comment that she is being her own advocate and sticking up for herself.    -Working on being in the moment.    -Spouse is up for a promotion at work.    -Veena feels no one is recognizing when she is enjoying something or shoots it down. Feels she is there to serve others and her tank is not being filled.         Treatment Objective(s) Addressed in This Session:  Safety planning   Patient will develop a plan to help manage PMDD  Patient will work on ways to communicate /patterns   Relational issues        Intervention:   Follow safety plan and use crisis resources.  Agrees to contract for safety today.   Use support system- Continued   Respond and not react to Star.  Use assertiveness skills and have good boundaries.    Discussed empathy and attempting to understand. Ask more questions.    Have consistent rules among the kids.    Show more interest to Deborah and give her compliments. Get involved in her activities.    Continuing with medication management.   Use some holistic approaches to health.         Brainstorm more ways to bring fun into the family. Really put effort into quality time.         Assessments completed prior to visit:  The following assessments were completed by patient for this visit:  PHQ2:       8/23/2023    10:14 AM 8/7/2023    11:02 AM 4/11/2023     9:00 PM 1/5/2023     1:35 PM 11/29/2022    12:07 PM 10/26/2022     8:26 AM 7/25/2022     2:10 PM   PHQ-2 ( 1999 [employer])   Q1: Little interest or pleasure in  doing things 1 1 1 2 3 2 0   Q2: Feeling down, depressed or hopeless 1 1 1 3 3 2 1   PHQ-2 Score 2 2 2 5 6 4 1   Q1: Little interest or pleasure in doing things Several days Several days Several days More than half the days Nearly every day More than half the days Not at all   Q2: Feeling down, depressed or hopeless Several days Several days Several days Nearly every day Nearly every day More than half the days Several days   PHQ-2 Score 2 2 2 5 6 4 1     PHQ9:       10/26/2022     8:26 AM 11/29/2022    12:07 PM 1/5/2023     1:35 PM 2/2/2023    10:13 AM 6/1/2023    10:07 AM 7/17/2023    11:15 AM 9/19/2023     7:09 PM   PHQ-9 SCORE   PHQ-9 Total Score MyChart 14 (Moderate depression) 21 (Severe depression) 16 (Moderately severe depression)    15 (Moderately severe depression)   PHQ-9 Total Score 14 21 16 12 15 16 15     GAD2:       7/28/2022     8:28 AM 11/27/2022     5:03 AM 4/6/2023     8:12 AM 8/7/2023    11:04 AM 8/23/2023    10:14 AM   YADI-2   Feeling nervous, anxious, or on edge 1 3 1 1 1   Not being able to stop or control worrying 0 3 1 2 1   YADI-2 Total Score 1 6 2 3 2     GAD7:       11/27/2022     5:03 AM 2/2/2023    10:13 AM 6/1/2023    10:07 AM 7/17/2023    11:15 AM 8/7/2023    11:04 AM 9/19/2023     7:10 PM   YADI-7 SCORE   Total Score 17 (severe anxiety)    5 (mild anxiety) 21 (severe anxiety)   Total Score 17 13 12 13 5 21     PROMIS 10-Global Health (all questions and answers displayed):       7/28/2022     8:34 AM 11/27/2022     5:04 AM 4/6/2023     8:14 AM 8/7/2023    11:05 AM 8/23/2023    10:14 AM   PROMIS 10   In general, would you say your health is: Good Good Good Good Good   In general, would you say your quality of life is: Good Fair Good Good Fair   In general, how would you rate your physical health? Good Good Good Fair Good   In general, how would you rate your mental health, including your mood and your ability to think? Good Poor Fair Fair Fair   In general, how would you rate your  satisfaction with your social activities and relationships? Good Poor Fair Good Fair   In general, please rate how well you carry out your usual social activities and roles Fair Poor Poor Good Poor   To what extent are you able to carry out your everyday physical activities such as walking, climbing stairs, carrying groceries, or moving a chair? Mostly A little Mostly Moderately Moderately   In the past 7 days, how often have you been bothered by emotional problems such as feeling anxious, depressed, or irritable? Sometimes Always Sometimes Sometimes Often   In the past 7 days, how would you rate your fatigue on average? Mild Moderate Mild Moderate Moderate   In the past 7 days, how would you rate your pain on average, where 0 means no pain, and 10 means worst imaginable pain? 3 4 2 4 6   In general, would you say your health is: 3 3 3 3 3   In general, would you say your quality of life is: 3 2 3 3 2   In general, how would you rate your physical health? 3 3 3 2 3   In general, how would you rate your mental health, including your mood and your ability to think? 3 1 2 2 2   In general, how would you rate your satisfaction with your social activities and relationships? 3 1 2 3 2   In general, please rate how well you carry out your usual social activities and roles. (This includes activities at home, at work and in your community, and responsibilities as a parent, child, spouse, employee, friend, etc.) 2 1 1 3 1   To what extent are you able to carry out your everyday physical activities such as walking, climbing stairs, carrying groceries, or moving a chair? 4 2 4 3 3   In the past 7 days, how often have you been bothered by emotional problems such as feeling anxious, depressed, or irritable? 3 5 3 3 4   In the past 7 days, how would you rate your fatigue on average? 2 3 2 3 3   In the past 7 days, how would you rate your pain on average, where 0 means no pain, and 10 means worst imaginable pain? 3 4 2 4 6   Global  "Mental Health Score 12 5 10 11 8   Global Physical Health Score 15 11 15 11 12   PROMIS TOTAL - SUBSCORES 27 16 25 22 20     Schriever Suicide Severity Rating Scale (Lifetime/Recent)      8/3/2022     9:17 AM   Schriever Suicide Severity Rating (Lifetime/Recent)   Q1 Wish to be Dead (Lifetime) N   Q2 Non-Specific Active Suicidal Thoughts (Lifetime) N   Actual Attempt (Lifetime) N   Has subject engaged in non-suicidal self-injurious behavior? (Lifetime) N   Calculated C-SSRS Risk Score (Lifetime/Recent) No Risk Indicated         ASSESSMENT: Current Emotional / Mental Status (status of significant symptoms):   Risk status (Self / Other harm or suicidal ideation)   Patient denies current fears or concerns for personal safety.   Patient reports the following current or recent suicidal ideation or behaviors: Suicidal thoughts with no plan or intent.  Triggers are relational issues and not feeling heard.  .    Patient denies current or recent homicidal ideation or behaviors.   Patient denies current or recent self injurious behavior or ideation.   Patient denies other safety concerns.   Patient reports there has been no change in risk factors since their last session.     Patient reports there has been no change in protective factors since their last session.     A safety and risk management plan has been developed including: Patient consented to co-developed safety plan on 11-30-22.  Safety and risk management plan was reviewed.   Patient agreed to use safety plan should any safety concerns arise.  A copy was made available to the patient. Reviewed 9-20-23        [facility]                                       Veena Parsons     SAFETY PLAN:  Step 1: Warning signs / cues (Thoughts, images, mood, situation, behavior) that a crisis may be developing:  Thoughts: \"I don't matter\", \"People would be better off without me\", \"I'm a burden\", \"I can't do this anymore\", \"I just want this to end\" and \"Nothing " "makes it better\"  Images: flashbacks  Thinking Processes: ruminations (can't stop thinking about my problems): PMDD and loss of mother, racing thoughts and highly critical and negative thoughts: I cant do anything right  Mood: worsening depression, hopelessness, helplessness, intense worry, agitation and mood swings  Behaviors: isolating/withdrawing , can't stop crying, not taking care of myself, not taking care of my responsibilities, sleeping too much, not sleeping enough and increasing frequency and duration of dissociation  Situations: relationship problems, trauma  and medical condition / diagnosis: PMDD    Step 2: Coping strategies - Things I can do to take my mind off of my problems without contacting another person (relaxation technique, physical activity):  Distress Tolerance Strategies:  relaxation activities, play with my pet , listen to positive and upbeat music, sensory based activities/self-soothe with five senses, watch a funny movie, read a book, change body temperature (ice pack/cold water)  and paced breathing/progressive muscle relaxation  Physical Activities: go for a walk, gardening, meditation, deep breathing and stretching   Focus on helpful thoughts:  \"This is temporary\", \"I will get through this\", \"It always passes\" and \"Ride the wave\"  Step 3: People and social settings that provide distraction:   Name: Spouse Star   Name:  Spending time with daughter     movie theater, pet store/humane society and coffee shop   Step 4: Remind myself of people and things that are important to me and worth living for:    Daughter   Spouse  Pets     Step 5: When I am in crisis, I can ask these people to help me use my safety plan:   Name: Spouse  Phone: [phone]      Step 6: Making the environment safe:   remove things I could use to hurt myself and be around others  Step 7: Professionals or agencies I can contact during a crisis:  Suicide Prevention Lifeline: Call or Text 026   Local Crisis Services:  " [facility] [phone]      Call 911 or go to my nearest emergency department.   I helped develop this safety plan and agree to use it when needed.  I have been given a copy of this plan.      Client signature _________________________________________________________________  Today s date:  11/30/2022  Completed by Provider Name/ Credentials:  Lia Stiles MA, LMFT  November 30, 2022  Adapted from Safety Plan Template 2008 Eleonora Capone and Danny Carmona is reprinted with the express permission of the authors.  No portion of the Safety Plan Template may be reproduced without the express, written permission.  You can contact the authors at [email or web address] or [email or web address].    Name: Veena Parsons  YOB: 1985  Date: November 30, 2022   My primary care provider: Francisco Nails  My primary care clinic: [facility]   My prescriber: PCP  Other care team support:  Holistic medical provider    My Triggers:    Relational problems  Loss of mother recently  Financial stress      Additional People, Places, and Things that I can access for support:   Spouse  Daughter          What is important to me and makes life worth living: Family         GREEN    Good Control  1. I feel good  2. No suicidal thoughts   3. Can work, sleep and play      Action Steps  1. Self-care: balanced meals, exercising, sleep practices, etc.  2. Take your medications as prescribed.  3. Continue meetings with therapist and prescriber.  4.  Do the healthy things that I enjoy.             YELLO Getting Worse  I have ANY of these:  1. I do not feel good  2. Difficulty Concentrating  3. Sleep is changing  4. Increase/Change in my thoughts to hurt self and/or others, but I can still manage and not act on it.   5. Not taking care of self.               Action Steps (in addition to the above):  1. Inform your therapist and psychiatric prescriber/PCP.  2. Keep taking your medications as prescribed.    3.  Turn to people you can ask for help.  4. Use internal coping strategies -see below.  5. Create safe environment: Removing items I can hurt self with              RED  Get Help  If I have ANY of these:  1. Current and uncontrollable thoughts and/or behaviors to hurt self and/or others.   2. Crazy buzzing and spinning.     Actions to manage my safety  1. Contact your emergency person Star  2. Call or Text [phone]  3. Call my crisis team- [facility] [phone]  3. Or Call 911 or go to the emergency room right away        My Internal Coping Strategies include the following:  take a bath, belly breathing, arts and crafts, play with my pet, use my coping box, exercise and use my coping skills    [End for Brief Safety Plan]     Safety Concerns  How To Identify Situations That Make Your Mental Health Worse:  Triggers are things that make your mental health worse.  Look at the list below to help you find your triggers and what you can do about them.     1. Identify Early Warning Signs:    Sometimes symptoms return, even when people do their best to stay well. Symptoms can develop over a short period of time with little or no warning, but most of the time they emerge gradually over several weeks.  Early warning signs are changes that people experience when a relapse is starting. Some early warning signs are common and others are not as common.   Common Early Warning Signs:    Feeling tense or nervous, Eating less or eating more, Trouble sleeping -either too much or too little sleep, Feeling depressed or low, Feeling irritable, Feeling like not being around other people, Trouble concentrating and Urges to harm self     2. Identify action steps to take when warning signs are noticed:    Taking Action- It is important to take action if you are experiencing early warning signs of a relapse.  The faster you act, the more likely it is that you can avoid a full relapse.  It is helpful to identify several specific ways to cope  with symptoms.      The following is my list of symptoms and coping strategies that I can use when they are present:    Symptom Coping Strategies   Anxiety -Talk with someone in your support system and let him or her know how you are feeling.  -Use relaxation techniques such as deep breathing or imagery.  -Use positive affirmations to counteract negative self-talk such as  I am learning to let go of worry.    Depression - Schedule your day; include activities you have to do and activities you enjoy doing.  - Get some exercise - walk, run, bike, or swim.  - Give yourself credit for even the smallest things you get done.   Sleep Difficulties   - Go to sleep at the same time every day.  - Do something relaxing before bed, such as drinking herbal tea or listening to music.  - Avoid having discussions about upsetting topics before going to bed.   Delusions   - Distract yourself from the disturbing thought by doing something that requires your attention such as a puzzle.  - Check out your beliefs by talking to someone you trust.    Hallucinations   - Use headphones to listen to music.  - Tell voices to  stop  or say to yourself,  I am safe.   - Ignore the hallucinations as much as possible; focus on other things.   Concentration Difficulties - Minimize distractions so there is only one thing for you to focus on at a time.    - Ask the person you are having a conversation with to slow down or repeat things you are unsure of.            Appearance:   Normal   Eye Contact:   Good   Psychomotor Behavior: Normal    Attitude:   Cooperative    Orientation:   All   Speech    Rate / Production: Normal     Volume:  Normal    Mood:    Anxious Upset Depressed   Affect:    Worrisome Tearful    Thought Content:  Clear    Thought Form:  Coherent  Circumstantial   Insight:    Fair      Medication Review:   No changes to current psychiatric medication(s)     Medication Compliance:   Yes     Changes in Health Issues:   None  "reported     Chemical Use Review:   Substance Use: Chemical use reviewed, no active concerns identified      Tobacco Use: No current tobacco use.      Diagnosis:  296.32 (F33.1) Major Depressive Disorder, Recurrent Episode, Moderate _ and With mixed features  300.01 (F41.0) Panic Disorder  300.02 (F41.1) Generalized Anxiety Disorder   PMDD      Collateral Reports Completed:   Not Applicable    PLAN: (Patient Tasks / Therapist Tasks / Other)  Client will continue to work on the following goals:    -Follow safety plan and use crisis resources-Continued   -Work on responding and not reacting. Use assertiveness skills.   -Practice empathy and trying not to problem solve.    -Attempt to have some feelings conversations with Deborah and build the relationship.    -Encourage Star to get his own services.   -Continue to work on \"staying in own eulalio\" as a couple.   -Have a good concrete daily schedule.   -Really focus on activities that bring marley.        Lia Stiles, [facility]                                                         ______________________________________________________________________    Individual Treatment Plan    Patient's Name: Veena Parsons  YOB: 1985    Date of Creation: 9-7-22  Date Treatment Plan Last Reviewed/Revised: 7-17-23    DSM5 Diagnoses:  296.32 (F33.1) Major Depressive Disorder, Recurrent Episode, Moderate _ and With mixed features  300.01 (F41.0) Panic Disorder  300.02 (F41.1) Generalized Anxiety Disorder   PMDD  Psychosocial / Contextual Factors: Some relational issues   PROMIS (reviewed every 90 days): 25    Referral / Collaboration:  Referral to another professional/service is not indicated at this time..    Anticipated number of session for this episode of care: 6-9 sessions  Anticipation frequency of session: Biweekly  Anticipated Duration of each session: 38-52 minutes  Treatment plan will be reviewed in 90 days or when goals have been changed. "       MeasurableTreatment Goal(s) related to diagnosis / functional impairment(s)  Goal 1: Patient will address struggles with anxiety, depression and PMDD.    I will know I've met my goal when I am feeling less reactive through the month with the symptoms.      Objective #A (Patient Action)    Patient will work on establishing a concrete routine with self-care.  Status: Continued - Date(s): 7-17-23    Intervention(s)  Therapist will use CBT and motivational interviewing.      Objective #B  Patient will develop a plan to help manage PMDD  Status: Continued - Date(s): 7-17-23    Intervention(s)  Therapist will use solution focused therapy.    Objective #C  Patient will work on ways to communicate with narcissistic individuals.  Status: Continued - Date(s): 7-17-23    Intervention(s)  Therapist will teach communication skills.          Patient has reviewed and agreed to the above plan.      Lia Stiles, NATACHA  September 7, 2022

## 2023-10-05 ENCOUNTER — VIRTUAL VISIT (OUTPATIENT)
Dept: PSYCHOLOGY | Facility: CLINIC | Age: 38
End: 2023-10-05
Payer: COMMERCIAL

## 2023-10-05 DIAGNOSIS — F41.1 GENERALIZED ANXIETY DISORDER: ICD-10-CM

## 2023-10-05 DIAGNOSIS — F33.1 MAJOR DEPRESSIVE DISORDER, RECURRENT EPISODE, MODERATE (H): Primary | ICD-10-CM

## 2023-10-05 DIAGNOSIS — F41.0 PANIC DISORDER WITHOUT AGORAPHOBIA: ICD-10-CM

## 2023-10-05 PROCEDURE — 90834 PSYTX W PT 45 MINUTES: CPT | Mod: VID | Performed by: MARRIAGE & FAMILY THERAPIST

## 2023-10-05 NOTE — PROGRESS NOTES
[facility]                                     Progress Note    Patient Name: Veena Parsons  Date: 10-5-23         Service Type: Individual      Session Start Time:  11am    Session End Time:    1150am     Session Length: 50min    Session #: 29    Attendees: Client     Service Modality:  Video    Telemedicine Visit: The patient's condition can be safely assessed and treated via synchronous audio and visual telemedicine encounter.      Reason for Telemedicine Visit: Patient has requested telehealth visit    Originating Site (Patient Location): Patient's home        Distant Location (provider location):  On-site    Consent:  The patient/guardian has verbally consented to: the potential risks and benefits of telemedicine (video visit) versus in person care; bill my insurance or make self-payment for services provided; and responsibility for payment of non-covered services.     Mode of Communication:  Video Conference via bewarket    As the provider I attest to compliance with applicable laws and regulations related to telemedicine.      Treatment Plan- 7-17-23  CGI- 7-17-23  Phq-9 and YADI-7- 9-20-23  Promis- 10-5-23    DATA  Interactive Complexity: No  Crisis: No        Progress Since Last Session (Related to Symptoms / Goals / Homework):   Symptoms: Client reports confronting issues with anxiety and depressed mood.  She reports this has been a better week.        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    -Feels there has been a lot of PTSD triggers and reactions the last two weeks.    -Has struggled with PMDD for a number of  years.  Has been working with chiropractic and message therapy on some holistic treatment options- Continue    -Suicidal thoughts have improved this week.     -Trying to keep in mind wanting to have fun at home and looking forward to being around family but no quality time spent the last two weeks.    -Daughter Deborah has been more de la vega, which in turn can turn into meanness and lack of follow through.    -Made the comment that she is being her own advocate and sticking up for herself.   -Spouse is up for a promotion at work.    -Veena feels no one is recognizing when she is enjoying something or shoots it down. Feels she is there to serve others and her tank is not being filled.         Treatment Objective(s) Addressed in This Session:  Safety planning   Patient will develop a plan to help manage PMDD  Patient will work on ways to communicate /patterns   Relational issues        Intervention:   Follow safety plan and use crisis resources.  Agrees to contract for safety today.   Use support system- Continued   Respond and not react to Star.  Use assertiveness skills and have good boundaries.    Support each other with parenting Deborah. Have consistent consequences. [place] back to respect and boundaries.    Practice empathy.    Have consistent rules among the kids.    Continuing with medication management.   Use some holistic approaches to health.         Brainstorm more ways to bring fun into the family. Really put effort into quality time.         Assessments completed prior to visit:  The following assessments were completed by patient for this visit:  PHQ2:       10/5/2023    10:34 AM 9/19/2023     7:09 PM 9/7/2023     6:59 AM 8/23/2023    10:14 AM 8/7/2023    11:02 AM 4/11/2023     9:00 PM 1/5/2023     1:35 PM   PHQ-2 ( 1999 [employer])   Q1: Little interest or pleasure in doing things 0 1 1 1 1 1 2   Q2: Feeling down, depressed or hopeless 1 2 1 1 1 1 3   PHQ-2 Score 1 3 2 2 2 2 5   Q1: Little interest or pleasure in  doing things Not at all Several days Several days Several days Several days Several days More than half the days   Q2: Feeling down, depressed or hopeless Several days More than half the days Several days Several days Several days Several days Nearly every day   PHQ-2 Score 1 3 2 2 2 2 5     PHQ9:       10/26/2022     8:26 AM 11/29/2022    12:07 PM 1/5/2023     1:35 PM 2/2/2023    10:13 AM 6/1/2023    10:07 AM 7/17/2023    11:15 AM 9/19/2023     7:09 PM   PHQ-9 SCORE   PHQ-9 Total Score MyChart 14 (Moderate depression) 21 (Severe depression) 16 (Moderately severe depression)    15 (Moderately severe depression)   PHQ-9 Total Score 14 21 16 12 15 16 15     GAD2:       11/27/2022     5:03 AM 4/6/2023     8:12 AM 8/7/2023    11:04 AM 8/23/2023    10:14 AM 9/7/2023     6:59 AM 9/19/2023     7:10 PM 10/5/2023    10:34 AM   YADI-2   Feeling nervous, anxious, or on edge 3 1 1 1 1 3 1   Not being able to stop or control worrying 3 1 2 1 1 3 1   YADI-2 Total Score 6 2 3 2 2 6 2     GAD7:       11/27/2022     5:03 AM 2/2/2023    10:13 AM 6/1/2023    10:07 AM 7/17/2023    11:15 AM 8/7/2023    11:04 AM 9/19/2023     7:10 PM   YADI-7 SCORE   Total Score 17 (severe anxiety)    5 (mild anxiety) 21 (severe anxiety)   Total Score 17 13 12 13 5 21     PROMIS 10-Global Health (all questions and answers displayed):       11/27/2022     5:04 AM 4/6/2023     8:14 AM 8/7/2023    11:05 AM 8/23/2023    10:14 AM 9/7/2023     7:00 AM 9/19/2023     7:11 PM 10/5/2023    10:35 AM   PROMIS 10   In general, would you say your health is: Good Good Good Good Good Good Good   In general, would you say your quality of life is: Fair Good Good Fair Good Fair Good   In general, how would you rate your physical health? Good Good Fair Good Good Fair Fair   In general, how would you rate your mental health, including your mood and your ability to think? Poor Fair Fair Fair Poor Fair Good   In general, how would you rate your satisfaction with your social  activities and relationships? Poor Fair Good Fair Fair Fair Good   In general, please rate how well you carry out your usual social activities and roles Poor Poor Good Poor Poor Poor Fair   To what extent are you able to carry out your everyday physical activities such as walking, climbing stairs, carrying groceries, or moving a chair? A little Mostly Moderately Moderately Mostly Completely Mostly   In the past 7 days, how often have you been bothered by emotional problems such as feeling anxious, depressed, or irritable? Always Sometimes Sometimes Often Often Often Sometimes   In the past 7 days, how would you rate your fatigue on average? Moderate Mild Moderate Moderate Moderate Severe Mild   In the past 7 days, how would you rate your pain on average, where 0 means no pain, and 10 means worst imaginable pain? 4 2 4 6 6 5 5   In general, would you say your health is: 3 3 3 3 3 3 3   In general, would you say your quality of life is: 2 3 3 2 3 2 3   In general, how would you rate your physical health? 3 3 2 3 3 2 2   In general, how would you rate your mental health, including your mood and your ability to think? 1 2 2 2 1 2 3   In general, how would you rate your satisfaction with your social activities and relationships? 1 2 3 2 2 2 3   In general, please rate how well you carry out your usual social activities and roles. (This includes activities at home, at work and in your community, and responsibilities as a parent, child, spouse, employee, friend, etc.) 1 1 3 1 1 1 2   To what extent are you able to carry out your everyday physical activities such as walking, climbing stairs, carrying groceries, or moving a chair? 2 4 3 3 4 5 4   In the past 7 days, how often have you been bothered by emotional problems such as feeling anxious, depressed, or irritable? 5 3 3 4 4 4 3   In the past 7 days, how would you rate your fatigue on average? 3 2 3 3 3 4 2   In the past 7 days, how would you rate your pain on average,  "where 0 means no pain, and 10 means worst imaginable pain? 4 2 4 6 6 5 5   Global Mental Health Score 5 10 11 8 8 8 12   Global Physical Health Score 11 15 11 12 13 12 13   PROMIS TOTAL - SUBSCORES 16 25 22 20 21 20 25     [place] Suicide Severity Rating Scale (Lifetime/Recent)      8/3/2022     9:17 AM   [place] Suicide Severity Rating (Lifetime/Recent)   Q1 Wish to be Dead (Lifetime) N   Q2 Non-Specific Active Suicidal Thoughts (Lifetime) N   Actual Attempt (Lifetime) N   Has subject engaged in non-suicidal self-injurious behavior? (Lifetime) N   Calculated C-SSRS Risk Score (Lifetime/Recent) No Risk Indicated         ASSESSMENT: Current Emotional / Mental Status (status of significant symptoms):   Risk status (Self / Other harm or suicidal ideation)   Patient denies current fears or concerns for personal safety.   Patient denies current or recent suicidal ideation or behaviors.    Patient denies current or recent homicidal ideation or behaviors.   Patient denies current or recent self injurious behavior or ideation.   Patient denies other safety concerns.   Patient reports there has been no change in risk factors since their last session.     Patient reports there has been no change in protective factors since their last session.     A safety and risk management plan has been developed including: Patient consented to co-developed safety plan on 11-30-22.  Safety and risk management plan was reviewed.   Patient agreed to use safety plan should any safety concerns arise.  A copy was made available to the patient. Reviewed 10-5-23        [facility]                                       Veena Parsons     SAFETY PLAN:  Step 1: Warning signs / cues (Thoughts, images, mood, situation, behavior) that a crisis may be developing:  Thoughts: \"I don't matter\", \"People would be better off without me\", \"I'm a burden\", \"I can't do this anymore\", \"I just want this to end\" and \"Nothing makes it " "better\"  Images: flashbacks  Thinking Processes: ruminations (can't stop thinking about my problems): PMDD and loss of mother, racing thoughts and highly critical and negative thoughts: I cant do anything right  Mood: worsening depression, hopelessness, helplessness, intense worry, agitation and mood swings  Behaviors: isolating/withdrawing , can't stop crying, not taking care of myself, not taking care of my responsibilities, sleeping too much, not sleeping enough and increasing frequency and duration of dissociation  Situations: relationship problems, trauma  and medical condition / diagnosis: PMDD    Step 2: Coping strategies - Things I can do to take my mind off of my problems without contacting another person (relaxation technique, physical activity):  Distress Tolerance Strategies:  relaxation activities, play with my pet , listen to positive and upbeat music, sensory based activities/self-soothe with five senses, watch a funny movie, read a book, change body temperature (ice pack/cold water)  and paced breathing/progressive muscle relaxation  Physical Activities: go for a walk, gardening, meditation, deep breathing and stretching   Focus on helpful thoughts:  \"This is temporary\", \"I will get through this\", \"It always passes\" and \"Ride the wave\"  Step 3: People and social settings that provide distraction:   Name: Spouse Star   Name:  Spending time with daughter     movie theater, pet store/humane society and coffee shop   Step 4: Remind myself of people and things that are important to me and worth living for:    Daughter   Spouse  Pets     Step 5: When I am in crisis, I can ask these people to help me use my safety plan:   Name: Spouse  Phone: [phone]      Step 6: Making the environment safe:   remove things I could use to hurt myself and be around others  Step 7: Professionals or agencies I can contact during a crisis:  Suicide Prevention Lifeline: Call or Text 520   Local Crisis Services:  Mine" [facility] [phone]      Call 911 or go to my nearest emergency department.   I helped develop this safety plan and agree to use it when needed.  I have been given a copy of this plan.      Client signature _________________________________________________________________  Today s date:  11/30/2022  Completed by Provider Name/ Credentials:  Lia Stiles MA, LMFT  November 30, 2022  Adapted from Safety Plan Template 2008 Eleonora Capone and Danny Carmona is reprinted with the express permission of the authors.  No portion of the Safety Plan Template may be reproduced without the express, written permission.  You can contact the authors at [email or web address] or [email or web address].    Name: Veena Parsons  YOB: 1985  Date: November 30, 2022   My primary care provider: Francisco Nails  My primary care clinic: [facility]   My prescriber: PCP  Other care team support:  Holistic medical provider    My Triggers:    Relational problems  Loss of mother recently  Financial stress      Additional People, Places, and Things that I can access for support:   Spouse  Daughter          What is important to me and makes life worth living: Family         GREEN    Good Control  1. I feel good  2. No suicidal thoughts   3. Can work, sleep and play      Action Steps  1. Self-care: balanced meals, exercising, sleep practices, etc.  2. Take your medications as prescribed.  3. Continue meetings with therapist and prescriber.  4.  Do the healthy things that I enjoy.             YELLO Getting Worse  I have ANY of these:  1. I do not feel good  2. Difficulty Concentrating  3. Sleep is changing  4. Increase/Change in my thoughts to hurt self and/or others, but I can still manage and not act on it.   5. Not taking care of self.               Action Steps (in addition to the above):  1. Inform your therapist and psychiatric prescriber/PCP.  2. Keep taking your medications as prescribed.    3. Turn to  people you can ask for help.  4. Use internal coping strategies -see below.  5. Create safe environment: Removing items I can hurt self with              RED  Get Help  If I have ANY of these:  1. Current and uncontrollable thoughts and/or behaviors to hurt self and/or others.   2. Crazy buzzing and spinning.     Actions to manage my safety  1. Contact your emergency person Star  2. Call or Text [phone]  3. Call my crisis team- [facility] [phone]  3. Or Call 911 or go to the emergency room right away        My Internal Coping Strategies include the following:  take a bath, belly breathing, arts and crafts, play with my pet, use my coping box, exercise and use my coping skills    [End for Brief Safety Plan]     Safety Concerns  How To Identify Situations That Make Your Mental Health Worse:  Triggers are things that make your mental health worse.  Look at the list below to help you find your triggers and what you can do about them.     1. Identify Early Warning Signs:    Sometimes symptoms return, even when people do their best to stay well. Symptoms can develop over a short period of time with little or no warning, but most of the time they emerge gradually over several weeks.  Early warning signs are changes that people experience when a relapse is starting. Some early warning signs are common and others are not as common.   Common Early Warning Signs:    Feeling tense or nervous, Eating less or eating more, Trouble sleeping -either too much or too little sleep, Feeling depressed or low, Feeling irritable, Feeling like not being around other people, Trouble concentrating and Urges to harm self     2. Identify action steps to take when warning signs are noticed:    Taking Action- It is important to take action if you are experiencing early warning signs of a relapse.  The faster you act, the more likely it is that you can avoid a full relapse.  It is helpful to identify several specific ways to cope with  symptoms.      The following is my list of symptoms and coping strategies that I can use when they are present:    Symptom Coping Strategies   Anxiety -Talk with someone in your support system and let him or her know how you are feeling.  -Use relaxation techniques such as deep breathing or imagery.  -Use positive affirmations to counteract negative self-talk such as  I am learning to let go of worry.    Depression - Schedule your day; include activities you have to do and activities you enjoy doing.  - Get some exercise - walk, run, bike, or swim.  - Give yourself credit for even the smallest things you get done.   Sleep Difficulties   - Go to sleep at the same time every day.  - Do something relaxing before bed, such as drinking herbal tea or listening to music.  - Avoid having discussions about upsetting topics before going to bed.   Delusions   - Distract yourself from the disturbing thought by doing something that requires your attention such as a puzzle.  - Check out your beliefs by talking to someone you trust.    Hallucinations   - Use headphones to listen to music.  - Tell voices to  stop  or say to yourself,  I am safe.   - Ignore the hallucinations as much as possible; focus on other things.   Concentration Difficulties - Minimize distractions so there is only one thing for you to focus on at a time.    - Ask the person you are having a conversation with to slow down or repeat things you are unsure of.            Appearance:   Normal   Eye Contact:   Good   Psychomotor Behavior: Normal    Attitude:   Cooperative    Orientation:   All   Speech    Rate / Production: Normal     Volume:  Normal    Mood:    Anxious Upset    Affect:    Worrisome Tearful    Thought Content:  Clear    Thought Form:  Coherent  Goal Directed    Insight:    Fair      Medication Review:   No changes to current psychiatric medication(s)     Medication Compliance:   Yes     Changes in Health Issues:   None reported     Chemical Use  "Review:   Substance Use: Chemical use reviewed, no active concerns identified      Tobacco Use: No current tobacco use.      Diagnosis:  296.32 (F33.1) Major Depressive Disorder, Recurrent Episode, Moderate _ and With mixed features  300.01 (F41.0) Panic Disorder  300.02 (F41.1) Generalized Anxiety Disorder   PMDD      Collateral Reports Completed:   Not Applicable    PLAN: (Patient Tasks / Therapist Tasks / Other)  Client will continue to work on the following goals:    -Follow safety plan and use crisis resources-Continued   -Work on responding and not reacting.   -Work on boundaries and having some discipline.    -Have some consistent parenting styles with Deborah.   -Practice empathy and trying not to problem solve.      -Encourage Star to get his own services.   -Continue to work on \"staying in own eulalio\" as a couple.   -Have a good concrete daily schedule.   -Really focus on activities that bring marley.        Lia Stiles, [facility]                                                         ______________________________________________________________________    Individual Treatment Plan    Patient's Name: Veena Parsons  YOB: 1985    Date of Creation: 9-7-22  Date Treatment Plan Last Reviewed/Revised: 7-17-23    DSM5 Diagnoses:  296.32 (F33.1) Major Depressive Disorder, Recurrent Episode, Moderate _ and With mixed features  300.01 (F41.0) Panic Disorder  300.02 (F41.1) Generalized Anxiety Disorder   PMDD  Psychosocial / Contextual Factors: Some relational issues   PROMIS (reviewed every 90 days): 25    Referral / Collaboration:  Referral to another professional/service is not indicated at this time..    Anticipated number of session for this episode of care: 6-9 sessions  Anticipation frequency of session: Biweekly  Anticipated Duration of each session: 38-52 minutes  Treatment plan will be reviewed in 90 days or when goals have been changed.       MeasurableTreatment Goal(s) related to diagnosis / " functional impairment(s)  Goal 1: Patient will address struggles with anxiety, depression and PMDD.    I will know I've met my goal when I am feeling less reactive through the month with the symptoms.      Objective #A (Patient Action)    Patient will work on establishing a concrete routine with self-care.  Status: Continued - Date(s): 7-17-23    Intervention(s)  Therapist will use CBT and motivational interviewing.      Objective #B  Patient will develop a plan to help manage PMDD  Status: Continued - Date(s): 7-17-23    Intervention(s)  Therapist will use solution focused therapy.    Objective #C  Patient will work on ways to communicate with narcissistic individuals.  Status: Continued - Date(s): 7-17-23    Intervention(s)  Therapist will teach communication skills.          Patient has reviewed and agreed to the above plan.      Lia Stiles, TANIAFT  September 7, 2022

## 2023-10-16 ENCOUNTER — VIRTUAL VISIT (OUTPATIENT)
Dept: PSYCHOLOGY | Facility: CLINIC | Age: 38
End: 2023-10-16
Payer: COMMERCIAL

## 2023-10-16 DIAGNOSIS — F41.0 PANIC DISORDER WITHOUT AGORAPHOBIA: ICD-10-CM

## 2023-10-16 DIAGNOSIS — F33.1 MAJOR DEPRESSIVE DISORDER, RECURRENT EPISODE, MODERATE (H): Primary | ICD-10-CM

## 2023-10-16 DIAGNOSIS — F41.1 GENERALIZED ANXIETY DISORDER: ICD-10-CM

## 2023-10-16 PROCEDURE — 90834 PSYTX W PT 45 MINUTES: CPT | Mod: VID | Performed by: MARRIAGE & FAMILY THERAPIST

## 2023-10-16 NOTE — PROGRESS NOTES
[facility]                                     Progress Note    Patient Name: Veena Parsons  Date: 10-16-23         Service Type: Individual      Session Start Time:  12pm    Session End Time:    1250pm     Session Length: 50min    Session #: 30    Attendees: Client     Service Modality:  Video    Telemedicine Visit: The patient's condition can be safely assessed and treated via synchronous audio and visual telemedicine encounter.      Reason for Telemedicine Visit: Patient has requested telehealth visit    Originating Site (Patient Location): Patient's home        Distant Location (provider location):  On-site    Consent:  The patient/guardian has verbally consented to: the potential risks and benefits of telemedicine (video visit) versus in person care; bill my insurance or make self-payment for services provided; and responsibility for payment of non-covered services.     Mode of Communication:  Video Conference via [employer]    As the provider I attest to compliance with applicable laws and regulations related to telemedicine.      Treatment Plan- 7-17-23  CGI- 7-17-23  Phq-9 and YADI-7- 9-20-23  Promis- 10-5-23    DATA  Interactive Complexity: No  Crisis: No        Progress Since Last Session (Related to Symptoms / Goals / Homework):   There has been demonstrated improvement in functioning while patient has been engaged in psychotherapy/psychological service- if withdrawn the patient would deteriorate and/or relapse.      Symptoms: Client reports confronting issues with anxiety and depressed mood.  She reports ongoing struggles in her marriage.     Homework: Achieved / completed to satisfaction  Completed in session      Episode of Care Goals: Satisfactory progress - ACTION (Actively working towards change); Intervened by reinforcing change plan / affirming steps taken     Current / Ongoing Stressors and Concerns:   -Veena did get approved for disability.   -Michele Graves has not been  engaged this week and is distracted on his phone.    -Has struggled with PMDD for a number of years.  Has been working with chiropractic and message therapy on some holistic treatment options.  Has not been able to get in consistently - Continue    -Trying to keep in mind wanting to have fun at home and looking forward to being around family.   -Attempted to have a Halloween get together but Veena felt she did all the work.     -Made the comment that she is being her own advocate and sticking up for herself.   -Spouse is up for a promotion at work. Has some more options for training on intermediate and money management.    -Veena is working on feeling useful and finding a purpose.         Treatment Objective(s) Addressed in This Session:  Safety planning   Patient will develop a plan to help manage PMDD  Patient will work on ways to communicate /patterns   Relational issues        Intervention:   Follow safety plan and use crisis resources.  Agrees to contract for safety today.   Use support system- Continued   Respond and not react to Star.  Use assertiveness skills and have good boundaries.    Work on finding purposeful and meaningful activities during the day.   Attend the financial conference with Star.    Support each other with parenting Deborah. Have consistent consequences. Holderness back to respect and boundaries.    Practice empathy.    Have consistent rules among the kids.    Use some holistic approaches to health.  Find a new option if the other place does not open up.    Brainstorm more ways to bring fun into the family. Really put effort into quality time.         Assessments completed prior to visit:  The following assessments were completed by patient for this visit:  PHQ2:       10/5/2023    10:34 AM 9/19/2023     7:09 PM 9/7/2023     6:59 AM 8/23/2023    10:14 AM 8/7/2023    11:02 AM 4/11/2023     9:00 PM 1/5/2023     1:35 PM   PHQ-2 ( 1999 [employer])   Q1: Little interest or pleasure in doing things 0 1 1 1 1  1 2   Q2: Feeling down, depressed or hopeless 1 2 1 1 1 1 3   PHQ-2 Score 1 3 2 2 2 2 5   Q1: Little interest or pleasure in doing things Not at all Several days Several days Several days Several days Several days More than half the days   Q2: Feeling down, depressed or hopeless Several days More than half the days Several days Several days Several days Several days Nearly every day   PHQ-2 Score 1 3 2 2 2 2 5     PHQ9:       10/26/2022     8:26 AM 11/29/2022    12:07 PM 1/5/2023     1:35 PM 2/2/2023    10:13 AM 6/1/2023    10:07 AM 7/17/2023    11:15 AM 9/19/2023     7:09 PM   PHQ-9 SCORE   PHQ-9 Total Score MyChart 14 (Moderate depression) 21 (Severe depression) 16 (Moderately severe depression)    15 (Moderately severe depression)   PHQ-9 Total Score 14 21 16 12 15 16 15     GAD2:       11/27/2022     5:03 AM 4/6/2023     8:12 AM 8/7/2023    11:04 AM 8/23/2023    10:14 AM 9/7/2023     6:59 AM 9/19/2023     7:10 PM 10/5/2023    10:34 AM   YADI-2   Feeling nervous, anxious, or on edge 3 1 1 1 1 3 1   Not being able to stop or control worrying 3 1 2 1 1 3 1   YADI-2 Total Score 6 2 3 2 2 6 2     GAD7:       11/27/2022     5:03 AM 2/2/2023    10:13 AM 6/1/2023    10:07 AM 7/17/2023    11:15 AM 8/7/2023    11:04 AM 9/19/2023     7:10 PM   YADI-7 SCORE   Total Score 17 (severe anxiety)    5 (mild anxiety) 21 (severe anxiety)   Total Score 17 13 12 13 5 21     PROMIS 10-Global Health (all questions and answers displayed):       11/27/2022     5:04 AM 4/6/2023     8:14 AM 8/7/2023    11:05 AM 8/23/2023    10:14 AM 9/7/2023     7:00 AM 9/19/2023     7:11 PM 10/5/2023    10:35 AM   PROMIS 10   In general, would you say your health is: Good Good Good Good Good Good Good   In general, would you say your quality of life is: Fair Good Good Fair Good Fair Good   In general, how would you rate your physical health? Good Good Fair Good Good Fair Fair   In general, how would you rate your mental health, including your mood and your  ability to think? Poor Fair Fair Fair Poor Fair Good   In general, how would you rate your satisfaction with your social activities and relationships? Poor Fair Good Fair Fair Fair Good   In general, please rate how well you carry out your usual social activities and roles Poor Poor Good Poor Poor Poor Fair   To what extent are you able to carry out your everyday physical activities such as walking, climbing stairs, carrying groceries, or moving a chair? A little Mostly Moderately Moderately Mostly Completely Mostly   In the past 7 days, how often have you been bothered by emotional problems such as feeling anxious, depressed, or irritable? Always Sometimes Sometimes Often Often Often Sometimes   In the past 7 days, how would you rate your fatigue on average? Moderate Mild Moderate Moderate Moderate Severe Mild   In the past 7 days, how would you rate your pain on average, where 0 means no pain, and 10 means worst imaginable pain? 4 2 4 6 6 5 5   In general, would you say your health is: 3 3 3 3 3 3 3   In general, would you say your quality of life is: 2 3 3 2 3 2 3   In general, how would you rate your physical health? 3 3 2 3 3 2 2   In general, how would you rate your mental health, including your mood and your ability to think? 1 2 2 2 1 2 3   In general, how would you rate your satisfaction with your social activities and relationships? 1 2 3 2 2 2 3   In general, please rate how well you carry out your usual social activities and roles. (This includes activities at home, at work and in your community, and responsibilities as a parent, child, spouse, employee, friend, etc.) 1 1 3 1 1 1 2   To what extent are you able to carry out your everyday physical activities such as walking, climbing stairs, carrying groceries, or moving a chair? 2 4 3 3 4 5 4   In the past 7 days, how often have you been bothered by emotional problems such as feeling anxious, depressed, or irritable? 5 3 3 4 4 4 3   In the past 7 days,  "how would you rate your fatigue on average? 3 2 3 3 3 4 2   In the past 7 days, how would you rate your pain on average, where 0 means no pain, and 10 means worst imaginable pain? 4 2 4 6 6 5 5   Global Mental Health Score 5 10 11 8 8 8 12   Global Physical Health Score 11 15 11 12 13 12 13   PROMIS TOTAL - SUBSCORES 16 25 22 20 21 20 25     [place] Suicide Severity Rating Scale (Lifetime/Recent)      8/3/2022     9:17 AM   [place] Suicide Severity Rating (Lifetime/Recent)   Q1 Wish to be Dead (Lifetime) N   Q2 Non-Specific Active Suicidal Thoughts (Lifetime) N   Actual Attempt (Lifetime) N   Has subject engaged in non-suicidal self-injurious behavior? (Lifetime) N   Calculated C-SSRS Risk Score (Lifetime/Recent) No Risk Indicated         ASSESSMENT: Current Emotional / Mental Status (status of significant symptoms):   Risk status (Self / Other harm or suicidal ideation)   Patient denies current fears or concerns for personal safety.   Patient denies current or recent suicidal ideation or behaviors.    Patient denies current or recent homicidal ideation or behaviors.   Patient denies current or recent self injurious behavior or ideation.   Patient denies other safety concerns.   Patient reports there has been no change in risk factors since their last session.     Patient reports there has been no change in protective factors since their last session.     A safety and risk management plan has been developed including: Patient consented to co-developed safety plan on 11-30-22.  Safety and risk management plan was reviewed.   Patient agreed to use safety plan should any safety concerns arise.  A copy was made available to the patient. Reviewed 10-16-23        [facility]                                       Veena Parsons     SAFETY PLAN:  Step 1: Warning signs / cues (Thoughts, images, mood, situation, behavior) that a crisis may be developing:  Thoughts: \"I don't matter\", \"People would be " "better off without me\", \"I'm a burden\", \"I can't do this anymore\", \"I just want this to end\" and \"Nothing makes it better\"  Images: flashbacks  Thinking Processes: ruminations (can't stop thinking about my problems): PMDD and loss of mother, racing thoughts and highly critical and negative thoughts: I cant do anything right  Mood: worsening depression, hopelessness, helplessness, intense worry, agitation and mood swings  Behaviors: isolating/withdrawing , can't stop crying, not taking care of myself, not taking care of my responsibilities, sleeping too much, not sleeping enough and increasing frequency and duration of dissociation  Situations: relationship problems, trauma  and medical condition / diagnosis: PMDD    Step 2: Coping strategies - Things I can do to take my mind off of my problems without contacting another person (relaxation technique, physical activity):  Distress Tolerance Strategies:  relaxation activities, play with my pet , listen to positive and upbeat music, sensory based activities/self-soothe with five senses, watch a funny movie, read a book, change body temperature (ice pack/cold water)  and paced breathing/progressive muscle relaxation  Physical Activities: go for a walk, gardening, meditation, deep breathing and stretching   Focus on helpful thoughts:  \"This is temporary\", \"I will get through this\", \"It always passes\" and \"Ride the wave\"  Step 3: People and social settings that provide distraction:   Name: Spouse tSar   Name:  Spending time with daughter     movie theater, pet store/humane society and coffee shop   Step 4: Remind myself of people and things that are important to me and worth living for:    Daughter   Spouse  Pets     Step 5: When I am in crisis, I can ask these people to help me use my safety plan:   Name: Spouse  Phone: [phone]      Step 6: Making the environment safe:   remove things I could use to hurt myself and be around others  Step 7: Professionals or agencies " I can contact during a crisis:  Suicide Prevention Lifeline: Call or Text 536   Local Crisis Services:  [facility] [phone]      Call 911 or go to my nearest emergency department.   I helped develop this safety plan and agree to use it when needed.  I have been given a copy of this plan.      Client signature _________________________________________________________________  Today s date:  11/30/2022  Completed by Provider Name/ Credentials:  Lia Stiles MA, LMFT  November 30, 2022  Adapted from Safety Plan Template 2008 Eleonora Capone and Danny Carmona is reprinted with the express permission of the authors.  No portion of the Safety Plan Template may be reproduced without the express, written permission.  You can contact the authors at [email or web address] or [email or web address].    Name: Veena Parsons  YOB: 1985  Date: November 30, 2022   My primary care provider: Francisco Nails  My primary care clinic:  [facility]   My prescriber: PCP  Other care team support:  Holistic medical provider    My Triggers:    Relational problems  Loss of mother recently  Financial stress      Additional People, Places, and Things that I can access for support:   Spouse  Daughter          What is important to me and makes life worth living: Family         GREEN    Good Control  1. I feel good  2. No suicidal thoughts   3. Can work, sleep and play      Action Steps  1. Self-care: balanced meals, exercising, sleep practices, etc.  2. Take your medications as prescribed.  3. Continue meetings with therapist and prescriber.  4.  Do the healthy things that I enjoy.             YELLO Getting Worse  I have ANY of these:  1. I do not feel good  2. Difficulty Concentrating  3. Sleep is changing  4. Increase/Change in my thoughts to hurt self and/or others, but I can still manage and not act on it.   5. Not taking care of self.               Action Steps (in addition to the above):  1.  Inform your therapist and psychiatric prescriber/PCP.  2. Keep taking your medications as prescribed.    3. Turn to people you can ask for help.  4. Use internal coping strategies -see below.  5. Create safe environment: Removing items I can hurt self with              RED  Get Help  If I have ANY of these:  1. Current and uncontrollable thoughts and/or behaviors to hurt self and/or others.   2. Crazy buzzing and spinning.     Actions to manage my safety  1. Contact your emergency person Star  2. Call or Text 364  3. Call my crisis team- [facility] [phone]  3. Or Call [phone] or go to the emergency room right away        My Internal Coping Strategies include the following:  take a bath, belly breathing, arts and crafts, play with my pet, use my coping box, exercise and use my coping skills    [End for Brief Safety Plan]     Safety Concerns  How To Identify Situations That Make Your Mental Health Worse:  Triggers are things that make your mental health worse.  Look at the list below to help you find your triggers and what you can do about them.     1. Identify Early Warning Signs:    Sometimes symptoms return, even when people do their best to stay well. Symptoms can develop over a short period of time with little or no warning, but most of the time they emerge gradually over several weeks.  Early warning signs are changes that people experience when a relapse is starting. Some early warning signs are common and others are not as common.   Common Early Warning Signs:    Feeling tense or nervous, Eating less or eating more, Trouble sleeping -either too much or too little sleep, Feeling depressed or low, Feeling irritable, Feeling like not being around other people, Trouble concentrating and Urges to harm self     2. Identify action steps to take when warning signs are noticed:    Taking Action- It is important to take action if you are experiencing early warning signs of a relapse.  The faster you act, the  more likely it is that you can avoid a full relapse.  It is helpful to identify several specific ways to cope with symptoms.      The following is my list of symptoms and coping strategies that I can use when they are present:    Symptom Coping Strategies   Anxiety -Talk with someone in your support system and let him or her know how you are feeling.  -Use relaxation techniques such as deep breathing or imagery.  -Use positive affirmations to counteract negative self-talk such as  I am learning to let go of worry.    Depression - Schedule your day; include activities you have to do and activities you enjoy doing.  - Get some exercise - walk, run, bike, or swim.  - Give yourself credit for even the smallest things you get done.   Sleep Difficulties   - Go to sleep at the same time every day.  - Do something relaxing before bed, such as drinking herbal tea or listening to music.  - Avoid having discussions about upsetting topics before going to bed.   Delusions   - Distract yourself from the disturbing thought by doing something that requires your attention such as a puzzle.  - Check out your beliefs by talking to someone you trust.    Hallucinations   - Use headphones to listen to music.  - Tell voices to  stop  or say to yourself,  I am safe.   - Ignore the hallucinations as much as possible; focus on other things.   Concentration Difficulties - Minimize distractions so there is only one thing for you to focus on at a time.    - Ask the person you are having a conversation with to slow down or repeat things you are unsure of.            Appearance:   Normal   Eye Contact:   Good   Psychomotor Behavior: Normal    Attitude:   Cooperative    Orientation:   All   Speech    Rate / Production: Normal     Volume:  Normal    Mood:    Anxious    Affect:    Worrisome Tearful    Thought Content:  Clear    Thought Form:  Coherent  Goal Directed    Insight:    Fair      Medication Review:   No changes to current psychiatric  "medication(s)     Medication Compliance:   Yes     Changes in Health Issues:   None reported     Chemical Use Review:   Substance Use: Chemical use reviewed, no active concerns identified      Tobacco Use: No current tobacco use.      Diagnosis:  296.32 (F33.1) Major Depressive Disorder, Recurrent Episode, Moderate _ and With mixed features  300.01 (F41.0) Panic Disorder  300.02 (F41.1) Generalized Anxiety Disorder   PMDD      Collateral Reports Completed:   Not Applicable    PLAN: (Patient Tasks / Therapist Tasks / Other)  Client will continue to work on the following goals:    -Follow safety plan and use crisis resources-Continued   -Work on responding and not reacting.   -Work on daily purpose   -Work on boundaries and having some discipline.    -Have some consistent parenting styles with Deborah.   -Practice empathy and trying not to problem solve.      -Encourage Star to get his own services.   -Continue to work on \"staying in own eulalio\" as a couple.   -Have a good concrete daily schedule.   -Find a new chiropractor if the place in [facility] does not open back up.       Lia Stiles, [facility]                                                         ______________________________________________________________________    Individual Treatment Plan    Patient's Name: Veena Parsons  YOB: 1985    Date of Creation: 9-7-22  Date Treatment Plan Last Reviewed/Revised: 10-16-23    DSM5 Diagnoses:  296.32 (F33.1) Major Depressive Disorder, Recurrent Episode, Moderate _ and With mixed features  300.01 (F41.0) Panic Disorder  300.02 (F41.1) Generalized Anxiety Disorder   PMDD  Psychosocial / Contextual Factors: Some relational issues   PROMIS (reviewed every 90 days): 25    Referral / Collaboration:  Referral to another professional/service is not indicated at this time..    Anticipated number of session for this episode of care: 6-9 sessions  Anticipation frequency of session: Biweekly  Anticipated Duration of " each session: 38-52 minutes  Treatment plan will be reviewed in 90 days or when goals have been changed.       MeasurableTreatment Goal(s) related to diagnosis / functional impairment(s)  Goal 1: Patient will address struggles with anxiety, depression and PMDD.    I will know I've met my goal when I am feeling less reactive through the month with the symptoms.      Objective #A (Patient Action)    Patient will work on establishing a concrete routine with self-care.  Status: Continued - Date(s): 10-16-23    Intervention(s)  Therapist will use CBT and motivational interviewing.      Objective #B  Patient will develop a plan to help manage PMDD  Status: Continued - Date(s): 10-16-23    Intervention(s)  Therapist will use solution focused therapy.    Objective #C  Patient will work on ways to communicate with narcissistic individuals.  Status: Continued - Date(s): 10-16-23    Intervention(s)  Therapist will teach communication skills.          Patient has reviewed and agreed to the above plan.      Lia Stiles, NATACHA  September 7, 2022

## 2023-10-20 ENCOUNTER — MYC MEDICAL ADVICE (OUTPATIENT)
Dept: FAMILY MEDICINE | Facility: CLINIC | Age: 38
End: 2023-10-20
Payer: COMMERCIAL

## 2023-10-20 NOTE — LETTER
October 30, 2023      Veena Parsons  [address]  [place] [address]          Dear Veena,    We care about your health and have reviewed your health plan including your medical conditions, medications, and lab results.  Based on this review, it is recommended that you follow up regarding the following health topic(s):  -Depression  -Wellness (Physical) Visit   -Immunizations:       Health Maintenance Due   Topic Date Due    HEPATITIS B IMMUNIZATION (1 of 3 - 3-dose series) Never done    COVID-19 Vaccine (1) Never done    INFLUENZA VACCINE (1) 09/01/2023         We recommend you take the following action(s):  -schedule a WELLNESS (Physical) APPOINTMENT.  We will perform the following labs: none at this time.  -Complete and return the attached PHQ-9 Form.  If your total score is greater than 9, please schedule a followup appointment.  If you answer Yes to question 9, call your clinic between the hours of 8 to 5.  You may also call the Suicide Hotline at 0-190-019-VSRG (7261) any time.     Please call us at the [facility] [phone] (or use [facility]) to address the above recommendations.      Thank you for trusting [facility] and we appreciate the opportunity to serve you.  We look forward to supporting your healthcare needs in the future.     Healthy Regards,     Your Health Care Team at [facility]

## 2023-10-20 NOTE — LETTER
My Depression Action Plan  Name: Veena Parsons   Date of Birth 1985  Date: 10/20/2023    My doctor: Francisco Nails   My clinic: [facility] [place]  [address], [address]  [place] [id number] [phone]            GREEN    ZONE   Good Control    What it looks like:   Things are going generally well. You have normal ups and downs. You may even feel depressed from time to time, but bad moods usually last less than a day.   What you need to do:  Continue to care for yourself (see self care plan)  Check your depression survival kit and update it as needed  Follow your physician s recommendations including any medication.  Do not stop taking medication unless you consult with your physician first.             YELLOW         ZONE Getting Worse    What it looks like:   Depression is starting to interfere with your life.   It may be hard to get out of bed; you may be starting to isolate yourself from others.  Symptoms of depression are starting to last most all day and this has happened for several days.   You may have suicidal thoughts but they are not constant.   What you need to do:     Call your care team. Your response to treatment will improve if you keep your care team informed of your progress. Yellow periods are signs an adjustment may need to be made.     Continue your self-care.  Just get dressed and ready for the day.  Don't give yourself time to talk yourself out of it.    Talk to someone in your support network.    Open up your Depression Self-Care Plan/Wellness Kit.             RED    ZONE Medical Alert - Get Help    What it looks like:   Depression is seriously interfering with your life.   You may experience these or other symptoms: You can t get out of bed most days, can t work or engage in other necessary activities, you have trouble taking care of basic hygiene, or basic responsibilities, thoughts of suicide or death that will not go away,  self-injurious behavior.     What you need to do:  Call your care team and request a same-day appointment. If they are not available (weekends or after hours) call your local crisis line, emergency room or 911.          Depression Self-Care Plan / Wellness Kit    Many people find that medication and therapy are helpful treatments for managing depression. In addition, making small changes to your everyday life can help to boost your mood and improve your wellbeing. Below are some tips for you to consider. Be sure to talk with your medical provider and/or behavioral health consultant if your symptoms are worsening or not improving.     Sleep   Sleep hygiene  means all of the habits that support good, restful sleep. It includes maintaining a consistent bedtime and wake time, using your bedroom only for sleeping or sex, and keeping the bedroom dark and free of distractions like a computer, smartphone, or television.     Develop a Healthy Routine  Maintain good hygiene. Get out of bed in the morning, make your bed, brush your teeth, take a shower, and get dressed. Don t spend too much time viewing media that makes you feel stressed. Find time to relax each day.    Exercise  Get some form of exercise every day. This will help reduce pain and release endorphins, the  feel good  chemicals in your brain. It can be as simple as just going for a walk or doing some gardening, anything that will get you moving.      Diet  Strive to eat healthy foods, including fruits and vegetables. Drink plenty of water. Avoid excessive sugar, caffeine, alcohol, and other mood-altering substances.     Stay Connected with Others  Stay in touch with friends and family members.    Manage Your Mood  Try deep breathing, massage therapy, biofeedback, or meditation. Take part in fun activities when you can. Try to find something to smile about each day.     Psychotherapy  Be open to working with a therapist if your provider recommends it.      Medication  Be sure to take your medication as prescribed. Most anti-depressants need to be taken every day. It usually takes several weeks for medications to work. Not all medicines work for all people. It is important to follow-up with your provider to make sure you have a treatment plan that is working for you. Do not stop your medication abruptly without first discussing it with your provider.    Crisis Resources   These hotlines are for both adults and children. They and are open 24 hours a day, 7 days a week unless noted otherwise.    National Suicide Prevention Lifeline   988 or 1-146-069-JADU (4285)    Crisis Text Line    www.crisistextline.org  Text HOME to 181449 from anywhere in the [place], anytime, about any type of crisis. A live, trained crisis counselor will receive the text and respond quickly.    [facility] Lif[employer] for LGBTQ Youth  A national crisis intervention and suicide lifeline for LGBTQ youth under 25. Provides a safe place to talk without judgement. Call 1-889.943.3182; text START to 584508 or visit www.thetrevorproject.org to talk to a trained counselor.    For Critical access hospital crisis numbers, visit the [facility] website at:  https://[place].gov/dhs/people-we-serve/adults/health-care/mental-health/resources/crisis-contacts.jsp

## 2023-10-20 NOTE — TELEPHONE ENCOUNTER
Patient Quality Outreach    Patient is due for the following:   Depression  -  PHQ-9 needed  Physical Annual Wellness Visit      Topic Date Due    Hepatitis B Vaccine (1 of 3 - 3-dose series) Never done    COVID-19 Vaccine (1) Never done    Flu Vaccine (1) 09/01/2023       Next Steps:   Schedule a Annual Wellness Visit  Patient was assigned appropriate questionnaire to complete    Type of outreach:    Sent [employer] message.  Call to schedule AWV if not completed/scheduled in 1 week. Do PHQ9 over the phone if not scheduled before 12/25/23      Questions for provider review:    None           Gilma Underwood, [facility]  Chart routed to Care Team.

## 2023-10-22 ENCOUNTER — HEALTH MAINTENANCE LETTER (OUTPATIENT)
Age: 38
End: 2023-10-22

## 2023-10-30 ENCOUNTER — VIRTUAL VISIT (OUTPATIENT)
Dept: PSYCHOLOGY | Facility: CLINIC | Age: 38
End: 2023-10-30
Payer: COMMERCIAL

## 2023-10-30 DIAGNOSIS — F41.0 PANIC DISORDER WITHOUT AGORAPHOBIA: ICD-10-CM

## 2023-10-30 DIAGNOSIS — F41.1 GENERALIZED ANXIETY DISORDER: ICD-10-CM

## 2023-10-30 DIAGNOSIS — F33.1 MAJOR DEPRESSIVE DISORDER, RECURRENT EPISODE, MODERATE (H): Primary | ICD-10-CM

## 2023-10-30 PROCEDURE — 90834 PSYTX W PT 45 MINUTES: CPT | Mod: VID | Performed by: MARRIAGE & FAMILY THERAPIST

## 2023-10-30 NOTE — PROGRESS NOTES
[facility]                                     Progress Note    Patient Name: Veena Parsons  Date: 10-30-23         Service Type: Individual      Session Start Time:  12pm    Session End Time:    1250pm     Session Length: 50min    Session #: 31    Attendees: Client and spouse Star    Service Modality:  Video    Telemedicine Visit: The patient's condition can be safely assessed and treated via synchronous audio and visual telemedicine encounter.      Reason for Telemedicine Visit: Patient has requested telehealth visit    Originating Site (Patient Location): Patient's home        Distant Location (provider location):  Off-site    Consent:  The patient/guardian has verbally consented to: the potential risks and benefits of telemedicine (video visit) versus in person care; bill my insurance or make self-payment for services provided; and responsibility for payment of non-covered services.     Mode of Communication:  Video Conference via [facility]    As the provider I attest to compliance with applicable laws and regulations related to telemedicine.      Treatment Plan- 10-16-23  CGI- 10-16-23  Phq-9 and YADI-7- 10-5-23  Promis- 10-5-23    DATA  Interactive Complexity: No  Crisis: No        Progress Since Last Session (Related to Symptoms / Goals / Homework):   There has been demonstrated improvement in functioning while patient has been engaged in psychotherapy/psychological service- if withdrawn the patient would deteriorate and/or relapse.      Symptoms: Client reports confronting issues with anxiety and depressed mood.  She reports ongoing struggles in her marriage and has had a hard week with PMDD.    Homework: Achieved / completed to satisfaction  Completed in session      Episode of Care Goals: Satisfactory progress - ACTION (Actively working towards change); Intervened by reinforcing change plan / affirming steps taken     Current / Ongoing Stressors and Concerns:   -Veena did get  approved for disability.   -Feels Star struggles with empathy.  Had a very hard week with PMDD.     -Has struggled with PMDD for a number of years.  Has been working with chiropractic and message therapy on some holistic treatment options.  Has not been able to get in consistently - Continue    -Trying to keep in mind wanting to have fun at home and looking forward to being around family.    -Made the comment that she is being her own advocate and sticking up for herself.     -Spouse is up for a promotion at work. Has some more options for training on [employer] and money management.    -Veena is working on feeling useful and finding a purpose.         Treatment Objective(s) Addressed in This Session:  Safety planning   Patient will develop a plan to help manage PMDD  Patient will work on ways to communicate /patterns   Relational issues        Intervention:   Follow safety plan and use crisis resources.  Agrees to contract for safety today.   Use support system- Continued   Joint activities- label it that way.     Have more active coping skills readily available.    Respond and not react to Star.  Use assertiveness skills and have good boundaries.    Work on finding purposeful and meaningful activities during the day.   Continue to work on finances as a team.    Support each other with parenting Deborah. Have consistent consequences. [place] back to respect and boundaries.    Practice empathy.    Have consistent rules among the kids.    Use some holistic approaches to health.  Find a new option if the other place does not open up.    Brainstorm more ways to bring fun into the family. Really put effort into quality time.    Veena will help with the hunting prep and participate in some activities- [employer].         Assessments completed prior to visit:  The following assessments were completed by patient for this visit:  PHQ2:       10/5/2023    10:34 AM 9/19/2023     7:09 PM 9/7/2023     6:59 AM 8/23/2023    10:14 AM  8/7/2023    11:02 AM 4/11/2023     9:00 PM 1/5/2023     1:35 PM   PHQ-2 ( 1999 [employer])   Q1: Little interest or pleasure in doing things 0 1 1 1 1 1 2   Q2: Feeling down, depressed or hopeless 1 2 1 1 1 1 3   PHQ-2 Score 1 3 2 2 2 2 5   Q1: Little interest or pleasure in doing things Not at all Several days Several days Several days Several days Several days More than half the days   Q2: Feeling down, depressed or hopeless Several days More than half the days Several days Several days Several days Several days Nearly every day   PHQ-2 Score 1 3 2 2 2 2 5     PHQ9:       10/26/2022     8:26 AM 11/29/2022    12:07 PM 1/5/2023     1:35 PM 2/2/2023    10:13 AM 6/1/2023    10:07 AM 7/17/2023    11:15 AM 9/19/2023     7:09 PM   PHQ-9 SCORE   PHQ-9 Total Score MyChart 14 (Moderate depression) 21 (Severe depression) 16 (Moderately severe depression)    15 (Moderately severe depression)   PHQ-9 Total Score 14 21 16 12 15 16 15     GAD2:       11/27/2022     5:03 AM 4/6/2023     8:12 AM 8/7/2023    11:04 AM 8/23/2023    10:14 AM 9/7/2023     6:59 AM 9/19/2023     7:10 PM 10/5/2023    10:34 AM   YADI-2   Feeling nervous, anxious, or on edge 3 1 1 1 1 3 1   Not being able to stop or control worrying 3 1 2 1 1 3 1   YADI-2 Total Score 6 2 3 2 2 6 2     GAD7:       11/27/2022     5:03 AM 2/2/2023    10:13 AM 6/1/2023    10:07 AM 7/17/2023    11:15 AM 8/7/2023    11:04 AM 9/19/2023     7:10 PM   YADI-7 SCORE   Total Score 17 (severe anxiety)    5 (mild anxiety) 21 (severe anxiety)   Total Score 17 13 12 13 5 21     PROMIS 10-Global Health (all questions and answers displayed):       11/27/2022     5:04 AM 4/6/2023     8:14 AM 8/7/2023    11:05 AM 8/23/2023    10:14 AM 9/7/2023     7:00 AM 9/19/2023     7:11 PM 10/5/2023    10:35 AM   PROMIS 10   In general, would you say your health is: Good Good Good Good Good Good Good   In general, would you say your quality of life is: Fair Good Good Fair Good Fair Good   In general, how would  you rate your physical health? Good Good Fair Good Good Fair Fair   In general, how would you rate your mental health, including your mood and your ability to think? Poor Fair Fair Fair Poor Fair Good   In general, how would you rate your satisfaction with your social activities and relationships? Poor Fair Good Fair Fair Fair Good   In general, please rate how well you carry out your usual social activities and roles Poor Poor Good Poor Poor Poor Fair   To what extent are you able to carry out your everyday physical activities such as walking, climbing stairs, carrying groceries, or moving a chair? A little Mostly Moderately Moderately Mostly Completely Mostly   In the past 7 days, how often have you been bothered by emotional problems such as feeling anxious, depressed, or irritable? Always Sometimes Sometimes Often Often Often Sometimes   In the past 7 days, how would you rate your fatigue on average? Moderate Mild Moderate Moderate Moderate Severe Mild   In the past 7 days, how would you rate your pain on average, where 0 means no pain, and 10 means worst imaginable pain? 4 2 4 6 6 5 5   In general, would you say your health is: 3 3 3 3 3 3 3   In general, would you say your quality of life is: 2 3 3 2 3 2 3   In general, how would you rate your physical health? 3 3 2 3 3 2 2   In general, how would you rate your mental health, including your mood and your ability to think? 1 2 2 2 1 2 3   In general, how would you rate your satisfaction with your social activities and relationships? 1 2 3 2 2 2 3   In general, please rate how well you carry out your usual social activities and roles. (This includes activities at home, at work and in your community, and responsibilities as a parent, child, spouse, employee, friend, etc.) 1 1 3 1 1 1 2   To what extent are you able to carry out your everyday physical activities such as walking, climbing stairs, carrying groceries, or moving a chair? 2 4 3 3 4 5 4   In the past 7  days, how often have you been bothered by emotional problems such as feeling anxious, depressed, or irritable? 5 3 3 4 4 4 3   In the past 7 days, how would you rate your fatigue on average? 3 2 3 3 3 4 2   In the past 7 days, how would you rate your pain on average, where 0 means no pain, and 10 means worst imaginable pain? 4 2 4 6 6 5 5   Global Mental Health Score 5 10 11 8 8 8 12   Global Physical Health Score 11 15 11 12 13 12 13   PROMIS TOTAL - SUBSCORES 16 25 22 20 21 20 25     [place] Suicide Severity Rating Scale (Lifetime/Recent)      8/3/2022     9:17 AM   [place] Suicide Severity Rating (Lifetime/Recent)   Q1 Wish to be Dead (Lifetime) N   Q2 Non-Specific Active Suicidal Thoughts (Lifetime) N   Actual Attempt (Lifetime) N   Has subject engaged in non-suicidal self-injurious behavior? (Lifetime) N   Calculated C-SSRS Risk Score (Lifetime/Recent) No Risk Indicated         ASSESSMENT: Current Emotional / Mental Status (status of significant symptoms):   Risk status (Self / Other harm or suicidal ideation)   Patient denies current fears or concerns for personal safety.   Patient denies current or recent suicidal ideation or behaviors.    Patient denies current or recent homicidal ideation or behaviors.   Patient denies current or recent self injurious behavior or ideation.   Patient denies other safety concerns.   Patient reports there has been no change in risk factors since their last session.     Patient reports there has been no change in protective factors since their last session.     A safety and risk management plan has been developed including: Patient consented to co-developed safety plan on 11-30-22.  Safety and risk management plan was reviewed.   Patient agreed to use safety plan should any safety concerns arise.  A copy was made available to the patient. Reviewed 10-30-23        [facility]                                       Veena Parsons     SAFETY PLAN:  Step 1:  "Warning signs / cues (Thoughts, images, mood, situation, behavior) that a crisis may be developing:  Thoughts: \"I don't matter\", \"People would be better off without me\", \"I'm a burden\", \"I can't do this anymore\", \"I just want this to end\" and \"Nothing makes it better\"  Images: flashbacks  Thinking Processes: ruminations (can't stop thinking about my problems): PMDD and loss of mother, racing thoughts and highly critical and negative thoughts: I cant do anything right  Mood: worsening depression, hopelessness, helplessness, intense worry, agitation and mood swings  Behaviors: isolating/withdrawing , can't stop crying, not taking care of myself, not taking care of my responsibilities, sleeping too much, not sleeping enough and increasing frequency and duration of dissociation  Situations: relationship problems, trauma  and medical condition / diagnosis: PMDD    Step 2: Coping strategies - Things I can do to take my mind off of my problems without contacting another person (relaxation technique, physical activity):  Distress Tolerance Strategies:  relaxation activities, play with my pet , listen to positive and upbeat music, sensory based activities/self-soothe with five senses, watch a funny movie, read a book, change body temperature (ice pack/cold water)  and paced breathing/progressive muscle relaxation  Physical Activities: go for a walk, gardening, meditation, deep breathing and stretching   Focus on helpful thoughts:  \"This is temporary\", \"I will get through this\", \"It always passes\" and \"Ride the wave\"  Step 3: People and social settings that provide distraction:   Name: Spouse Star   Name:  Spending time with daughter     movie theater, pet store/humane society and coffee shop   Step 4: Remind myself of people and things that are important to me and worth living for:    Daughter   Spouse  Pets     Step 5: When I am in crisis, I can ask these people to help me use my safety plan:   Name: Spouse  Phone: " [phone]      Step 6: Making the environment safe:   remove things I could use to hurt myself and be around others  Step 7: Professionals or agencies I can contact during a crisis:  Suicide Prevention Lifeline: Call or Text 253   Local Crisis Services:  [facility] [phone]      Call 911 or go to my nearest emergency department.   I helped develop this safety plan and agree to use it when needed.  I have been given a copy of this plan.      Client signature _________________________________________________________________  Today s date:  11/30/2022  Completed by Provider Name/ Credentials:  Lia Stiles MA, LMFT  November 30, 2022  Adapted from Safety Plan Template 2008 Eleonora Capone and Danny Carmona is reprinted with the express permission of the authors.  No portion of the Safety Plan Template may be reproduced without the express, written permission.  You can contact the authors at [email or web address] or [email or web address].    Name: Veena Parsons  YOB: 1985  Date: November 30, 2022   My primary care provider: Francisco Nails  My primary care clinic: [facility]   My prescriber: PCP  Other care team support:  Holistic medical provider    My Triggers:    Relational problems  Loss of mother recently  Financial stress      Additional People, Places, and Things that I can access for support:   Spouse  Daughter          What is important to me and makes life worth living: Family         GREEN    Good Control  1. I feel good  2. No suicidal thoughts   3. Can work, sleep and play      Action Steps  1. Self-care: balanced meals, exercising, sleep practices, etc.  2. Take your medications as prescribed.  3. Continue meetings with therapist and prescriber.  4.  Do the healthy things that I enjoy.             YELLO Getting Worse  I have ANY of these:  1. I do not feel good  2. Difficulty Concentrating  3. Sleep is changing  4. Increase/Change in my thoughts to hurt  self and/or others, but I can still manage and not act on it.   5. Not taking care of self.               Action Steps (in addition to the above):  1. Inform your therapist and psychiatric prescriber/PCP.  2. Keep taking your medications as prescribed.    3. Turn to people you can ask for help.  4. Use internal coping strategies -see below.  5. Create safe environment: Removing items I can hurt self with              RED  Get Help  If I have ANY of these:  1. Current and uncontrollable thoughts and/or behaviors to hurt self and/or others.   2. Crazy buzzing and spinning.     Actions to manage my safety  1. Contact your emergency person Star  2. Call or Text 239  3. Call my crisis team- [facility] [phone]  3. Or Call [phone] or go to the emergency room right away        My Internal Coping Strategies include the following:  take a bath, belly breathing, arts and crafts, play with my pet, use my coping box, exercise and use my coping skills    [End for Brief Safety Plan]     Safety Concerns  How To Identify Situations That Make Your Mental Health Worse:  Triggers are things that make your mental health worse.  Look at the list below to help you find your triggers and what you can do about them.     1. Identify Early Warning Signs:    Sometimes symptoms return, even when people do their best to stay well. Symptoms can develop over a short period of time with little or no warning, but most of the time they emerge gradually over several weeks.  Early warning signs are changes that people experience when a relapse is starting. Some early warning signs are common and others are not as common.   Common Early Warning Signs:    Feeling tense or nervous, Eating less or eating more, Trouble sleeping -either too much or too little sleep, Feeling depressed or low, Feeling irritable, Feeling like not being around other people, Trouble concentrating and Urges to harm self     2. Identify action steps to take when warning  signs are noticed:    Taking Action- It is important to take action if you are experiencing early warning signs of a relapse.  The faster you act, the more likely it is that you can avoid a full relapse.  It is helpful to identify several specific ways to cope with symptoms.      The following is my list of symptoms and coping strategies that I can use when they are present:    Symptom Coping Strategies   Anxiety -Talk with someone in your support system and let him or her know how you are feeling.  -Use relaxation techniques such as deep breathing or imagery.  -Use positive affirmations to counteract negative self-talk such as  I am learning to let go of worry.    Depression - Schedule your day; include activities you have to do and activities you enjoy doing.  - Get some exercise - walk, run, bike, or swim.  - Give yourself credit for even the smallest things you get done.   Sleep Difficulties   - Go to sleep at the same time every day.  - Do something relaxing before bed, such as drinking herbal tea or listening to music.  - Avoid having discussions about upsetting topics before going to bed.   Delusions   - Distract yourself from the disturbing thought by doing something that requires your attention such as a puzzle.  - Check out your beliefs by talking to someone you trust.    Hallucinations   - Use headphones to listen to music.  - Tell voices to  stop  or say to yourself,  I am safe.   - Ignore the hallucinations as much as possible; focus on other things.   Concentration Difficulties - Minimize distractions so there is only one thing for you to focus on at a time.    - Ask the person you are having a conversation with to slow down or repeat things you are unsure of.            Appearance:   Normal   Eye Contact:   Good   Psychomotor Behavior: Normal    Attitude:   Cooperative    Orientation:   All   Speech    Rate / Production: Normal     Volume:  Normal    Mood:    Anxious    Affect:    Worrisome Tearful  "Upset    Thought Content:  Clear    Thought Form:  Coherent  Goal Directed    Insight:    Fair      Medication Review:   No changes to current psychiatric medication(s)     Medication Compliance:   Yes     Changes in Health Issues:   None reported     Chemical Use Review:   Substance Use: Chemical use reviewed, no active concerns identified      Tobacco Use: No current tobacco use.      Diagnosis:  296.32 (F33.1) Major Depressive Disorder, Recurrent Episode, Moderate _ and With mixed features  300.01 (F41.0) Panic Disorder  300.02 (F41.1) Generalized Anxiety Disorder   PMDD      Collateral Reports Completed:   Not Applicable    PLAN: (Patient Tasks / Therapist Tasks / Other)  Client will continue to work on the following goals:    -Follow safety plan and use crisis resources-Continued   -Have more active coping skills readily available.    -Do more brainstorming together on hard days.   -Work on responding and not reacting.   -Help with hunting prep and participate.   -Work on daily purpose   -Work on boundaries and having some discipline.    -Have some consistent parenting styles with Deborah.   -Practice empathy and trying not to problem solve.      -Encourage Star to get his own services.   -Continue to work on \"staying in own eulalio\" as a couple.   -Have a good concrete daily schedule.       Lia Stiles, [facility]                                                         ______________________________________________________________________    Individual Treatment Plan    Patient's Name: Veena Parsons  YOB: 1985    Date of Creation: 9-7-22  Date Treatment Plan Last Reviewed/Revised: 10-16-23    DSM5 Diagnoses:  296.32 (F33.1) Major Depressive Disorder, Recurrent Episode, Moderate _ and With mixed features  300.01 (F41.0) Panic Disorder  300.02 (F41.1) Generalized Anxiety Disorder   PMDD  Psychosocial / Contextual Factors: Some relational issues   PROMIS (reviewed every 90 days): 25    Referral / " Collaboration:  Referral to another professional/service is not indicated at this time..    Anticipated number of session for this episode of care: 6-9 sessions  Anticipation frequency of session: Biweekly  Anticipated Duration of each session: 38-52 minutes  Treatment plan will be reviewed in 90 days or when goals have been changed.       MeasurableTreatment Goal(s) related to diagnosis / functional impairment(s)  Goal 1: Patient will address struggles with anxiety, depression and PMDD.    I will know I've met my goal when I am feeling less reactive through the month with the symptoms.      Objective #A (Patient Action)    Patient will work on establishing a concrete routine with self-care.  Status: Continued - Date(s): 10-16-23    Intervention(s)  Therapist will use CBT and motivational interviewing.      Objective #B  Patient will develop a plan to help manage PMDD  Status: Continued - Date(s): 10-16-23    Intervention(s)  Therapist will use solution focused therapy.    Objective #C  Patient will work on ways to communicate with narcissistic individuals.  Status: Continued - Date(s): 10-16-23    Intervention(s)  Therapist will teach communication skills.          Patient has reviewed and agreed to the above plan.      Lia Stiles, NATACHA  September 7, 2022

## 2023-11-15 ENCOUNTER — VIRTUAL VISIT (OUTPATIENT)
Dept: PSYCHOLOGY | Facility: CLINIC | Age: 38
End: 2023-11-15
Payer: COMMERCIAL

## 2023-11-15 DIAGNOSIS — F41.1 GENERALIZED ANXIETY DISORDER: ICD-10-CM

## 2023-11-15 DIAGNOSIS — F33.1 MAJOR DEPRESSIVE DISORDER, RECURRENT EPISODE, MODERATE (H): Primary | ICD-10-CM

## 2023-11-15 DIAGNOSIS — F41.0 PANIC DISORDER WITHOUT AGORAPHOBIA: ICD-10-CM

## 2023-11-15 PROCEDURE — 90834 PSYTX W PT 45 MINUTES: CPT | Mod: VID | Performed by: MARRIAGE & FAMILY THERAPIST

## 2023-11-15 NOTE — PROGRESS NOTES
[facility]                                     Progress Note    Patient Name: Veena Parsons  Date: 11-15-23         Service Type: Individual      Session Start Time:  11am    Session End Time:    1150am     Session Length: 50min    Session #: 32    Attendees: Client and spouse Star    Service Modality:  Video    Telemedicine Visit: The patient's condition can be safely assessed and treated via synchronous audio and visual telemedicine encounter.      Reason for Telemedicine Visit: Patient has requested telehealth visit    Originating Site (Patient Location): Patient's home        Distant Location (provider location):  Off-site    Consent:  The patient/guardian has verbally consented to: the potential risks and benefits of telemedicine (video visit) versus in person care; bill my insurance or make self-payment for services provided; and responsibility for payment of non-covered services.     Mode of Communication:  Video Conference via [employer]    As the provider I attest to compliance with applicable laws and regulations related to telemedicine.      Treatment Plan- 10-16-23  CGI- 10-16-23  Phq-9 and YADI-7- See check in data  Promis- 10-5-23    DATA  Interactive Complexity: No  Crisis: No        Progress Since Last Session (Related to Symptoms / Goals / Homework):   There has been demonstrated improvement in functioning while patient has been engaged in psychotherapy/psychological service- if withdrawn the patient would deteriorate and/or relapse.      Symptoms: Client reports confronting issues with anxiety and depressed mood.  Anniversary of mothers passing is today.     Homework: Achieved / completed to satisfaction  Completed in session      Episode of Care Goals: Satisfactory progress - ACTION (Actively working towards change); Intervened by reinforcing change plan / affirming steps taken     Current / Ongoing Stressors and Concerns:   -Veena did get approved for  disability.   -Anniversary of mother passing is today.  [place] from her brother and her good friend.    -Both Star and Veena had a good time hunting.    -Has struggled with PMDD for a number of years.  Has been working with chiropractic and message therapy on some holistic treatment options.  Has not been able to get in consistently - Continue    -Trying to keep in mind wanting to have fun at home and looking forward to being around family.    -Made the comment that she is being her own advocate and sticking up for herself.     -Veena is working on feeling useful and finding a purpose.    -Working on having really good body awareness.          Treatment Objective(s) Addressed in This Session:  Safety planning   Patient will develop a plan to help manage PMDD  Patient will work on ways to communicate /patterns   Relational issues   Grief        Intervention:   Processed through grief and loss.  Veena feels she needed the year to grieve.    Follow safety plan and use crisis resources.  Agrees to contract for safety today.   Use support system- Continued   Joint activities- label it that way.     Keep conversations with sister straight and to the point.     Respond and not react to Star.  Use assertiveness skills and have good boundaries.    Work on finding purposeful and meaningful activities during the day.   Continue to work on finances as a team.    Practice empathy.    Have consistent rules among the kids.    Use some holistic approaches to health.  Find a new option if the other place does not open up.    Brainstorm more ways to bring fun into the family. Really put effort into quality time.        Assessments completed prior to visit:  The following assessments were completed by patient for this visit:  PHQ2:       10/5/2023    10:34 AM 9/19/2023     7:09 PM 9/7/2023     6:59 AM 8/23/2023    10:14 AM 8/7/2023    11:02 AM 4/11/2023     9:00 PM 1/5/2023     1:35 PM   PHQ-2 ( 1999 [employer])   Q1: Little interest or  pleasure in doing things 0 1 1 1 1 1 2   Q2: Feeling down, depressed or hopeless 1 2 1 1 1 1 3   PHQ-2 Score 1 3 2 2 2 2 5   Q1: Little interest or pleasure in doing things Not at all Several days Several days Several days Several days Several days More than half the days   Q2: Feeling down, depressed or hopeless Several days More than half the days Several days Several days Several days Several days Nearly every day   PHQ-2 Score 1 3 2 2 2 2 5     PHQ9:       10/26/2022     8:26 AM 11/29/2022    12:07 PM 1/5/2023     1:35 PM 2/2/2023    10:13 AM 6/1/2023    10:07 AM 7/17/2023    11:15 AM 9/19/2023     7:09 PM   PHQ-9 SCORE   PHQ-9 Total Score MyChart 14 (Moderate depression) 21 (Severe depression) 16 (Moderately severe depression)    15 (Moderately severe depression)   PHQ-9 Total Score 14 21 16 12 15 16 15     GAD2:       11/27/2022     5:03 AM 4/6/2023     8:12 AM 8/7/2023    11:04 AM 8/23/2023    10:14 AM 9/7/2023     6:59 AM 9/19/2023     7:10 PM 10/5/2023    10:34 AM   YADI-2   Feeling nervous, anxious, or on edge 3 1 1 1 1 3 1   Not being able to stop or control worrying 3 1 2 1 1 3 1   YADI-2 Total Score 6 2 3 2 2 6 2     GAD7:       11/27/2022     5:03 AM 2/2/2023    10:13 AM 6/1/2023    10:07 AM 7/17/2023    11:15 AM 8/7/2023    11:04 AM 9/19/2023     7:10 PM   YADI-7 SCORE   Total Score 17 (severe anxiety)    5 (mild anxiety) 21 (severe anxiety)   Total Score 17 13 12 13 5 21     PROMIS 10-Global Health (all questions and answers displayed):       11/27/2022     5:04 AM 4/6/2023     8:14 AM 8/7/2023    11:05 AM 8/23/2023    10:14 AM 9/7/2023     7:00 AM 9/19/2023     7:11 PM 10/5/2023    10:35 AM   PROMIS 10   In general, would you say your health is: Good Good Good Good Good Good Good   In general, would you say your quality of life is: Fair Good Good Fair Good Fair Good   In general, how would you rate your physical health? Good Good Fair Good Good Fair Fair   In general, how would you rate your mental  health, including your mood and your ability to think? Poor Fair Fair Fair Poor Fair Good   In general, how would you rate your satisfaction with your social activities and relationships? Poor Fair Good Fair Fair Fair Good   In general, please rate how well you carry out your usual social activities and roles Poor Poor Good Poor Poor Poor Fair   To what extent are you able to carry out your everyday physical activities such as walking, climbing stairs, carrying groceries, or moving a chair? A little Mostly Moderately Moderately Mostly Completely Mostly   In the past 7 days, how often have you been bothered by emotional problems such as feeling anxious, depressed, or irritable? Always Sometimes Sometimes Often Often Often Sometimes   In the past 7 days, how would you rate your fatigue on average? Moderate Mild Moderate Moderate Moderate Severe Mild   In the past 7 days, how would you rate your pain on average, where 0 means no pain, and 10 means worst imaginable pain? 4 2 4 6 6 5 5   In general, would you say your health is: 3 3 3 3 3 3 3   In general, would you say your quality of life is: 2 3 3 2 3 2 3   In general, how would you rate your physical health? 3 3 2 3 3 2 2   In general, how would you rate your mental health, including your mood and your ability to think? 1 2 2 2 1 2 3   In general, how would you rate your satisfaction with your social activities and relationships? 1 2 3 2 2 2 3   In general, please rate how well you carry out your usual social activities and roles. (This includes activities at home, at work and in your community, and responsibilities as a parent, child, spouse, employee, friend, etc.) 1 1 3 1 1 1 2   To what extent are you able to carry out your everyday physical activities such as walking, climbing stairs, carrying groceries, or moving a chair? 2 4 3 3 4 5 4   In the past 7 days, how often have you been bothered by emotional problems such as feeling anxious, depressed, or irritable?  "5 3 3 4 4 4 3   In the past 7 days, how would you rate your fatigue on average? 3 2 3 3 3 4 2   In the past 7 days, how would you rate your pain on average, where 0 means no pain, and 10 means worst imaginable pain? 4 2 4 6 6 5 5   Global Mental Health Score 5 10 11 8 8 8 12   Global Physical Health Score 11 15 11 12 13 12 13   PROMIS TOTAL - SUBSCORES 16 25 22 20 21 20 25     [place] Suicide Severity Rating Scale (Lifetime/Recent)      8/3/2022     9:17 AM   [place] Suicide Severity Rating (Lifetime/Recent)   Q1 Wish to be Dead (Lifetime) N   Q2 Non-Specific Active Suicidal Thoughts (Lifetime) N   Actual Attempt (Lifetime) N   Has subject engaged in non-suicidal self-injurious behavior? (Lifetime) N   Calculated C-SSRS Risk Score (Lifetime/Recent) No Risk Indicated         ASSESSMENT: Current Emotional / Mental Status (status of significant symptoms):   Risk status (Self / Other harm or suicidal ideation)   Patient denies current fears or concerns for personal safety.   Patient denies current or recent suicidal ideation or behaviors.    Patient denies current or recent homicidal ideation or behaviors.   Patient denies current or recent self injurious behavior or ideation.   Patient denies other safety concerns.   Patient reports there has been no change in risk factors since their last session.     Patient reports there has been no change in protective factors since their last session.     A safety and risk management plan has been developed including: Patient consented to co-developed safety plan on 11-30-22.  Safety and risk management plan was reviewed.   Patient agreed to use safety plan should any safety concerns arise.  A copy was made available to the patient. Reviewed 11-15-23        [facility]                                       Veena Parsons     SAFETY PLAN:  Step 1: Warning signs / cues (Thoughts, images, mood, situation, behavior) that a crisis may be developing:  Thoughts: \"I " "don't matter\", \"People would be better off without me\", \"I'm a burden\", \"I can't do this anymore\", \"I just want this to end\" and \"Nothing makes it better\"  Images: flashbacks  Thinking Processes: ruminations (can't stop thinking about my problems): PMDD and loss of mother, racing thoughts and highly critical and negative thoughts: I cant do anything right  Mood: worsening depression, hopelessness, helplessness, intense worry, agitation and mood swings  Behaviors: isolating/withdrawing , can't stop crying, not taking care of myself, not taking care of my responsibilities, sleeping too much, not sleeping enough and increasing frequency and duration of dissociation  Situations: relationship problems, trauma  and medical condition / diagnosis: PMDD    Step 2: Coping strategies - Things I can do to take my mind off of my problems without contacting another person (relaxation technique, physical activity):  Distress Tolerance Strategies:  relaxation activities, play with my pet , listen to positive and upbeat music, sensory based activities/self-soothe with five senses, watch a funny movie, read a book, change body temperature (ice pack/cold water)  and paced breathing/progressive muscle relaxation  Physical Activities: go for a walk, gardening, meditation, deep breathing and stretching   Focus on helpful thoughts:  \"This is temporary\", \"I will get through this\", \"It always passes\" and \"Ride the wave\"  Step 3: People and social settings that provide distraction:   Name: Spouse Star   Name:  Spending time with daughter     movie theater, pet store/humane society and coffee shop   Step 4: Remind myself of people and things that are important to me and worth living for:    Daughter   Spouse  Pets     Step 5: When I am in crisis, I can ask these people to help me use my safety plan:   Name: Spouse  Phone: [phone]      Step 6: Making the environment safe:   remove things I could use to hurt myself and be around " others  Step 7: Professionals or agencies I can contact during a crisis:  Suicide Prevention Lifeline: Call or Text 026   Local Crisis Services:  [facility] [phone]      Call 911 or go to my nearest emergency department.   I helped develop this safety plan and agree to use it when needed.  I have been given a copy of this plan.      Client signature _________________________________________________________________  Today s date:  11/30/2022  Completed by Provider Name/ Credentials:  Lia Stiles MA, LMFT  November 30, 2022  Adapted from Safety Plan Template 2008 Eleonora Capone and Danny Carmona is reprinted with the express permission of the authors.  No portion of the Safety Plan Template may be reproduced without the express, written permission.  You can contact the authors at [email or web address] or [email or web address].    Name: Veena Parsons  YOB: 1985  Date: November 30, 2022   My primary care provider: Francisco Nails  My primary care clinic: [facility]   My prescriber: PCP  Other care team support:  Holistic medical provider    My Triggers:    Relational problems  Loss of mother recently  Financial stress      Additional People, Places, and Things that I can access for support:   Spouse  Daughter          What is important to me and makes life worth living: Family         GREEN    Good Control  1. I feel good  2. No suicidal thoughts   3. Can work, sleep and play      Action Steps  1. Self-care: balanced meals, exercising, sleep practices, etc.  2. Take your medications as prescribed.  3. Continue meetings with therapist and prescriber.  4.  Do the healthy things that I enjoy.             YELLO Getting Worse  I have ANY of these:  1. I do not feel good  2. Difficulty Concentrating  3. Sleep is changing  4. Increase/Change in my thoughts to hurt self and/or others, but I can still manage and not act on it.   5. Not taking care of self.               Action  Steps (in addition to the above):  1. Inform your therapist and psychiatric prescriber/PCP.  2. Keep taking your medications as prescribed.    3. Turn to people you can ask for help.  4. Use internal coping strategies -see below.  5. Create safe environment: Removing items I can hurt self with              RED  Get Help  If I have ANY of these:  1. Current and uncontrollable thoughts and/or behaviors to hurt self and/or others.   2. Crazy buzzing and spinning.     Actions to manage my safety  1. Contact your emergency person Star  2. Call or Text [phone]  3. Call my crisis team- [facility] [phone]  3. Or Call 231 or go to the emergency room right away        My Internal Coping Strategies include the following:  take a bath, belly breathing, arts and crafts, play with my pet, use my coping box, exercise and use my coping skills    [End for Brief Safety Plan]     Safety Concerns  How To Identify Situations That Make Your Mental Health Worse:  Triggers are things that make your mental health worse.  Look at the list below to help you find your triggers and what you can do about them.     1. Identify Early Warning Signs:    Sometimes symptoms return, even when people do their best to stay well. Symptoms can develop over a short period of time with little or no warning, but most of the time they emerge gradually over several weeks.  Early warning signs are changes that people experience when a relapse is starting. Some early warning signs are common and others are not as common.   Common Early Warning Signs:    Feeling tense or nervous, Eating less or eating more, Trouble sleeping -either too much or too little sleep, Feeling depressed or low, Feeling irritable, Feeling like not being around other people, Trouble concentrating and Urges to harm self     2. Identify action steps to take when warning signs are noticed:    Taking Action- It is important to take action if you are experiencing early warning signs of  a relapse.  The faster you act, the more likely it is that you can avoid a full relapse.  It is helpful to identify several specific ways to cope with symptoms.      The following is my list of symptoms and coping strategies that I can use when they are present:    Symptom Coping Strategies   Anxiety -Talk with someone in your support system and let him or her know how you are feeling.  -Use relaxation techniques such as deep breathing or imagery.  -Use positive affirmations to counteract negative self-talk such as  I am learning to let go of worry.    Depression - Schedule your day; include activities you have to do and activities you enjoy doing.  - Get some exercise - walk, run, bike, or swim.  - Give yourself credit for even the smallest things you get done.   Sleep Difficulties   - Go to sleep at the same time every day.  - Do something relaxing before bed, such as drinking herbal tea or listening to music.  - Avoid having discussions about upsetting topics before going to bed.   Delusions   - Distract yourself from the disturbing thought by doing something that requires your attention such as a puzzle.  - Check out your beliefs by talking to someone you trust.    Hallucinations   - Use headphones to listen to music.  - Tell voices to  stop  or say to yourself,  I am safe.   - Ignore the hallucinations as much as possible; focus on other things.   Concentration Difficulties - Minimize distractions so there is only one thing for you to focus on at a time.    - Ask the person you are having a conversation with to slow down or repeat things you are unsure of.            Appearance:   Normal   Eye Contact:   Good   Psychomotor Behavior: Normal    Attitude:   Cooperative    Orientation:   All   Speech    Rate / Production: Normal     Volume:  Normal    Mood:    Anxious    Affect:    Worrisome Sad   Thought Content:  Clear    Thought Form:  Coherent  Goal Directed    Insight:    Fair      Medication Review:   No  "changes to current psychiatric medication(s)     Medication Compliance:   Yes     Changes in Health Issues:   None reported     Chemical Use Review:   Substance Use: Chemical use reviewed, no active concerns identified      Tobacco Use: No current tobacco use.      Diagnosis:  296.32 (F33.1) Major Depressive Disorder, Recurrent Episode, Moderate _ and With mixed features  300.01 (F41.0) Panic Disorder  300.02 (F41.1) Generalized Anxiety Disorder   PMDD      Collateral Reports Completed:   Not Applicable    PLAN: (Patient Tasks / Therapist Tasks / Other)  Client will continue to work on the following goals:    -Follow safety plan and use crisis resources-Continued   -Give self permission to take time to grieve.   -Have more active coping skills readily available.    -Do more brainstorming together on hard days.   -Work on responding and not reacting.   -Work on daily purpose.  -Work on boundaries and having some discipline.    -Practice empathy and trying not to problem solve initially.   -Encourage Star to get his own services.   -Continue to work on \"staying in own eulalio\" as a couple.   -Have a good concrete daily schedule.       Lia Stiles, [facility]                                                         ______________________________________________________________________    Individual Treatment Plan    Patient's Name: Veena Parsons  YOB: 1985    Date of Creation: 9-7-22  Date Treatment Plan Last Reviewed/Revised: 10-16-23    DSM5 Diagnoses:  296.32 (F33.1) Major Depressive Disorder, Recurrent Episode, Moderate _ and With mixed features  300.01 (F41.0) Panic Disorder  300.02 (F41.1) Generalized Anxiety Disorder   PMDD  Psychosocial / Contextual Factors: Some relational issues   PROMIS (reviewed every 90 days): 25    Referral / Collaboration:  Referral to another professional/service is not indicated at this time..    Anticipated number of session for this episode of care: 6-9 " sessions  Anticipation frequency of session: Biweekly  Anticipated Duration of each session: 38-52 minutes  Treatment plan will be reviewed in 90 days or when goals have been changed.       MeasurableTreatment Goal(s) related to diagnosis / functional impairment(s)  Goal 1: Patient will address struggles with anxiety, depression and PMDD.    I will know I've met my goal when I am feeling less reactive through the month with the symptoms.      Objective #A (Patient Action)    Patient will work on establishing a concrete routine with self-care.  Status: Continued - Date(s): 10-16-23    Intervention(s)  Therapist will use CBT and motivational interviewing.      Objective #B  Patient will develop a plan to help manage PMDD  Status: Continued - Date(s): 10-16-23    Intervention(s)  Therapist will use solution focused therapy.    Objective #C  Patient will work on ways to communicate with narcissistic individuals.  Status: Continued - Date(s): 10-16-23    Intervention(s)  Therapist will teach communication skills.          Patient has reviewed and agreed to the above plan.      Lia Stiles, NATACHA  September 7, 2022

## 2023-11-22 ENCOUNTER — VIRTUAL VISIT (OUTPATIENT)
Dept: PSYCHOLOGY | Facility: CLINIC | Age: 38
End: 2023-11-22
Payer: COMMERCIAL

## 2023-11-22 DIAGNOSIS — F33.1 MAJOR DEPRESSIVE DISORDER, RECURRENT EPISODE, MODERATE (H): Primary | ICD-10-CM

## 2023-11-22 DIAGNOSIS — F41.0 PANIC DISORDER WITHOUT AGORAPHOBIA: ICD-10-CM

## 2023-11-22 DIAGNOSIS — F41.1 GENERALIZED ANXIETY DISORDER: ICD-10-CM

## 2023-11-22 PROCEDURE — 90834 PSYTX W PT 45 MINUTES: CPT | Mod: VID | Performed by: MARRIAGE & FAMILY THERAPIST

## 2023-11-22 NOTE — PROGRESS NOTES
[facility]                                     Progress Note    Patient Name: Veena Parsons  Date: 11-22-23         Service Type: Individual      Session Start Time:  12pm    Session End Time:    1250pm     Session Length: 50min    Session #: 33    Attendees: Client and spouse Star    Service Modality:  Video    Telemedicine Visit: The patient's condition can be safely assessed and treated via synchronous audio and visual telemedicine encounter.      Reason for Telemedicine Visit: Patient has requested telehealth visit    Originating Site (Patient Location): Patient's home        Distant Location (provider location):  Off-site    Consent:  The patient/guardian has verbally consented to: the potential risks and benefits of telemedicine (video visit) versus in person care; bill my insurance or make self-payment for services provided; and responsibility for payment of non-covered services.     Mode of Communication:  Video Conference via aWhere    As the provider I attest to compliance with applicable laws and regulations related to telemedicine.      Treatment Plan- 10-16-23  CGI- 10-16-23  Phq-9 and YAID-7- See check in data  Promis- 10-5-23    DATA  Interactive Complexity: No  Crisis: No        Progress Since Last Session (Related to Symptoms / Goals / Homework):   There has been demonstrated improvement in functioning while patient has been engaged in psychotherapy/psychological service- if withdrawn the patient would deteriorate and/or relapse.      Symptoms: Client reports confronting issues with anxiety and depressed mood.  Anniversary of mothers passing was last week and today feels like a down day.     Homework: Achieved / completed to satisfaction  Completed in session      Episode of Care Goals: Satisfactory progress - ACTION (Actively working towards change); Intervened by reinforcing change plan / affirming steps taken     Current / Ongoing Stressors and Concerns:   -Veena  did get approved for disability.   -Anniversary of mother passing was last week.     -Both Star and Veena are not sure where things are with the cycle.     -Has struggled with PMDD for a number of years.  Has been working with chiropractic and message therapy on some holistic treatment options.  Has not been able to get in consistently - Continue    -Thanksgiving will involve all the kids attending. Veena is worried that this will be hard but also states the boys can boost her mood.    -Deborah can be difficult at times and her and Veena can clash.    -Trying to keep in mind wanting to have fun at home and looking forward to being around family.    -Made the comment that she is being her own advocate and sticking up for herself.     -Veena is working on feeling useful and finding a purpose.    -Working on having really good body awareness.          Treatment Objective(s) Addressed in This Session:  Safety planning   Patient will develop a plan to help manage PMDD  Patient will work on ways to communicate /patterns   Relational issues   Grief        Intervention:   Processed through grief and loss.  Veena feels she needed the year to grieve.  Give Deborah a specific task for tomorrow.      Get some specific tasks done today.   Have enough prepared tonight so she can be involved tomorrow.   Follow safety plan and use crisis resources.  Agrees to contract for safety today.   Use support system- Continued   Joint activities- label it that way.      Respond and not react to Star.  Use assertiveness skills and have good boundaries.    Work on finding purposeful and meaningful activities during the day.   Practice empathy.    Use some holistic approaches to health.  Find a new option if the other place does not open up.    Brainstorm more ways to bring fun into the family. Really put effort into quality time.        Assessments completed prior to visit:  The following assessments were completed by patient for this visit:  PHQ2:        10/5/2023    10:34 AM 9/19/2023     7:09 PM 9/7/2023     6:59 AM 8/23/2023    10:14 AM 8/7/2023    11:02 AM 4/11/2023     9:00 PM 1/5/2023     1:35 PM   PHQ-2 ( 1999 [employer])   Q1: Little interest or pleasure in doing things 0 1 1 1 1 1 2   Q2: Feeling down, depressed or hopeless 1 2 1 1 1 1 3   PHQ-2 Score 1 3 2 2 2 2 5   Q1: Little interest or pleasure in doing things Not at all Several days Several days Several days Several days Several days More than half the days   Q2: Feeling down, depressed or hopeless Several days More than half the days Several days Several days Several days Several days Nearly every day   PHQ-2 Score 1 3 2 2 2 2 5     PHQ9:       10/26/2022     8:26 AM 11/29/2022    12:07 PM 1/5/2023     1:35 PM 2/2/2023    10:13 AM 6/1/2023    10:07 AM 7/17/2023    11:15 AM 9/19/2023     7:09 PM   PHQ-9 SCORE   PHQ-9 Total Score MyChart 14 (Moderate depression) 21 (Severe depression) 16 (Moderately severe depression)    15 (Moderately severe depression)   PHQ-9 Total Score 14 21 16 12 15 16 15     GAD2:       11/27/2022     5:03 AM 4/6/2023     8:12 AM 8/7/2023    11:04 AM 8/23/2023    10:14 AM 9/7/2023     6:59 AM 9/19/2023     7:10 PM 10/5/2023    10:34 AM   YADI-2   Feeling nervous, anxious, or on edge 3 1 1 1 1 3 1   Not being able to stop or control worrying 3 1 2 1 1 3 1   YADI-2 Total Score 6 2 3 2 2 6 2     GAD7:       11/27/2022     5:03 AM 2/2/2023    10:13 AM 6/1/2023    10:07 AM 7/17/2023    11:15 AM 8/7/2023    11:04 AM 9/19/2023     7:10 PM   YADI-7 SCORE   Total Score 17 (severe anxiety)    5 (mild anxiety) 21 (severe anxiety)   Total Score 17 13 12 13 5 21     PROMIS 10-Global Health (all questions and answers displayed):       11/27/2022     5:04 AM 4/6/2023     8:14 AM 8/7/2023    11:05 AM 8/23/2023    10:14 AM 9/7/2023     7:00 AM 9/19/2023     7:11 PM 10/5/2023    10:35 AM   PROMIS 10   In general, would you say your health is: Good Good Good Good Good Good Good   In general, would you say  your quality of life is: Fair Good Good Fair Good Fair Good   In general, how would you rate your physical health? Good Good Fair Good Good Fair Fair   In general, how would you rate your mental health, including your mood and your ability to think? Poor Fair Fair Fair Poor Fair Good   In general, how would you rate your satisfaction with your social activities and relationships? Poor Fair Good Fair Fair Fair Good   In general, please rate how well you carry out your usual social activities and roles Poor Poor Good Poor Poor Poor Fair   To what extent are you able to carry out your everyday physical activities such as walking, climbing stairs, carrying groceries, or moving a chair? A little Mostly Moderately Moderately Mostly Completely Mostly   In the past 7 days, how often have you been bothered by emotional problems such as feeling anxious, depressed, or irritable? Always Sometimes Sometimes Often Often Often Sometimes   In the past 7 days, how would you rate your fatigue on average? Moderate Mild Moderate Moderate Moderate Severe Mild   In the past 7 days, how would you rate your pain on average, where 0 means no pain, and 10 means worst imaginable pain? 4 2 4 6 6 5 5   In general, would you say your health is: 3 3 3 3 3 3 3   In general, would you say your quality of life is: 2 3 3 2 3 2 3   In general, how would you rate your physical health? 3 3 2 3 3 2 2   In general, how would you rate your mental health, including your mood and your ability to think? 1 2 2 2 1 2 3   In general, how would you rate your satisfaction with your social activities and relationships? 1 2 3 2 2 2 3   In general, please rate how well you carry out your usual social activities and roles. (This includes activities at home, at work and in your community, and responsibilities as a parent, child, spouse, employee, friend, etc.) 1 1 3 1 1 1 2   To what extent are you able to carry out your everyday physical activities such as walking,  climbing stairs, carrying groceries, or moving a chair? 2 4 3 3 4 5 4   In the past 7 days, how often have you been bothered by emotional problems such as feeling anxious, depressed, or irritable? 5 3 3 4 4 4 3   In the past 7 days, how would you rate your fatigue on average? 3 2 3 3 3 4 2   In the past 7 days, how would you rate your pain on average, where 0 means no pain, and 10 means worst imaginable pain? 4 2 4 6 6 5 5   Global Mental Health Score 5 10 11 8 8 8 12   Global Physical Health Score 11 15 11 12 13 12 13   PROMIS TOTAL - SUBSCORES 16 25 22 20 21 20 25     [place] Suicide Severity Rating Scale (Lifetime/Recent)      8/3/2022     9:17 AM   [place] Suicide Severity Rating (Lifetime/Recent)   Q1 Wish to be Dead (Lifetime) N   Q2 Non-Specific Active Suicidal Thoughts (Lifetime) N   Actual Attempt (Lifetime) N   Has subject engaged in non-suicidal self-injurious behavior? (Lifetime) N   Calculated C-SSRS Risk Score (Lifetime/Recent) No Risk Indicated         ASSESSMENT: Current Emotional / Mental Status (status of significant symptoms):   Risk status (Self / Other harm or suicidal ideation)   Patient denies current fears or concerns for personal safety.   Patient denies current or recent suicidal ideation or behaviors.    Patient denies current or recent homicidal ideation or behaviors.   Patient denies current or recent self injurious behavior or ideation.   Patient denies other safety concerns.   Patient reports there has been no change in risk factors since their last session.     Patient reports there has been no change in protective factors since their last session.     A safety and risk management plan has been developed including: Patient consented to co-developed safety plan on 11-30-22.  Safety and risk management plan was reviewed.   Patient agreed to use safety plan should any safety concerns arise.  A copy was made available to the patient. Reviewed 11-22-23        [facility]  "Veena Parsons     SAFETY PLAN:  Step 1: Warning signs / cues (Thoughts, images, mood, situation, behavior) that a crisis may be developing:  Thoughts: \"I don't matter\", \"People would be better off without me\", \"I'm a burden\", \"I can't do this anymore\", \"I just want this to end\" and \"Nothing makes it better\"  Images: flashbacks  Thinking Processes: ruminations (can't stop thinking about my problems): PMDD and loss of mother, racing thoughts and highly critical and negative thoughts: I cant do anything right  Mood: worsening depression, hopelessness, helplessness, intense worry, agitation and mood swings  Behaviors: isolating/withdrawing , can't stop crying, not taking care of myself, not taking care of my responsibilities, sleeping too much, not sleeping enough and increasing frequency and duration of dissociation  Situations: relationship problems, trauma  and medical condition / diagnosis: PMDD    Step 2: Coping strategies - Things I can do to take my mind off of my problems without contacting another person (relaxation technique, physical activity):  Distress Tolerance Strategies:  relaxation activities, play with my pet , listen to positive and upbeat music, sensory based activities/self-soothe with five senses, watch a funny movie, read a book, change body temperature (ice pack/cold water)  and paced breathing/progressive muscle relaxation  Physical Activities: go for a walk, gardening, meditation, deep breathing and stretching   Focus on helpful thoughts:  \"This is temporary\", \"I will get through this\", \"It always passes\" and \"Ride the wave\"  Step 3: People and social settings that provide distraction:   Name: Spouse Star   Name:  Spending time with daughter     movie theater, pet store/humane society and coffee shop   Step 4: Remind myself of people and things that are important to me and worth living for:    Daughter   Spouse  Pets     Step 5: When I am in crisis, I " can ask these people to help me use my safety plan:   Name: Spouse  Phone: [phone]      Step 6: Making the environment safe:   remove things I could use to hurt myself and be around others  Step 7: Professionals or agencies I can contact during a crisis:  Suicide Prevention Lifeline: Call or Text 484   Local Crisis Services:  [facility] [phone]      Call 911 or go to my nearest emergency department.   I helped develop this safety plan and agree to use it when needed.  I have been given a copy of this plan.      Client signature _________________________________________________________________  Today s date:  11/30/2022  Completed by Provider Name/ Credentials:  Lia Stiles MA, LMFT  November 30, 2022  Adapted from Safety Plan Template 2008 Eleonora Capone and Danny Carmona is reprinted with the express permission of the authors.  No portion of the Safety Plan Template may be reproduced without the express, written permission.  You can contact the authors at [email or web address] or [email or web address].    Name: Veena Parsons  YOB: 1985  Date: November 30, 2022   My primary care provider: Francisco Nails  My primary care clinic: [facility]   My prescriber: PCP  Other care team support:  Holistic medical provider    My Triggers:    Relational problems  Loss of mother recently  Financial stress      Additional People, Places, and Things that I can access for support:   Spouse  Daughter          What is important to me and makes life worth living: Family         GREEN    Good Control  1. I feel good  2. No suicidal thoughts   3. Can work, sleep and play      Action Steps  1. Self-care: balanced meals, exercising, sleep practices, etc.  2. Take your medications as prescribed.  3. Continue meetings with therapist and prescriber.  4.  Do the healthy things that I enjoy.             YELLO Getting Worse  I have ANY of these:  1. I do not feel good  2. Difficulty  Concentrating  3. Sleep is changing  4. Increase/Change in my thoughts to hurt self and/or others, but I can still manage and not act on it.   5. Not taking care of self.               Action Steps (in addition to the above):  1. Inform your therapist and psychiatric prescriber/PCP.  2. Keep taking your medications as prescribed.    3. Turn to people you can ask for help.  4. Use internal coping strategies -see below.  5. Create safe environment: Removing items I can hurt self with              RED  Get Help  If I have ANY of these:  1. Current and uncontrollable thoughts and/or behaviors to hurt self and/or others.   2. Crazy buzzing and spinning.     Actions to manage my safety  1. Contact your emergency person Star  2. Call or Text 601  3. Call my crisis team- [facility] [phone]  3. Or Call 411 or go to the emergency room right away        My Internal Coping Strategies include the following:  take a bath, belly breathing, arts and crafts, play with my pet, use my coping box, exercise and use my coping skills    [End for Brief Safety Plan]     Safety Concerns  How To Identify Situations That Make Your Mental Health Worse:  Triggers are things that make your mental health worse.  Look at the list below to help you find your triggers and what you can do about them.     1. Identify Early Warning Signs:    Sometimes symptoms return, even when people do their best to stay well. Symptoms can develop over a short period of time with little or no warning, but most of the time they emerge gradually over several weeks.  Early warning signs are changes that people experience when a relapse is starting. Some early warning signs are common and others are not as common.   Common Early Warning Signs:    Feeling tense or nervous, Eating less or eating more, Trouble sleeping -either too much or too little sleep, Feeling depressed or low, Feeling irritable, Feeling like not being around other people, Trouble  concentrating and Urges to harm self     2. Identify action steps to take when warning signs are noticed:    Taking Action- It is important to take action if you are experiencing early warning signs of a relapse.  The faster you act, the more likely it is that you can avoid a full relapse.  It is helpful to identify several specific ways to cope with symptoms.      The following is my list of symptoms and coping strategies that I can use when they are present:    Symptom Coping Strategies   Anxiety -Talk with someone in your support system and let him or her know how you are feeling.  -Use relaxation techniques such as deep breathing or imagery.  -Use positive affirmations to counteract negative self-talk such as  I am learning to let go of worry.    Depression - Schedule your day; include activities you have to do and activities you enjoy doing.  - Get some exercise - walk, run, bike, or swim.  - Give yourself credit for even the smallest things you get done.   Sleep Difficulties   - Go to sleep at the same time every day.  - Do something relaxing before bed, such as drinking herbal tea or listening to music.  - Avoid having discussions about upsetting topics before going to bed.   Delusions   - Distract yourself from the disturbing thought by doing something that requires your attention such as a puzzle.  - Check out your beliefs by talking to someone you trust.    Hallucinations   - Use headphones to listen to music.  - Tell voices to  stop  or say to yourself,  I am safe.   - Ignore the hallucinations as much as possible; focus on other things.   Concentration Difficulties - Minimize distractions so there is only one thing for you to focus on at a time.    - Ask the person you are having a conversation with to slow down or repeat things you are unsure of.            Appearance:   Normal   Eye Contact:   Good   Psychomotor Behavior: Normal    Attitude:   Cooperative    Orientation:   All   Speech    Rate /  "Production: Normal     Volume:  Normal    Mood:    Anxious    Affect:    Worrisome Sad   Thought Content:  Clear    Thought Form:  Coherent  Goal Directed    Insight:    Fair      Medication Review:   No changes to current psychiatric medication(s)     Medication Compliance:   Yes     Changes in Health Issues:   None reported     Chemical Use Review:   Substance Use: Chemical use reviewed, no active concerns identified      Tobacco Use: No current tobacco use.      Diagnosis:  296.32 (F33.1) Major Depressive Disorder, Recurrent Episode, Moderate _ and With mixed features  300.01 (F41.0) Panic Disorder  300.02 (F41.1) Generalized Anxiety Disorder   PMDD      Collateral Reports Completed:   Not Applicable    PLAN: (Patient Tasks / Therapist Tasks / Other)  Client will continue to work on the following goals:    -Follow safety plan and use crisis resources-Continued   -Give self permission to take time to grieve.   -Have more active coping skills readily available.    -Give the kids specific tasks tomorrow  -Attempt to get as much prep done tonight.   -Do more brainstorming together on hard days.   -Work on responding and not reacting.   -Work on daily purpose.  -Practice empathy and trying not to problem solve initially.   -Continue to work on \"staying in own eulalio\" as a couple.   -Have a good concrete daily schedule.       Lia Stiles, TANIAFT                                                         ______________________________________________________________________    Individual Treatment Plan    Patient's Name: Veena Parsons  YOB: 1985    Date of Creation: 9-7-22  Date Treatment Plan Last Reviewed/Revised: 10-16-23    DSM5 Diagnoses:  296.32 (F33.1) Major Depressive Disorder, Recurrent Episode, Moderate _ and With mixed features  300.01 (F41.0) Panic Disorder  300.02 (F41.1) Generalized Anxiety Disorder   PMDD  Psychosocial / Contextual Factors: Some relational issues   PROMIS (reviewed every " 90 days): 25    Referral / Collaboration:  Referral to another professional/service is not indicated at this time..    Anticipated number of session for this episode of care: 6-9 sessions  Anticipation frequency of session: Biweekly  Anticipated Duration of each session: 38-52 minutes  Treatment plan will be reviewed in 90 days or when goals have been changed.       MeasurableTreatment Goal(s) related to diagnosis / functional impairment(s)  Goal 1: Patient will address struggles with anxiety, depression and PMDD.    I will know I've met my goal when I am feeling less reactive through the month with the symptoms.      Objective #A (Patient Action)    Patient will work on establishing a concrete routine with self-care.  Status: Continued - Date(s): 10-16-23    Intervention(s)  Therapist will use CBT and motivational interviewing.      Objective #B  Patient will develop a plan to help manage PMDD  Status: Continued - Date(s): 10-16-23    Intervention(s)  Therapist will use solution focused therapy.    Objective #C  Patient will work on ways to communicate with narcissistic individuals.  Status: Continued - Date(s): 10-16-23    Intervention(s)  Therapist will teach communication skills.          Patient has reviewed and agreed to the above plan.      Lia Stiles, NATACHA  September 7, 2022

## 2023-11-29 ENCOUNTER — VIRTUAL VISIT (OUTPATIENT)
Dept: PSYCHOLOGY | Facility: CLINIC | Age: 38
End: 2023-11-29
Payer: COMMERCIAL

## 2023-11-29 DIAGNOSIS — F41.1 GENERALIZED ANXIETY DISORDER: ICD-10-CM

## 2023-11-29 DIAGNOSIS — F41.0 PANIC DISORDER WITHOUT AGORAPHOBIA: ICD-10-CM

## 2023-11-29 DIAGNOSIS — F33.1 MAJOR DEPRESSIVE DISORDER, RECURRENT EPISODE, MODERATE (H): Primary | ICD-10-CM

## 2023-11-29 PROCEDURE — 90834 PSYTX W PT 45 MINUTES: CPT | Mod: VID | Performed by: MARRIAGE & FAMILY THERAPIST

## 2023-11-29 NOTE — PROGRESS NOTES
[facility]                                     Progress Note    Patient Name: Veena Parsons  Date: 11-29-23         Service Type: Individual      Session Start Time:  11am    Session End Time:    1150pm     Session Length: 50min    Session #: 34    Attendees: Client     Service Modality:  Video    Telemedicine Visit: The patient's condition can be safely assessed and treated via synchronous audio and visual telemedicine encounter.      Reason for Telemedicine Visit: Patient has requested telehealth visit    Originating Site (Patient Location): Patient's home        Distant Location (provider location):  Off-site    Consent:  The patient/guardian has verbally consented to: the potential risks and benefits of telemedicine (video visit) versus in person care; bill my insurance or make self-payment for services provided; and responsibility for payment of non-covered services.     Mode of Communication:  Video Conference via [employer]    As the provider I attest to compliance with applicable laws and regulations related to telemedicine.      Treatment Plan- 10-16-23  CGI- 10-16-23  Phq-9 and YADI-7- See check in data  Promis- 11-29-23    DATA  Interactive Complexity: No  Crisis: No        Progress Since Last Session (Related to Symptoms / Goals / Homework):   There has been demonstrated improvement in functioning while patient has been engaged in psychotherapy/psychological service- if withdrawn the patient would deteriorate and/or relapse.      Symptoms: Client reports confronting issues with anxiety and depressed mood.  Suicidal thoughts with no plan or intent.  Triggers have been holiday stress.     Homework: Achieved / completed to satisfaction  Completed in session      Episode of Care Goals: Satisfactory progress - ACTION (Actively working towards change); Intervened by reinforcing change plan / affirming steps taken     Current / Ongoing Stressors and Concerns:   -Veena did get approved  for disability.   -Missing some holiday traditions and feels her immediate family does not participate.     -Tearful in session today.    -Has struggled with PMDD for a number of years.  Has been working with chiropractic and message therapy on some holistic treatment options.  Has not been able to get in consistently - Continue    -On Thanksgiving felt there was no value in time together.    -Deborah can be difficult at times and her and Veena can clash.    -Struggling more with dissociation.    -Veena is working on feeling useful and finding a purpose.    -Working on having really good body awareness.  -Finding enjoyment with spending time with friend Forrest.   -Veena states she feels like a member of the family that does not matter.         Treatment Objective(s) Addressed in This Session:  Safety planning   Patient will develop a plan to help manage PMDD  Patient will work on ways to communicate /patterns   Relational issues   Grief        Intervention:   Connect with friend Forrest for support.   Processed through grief and loss.  Veena feels she needed the year to grieve.  Have a conversation about value in the family.   Follow safety plan and use crisis resources.  Agrees to contract for safety today.   Use support system- Continued   Joint activities- label it that way and find shared activities.    Respond and not react to Star.  Use assertiveness skills and have good boundaries.    Work on finding purposeful and meaningful activities during the day.   Practice empathy.    Use some holistic approaches to health.     Brainstorm more ways to bring fun into the family. Really put effort into quality time.        Assessments completed prior to visit:  The following assessments were completed by patient for this visit:  PHQ2:       11/29/2023     9:44 AM 10/5/2023    10:34 AM 9/19/2023     7:09 PM 9/7/2023     6:59 AM 8/23/2023    10:14 AM 8/7/2023    11:02 AM 4/11/2023     9:00 PM   PHQ-2 ( 1999 [employer])   Q1: Little  interest or pleasure in doing things 3 0 1 1 1 1 1   Q2: Feeling down, depressed or hopeless 3 1 2 1 1 1 1   PHQ-2 Score 6 1 3 2 2 2 2   Q1: Little interest or pleasure in doing things Nearly every day Not at all Several days Several days Several days Several days Several days   Q2: Feeling down, depressed or hopeless Nearly every day Several days More than half the days Several days Several days Several days Several days   PHQ-2 Score 6 1 3 2 2 2 2     PHQ9:       11/29/2022    12:07 PM 1/5/2023     1:35 PM 2/2/2023    10:13 AM 6/1/2023    10:07 AM 7/17/2023    11:15 AM 9/19/2023     7:09 PM 11/29/2023     9:44 AM   PHQ-9 SCORE   PHQ-9 Total Score MyChart 21 (Severe depression) 16 (Moderately severe depression)    15 (Moderately severe depression) 15 (Moderately severe depression)   PHQ-9 Total Score 21 16 12 15 16 15 15     GAD2:       4/6/2023     8:12 AM 8/7/2023    11:04 AM 8/23/2023    10:14 AM 9/7/2023     6:59 AM 9/19/2023     7:10 PM 10/5/2023    10:34 AM 11/29/2023     9:45 AM   YADI-2   Feeling nervous, anxious, or on edge 1 1 1 1 3 1 1   Not being able to stop or control worrying 1 2 1 1 3 1 1   YADI-2 Total Score 2 3 2 2 6 2 2     GAD7:       11/27/2022     5:03 AM 2/2/2023    10:13 AM 6/1/2023    10:07 AM 7/17/2023    11:15 AM 8/7/2023    11:04 AM 9/19/2023     7:10 PM   YADI-7 SCORE   Total Score 17 (severe anxiety)    5 (mild anxiety) 21 (severe anxiety)   Total Score 17 13 12 13 5 21     PROMIS 10-Global Health (all questions and answers displayed):       4/6/2023     8:14 AM 8/7/2023    11:05 AM 8/23/2023    10:14 AM 9/7/2023     7:00 AM 9/19/2023     7:11 PM 10/5/2023    10:35 AM 11/29/2023     9:46 AM   PROMIS 10   In general, would you say your health is: Good Good Good Good Good Good Fair   In general, would you say your quality of life is: Good Good Fair Good Fair Good Fair   In general, how would you rate your physical health? Good Fair Good Good Fair Fair Fair   In general, how would you rate  your mental health, including your mood and your ability to think? Fair Fair Fair Poor Fair Good Fair   In general, how would you rate your satisfaction with your social activities and relationships? Fair Good Fair Fair Fair Good Fair   In general, please rate how well you carry out your usual social activities and roles Poor Good Poor Poor Poor Fair Poor   To what extent are you able to carry out your everyday physical activities such as walking, climbing stairs, carrying groceries, or moving a chair? Mostly Moderately Moderately Mostly Completely Mostly Mostly   In the past 7 days, how often have you been bothered by emotional problems such as feeling anxious, depressed, or irritable? Sometimes Sometimes Often Often Often Sometimes Always   In the past 7 days, how would you rate your fatigue on average? Mild Moderate Moderate Moderate Severe Mild Moderate   In the past 7 days, how would you rate your pain on average, where 0 means no pain, and 10 means worst imaginable pain? 2 4 6 6 5 5 6   In general, would you say your health is: 3 3 3 3 3 3 2   In general, would you say your quality of life is: 3 3 2 3 2 3 2   In general, how would you rate your physical health? 3 2 3 3 2 2 2   In general, how would you rate your mental health, including your mood and your ability to think? 2 2 2 1 2 3 2   In general, how would you rate your satisfaction with your social activities and relationships? 2 3 2 2 2 3 2   In general, please rate how well you carry out your usual social activities and roles. (This includes activities at home, at work and in your community, and responsibilities as a parent, child, spouse, employee, friend, etc.) 1 3 1 1 1 2 1   To what extent are you able to carry out your everyday physical activities such as walking, climbing stairs, carrying groceries, or moving a chair? 4 3 3 4 5 4 4   In the past 7 days, how often have you been bothered by emotional problems such as feeling anxious, depressed, or  irritable? 3 3 4 4 4 3 5   In the past 7 days, how would you rate your fatigue on average? 2 3 3 3 4 2 3   In the past 7 days, how would you rate your pain on average, where 0 means no pain, and 10 means worst imaginable pain? 2 4 6 6 5 5 6   Global Mental Health Score 10 11 8 8 8 12 7   Global Physical Health Score 15 11 12 13 12 13 12   PROMIS TOTAL - SUBSCORES 25 22 20 21 20 25 19     Hagan Suicide Severity Rating Scale (Lifetime/Recent)      8/3/2022     9:17 AM   Hagan Suicide Severity Rating (Lifetime/Recent)   Q1 Wish to be Dead (Lifetime) N   Q2 Non-Specific Active Suicidal Thoughts (Lifetime) N   Actual Attempt (Lifetime) N   Has subject engaged in non-suicidal self-injurious behavior? (Lifetime) N   Calculated C-SSRS Risk Score (Lifetime/Recent) No Risk Indicated         ASSESSMENT: Current Emotional / Mental Status (status of significant symptoms):   Risk status (Self / Other harm or suicidal ideation)   Patient denies current fears or concerns for personal safety.   Patient reports the following current or recent suicidal ideation or behaviors:  Suicidal thoughts with no plan or intent.  Triggers have been holiday stress. .    Patient denies current or recent homicidal ideation or behaviors.   Patient denies current or recent self injurious behavior or ideation.   Patient denies other safety concerns.   Patient reports there has been no change in risk factors since their last session.     Patient reports there has been no change in protective factors since their last session.     A safety and risk management plan has been developed including: Patient consented to co-developed safety plan on 11-30-22.  Safety and risk management plan was reviewed.   Patient agreed to use safety plan should any safety concerns arise.  A copy was made available to the patient. Reviewed 11-29-23        [facility]                                       Veena Parsons     SAFETY PLAN:  Step 1:  "Warning signs / cues (Thoughts, images, mood, situation, behavior) that a crisis may be developing:  Thoughts: \"I don't matter\", \"People would be better off without me\", \"I'm a burden\", \"I can't do this anymore\", \"I just want this to end\" and \"Nothing makes it better\"  Images: flashbacks  Thinking Processes: ruminations (can't stop thinking about my problems): PMDD and loss of mother, racing thoughts and highly critical and negative thoughts: I cant do anything right  Mood: worsening depression, hopelessness, helplessness, intense worry, agitation and mood swings  Behaviors: isolating/withdrawing , can't stop crying, not taking care of myself, not taking care of my responsibilities, sleeping too much, not sleeping enough and increasing frequency and duration of dissociation  Situations: relationship problems, trauma  and medical condition / diagnosis: PMDD    Step 2: Coping strategies - Things I can do to take my mind off of my problems without contacting another person (relaxation technique, physical activity):  Distress Tolerance Strategies:  relaxation activities, play with my pet , listen to positive and upbeat music, sensory based activities/self-soothe with five senses, watch a funny movie, read a book, change body temperature (ice pack/cold water)  and paced breathing/progressive muscle relaxation  Physical Activities: go for a walk, gardening, meditation, deep breathing and stretching   Focus on helpful thoughts:  \"This is temporary\", \"I will get through this\", \"It always passes\" and \"Ride the wave\"  Step 3: People and social settings that provide distraction:   Name: Spouse Star   Name:  Spending time with daughter     movie theater, pet store/humane society and coffee shop   Step 4: Remind myself of people and things that are important to me and worth living for:    Daughter   Spouse  Pets     Step 5: When I am in crisis, I can ask these people to help me use my safety plan:   Name: Spouse  Phone: " [phone]      Step 6: Making the environment safe:   remove things I could use to hurt myself and be around others  Step 7: Professionals or agencies I can contact during a crisis:  Suicide Prevention Lifeline: Call or Text 097   Local Crisis Services:  [facility] [phone]      Call 911 or go to my nearest emergency department.   I helped develop this safety plan and agree to use it when needed.  I have been given a copy of this plan.      Client signature _________________________________________________________________  Today s date:  11/30/2022  Completed by Provider Name/ Credentials:  Lia Stiles MA, LMFT  November 30, 2022  Adapted from Safety Plan Template 2008 Eleonora Capone and Danny Carmona is reprinted with the express permission of the authors.  No portion of the Safety Plan Template may be reproduced without the express, written permission.  You can contact the authors at [email or web address] or [email or web address].    Name: Veena Parsons  YOB: 1985  Date: November 30, 2022   My primary care provider: Francisco Nails  My primary care clinic: [facility]   My prescriber: PCP  Other care team support:  Holistic medical provider    My Triggers:    Relational problems  Loss of mother recently  Financial stress      Additional People, Places, and Things that I can access for support:   Spouse  Daughter          What is important to me and makes life worth living: Family         GREEN    Good Control  1. I feel good  2. No suicidal thoughts   3. Can work, sleep and play      Action Steps  1. Self-care: balanced meals, exercising, sleep practices, etc.  2. Take your medications as prescribed.  3. Continue meetings with therapist and prescriber.  4.  Do the healthy things that I enjoy.             YELLO Getting Worse  I have ANY of these:  1. I do not feel good  2. Difficulty Concentrating  3. Sleep is changing  4. Increase/Change in my thoughts to hurt  self and/or others, but I can still manage and not act on it.   5. Not taking care of self.               Action Steps (in addition to the above):  1. Inform your therapist and psychiatric prescriber/PCP.  2. Keep taking your medications as prescribed.    3. Turn to people you can ask for help.  4. Use internal coping strategies -see below.  5. Create safe environment: Removing items I can hurt self with              RED  Get Help  If I have ANY of these:  1. Current and uncontrollable thoughts and/or behaviors to hurt self and/or others.   2. Crazy buzzing and spinning.     Actions to manage my safety  1. Contact your emergency person Star  2. Call or Text 539  3. Call my crisis team- [facility] [phone]  3. Or Call [phone] or go to the emergency room right away        My Internal Coping Strategies include the following:  take a bath, belly breathing, arts and crafts, play with my pet, use my coping box, exercise and use my coping skills    [End for Brief Safety Plan]     Safety Concerns  How To Identify Situations That Make Your Mental Health Worse:  Triggers are things that make your mental health worse.  Look at the list below to help you find your triggers and what you can do about them.     1. Identify Early Warning Signs:    Sometimes symptoms return, even when people do their best to stay well. Symptoms can develop over a short period of time with little or no warning, but most of the time they emerge gradually over several weeks.  Early warning signs are changes that people experience when a relapse is starting. Some early warning signs are common and others are not as common.   Common Early Warning Signs:    Feeling tense or nervous, Eating less or eating more, Trouble sleeping -either too much or too little sleep, Feeling depressed or low, Feeling irritable, Feeling like not being around other people, Trouble concentrating and Urges to harm self     2. Identify action steps to take when warning  signs are noticed:    Taking Action- It is important to take action if you are experiencing early warning signs of a relapse.  The faster you act, the more likely it is that you can avoid a full relapse.  It is helpful to identify several specific ways to cope with symptoms.      The following is my list of symptoms and coping strategies that I can use when they are present:    Symptom Coping Strategies   Anxiety -Talk with someone in your support system and let him or her know how you are feeling.  -Use relaxation techniques such as deep breathing or imagery.  -Use positive affirmations to counteract negative self-talk such as  I am learning to let go of worry.    Depression - Schedule your day; include activities you have to do and activities you enjoy doing.  - Get some exercise - walk, run, bike, or swim.  - Give yourself credit for even the smallest things you get done.   Sleep Difficulties   - Go to sleep at the same time every day.  - Do something relaxing before bed, such as drinking herbal tea or listening to music.  - Avoid having discussions about upsetting topics before going to bed.   Delusions   - Distract yourself from the disturbing thought by doing something that requires your attention such as a puzzle.  - Check out your beliefs by talking to someone you trust.    Hallucinations   - Use headphones to listen to music.  - Tell voices to  stop  or say to yourself,  I am safe.   - Ignore the hallucinations as much as possible; focus on other things.   Concentration Difficulties - Minimize distractions so there is only one thing for you to focus on at a time.    - Ask the person you are having a conversation with to slow down or repeat things you are unsure of.            Appearance:   Normal   Eye Contact:   Good   Psychomotor Behavior: Normal    Attitude:   Cooperative    Orientation:   All   Speech    Rate / Production: Normal     Volume:  Normal    Mood:    Anxious    Affect:    Worrisome Sad Upset  "   Thought Content:  Clear    Thought Form:  Coherent  Goal Directed    Insight:    Fair      Medication Review:   No changes to current psychiatric medication(s)     Medication Compliance:   Yes     Changes in Health Issues:   None reported     Chemical Use Review:   Substance Use: Chemical use reviewed, no active concerns identified      Tobacco Use: No current tobacco use.      Diagnosis:  296.32 (F33.1) Major Depressive Disorder, Recurrent Episode, Moderate _ and With mixed features  300.01 (F41.0) Panic Disorder  300.02 (F41.1) Generalized Anxiety Disorder   PMDD      Collateral Reports Completed:   Not Applicable    PLAN: (Patient Tasks / Therapist Tasks / Other)  Client will continue to work on the following goals:    -Follow safety plan and use crisis resources-Continued   -Have a conversation about values.  -Spend more quality time with friend Forrest.   -Have more active coping skills readily available.    -Do more brainstorming together on hard days.   -Work on responding and not reacting.   -Work on daily purpose.  -Practice empathy and trying not to problem solve initially.   -Continue to work on \"staying in own eulalio\" as a couple.   -Have a good concrete daily schedule.   -Consider couples counseling again.       Lia Stiles, [facility]                                                         ______________________________________________________________________    Individual Treatment Plan    Patient's Name: Veena Parsons  YOB: 1985    Date of Creation: 9-7-22  Date Treatment Plan Last Reviewed/Revised: 10-16-23    DSM5 Diagnoses:  296.32 (F33.1) Major Depressive Disorder, Recurrent Episode, Moderate _ and With mixed features  300.01 (F41.0) Panic Disorder  300.02 (F41.1) Generalized Anxiety Disorder   PMDD  Psychosocial / Contextual Factors: Some relational issues   PROMIS (reviewed every 90 days): 19    Referral / Collaboration:  Referral to another professional/service is not " indicated at this time..    Anticipated number of session for this episode of care: 6-9 sessions  Anticipation frequency of session: Biweekly  Anticipated Duration of each session: 38-52 minutes  Treatment plan will be reviewed in 90 days or when goals have been changed.       MeasurableTreatment Goal(s) related to diagnosis / functional impairment(s)  Goal 1: Patient will address struggles with anxiety, depression and PMDD.    I will know I've met my goal when I am feeling less reactive through the month with the symptoms.      Objective #A (Patient Action)    Patient will work on establishing a concrete routine with self-care.  Status: Continued - Date(s): 10-16-23    Intervention(s)  Therapist will use CBT and motivational interviewing.      Objective #B  Patient will develop a plan to help manage PMDD  Status: Continued - Date(s): 10-16-23    Intervention(s)  Therapist will use solution focused therapy.    Objective #C  Patient will work on ways to communicate with narcissistic individuals.  Status: Continued - Date(s): 10-16-23    Intervention(s)  Therapist will teach communication skills.          Patient has reviewed and agreed to the above plan.      Lia Stiles, NATACHA  September 7, 2022

## 2023-12-05 ENCOUNTER — VIRTUAL VISIT (OUTPATIENT)
Dept: PSYCHOLOGY | Facility: CLINIC | Age: 38
End: 2023-12-05
Payer: COMMERCIAL

## 2023-12-05 DIAGNOSIS — F41.0 PANIC DISORDER WITHOUT AGORAPHOBIA: ICD-10-CM

## 2023-12-05 DIAGNOSIS — F41.1 GENERALIZED ANXIETY DISORDER: ICD-10-CM

## 2023-12-05 DIAGNOSIS — F33.1 MAJOR DEPRESSIVE DISORDER, RECURRENT EPISODE, MODERATE (H): Primary | ICD-10-CM

## 2023-12-05 PROCEDURE — 90834 PSYTX W PT 45 MINUTES: CPT | Mod: VID | Performed by: MARRIAGE & FAMILY THERAPIST

## 2023-12-05 NOTE — PROGRESS NOTES
[facility]                                     Progress Note    Patient Name: Veena Parsons  Date: 12-5-23         Service Type: Individual      Session Start Time:  1pm    Session End Time:    150pm     Session Length: 50min    Session #: 35    Attendees: Client and spouse.     Service Modality:  Video    Telemedicine Visit: The patient's condition can be safely assessed and treated via synchronous audio and visual telemedicine encounter.      Reason for Telemedicine Visit: Patient has requested telehealth visit    Originating Site (Patient Location): Patient's home        Distant Location (provider location):  Off-site    Consent:  The patient/guardian has verbally consented to: the potential risks and benefits of telemedicine (video visit) versus in person care; bill my insurance or make self-payment for services provided; and responsibility for payment of non-covered services.     Mode of Communication:  Video Conference via [employer]    As the provider I attest to compliance with applicable laws and regulations related to telemedicine.      Treatment Plan- 10-16-23  CGI- 10-16-23  Phq-9 and YADI-7- See check in data  Promis- 11-29-23    DATA  Interactive Complexity: No  Crisis: No        Progress Since Last Session (Related to Symptoms / Goals / Homework):   There has been demonstrated improvement in functioning while patient has been engaged in psychotherapy/psychological service- if withdrawn the patient would deteriorate and/or relapse.      Symptoms: Client reports confronting issues with anxiety and depressed mood.  PMDD symptoms have been very intense this month.      Homework: Achieved / completed to satisfaction  Completed in session      Episode of Care Goals: Satisfactory progress - ACTION (Actively working towards change); Intervened by reinforcing change plan / affirming steps taken     Current / Ongoing Stressors and Concerns:   -Veena did get approved for  disability.   -PMDD has been very intense for a month.  Was not able to go to [employer] work party due to symptoms.    -Supposed to go to [place] family holiday this weekend.  [place] sister can be very discouraging. Star would like to go to see his father.    -Has struggled with PMDD for a number of years.  Has been working with chiropractic and message therapy on some holistic treatment options.  Has not been able to get in consistently - Continue    -Deborah can be difficult at times and her and Veena can clash.    -Struggling more with dissociation.   -Finding enjoyment with spending time with friend Forrest.   -Veena states she feels like a member of the family that does not matter.   -Communication struggles with processing speed and formulating.   -Feeling trauma wounds are strong this month.         Treatment Objective(s) Addressed in This Session:  Safety planning   Patient will develop a plan to help manage PMDD  Patient will work on ways to communicate /patterns   Relational issues   Grief        Intervention:   Review speaker listening technique.   Sending out relationship check in sheet.   Connect with friend Forrest for support.   Processed through grief and loss.   Have a conversation about value in the family.   Follow safety plan and use crisis resources.  Agrees to contract for safety today.   Use support system- Continued   Joint activities- label it that way and find shared activities.    Attempt to plan for Star's holiday family celebration.    Work on finding purposeful and meaningful activities during the day.   Use some holistic approaches to health.     Brainstorm more ways to bring fun into the family. Really put effort into quality time.        Assessments completed prior to visit:  The following assessments were completed by patient for this visit:  PHQ2:       11/29/2023     9:44 AM 10/5/2023    10:34 AM 9/19/2023     7:09 PM 9/7/2023     6:59 AM 8/23/2023    10:14 AM 8/7/2023    11:02 AM 4/11/2023      9:00 PM   PHQ-2 ( 1999 [employer])   Q1: Little interest or pleasure in doing things 3 0 1 1 1 1 1   Q2: Feeling down, depressed or hopeless 3 1 2 1 1 1 1   PHQ-2 Score 6 1 3 2 2 2 2   Q1: Little interest or pleasure in doing things Nearly every day Not at all Several days Several days Several days Several days Several days   Q2: Feeling down, depressed or hopeless Nearly every day Several days More than half the days Several days Several days Several days Several days   PHQ-2 Score 6 1 3 2 2 2 2     PHQ9:       11/29/2022    12:07 PM 1/5/2023     1:35 PM 2/2/2023    10:13 AM 6/1/2023    10:07 AM 7/17/2023    11:15 AM 9/19/2023     7:09 PM 11/29/2023     9:44 AM   PHQ-9 SCORE   [facility]9 [facility] Score [facility] 21 (Severe depression) 16 (Moderately severe depression)    15 (Moderately severe depression) 15 (Moderately severe depression)   PHQ-9 Total Score 21 16 12 15 16 15 15     GAD2:       4/6/2023     8:12 AM 8/7/2023    11:04 AM 8/23/2023    10:14 AM 9/7/2023     6:59 AM 9/19/2023     7:10 PM 10/5/2023    10:34 AM 11/29/2023     9:45 AM   YADI-2   Feeling nervous, anxious, or on edge 1 1 1 1 3 1 1   Not being able to stop or control worrying 1 2 1 1 3 1 1   YADI-2 Total Score 2 3 2 2 6 2 2     GAD7:       11/27/2022     5:03 AM 2/2/2023    10:13 AM 6/1/2023    10:07 AM 7/17/2023    11:15 AM 8/7/2023    11:04 AM 9/19/2023     7:10 PM   YADI-7 SCORE   Total Score 17 (severe anxiety)    5 (mild anxiety) 21 (severe anxiety)   Total Score 17 13 12 13 5 21     PROMIS 10-Global Health (all questions and answers displayed):       4/6/2023     8:14 AM 8/7/2023    11:05 AM 8/23/2023    10:14 AM 9/7/2023     7:00 AM 9/19/2023     7:11 PM 10/5/2023    10:35 AM 11/29/2023     9:46 AM   PROMIS 10   In general, would you say your health is: Good Good Good Good Good Good Fair   In general, would you say your quality of life is: Good Good Fair Good Fair Good Fair   In general, how would you rate your physical health? Good Fair Good Good Fair  Fair Fair   In general, how would you rate your mental health, including your mood and your ability to think? Fair Fair Fair Poor Fair Good Fair   In general, how would you rate your satisfaction with your social activities and relationships? Fair Good Fair Fair Fair Good Fair   In general, please rate how well you carry out your usual social activities and roles Poor Good Poor Poor Poor Fair Poor   To what extent are you able to carry out your everyday physical activities such as walking, climbing stairs, carrying groceries, or moving a chair? Mostly Moderately Moderately Mostly Completely Mostly Mostly   In the past 7 days, how often have you been bothered by emotional problems such as feeling anxious, depressed, or irritable? Sometimes Sometimes Often Often Often Sometimes Always   In the past 7 days, how would you rate your fatigue on average? Mild Moderate Moderate Moderate Severe Mild Moderate   In the past 7 days, how would you rate your pain on average, where 0 means no pain, and 10 means worst imaginable pain? 2 4 6 6 5 5 6   In general, would you say your health is: 3 3 3 3 3 3 2   In general, would you say your quality of life is: 3 3 2 3 2 3 2   In general, how would you rate your physical health? 3 2 3 3 2 2 2   In general, how would you rate your mental health, including your mood and your ability to think? 2 2 2 1 2 3 2   In general, how would you rate your satisfaction with your social activities and relationships? 2 3 2 2 2 3 2   In general, please rate how well you carry out your usual social activities and roles. (This includes activities at home, at work and in your community, and responsibilities as a parent, child, spouse, employee, friend, etc.) 1 3 1 1 1 2 1   To what extent are you able to carry out your everyday physical activities such as walking, climbing stairs, carrying groceries, or moving a chair? 4 3 3 4 5 4 4   In the past 7 days, how often have you been bothered by emotional  problems such as feeling anxious, depressed, or irritable? 3 3 4 4 4 3 5   In the past 7 days, how would you rate your fatigue on average? 2 3 3 3 4 2 3   In the past 7 days, how would you rate your pain on average, where 0 means no pain, and 10 means worst imaginable pain? 2 4 6 6 5 5 6   Global Mental Health Score 10 11 8 8 8 12 7   Global Physical Health Score 15 11 12 13 12 13 12   PROMIS TOTAL - SUBSCORES 25 22 20 21 20 25 19     [place] Suicide Severity Rating Scale (Lifetime/Recent)      8/3/2022     9:17 AM   [place] Suicide Severity Rating (Lifetime/Recent)   Q1 Wish to be Dead (Lifetime) N   Q2 Non-Specific Active Suicidal Thoughts (Lifetime) N   Actual Attempt (Lifetime) N   Has subject engaged in non-suicidal self-injurious behavior? (Lifetime) N   Calculated C-SSRS Risk Score (Lifetime/Recent) No Risk Indicated         ASSESSMENT: Current Emotional / Mental Status (status of significant symptoms):   Risk status (Self / Other harm or suicidal ideation)   Patient denies current fears or concerns for personal safety.   Patient denies current or recent suicidal ideation or behaviors.    Patient denies current or recent homicidal ideation or behaviors.   Patient denies current or recent self injurious behavior or ideation.   Patient denies other safety concerns.   Patient reports there has been no change in risk factors since their last session.     Patient reports there has been no change in protective factors since their last session.     A safety and risk management plan has been developed including: Patient consented to co-developed safety plan on 11-30-22.  Safety and risk management plan was reviewed.   Patient agreed to use safety plan should any safety concerns arise.  A copy was made available to the patient. Reviewed 12-5-23        [facility]                                       Veena Parsons     SAFETY PLAN:  Step 1: Warning signs / cues (Thoughts, images, mood,  "situation, behavior) that a crisis may be developing:  Thoughts: \"I don't matter\", \"People would be better off without me\", \"I'm a burden\", \"I can't do this anymore\", \"I just want this to end\" and \"Nothing makes it better\"  Images: flashbacks  Thinking Processes: ruminations (can't stop thinking about my problems): PMDD and loss of mother, racing thoughts and highly critical and negative thoughts: I cant do anything right  Mood: worsening depression, hopelessness, helplessness, intense worry, agitation and mood swings  Behaviors: isolating/withdrawing , can't stop crying, not taking care of myself, not taking care of my responsibilities, sleeping too much, not sleeping enough and increasing frequency and duration of dissociation  Situations: relationship problems, trauma  and medical condition / diagnosis: PMDD    Step 2: Coping strategies - Things I can do to take my mind off of my problems without contacting another person (relaxation technique, physical activity):  Distress Tolerance Strategies:  relaxation activities, play with my pet , listen to positive and upbeat music, sensory based activities/self-soothe with five senses, watch a funny movie, read a book, change body temperature (ice pack/cold water)  and paced breathing/progressive muscle relaxation  Physical Activities: go for a walk, gardening, meditation, deep breathing and stretching   Focus on helpful thoughts:  \"This is temporary\", \"I will get through this\", \"It always passes\" and \"Ride the wave\"  Step 3: People and social settings that provide distraction:   Name: Spouse Star   Name:  Spending time with daughter     movie theater, pet store/humane society and coffee shop   Step 4: Remind myself of people and things that are important to me and worth living for:    Daughter   Spouse  Pets     Step 5: When I am in crisis, I can ask these people to help me use my safety plan:   Name: Spouse  Phone: [phone]      Step 6: Making the environment safe: "   remove things I could use to hurt myself and be around others  Step 7: Professionals or agencies I can contact during a crisis:  Suicide Prevention Lifeline: Call or Text 904   Local Crisis Services:  [facility] [phone]      Call 911 or go to my nearest emergency department.   I helped develop this safety plan and agree to use it when needed.  I have been given a copy of this plan.      Client signature _________________________________________________________________  Today s date:  11/30/2022  Completed by Provider Name/ Credentials:  Lia Stiles MA, LMFT  November 30, 2022  Adapted from Safety Plan Template 2008 Eleonora Capone and Danny Carmona is reprinted with the express permission of the authors.  No portion of the Safety Plan Template may be reproduced without the express, written permission.  You can contact the authors at [email or web address] or [email or web address].    Name: Veena Parsons  YOB: 1985  Date: November 30, 2022   My primary care provider: Francisco Nails  My primary care clinic: [facility]   My prescriber: PCP  Other care team support:  Holistic medical provider    My Triggers:    Relational problems  Loss of mother recently  Financial stress      Additional People, Places, and Things that I can access for support:   Spouse  Daughter          What is important to me and makes life worth living: Family         GREEN    Good Control  1. I feel good  2. No suicidal thoughts   3. Can work, sleep and play      Action Steps  1. Self-care: balanced meals, exercising, sleep practices, etc.  2. Take your medications as prescribed.  3. Continue meetings with therapist and prescriber.  4.  Do the healthy things that I enjoy.             YELLO Getting Worse  I have ANY of these:  1. I do not feel good  2. Difficulty Concentrating  3. Sleep is changing  4. Increase/Change in my thoughts to hurt self and/or others, but I can still manage and not act on  it.   5. Not taking care of self.               Action Steps (in addition to the above):  1. Inform your therapist and psychiatric prescriber/PCP.  2. Keep taking your medications as prescribed.    3. Turn to people you can ask for help.  4. Use internal coping strategies -see below.  5. Create safe environment: Removing items I can hurt self with              RED  Get Help  If I have ANY of these:  1. Current and uncontrollable thoughts and/or behaviors to hurt self and/or others.   2. Crazy buzzing and spinning.     Actions to manage my safety  1. Contact your emergency person Star  2. Call or Text 341  3. Call my crisis team- [facility] [phone]  3. Or Call [phone] or go to the emergency room right away        My Internal Coping Strategies include the following:  take a bath, belly breathing, arts and crafts, play with my pet, use my coping box, exercise and use my coping skills    [End for Brief Safety Plan]     Safety Concerns  How To Identify Situations That Make Your Mental Health Worse:  Triggers are things that make your mental health worse.  Look at the list below to help you find your triggers and what you can do about them.     1. Identify Early Warning Signs:    Sometimes symptoms return, even when people do their best to stay well. Symptoms can develop over a short period of time with little or no warning, but most of the time they emerge gradually over several weeks.  Early warning signs are changes that people experience when a relapse is starting. Some early warning signs are common and others are not as common.   Common Early Warning Signs:    Feeling tense or nervous, Eating less or eating more, Trouble sleeping -either too much or too little sleep, Feeling depressed or low, Feeling irritable, Feeling like not being around other people, Trouble concentrating and Urges to harm self     2. Identify action steps to take when warning signs are noticed:    Taking Action- It is important to take  action if you are experiencing early warning signs of a relapse.  The faster you act, the more likely it is that you can avoid a full relapse.  It is helpful to identify several specific ways to cope with symptoms.      The following is my list of symptoms and coping strategies that I can use when they are present:    Symptom Coping Strategies   Anxiety -Talk with someone in your support system and let him or her know how you are feeling.  -Use relaxation techniques such as deep breathing or imagery.  -Use positive affirmations to counteract negative self-talk such as  I am learning to let go of worry.    Depression - Schedule your day; include activities you have to do and activities you enjoy doing.  - Get some exercise - walk, run, bike, or swim.  - Give yourself credit for even the smallest things you get done.   Sleep Difficulties   - Go to sleep at the same time every day.  - Do something relaxing before bed, such as drinking herbal tea or listening to music.  - Avoid having discussions about upsetting topics before going to bed.   Delusions   - Distract yourself from the disturbing thought by doing something that requires your attention such as a puzzle.  - Check out your beliefs by talking to someone you trust.    Hallucinations   - Use headphones to listen to music.  - Tell voices to  stop  or say to yourself,  I am safe.   - Ignore the hallucinations as much as possible; focus on other things.   Concentration Difficulties - Minimize distractions so there is only one thing for you to focus on at a time.    - Ask the person you are having a conversation with to slow down or repeat things you are unsure of.            Appearance:   Normal   Eye Contact:   Good   Psychomotor Behavior: Normal    Attitude:   Cooperative    Orientation:   All   Speech    Rate / Production: Normal     Volume:  Normal    Mood:    Anxious    Affect:    Worrisome Sad Upset Tearful    Thought Content:  Clear    Thought Form:  Coherent  " Goal Directed    Insight:    Fair      Medication Review:   No changes to current psychiatric medication(s)     Medication Compliance:   Yes     Changes in Health Issues:   None reported     Chemical Use Review:   Substance Use: Chemical use reviewed, no active concerns identified      Tobacco Use: No current tobacco use.      Diagnosis:  296.32 (F33.1) Major Depressive Disorder, Recurrent Episode, Moderate _ and With mixed features  300.01 (F41.0) Panic Disorder  300.02 (F41.1) Generalized Anxiety Disorder   PMDD      Collateral Reports Completed:   Not Applicable    PLAN: (Patient Tasks / Therapist Tasks / Other)  Client will continue to work on the following goals:    -Follow safety plan and use crisis resources-Continued   -Have a conversation about values.  Sending out relationship check list sheet.   -Review speaker listening technique.   -Spend more quality time with friend Forrest.   -Have more active coping skills readily available.    -Do more brainstorming together on hard days.   -Plan to go to the [employer] family party with some different purposes in mind.   -Work on daily purpose.  -Continue to work on \"staying in own eulalio\" as a couple.   -Have a good concrete daily schedule.   -Consider couples counseling again.       Lia Stiles, [facility]                                                         ______________________________________________________________________    Individual Treatment Plan    Patient's Name: Veena Parsons  YOB: 1985    Date of Creation: 9-7-22  Date Treatment Plan Last Reviewed/Revised: 10-16-23    DSM5 Diagnoses:  296.32 (F33.1) Major Depressive Disorder, Recurrent Episode, Moderate _ and With mixed features  300.01 (F41.0) Panic Disorder  300.02 (F41.1) Generalized Anxiety Disorder   PMDD  Psychosocial / Contextual Factors: Some relational issues   PROMIS (reviewed every 90 days): 19    Referral / Collaboration:  Referral to another professional/service is not " indicated at this time..    Anticipated number of session for this episode of care: 6-9 sessions  Anticipation frequency of session: Biweekly  Anticipated Duration of each session: 38-52 minutes  Treatment plan will be reviewed in 90 days or when goals have been changed.       MeasurableTreatment Goal(s) related to diagnosis / functional impairment(s)  Goal 1: Patient will address struggles with anxiety, depression and PMDD.    I will know I've met my goal when I am feeling less reactive through the month with the symptoms.      Objective #A (Patient Action)    Patient will work on establishing a concrete routine with self-care.  Status: Continued - Date(s): 10-16-23    Intervention(s)  Therapist will use CBT and motivational interviewing.      Objective #B  Patient will develop a plan to help manage PMDD  Status: Continued - Date(s): 10-16-23    Intervention(s)  Therapist will use solution focused therapy.    Objective #C  Patient will work on ways to communicate with narcissistic individuals.  Status: Continued - Date(s): 10-16-23    Intervention(s)  Therapist will teach communication skills.          Patient has reviewed and agreed to the above plan.      Lia Stiles, NATACHA  September 7, 2022

## 2023-12-13 ENCOUNTER — VIRTUAL VISIT (OUTPATIENT)
Dept: PSYCHOLOGY | Facility: CLINIC | Age: 38
End: 2023-12-13
Payer: COMMERCIAL

## 2023-12-13 DIAGNOSIS — F33.1 MAJOR DEPRESSIVE DISORDER, RECURRENT EPISODE, MODERATE (H): Primary | ICD-10-CM

## 2023-12-13 DIAGNOSIS — F41.1 GENERALIZED ANXIETY DISORDER: ICD-10-CM

## 2023-12-13 DIAGNOSIS — F41.0 PANIC DISORDER WITHOUT AGORAPHOBIA: ICD-10-CM

## 2023-12-13 PROCEDURE — 90834 PSYTX W PT 45 MINUTES: CPT | Mod: VID | Performed by: MARRIAGE & FAMILY THERAPIST

## 2023-12-13 NOTE — PROGRESS NOTES
[facility]                                     Progress Note    Patient Name: Veena Parsons  Date: 12-13-23         Service Type: Individual      Session Start Time:  1120am    Session End Time:    12pm     Session Length: 40min    Session #: 36    Attendees: Client     Service Modality:  Video    Telemedicine Visit: The patient's condition can be safely assessed and treated via synchronous audio and visual telemedicine encounter.      Reason for Telemedicine Visit: Patient has requested telehealth visit    Originating Site (Patient Location): Patient's home        Distant Location (provider location):  Off-site    Consent:  The patient/guardian has verbally consented to: the potential risks and benefits of telemedicine (video visit) versus in person care; bill my insurance or make self-payment for services provided; and responsibility for payment of non-covered services.     Mode of Communication:  Video Conference via [employer]    As the provider I attest to compliance with applicable laws and regulations related to telemedicine.      Treatment Plan- 10-16-23  CGI- 10-16-23  Phq-9 and YADI-7- See check in data  Promis- 11-29-23    DATA  Interactive Complexity: No  Crisis: No        Progress Since Last Session (Related to Symptoms / Goals / Homework):   There has been demonstrated improvement in functioning while patient has been engaged in psychotherapy/psychological service- if withdrawn the patient would deteriorate and/or relapse.      Symptoms: Client reports confronting issues with anxiety and depressed mood.  Working on some relational issues at home.     Homework: Achieved / completed to satisfaction  Completed in session      Episode of Care Goals: Satisfactory progress - ACTION (Actively working towards change); Intervened by reinforcing change plan / affirming steps taken     Current / Ongoing Stressors and Concerns:   -Veena did get approved for disability.   -PMDD symptoms  have been a little better this week.    -Did spend the weekend with Star's family for [place] and it did go well.  Went ax throwing which was really fun.    -Has struggled with PMDD for a number of years.  Has been working with chiropractic and message therapy on some holistic treatment options.  Has not been able to get in consistently - Continue    -Veena and Deborah have been communicating more at home and getting along.  She comes and says goodbye in the morning before she leaves for school.    -Finding enjoyment with spending time with friend oFrrest.   -Veena states she feels like a member of the family that does not matter.   -Communication struggles with processing speed and formulating. Star sometimes feels he is helping but some of the solution focused ideas are impulsive.         Treatment Objective(s) Addressed in This Session:  Safety planning   Patient will develop a plan to help manage PMDD  Patient will work on ways to communicate /patterns   Relational issues   Grief        Intervention:   Practice speaker listening technique.     Spend time on relationship check sheet.   Connect with friend Forrest for support.   Processed through grief and loss.   Have a conversation about value in the family.   Follow safety plan and use crisis resources.  Agrees to contract for safety today.   Use support system- Continued   Joint activities- Make this a priority.    Work on finding purposeful and meaningful activities during the day.   Use some holistic approaches to health.          Assessments completed prior to visit:  The following assessments were completed by patient for this visit:  PHQ2:       11/29/2023     9:44 AM 10/5/2023    10:34 AM 9/19/2023     7:09 PM 9/7/2023     6:59 AM 8/23/2023    10:14 AM 8/7/2023    11:02 AM 4/11/2023     9:00 PM   PHQ-2 ( 1999 [employer])   Q1: Little interest or pleasure in doing things 3 0 1 1 1 1 1   Q2: Feeling down, depressed or hopeless 3 1 2 1 1 1 1   PHQ-2 Score 6 1 3 2 2 2  2   Q1: Little interest or pleasure in doing things Nearly every day Not at all Several days Several days Several days Several days Several days   Q2: Feeling down, depressed or hopeless Nearly every day Several days More than half the days Several days Several days Several days Several days   PHQ-2 Score 6 1 3 2 2 2 2     PHQ9:       11/29/2022    12:07 PM 1/5/2023     1:35 PM 2/2/2023    10:13 AM 6/1/2023    10:07 AM 7/17/2023    11:15 AM 9/19/2023     7:09 PM 11/29/2023     9:44 AM   PHQ-9 SCORE   PHQ-9 Total Score MyChart 21 (Severe depression) 16 (Moderately severe depression)    15 (Moderately severe depression) 15 (Moderately severe depression)   PHQ-9 Total Score 21 16 12 15 16 15 15     GAD2:       4/6/2023     8:12 AM 8/7/2023    11:04 AM 8/23/2023    10:14 AM 9/7/2023     6:59 AM 9/19/2023     7:10 PM 10/5/2023    10:34 AM 11/29/2023     9:45 AM   YADI-2   Feeling nervous, anxious, or on edge 1 1 1 1 3 1 1   Not being able to stop or control worrying 1 2 1 1 3 1 1   YADI-2 Total Score 2 3 2 2 6 2 2     GAD7:       11/27/2022     5:03 AM 2/2/2023    10:13 AM 6/1/2023    10:07 AM 7/17/2023    11:15 AM 8/7/2023    11:04 AM 9/19/2023     7:10 PM   YDAI-7 SCORE   Total Score 17 (severe anxiety)    5 (mild anxiety) 21 (severe anxiety)   Total Score 17 13 12 13 5 21     PROMIS 10-Global Health (all questions and answers displayed):       4/6/2023     8:14 AM 8/7/2023    11:05 AM 8/23/2023    10:14 AM 9/7/2023     7:00 AM 9/19/2023     7:11 PM 10/5/2023    10:35 AM 11/29/2023     9:46 AM   PROMIS 10   In general, would you say your health is: Good Good Good Good Good Good Fair   In general, would you say your quality of life is: Good Good Fair Good Fair Good Fair   In general, how would you rate your physical health? Good Fair Good Good Fair Fair Fair   In general, how would you rate your mental health, including your mood and your ability to think? Fair Fair Fair Poor Fair Good Fair   In general, how would you rate  your satisfaction with your social activities and relationships? Fair Good Fair Fair Fair Good Fair   In general, please rate how well you carry out your usual social activities and roles Poor Good Poor Poor Poor Fair Poor   To what extent are you able to carry out your everyday physical activities such as walking, climbing stairs, carrying groceries, or moving a chair? Mostly Moderately Moderately Mostly Completely Mostly Mostly   In the past 7 days, how often have you been bothered by emotional problems such as feeling anxious, depressed, or irritable? Sometimes Sometimes Often Often Often Sometimes Always   In the past 7 days, how would you rate your fatigue on average? Mild Moderate Moderate Moderate Severe Mild Moderate   In the past 7 days, how would you rate your pain on average, where 0 means no pain, and 10 means worst imaginable pain? 2 4 6 6 5 5 6   In general, would you say your health is: 3 3 3 3 3 3 2   In general, would you say your quality of life is: 3 3 2 3 2 3 2   In general, how would you rate your physical health? 3 2 3 3 2 2 2   In general, how would you rate your mental health, including your mood and your ability to think? 2 2 2 1 2 3 2   In general, how would you rate your satisfaction with your social activities and relationships? 2 3 2 2 2 3 2   In general, please rate how well you carry out your usual social activities and roles. (This includes activities at home, at work and in your community, and responsibilities as a parent, child, spouse, employee, friend, etc.) 1 3 1 1 1 2 1   To what extent are you able to carry out your everyday physical activities such as walking, climbing stairs, carrying groceries, or moving a chair? 4 3 3 4 5 4 4   In the past 7 days, how often have you been bothered by emotional problems such as feeling anxious, depressed, or irritable? 3 3 4 4 4 3 5   In the past 7 days, how would you rate your fatigue on average? 2 3 3 3 4 2 3   In the past 7 days, how would  "you rate your pain on average, where 0 means no pain, and 10 means worst imaginable pain? 2 4 6 6 5 5 6   Global Mental Health Score 10 11 8 8 8 12 7   Global Physical Health Score 15 11 12 13 12 13 12   PROMIS TOTAL - SUBSCORES 25 22 20 21 20 25 19     [place] Suicide Severity Rating Scale (Lifetime/Recent)      8/3/2022     9:17 AM   [place] Suicide Severity Rating (Lifetime/Recent)   Q1 Wish to be Dead (Lifetime) N   Q2 Non-Specific Active Suicidal Thoughts (Lifetime) N   Actual Attempt (Lifetime) N   Has subject engaged in non-suicidal self-injurious behavior? (Lifetime) N   Calculated C-SSRS Risk Score (Lifetime/Recent) No Risk Indicated         ASSESSMENT: Current Emotional / Mental Status (status of significant symptoms):   Risk status (Self / Other harm or suicidal ideation)   Patient denies current fears or concerns for personal safety.   Patient denies current or recent suicidal ideation or behaviors.    Patient denies current or recent homicidal ideation or behaviors.   Patient denies current or recent self injurious behavior or ideation.   Patient denies other safety concerns.   Patient reports there has been no change in risk factors since their last session.     Patient reports there has been no change in protective factors since their last session.     A safety and risk management plan has been developed including: Patient consented to co-developed safety plan on 11-30-22.  Safety and risk management plan was reviewed.   Patient agreed to use safety plan should any safety concerns arise.  A copy was made available to the patient. Reviewed 12-13-23        [facility]                                       Veena Parsons     SAFETY PLAN:  Step 1: Warning signs / cues (Thoughts, images, mood, situation, behavior) that a crisis may be developing:  Thoughts: \"I don't matter\", \"People would be better off without me\", \"I'm a burden\", \"I can't do this anymore\", \"I just want this to end\" " "and \"Nothing makes it better\"  Images: flashbacks  Thinking Processes: ruminations (can't stop thinking about my problems): PMDD and loss of mother, racing thoughts and highly critical and negative thoughts: I cant do anything right  Mood: worsening depression, hopelessness, helplessness, intense worry, agitation and mood swings  Behaviors: isolating/withdrawing , can't stop crying, not taking care of myself, not taking care of my responsibilities, sleeping too much, not sleeping enough and increasing frequency and duration of dissociation  Situations: relationship problems, trauma  and medical condition / diagnosis: PMDD    Step 2: Coping strategies - Things I can do to take my mind off of my problems without contacting another person (relaxation technique, physical activity):  Distress Tolerance Strategies:  relaxation activities, play with my pet , listen to positive and upbeat music, sensory based activities/self-soothe with five senses, watch a funny movie, read a book, change body temperature (ice pack/cold water)  and paced breathing/progressive muscle relaxation  Physical Activities: go for a walk, gardening, meditation, deep breathing and stretching   Focus on helpful thoughts:  \"This is temporary\", \"I will get through this\", \"It always passes\" and \"Ride the wave\"  Step 3: People and social settings that provide distraction:   Name: Spouse Star   Name:  Spending time with daughter     movie theater, pet store/humane society and coffee shop   Step 4: Remind myself of people and things that are important to me and worth living for:    Daughter   Spouse  Pets     Step 5: When I am in crisis, I can ask these people to help me use my safety plan:   Name: Spouse  Phone: [phone]      Step 6: Making the environment safe:   remove things I could use to hurt myself and be around others  Step 7: Professionals or agencies I can contact during a crisis:  Suicide Prevention Lifeline: Call or Text 988   Local Crisis " Services:  [facility] [phone]      Call 911 or go to my nearest emergency department.   I helped develop this safety plan and agree to use it when needed.  I have been given a copy of this plan.      Client signature _________________________________________________________________  Today s date:  11/30/2022  Completed by Provider Name/ Credentials:  Lia Stiles MA, LMFT  November 30, 2022  Adapted from Safety Plan Template 2008 Eleonora Capone and Danny Carmona is reprinted with the express permission of the authors.  No portion of the Safety Plan Template may be reproduced without the express, written permission.  You can contact the authors at [email or web address] or [email or web address].    Name: Veena Parsons  YOB: 1985  Date: November 30, 2022   My primary care provider: Francisco Nails  My primary care clinic: [facility]   My prescriber: PCP  Other care team support:  Holistic medical provider    My Triggers:    Relational problems  Loss of mother recently  Financial stress      Additional People, Places, and Things that I can access for support:   Spouse  Daughter          What is important to me and makes life worth living: Family         GREEN    Good Control  1. I feel good  2. No suicidal thoughts   3. Can work, sleep and play      Action Steps  1. Self-care: balanced meals, exercising, sleep practices, etc.  2. Take your medications as prescribed.  3. Continue meetings with therapist and prescriber.  4.  Do the healthy things that I enjoy.             YELLO Getting Worse  I have ANY of these:  1. I do not feel good  2. Difficulty Concentrating  3. Sleep is changing  4. Increase/Change in my thoughts to hurt self and/or others, but I can still manage and not act on it.   5. Not taking care of self.               Action Steps (in addition to the above):  1. Inform your therapist and psychiatric prescriber/PCP.  2. Keep taking your medications as  prescribed.    3. Turn to people you can ask for help.  4. Use internal coping strategies -see below.  5. Create safe environment: Removing items I can hurt self with              RED  Get Help  If I have ANY of these:  1. Current and uncontrollable thoughts and/or behaviors to hurt self and/or others.   2. Crazy buzzing and spinning.     Actions to manage my safety  1. Contact your emergency person Star  2. Call or Text 557  3. Call my crisis team- [facility] [phone]  3. Or Call 391 or go to the emergency room right away        My Internal Coping Strategies include the following:  take a bath, belly breathing, arts and crafts, play with my pet, use my coping box, exercise and use my coping skills    [End for Brief Safety Plan]     Safety Concerns  How To Identify Situations That Make Your Mental Health Worse:  Triggers are things that make your mental health worse.  Look at the list below to help you find your triggers and what you can do about them.     1. Identify Early Warning Signs:    Sometimes symptoms return, even when people do their best to stay well. Symptoms can develop over a short period of time with little or no warning, but most of the time they emerge gradually over several weeks.  Early warning signs are changes that people experience when a relapse is starting. Some early warning signs are common and others are not as common.   Common Early Warning Signs:    Feeling tense or nervous, Eating less or eating more, Trouble sleeping -either too much or too little sleep, Feeling depressed or low, Feeling irritable, Feeling like not being around other people, Trouble concentrating and Urges to harm self     2. Identify action steps to take when warning signs are noticed:    Taking Action- It is important to take action if you are experiencing early warning signs of a relapse.  The faster you act, the more likely it is that you can avoid a full relapse.  It is helpful to identify several  specific ways to cope with symptoms.      The following is my list of symptoms and coping strategies that I can use when they are present:    Symptom Coping Strategies   Anxiety -Talk with someone in your support system and let him or her know how you are feeling.  -Use relaxation techniques such as deep breathing or imagery.  -Use positive affirmations to counteract negative self-talk such as  I am learning to let go of worry.    Depression - Schedule your day; include activities you have to do and activities you enjoy doing.  - Get some exercise - walk, run, bike, or swim.  - Give yourself credit for even the smallest things you get done.   Sleep Difficulties   - Go to sleep at the same time every day.  - Do something relaxing before bed, such as drinking herbal tea or listening to music.  - Avoid having discussions about upsetting topics before going to bed.   Delusions   - Distract yourself from the disturbing thought by doing something that requires your attention such as a puzzle.  - Check out your beliefs by talking to someone you trust.    Hallucinations   - Use headphones to listen to music.  - Tell voices to  stop  or say to yourself,  I am safe.   - Ignore the hallucinations as much as possible; focus on other things.   Concentration Difficulties - Minimize distractions so there is only one thing for you to focus on at a time.    - Ask the person you are having a conversation with to slow down or repeat things you are unsure of.            Appearance:   Normal   Eye Contact:   Good   Psychomotor Behavior: Normal    Attitude:   Cooperative    Orientation:   All   Speech    Rate / Production: Normal     Volume:  Normal    Mood:    Anxious Sad   Affect:    Worrisome    Thought Content:  Clear    Thought Form:  Coherent  Goal Directed    Insight:    Fair      Medication Review:   No changes to current psychiatric medication(s)     Medication Compliance:   Yes     Changes in Health Issues:   None  "reported     Chemical Use Review:   Substance Use: Chemical use reviewed, no active concerns identified      Tobacco Use: No current tobacco use.      Diagnosis:  296.32 (F33.1) Major Depressive Disorder, Recurrent Episode, Moderate _ and With mixed features  300.01 (F41.0) Panic Disorder  300.02 (F41.1) Generalized Anxiety Disorder   PMDD      Collateral Reports Completed:   Not Applicable    PLAN: (Patient Tasks / Therapist Tasks / Other)  Client will continue to work on the following goals:    -Follow safety plan and use crisis resources-Continued   -Have a conversation about values.  Review relationship check list.   -Review speaker listening technique and practice the skill.   -Spend more quality time with friend Forrest.   -Have more active coping skills readily available.   -Work on daily purpose.  -Continue to work on \"staying in own eulalio\" as a couple.   -Have a good concrete daily schedule.   -Consider couples counseling again.       Lia Stiles, [facility]                                                         ______________________________________________________________________    Individual Treatment Plan    Patient's Name: Veena Parsons  YOB: 1985    Date of Creation: 9-7-22  Date Treatment Plan Last Reviewed/Revised: 10-16-23    DSM5 Diagnoses:  296.32 (F33.1) Major Depressive Disorder, Recurrent Episode, Moderate _ and With mixed features  300.01 (F41.0) Panic Disorder  300.02 (F41.1) Generalized Anxiety Disorder   PMDD  Psychosocial / Contextual Factors: Some relational issues   PROMIS (reviewed every 90 days): 19    Referral / Collaboration:  Referral to another professional/service is not indicated at this time..    Anticipated number of session for this episode of care: 6-9 sessions  Anticipation frequency of session: Biweekly  Anticipated Duration of each session: 38-52 minutes  Treatment plan will be reviewed in 90 days or when goals have been changed.       MeasurableTreatment " Goal(s) related to diagnosis / functional impairment(s)  Goal 1: Patient will address struggles with anxiety, depression and PMDD.    I will know I've met my goal when I am feeling less reactive through the month with the symptoms.      Objective #A (Patient Action)    Patient will work on establishing a concrete routine with self-care.  Status: Continued - Date(s): 10-16-23    Intervention(s)  Therapist will use CBT and motivational interviewing.      Objective #B  Patient will develop a plan to help manage PMDD  Status: Continued - Date(s): 10-16-23    Intervention(s)  Therapist will use solution focused therapy.    Objective #C  Patient will work on ways to communicate with narcissistic individuals.  Status: Continued - Date(s): 10-16-23    Intervention(s)  Therapist will teach communication skills.          Patient has reviewed and agreed to the above plan.      Lia Stiles, NATACHA  September 7, 2022

## 2023-12-27 ENCOUNTER — VIRTUAL VISIT (OUTPATIENT)
Dept: PSYCHOLOGY | Facility: CLINIC | Age: 38
End: 2023-12-27
Payer: COMMERCIAL

## 2023-12-27 DIAGNOSIS — F41.0 PANIC DISORDER WITHOUT AGORAPHOBIA: ICD-10-CM

## 2023-12-27 DIAGNOSIS — F33.1 MAJOR DEPRESSIVE DISORDER, RECURRENT EPISODE, MODERATE (H): Primary | ICD-10-CM

## 2023-12-27 DIAGNOSIS — F41.1 GENERALIZED ANXIETY DISORDER: ICD-10-CM

## 2023-12-27 PROCEDURE — 90834 PSYTX W PT 45 MINUTES: CPT | Mod: VID | Performed by: MARRIAGE & FAMILY THERAPIST

## 2023-12-27 NOTE — PROGRESS NOTES
[facility]                                     Progress Note    Patient Name: Veena Parsons  Date: 12-27-23         Service Type: Individual      Session Start Time:  11am    Session End Time:    1150am     Session Length: 50min    Session #: 37    Attendees: Client and Star    Service Modality:  Video    Telemedicine Visit: The patient's condition can be safely assessed and treated via synchronous audio and visual telemedicine encounter.      Reason for Telemedicine Visit: Patient has requested telehealth visit    Originating Site (Patient Location): Patient's home        Distant Location (provider location):  Off-site    Consent:  The patient/guardian has verbally consented to: the potential risks and benefits of telemedicine (video visit) versus in person care; bill my insurance or make self-payment for services provided; and responsibility for payment of non-covered services.     Mode of Communication:  Video Conference via [employer]    As the provider I attest to compliance with applicable laws and regulations related to telemedicine.      Treatment Plan- 10-16-23  CGI- 10-16-23  Phq-9 and YADI-7- See check in data  Promis- 11-29-23    DATA  Interactive Complexity: No  Crisis: No        Progress Since Last Session (Related to Symptoms / Goals / Homework):   There has been demonstrated improvement in functioning while patient has been engaged in psychotherapy/psychological service- if withdrawn the patient would deteriorate and/or relapse.      Symptoms: Client reports confronting issues with anxiety and depressed mood.  Working on some relational issues at home with limited success.     Homework: Achieved / completed to satisfaction  Completed in session      Episode of Care Goals: Satisfactory progress - ACTION (Actively working towards change); Intervened by reinforcing change plan / affirming steps taken     Current / Ongoing Stressors and Concerns:   -Veena did get approved for  disability.   -PMDD symptoms have been flaring and seem to pop up around the Holidays this year.    -Was some good days and bad days over [place].    -Has struggled with PMDD for a number of years.  Has been working with chiropractic and message therapy on some holistic treatment options.  Has not been able to get in consistently - Continue    -Veena and Deborah have been communicating more at home and getting along.     -Finding enjoyment with spending time with friend Forrest.   -Veena states she feels like a member of the family that does not matter.   -Communication struggles with processing speed and formulating. Star sometimes feels he is helping but some of the solution focused ideas are impulsive.  -Veena feels she needs to be ready for occasions more in advance in case there is a bad time frame with symptoms.         Treatment Objective(s) Addressed in This Session:  Safety planning   Patient will develop a plan to help manage PMDD  Patient will work on ways to communicate /patterns   Relational issues   Grief        Intervention:   Practice speaker listening technique.     Spend time on relationship check sheet.    Have some conversations about symptoms with kids.    Use PMDD page for support.   Connect with friend Forrest for support.    Have a conversation about value in the family.   Follow safety plan and use crisis resources.  Agrees to contract for safety today.   Use support system- Continued   Joint activities- Make this a priority.    Work on finding purposeful and meaningful activities during the day.   Use some holistic approaches to health.     Physical touch is a grounding technique for Veena.         Assessments completed prior to visit:  The following assessments were completed by patient for this visit:  PHQ2:       11/29/2023     9:44 AM 10/5/2023    10:34 AM 9/19/2023     7:09 PM 9/7/2023     6:59 AM 8/23/2023    10:14 AM 8/7/2023    11:02 AM 4/11/2023     9:00 PM   PHQ-2 ( 1999 [employer])   Q1:  Little interest or pleasure in doing things 3 0 1 1 1 1 1   Q2: Feeling down, depressed or hopeless 3 1 2 1 1 1 1   PHQ-2 Score 6 1 3 2 2 2 2   Q1: Little interest or pleasure in doing things Nearly every day Not at all Several days Several days Several days Several days Several days   Q2: Feeling down, depressed or hopeless Nearly every day Several days More than half the days Several days Several days Several days Several days   PHQ-2 Score 6 1 3 2 2 2 2     PHQ9:       11/29/2022    12:07 PM 1/5/2023     1:35 PM 2/2/2023    10:13 AM 6/1/2023    10:07 AM 7/17/2023    11:15 AM 9/19/2023     7:09 PM 11/29/2023     9:44 AM   PHQ-9 SCORE   PHQ-9 Total Score MyChart 21 (Severe depression) 16 (Moderately severe depression)    15 (Moderately severe depression) 15 (Moderately severe depression)   PHQ-9 Total Score 21 16 12 15 16 15 15     GAD2:       4/6/2023     8:12 AM 8/7/2023    11:04 AM 8/23/2023    10:14 AM 9/7/2023     6:59 AM 9/19/2023     7:10 PM 10/5/2023    10:34 AM 11/29/2023     9:45 AM   YADI-2   Feeling nervous, anxious, or on edge 1 1 1 1 3 1 1   Not being able to stop or control worrying 1 2 1 1 3 1 1   YADI-2 Total Score 2 3 2 2 6 2 2     GAD7:       11/27/2022     5:03 AM 2/2/2023    10:13 AM 6/1/2023    10:07 AM 7/17/2023    11:15 AM 8/7/2023    11:04 AM 9/19/2023     7:10 PM   YADI-7 SCORE   Total Score 17 (severe anxiety)    5 (mild anxiety) 21 (severe anxiety)   Total Score 17 13 12 13 5 21     PROMIS 10-Global Health (all questions and answers displayed):       4/6/2023     8:14 AM 8/7/2023    11:05 AM 8/23/2023    10:14 AM 9/7/2023     7:00 AM 9/19/2023     7:11 PM 10/5/2023    10:35 AM 11/29/2023     9:46 AM   PROMIS 10   In general, would you say your health is: Good Good Good Good Good Good Fair   In general, would you say your quality of life is: Good Good Fair Good Fair Good Fair   In general, how would you rate your physical health? Good Fair Good Good Fair Fair Fair   In general, how would you  rate your mental health, including your mood and your ability to think? Fair Fair Fair Poor Fair Good Fair   In general, how would you rate your satisfaction with your social activities and relationships? Fair Good Fair Fair Fair Good Fair   In general, please rate how well you carry out your usual social activities and roles Poor Good Poor Poor Poor Fair Poor   To what extent are you able to carry out your everyday physical activities such as walking, climbing stairs, carrying groceries, or moving a chair? Mostly Moderately Moderately Mostly Completely Mostly Mostly   In the past 7 days, how often have you been bothered by emotional problems such as feeling anxious, depressed, or irritable? Sometimes Sometimes Often Often Often Sometimes Always   In the past 7 days, how would you rate your fatigue on average? Mild Moderate Moderate Moderate Severe Mild Moderate   In the past 7 days, how would you rate your pain on average, where 0 means no pain, and 10 means worst imaginable pain? 2 4 6 6 5 5 6   In general, would you say your health is: 3 3 3 3 3 3 2   In general, would you say your quality of life is: 3 3 2 3 2 3 2   In general, how would you rate your physical health? 3 2 3 3 2 2 2   In general, how would you rate your mental health, including your mood and your ability to think? 2 2 2 1 2 3 2   In general, how would you rate your satisfaction with your social activities and relationships? 2 3 2 2 2 3 2   In general, please rate how well you carry out your usual social activities and roles. (This includes activities at home, at work and in your community, and responsibilities as a parent, child, spouse, employee, friend, etc.) 1 3 1 1 1 2 1   To what extent are you able to carry out your everyday physical activities such as walking, climbing stairs, carrying groceries, or moving a chair? 4 3 3 4 5 4 4   In the past 7 days, how often have you been bothered by emotional problems such as feeling anxious, depressed,  or irritable? 3 3 4 4 4 3 5   In the past 7 days, how would you rate your fatigue on average? 2 3 3 3 4 2 3   In the past 7 days, how would you rate your pain on average, where 0 means no pain, and 10 means worst imaginable pain? 2 4 6 6 5 5 6   Global Mental Health Score 10 11 8 8 8 12 7   Global Physical Health Score 15 11 12 13 12 13 12   PROMIS TOTAL - SUBSCORES 25 22 20 21 20 25 19     [place] Suicide Severity Rating Scale (Lifetime/Recent)      8/3/2022     9:17 AM   [place] Suicide Severity Rating (Lifetime/Recent)   Q1 Wish to be Dead (Lifetime) N   Q2 Non-Specific Active Suicidal Thoughts (Lifetime) N   Actual Attempt (Lifetime) N   Has subject engaged in non-suicidal self-injurious behavior? (Lifetime) N   Calculated C-SSRS Risk Score (Lifetime/Recent) No Risk Indicated         ASSESSMENT: Current Emotional / Mental Status (status of significant symptoms):   Risk status (Self / Other harm or suicidal ideation)   Patient denies current fears or concerns for personal safety.   Patient denies current or recent suicidal ideation or behaviors.    Patient denies current or recent homicidal ideation or behaviors.   Patient denies current or recent self injurious behavior or ideation.   Patient denies other safety concerns.   Patient reports there has been no change in risk factors since their last session.     Patient reports there has been no change in protective factors since their last session.     A safety and risk management plan has been developed including: Patient consented to co-developed safety plan on 11-30-22.  Safety and risk management plan was reviewed.   Patient agreed to use safety plan should any safety concerns arise.  A copy was made available to the patient. Reviewed 12-27-23        [facility]                                       Veena Parsons     SAFETY PLAN:  Step 1: Warning signs / cues (Thoughts, images, mood, situation, behavior) that a crisis may be  "developing:  Thoughts: \"I don't matter\", \"People would be better off without me\", \"I'm a burden\", \"I can't do this anymore\", \"I just want this to end\" and \"Nothing makes it better\"  Images: flashbacks  Thinking Processes: ruminations (can't stop thinking about my problems): PMDD and loss of mother, racing thoughts and highly critical and negative thoughts: I cant do anything right  Mood: worsening depression, hopelessness, helplessness, intense worry, agitation and mood swings  Behaviors: isolating/withdrawing , can't stop crying, not taking care of myself, not taking care of my responsibilities, sleeping too much, not sleeping enough and increasing frequency and duration of dissociation  Situations: relationship problems, trauma  and medical condition / diagnosis: PMDD    Step 2: Coping strategies - Things I can do to take my mind off of my problems without contacting another person (relaxation technique, physical activity):  Distress Tolerance Strategies:  relaxation activities, play with my pet , listen to positive and upbeat music, sensory based activities/self-soothe with five senses, watch a funny movie, read a book, change body temperature (ice pack/cold water)  and paced breathing/progressive muscle relaxation  Physical Activities: go for a walk, gardening, meditation, deep breathing and stretching   Focus on helpful thoughts:  \"This is temporary\", \"I will get through this\", \"It always passes\" and \"Ride the wave\"  Step 3: People and social settings that provide distraction:   Name: Spouse Star   Name:  Spending time with daughter     movie theater, pet store/humane society and coffee shop   Step 4: Remind myself of people and things that are important to me and worth living for:    Daughter   Spouse  Pets     Step 5: When I am in crisis, I can ask these people to help me use my safety plan:   Name: Spouse  Phone: [phone]      Step 6: Making the environment safe:   remove things I could use to hurt " myself and be around others  Step 7: Professionals or agencies I can contact during a crisis:  Suicide Prevention Lifeline: Call or Text 563   Local Crisis Services:  [facility] [phone]      Call 911 or go to my nearest emergency department.   I helped develop this safety plan and agree to use it when needed.  I have been given a copy of this plan.      Client signature _________________________________________________________________  Today s date:  11/30/2022  Completed by Provider Name/ Credentials:  Lia Stiles MA, LMFT  November 30, 2022  Adapted from Safety Plan Template 2008 Eleonora Capone and Danny Carmona is reprinted with the express permission of the authors.  No portion of the Safety Plan Template may be reproduced without the express, written permission.  You can contact the authors at [email or web address] or [email or web address].    Name: Veena Parsons  YOB: 1985  Date: November 30, 2022   My primary care provider: Francisco Nails  My primary care clinic: [facility]   My prescriber: PCP  Other care team support:  Holistic medical provider    My Triggers:    Relational problems  Loss of mother recently  Financial stress      Additional People, Places, and Things that I can access for support:   Spouse  Daughter          What is important to me and makes life worth living: Family         GREEN    Good Control  1. I feel good  2. No suicidal thoughts   3. Can work, sleep and play      Action Steps  1. Self-care: balanced meals, exercising, sleep practices, etc.  2. Take your medications as prescribed.  3. Continue meetings with therapist and prescriber.  4.  Do the healthy things that I enjoy.             YELLO Getting Worse  I have ANY of these:  1. I do not feel good  2. Difficulty Concentrating  3. Sleep is changing  4. Increase/Change in my thoughts to hurt self and/or others, but I can still manage and not act on it.   5. Not taking care of self.                Action Steps (in addition to the above):  1. Inform your therapist and psychiatric prescriber/PCP.  2. Keep taking your medications as prescribed.    3. Turn to people you can ask for help.  4. Use internal coping strategies -see below.  5. Create safe environment: Removing items I can hurt self with              RED  Get Help  If I have ANY of these:  1. Current and uncontrollable thoughts and/or behaviors to hurt self and/or others.   2. Crazy buzzing and spinning.     Actions to manage my safety  1. Contact your emergency person Star  2. Call or Text [phone]  3. Call my crisis team- [facility] [phone]  3. Or Call 241 or go to the emergency room right away        My Internal Coping Strategies include the following:  take a bath, belly breathing, arts and crafts, play with my pet, use my coping box, exercise and use my coping skills    [End for Brief Safety Plan]     Safety Concerns  How To Identify Situations That Make Your Mental Health Worse:  Triggers are things that make your mental health worse.  Look at the list below to help you find your triggers and what you can do about them.     1. Identify Early Warning Signs:    Sometimes symptoms return, even when people do their best to stay well. Symptoms can develop over a short period of time with little or no warning, but most of the time they emerge gradually over several weeks.  Early warning signs are changes that people experience when a relapse is starting. Some early warning signs are common and others are not as common.   Common Early Warning Signs:    Feeling tense or nervous, Eating less or eating more, Trouble sleeping -either too much or too little sleep, Feeling depressed or low, Feeling irritable, Feeling like not being around other people, Trouble concentrating and Urges to harm self     2. Identify action steps to take when warning signs are noticed:    Taking Action- It is important to take action if you are experiencing  early warning signs of a relapse.  The faster you act, the more likely it is that you can avoid a full relapse.  It is helpful to identify several specific ways to cope with symptoms.      The following is my list of symptoms and coping strategies that I can use when they are present:    Symptom Coping Strategies   Anxiety -Talk with someone in your support system and let him or her know how you are feeling.  -Use relaxation techniques such as deep breathing or imagery.  -Use positive affirmations to counteract negative self-talk such as  I am learning to let go of worry.    Depression - Schedule your day; include activities you have to do and activities you enjoy doing.  - Get some exercise - walk, run, bike, or swim.  - Give yourself credit for even the smallest things you get done.   Sleep Difficulties   - Go to sleep at the same time every day.  - Do something relaxing before bed, such as drinking herbal tea or listening to music.  - Avoid having discussions about upsetting topics before going to bed.   Delusions   - Distract yourself from the disturbing thought by doing something that requires your attention such as a puzzle.  - Check out your beliefs by talking to someone you trust.    Hallucinations   - Use headphones to listen to music.  - Tell voices to  stop  or say to yourself,  I am safe.   - Ignore the hallucinations as much as possible; focus on other things.   Concentration Difficulties - Minimize distractions so there is only one thing for you to focus on at a time.    - Ask the person you are having a conversation with to slow down or repeat things you are unsure of.            Appearance:   Normal   Eye Contact:   Good   Psychomotor Behavior: Normal    Attitude:   Cooperative    Orientation:   All   Speech    Rate / Production: Normal     Volume:  Normal    Mood:    Anxious Sad   Affect:    Worrisome    Thought Content:  Clear    Thought Form:  Coherent  Goal Directed    Insight:    Fair  "     Medication Review:   No changes to current psychiatric medication(s)     Medication Compliance:   Yes     Changes in Health Issues:   None reported     Chemical Use Review:   Substance Use: Chemical use reviewed, no active concerns identified      Tobacco Use: No current tobacco use.      Diagnosis:  296.32 (F33.1) Major Depressive Disorder, Recurrent Episode, Moderate _ and With mixed features  300.01 (F41.0) Panic Disorder  300.02 (F41.1) Generalized Anxiety Disorder   PMDD      Collateral Reports Completed:   Not Applicable    PLAN: (Patient Tasks / Therapist Tasks / Other)  Client will continue to work on the following goals:    -Follow safety plan and use crisis resources-Continued   -Have a conversation about values.  Review relationship check list.   -Use physical touch as a way to ground.   -Review speaker listening technique and practice the skill.   -Spend more quality time with friend Forrest.   -Have more active coping skills readily available.   -Work on daily purpose.  -Continue to work on \"staying in own eulalio\" as a couple.   -Have a good concrete daily schedule.   -Consider couples counseling again.   -Prep early for events.       Lia Stiles, NATACHA                                                         ______________________________________________________________________    Individual Treatment Plan    Patient's Name: Veena Parsons  YOB: 1985    Date of Creation: 9-7-22  Date Treatment Plan Last Reviewed/Revised: 10-16-23    DSM5 Diagnoses:  296.32 (F33.1) Major Depressive Disorder, Recurrent Episode, Moderate _ and With mixed features  300.01 (F41.0) Panic Disorder  300.02 (F41.1) Generalized Anxiety Disorder   PMDD  Psychosocial / Contextual Factors: Some relational issues   PROMIS (reviewed every 90 days): 19    Referral / Collaboration:  Referral to another professional/service is not indicated at this time..    Anticipated number of session for this episode of care: " 6-9 sessions  Anticipation frequency of session: Biweekly  Anticipated Duration of each session: 38-52 minutes  Treatment plan will be reviewed in 90 days or when goals have been changed.       MeasurableTreatment Goal(s) related to diagnosis / functional impairment(s)  Goal 1: Patient will address struggles with anxiety, depression and PMDD.    I will know I've met my goal when I am feeling less reactive through the month with the symptoms.      Objective #A (Patient Action)    Patient will work on establishing a concrete routine with self-care.  Status: Continued - Date(s): 10-16-23    Intervention(s)  Therapist will use CBT and motivational interviewing.      Objective #B  Patient will develop a plan to help manage PMDD  Status: Continued - Date(s): 10-16-23    Intervention(s)  Therapist will use solution focused therapy.    Objective #C  Patient will work on ways to communicate with narcissistic individuals.  Status: Continued - Date(s): 10-16-23    Intervention(s)  Therapist will teach communication skills.          Patient has reviewed and agreed to the above plan.      Lia Stiles, NATACHA  September 7, 2022

## 2024-01-03 ENCOUNTER — VIRTUAL VISIT (OUTPATIENT)
Dept: PSYCHOLOGY | Facility: CLINIC | Age: 39
End: 2024-01-03
Payer: MEDICARE

## 2024-01-03 ENCOUNTER — VIRTUAL VISIT (OUTPATIENT)
Dept: FAMILY MEDICINE | Facility: CLINIC | Age: 39
End: 2024-01-03
Payer: MEDICARE

## 2024-01-03 DIAGNOSIS — Z00.00 ROUTINE HISTORY AND PHYSICAL EXAMINATION OF ADULT: Primary | ICD-10-CM

## 2024-01-03 DIAGNOSIS — Z02.71 DISABILITY EXAMINATION: ICD-10-CM

## 2024-01-03 DIAGNOSIS — Z71.6 ENCOUNTER FOR TOBACCO USE CESSATION COUNSELING: ICD-10-CM

## 2024-01-03 DIAGNOSIS — F32.81 PMDD (PREMENSTRUAL DYSPHORIC DISORDER): ICD-10-CM

## 2024-01-03 DIAGNOSIS — Z13.220 LIPID SCREENING: ICD-10-CM

## 2024-01-03 DIAGNOSIS — Z59.9 FINANCIAL DIFFICULTIES: ICD-10-CM

## 2024-01-03 DIAGNOSIS — F41.0 PANIC DISORDER WITHOUT AGORAPHOBIA: ICD-10-CM

## 2024-01-03 DIAGNOSIS — Z13.1 SCREENING FOR DIABETES MELLITUS: ICD-10-CM

## 2024-01-03 DIAGNOSIS — F41.1 GENERALIZED ANXIETY DISORDER: ICD-10-CM

## 2024-01-03 DIAGNOSIS — F33.1 MAJOR DEPRESSIVE DISORDER, RECURRENT EPISODE, MODERATE (H): Primary | ICD-10-CM

## 2024-01-03 PROCEDURE — 99207 PR INITIAL PREVENTIVE EXAM STAT: CPT | Mod: 95 | Performed by: FAMILY MEDICINE

## 2024-01-03 PROCEDURE — 90834 PSYTX W PT 45 MINUTES: CPT | Mod: 95 | Performed by: MARRIAGE & FAMILY THERAPIST

## 2024-01-03 PROCEDURE — 99213 OFFICE O/P EST LOW 20 MIN: CPT | Mod: 95 | Performed by: FAMILY MEDICINE

## 2024-01-03 RX ORDER — HYDROXYZINE PAMOATE 25 MG/1
25 CAPSULE ORAL
COMMUNITY
Start: 2023-12-20

## 2024-01-03 RX ORDER — FAMOTIDINE 20 MG/1
20 TABLET, FILM COATED ORAL 2 TIMES DAILY
COMMUNITY
Start: 2023-01-13 | End: 2024-01-13

## 2024-01-03 SDOH — ECONOMIC STABILITY - INCOME SECURITY: PROBLEM RELATED TO HOUSING AND ECONOMIC CIRCUMSTANCES, UNSPECIFIED: Z59.9

## 2024-01-03 ASSESSMENT — ENCOUNTER SYMPTOMS
CHILLS: 1
PALPITATIONS: 1
HEARTBURN: 0
DIZZINESS: 0
HEMATURIA: 0
COUGH: 0
HEADACHES: 0
NAUSEA: 1
MYALGIAS: 1
DYSURIA: 0
SORE THROAT: 0
BREAST MASS: 0
HEMATOCHEZIA: 0
EYE PAIN: 0
DIARRHEA: 0
SHORTNESS OF BREATH: 0
PARESTHESIAS: 1
CONSTIPATION: 0
WEAKNESS: 1
FREQUENCY: 0
JOINT SWELLING: 0
ABDOMINAL PAIN: 1
FEVER: 0
ARTHRALGIAS: 1
NERVOUS/ANXIOUS: 1

## 2024-01-03 ASSESSMENT — ACTIVITIES OF DAILY LIVING (ADL)
CURRENT_FUNCTION: LAUNDRY REQUIRES ASSISTANCE
CURRENT_FUNCTION: TRANSPORTATION REQUIRES ASSISTANCE
CURRENT_FUNCTION: PREPARING MEALS REQUIRES ASSISTANCE
CURRENT_FUNCTION: TELEPHONE REQUIRES ASSISTANCE
CURRENT_FUNCTION: MEDICATION ADMINISTRATION REQUIRES ASSISTANCE
CURRENT_FUNCTION: SHOPPING REQUIRES ASSISTANCE
CURRENT_FUNCTION: MONEY MANAGEMENT REQUIRES ASSISTANCE
CURRENT_FUNCTION: HOUSEWORK REQUIRES ASSISTANCE
CURRENT_FUNCTION: BATHING REQUIRES ASSISTANCE

## 2024-01-03 ASSESSMENT — PATIENT HEALTH QUESTIONNAIRE - PHQ9
10. IF YOU CHECKED OFF ANY PROBLEMS, HOW DIFFICULT HAVE THESE PROBLEMS MADE IT FOR YOU TO DO YOUR WORK, TAKE CARE OF THINGS AT HOME, OR GET ALONG WITH OTHER PEOPLE: EXTREMELY DIFFICULT
SUM OF ALL RESPONSES TO PHQ QUESTIONS 1-9: 17
SUM OF ALL RESPONSES TO PHQ QUESTIONS 1-9: 17

## 2024-01-03 NOTE — PROGRESS NOTES
"SUBJECTIVE:   Veena is a 38 year old, presenting for the following:  Annual Visit  -PMDD    Are you in the first 12 months of your Medicare coverage?  Yes  Healthy Habits:     In general, how would you rate your overall health?  Fair    Frequency of exercise:  2-3 days/week    Duration of exercise:  15-30 minutes    Do you usually eat at least 4 servings of fruit and vegetables a day, include whole grains    & fiber and avoid regularly eating high fat or \"junk\" foods?  No    Taking medications regularly:  Yes    Medication side effects:  No muscle aches and No significant flushing    Ability to successfully perform activities of daily living:  Telephone requires assistance, transportation requires assistance, shopping requires assistance, preparing meals requires assistance, housework requires assistance, bathing requires assistance, laundry requires assistance, medication administration requires assistance and money management requires assistance    Home Safety:  No safety concerns identified    Hearing Impairment:  Feel that people are mumbling or not speaking clearly, need to ask people to speak up or repeat themselves and difficulty understanding soft or whispered speech    In the past 6 months, have you been bothered by leaking of urine?  No    In general, how would you rate your overall mental or emotional health?  Fair    Additional concerns today:  No    Patient here for physical today.    Also very upset as she is looking into gradual return to work programs. Currently on disability for PMDD. Doesn't know if she can work any jobs at all without losing disability coverage. Would like social work referral to discuss.     Have you ever done Advance Care Planning? (For example, a Health Directive, POLST, or a discussion with a medical provider or your loved ones about your wishes): No, advance care planning information given to patient to review.  Advanced care planning was discussed at today's visit.     Fall " risk  Unable to complete due to virtual visit; need for additional assessment in future face-to-face visit    Cognitive Screening Unable to complete due to virtual visit; need for additional assessment in future face-to-face visit    Do you have sleep apnea, excessive snoring or daytime drowsiness? : no    Reviewed and updated as needed this visit by clinical staff   Tobacco  Allergies  Meds  Problems  Med Hx  Surg Hx  Fam Hx        Reviewed and updated as needed this visit by Provider   Tobacco  Allergies  Meds  Problems  Med Hx  Surg Hx  Fam Hx       Social Hx Reviewed     Social History     Tobacco Use    Smoking status: Every Day     Packs/day: 1     Types: Cigarettes    Smokeless tobacco: Never   Substance Use Topics    Alcohol use: Not Currently     Comment: Occasionally, rare         1/3/2024    10:19 AM   Alcohol Use   Prescreen: >3 drinks/day or >7 drinks/week? No     Do you have a current opioid prescription? No  Do you use any other controlled substances or medications that are not prescribed by a provider? Alcohol very sparingly, Cannabis, and Tobacco    Current providers sharing in care for this patient include:   Patient Care Team:  Kendrick Pa MD as PCP - General (Family Medicine)  Francisco Nails MD as Assigned PCP  Lia Stiles LMFT (Marriage & Family Therapist)  Lia Stiles LMFT as Assigned Behavioral Health Provider  Lia Stiles LMFT (Marriage & Family Therapist)  Jasmyn Fleming LSW as Lead Care Coordinator (Primary Care - CC)    The following health maintenance items are reviewed in Epic and correct as of today:  Health Maintenance   Topic Date Due    NICOTINE/TOBACCO CESSATION COUNSELING Q 1 YR  Never done    HEPATITIS B IMMUNIZATION (1 of 3 - 3-dose series) Never done    COVID-19 Vaccine (1) Never done    Pneumococcal Vaccine: Pediatrics (0 to 5 Years) and At-Risk Patients (6 to 64 Years) (1 of 2 - PCV) Never done    INFLUENZA VACCINE (1)  09/01/2023    PHQ-9  07/03/2024    MEDICARE ANNUAL WELLNESS VISIT  01/03/2025    ANNUAL REVIEW OF HM ORDERS  01/03/2025    HPV TEST  08/01/2027    PAP  08/01/2027    ADVANCE CARE PLANNING  01/03/2029    DTAP/TDAP/TD IMMUNIZATION (2 - Td or Tdap) 08/01/2032    DEPRESSION ACTION PLAN  Completed    IPV IMMUNIZATION  Aged Out    HPV IMMUNIZATION  Aged Out    MENINGITIS IMMUNIZATION  Aged Out    RSV MONOCLONAL ANTIBODY  Aged Out    HEPATITIS C SCREENING  Discontinued    HIV SCREENING  Discontinued     Lab work is in process  BP Readings from Last 3 Encounters:   No data found for BP    Wt Readings from Last 3 Encounters:   No data found for Wt                  There is no problem list on file for this patient.    Past Surgical History:   Procedure Laterality Date    MICRODISCECTOMY LUMBAR  2020       Social History     Tobacco Use    Smoking status: Every Day     Packs/day: 1     Types: Cigarettes    Smokeless tobacco: Never   Substance Use Topics    Alcohol use: Not Currently     Comment: Occasionally, rare     Family History   Problem Relation Age of Onset    Hypertension Mother     Depression Mother     Diabetes Mother     Myocardial Infarction Mother     Diabetes Father     Myocardial Infarction Father     Skin Cancer Father     Attention Deficit Disorder Sister     Alzheimer Disease Maternal Grandmother     Colon Cancer No family hx of     Prostate Cancer No family hx of     Breast Cancer No family hx of          Current Outpatient Medications   Medication Sig Dispense Refill    famotidine (PEPCID) 20 MG tablet Take 20 mg by mouth 2 times daily      sertraline (ZOLOFT) 25 MG tablet Take 1 tablet (25 mg) by mouth daily      hydrOXYzine zoe (VISTARIL) 25 MG capsule Take 25 mg by mouth (Patient not taking: Reported on 1/3/2024)      levonorgestrel (MIRENA) 52 MG (20 mcg/day) IUD 52 mg by Intrauterine route once       Allergies   Allergen Reactions    Iodinated Contrast Media Unknown     Any new diagnosis of family  breast, ovarian, or bowel cancer? No    FHS-7:        No data to display                Patient under 40 years of age: Routine Mammogram Screening not recommended.   Pertinent mammograms are reviewed under the imaging tab.    Review of Systems   Constitutional:  Positive for chills. Negative for fever.   HENT:  Positive for ear pain. Negative for congestion, hearing loss and sore throat.    Eyes:  Negative for pain and visual disturbance.   Respiratory:  Negative for cough and shortness of breath.    Cardiovascular:  Positive for chest pain and palpitations. Negative for peripheral edema.   Gastrointestinal:  Positive for abdominal pain and nausea. Negative for constipation, diarrhea, heartburn and hematochezia.   Breasts:  Negative for tenderness, breast mass and discharge.   Genitourinary:  Positive for pelvic pain, urgency and vaginal bleeding. Negative for dysuria, frequency, genital sores, hematuria and vaginal discharge.   Musculoskeletal:  Positive for arthralgias and myalgias. Negative for joint swelling.   Skin:  Negative for rash.   Neurological:  Positive for weakness and paresthesias. Negative for dizziness and headaches.   Psychiatric/Behavioral:  Positive for mood changes. The patient is nervous/anxious.      OBJECTIVE:   LMP 01/02/2024 (Approximate)  There is no height or weight on file to calculate BMI.  Physical Exam  GENERAL: healthy, alert and no distress  EYES: Eyes grossly normal to inspection, PERRL and conjunctivae and sclerae normal  HENT: Unable to evaluate over virtual visit.  NECK: no obvious mass or goiter.  RESP: No increased work of breathing.  BREAST: Unable to evaluate over virtual visit.  CV: Unable to evaluate over virtual visit.  ABDOMEN: Unable to evaluate over virtual visit.  MS: no gross musculoskeletal defects noted  SKIN: no suspicious lesions or rashes over exposed surfaces.  NEURO: Normal strength and tone, mentation intact and speech normal.  PSYCH: mentation appears  normal, affect normal. Tearful at times.    Labs: pending    ASSESSMENT / PLAN:   1. Routine history and physical examination of adult  Discussed personal health and safety. Routine screenings as below. Appropriate anticipatory guidance, vaccinations, and health screening recommendations delivered according to the USPSTF and other appropriate society guidelines.  Patient understands and is agreeable with the plan ordered below.  - Primary Care - Care Coordination Referral; Future  - Lipid panel reflex to direct LDL Non-fasting; Future  - CBC with platelets; Future  - Comprehensive metabolic panel (BMP + Alb, Alk Phos, ALT, AST, Total. Bili, TP); Future    2. PMDD (premenstrual dysphoric disorder)  3. Disability examination  4. Financial difficulties  Social work referral given.    5. Lipid screening  - Lipid panel reflex to direct LDL Non-fasting; Future    6. Screening for diabetes mellitus  - Comprehensive metabolic panel (BMP + Alb, Alk Phos, ALT, AST, Total. Bili, TP); Future    7. Encounter for tobacco use cessation counseling  Declines intervention at this time.    Patient has been advised of split billing requirements and indicates understanding: Yes      COUNSELING:  Reviewed preventive health counseling, as reflected in patient instructions       Regular exercise       Healthy diet/nutrition       Vision screening       Hearing screening       Dental care       Bladder control       Fall risk prevention       Folic Acid        She reports that she has been smoking cigarettes. She has been smoking an average of 1 pack per day. She has never used smokeless tobacco.      Appropriate preventive services were discussed with this patient, including applicable screening as appropriate for fall prevention, nutrition, physical activity, Tobacco-use cessation, weight loss and cognition.  Checklist reviewing preventive services available has been given to the patient.    Reviewed patients plan of care and provided an  AVS. The Basic Care Plan (routine screening as documented in Health Maintenance) for Veena meets the Care Plan requirement. This Care Plan has been established and reviewed with the Patient.    Francisco Nails MD  [facility]    Disclaimer: This note consists of symbols derived from keyboarding, dictation and/or voice recognition software. As a result, there may be errors in the script that have gone undetected. Please consider this when interpreting information found in this chart.    Identified Health Risks:  I have reviewed Opioid Use Disorder and Substance Use Disorder risk factors and made any needed referrals.     Virtual visit via [employer] 1:32 PM to 2:03 PM

## 2024-01-03 NOTE — PROGRESS NOTES
[facility]                                     Progress Note    Patient Name: Veena Parsons  Date: 1-3-24         Service Type: Individual      Session Start Time:  11am    Session End Time:    1150am     Session Length: 50min    Session #: 38    Attendees: Client     Service Modality:  Video had to switch to phone part way through due to sound issues.     Telemedicine Visit: The patient's condition can be safely assessed and treated via synchronous audio and visual telemedicine encounter.      Reason for Telemedicine Visit: Patient has requested telehealth visit    Originating Site (Patient Location): Patient's home        Distant Location (provider location):  Off-site    Consent:  The patient/guardian has verbally consented to: the potential risks and benefits of telemedicine (video visit) versus in person care; bill my insurance or make self-payment for services provided; and responsibility for payment of non-covered services.     Mode of Communication:  Video Conference via [employer]    As the provider I attest to compliance with applicable laws and regulations related to telemedicine.      Treatment Plan- 10-16-23  CGI- 10-16-23  Phq-9 and YADI-7- See check in data  Promis- 11-29-23    DATA  Interactive Complexity: No  Crisis: No        Progress Since Last Session (Related to Symptoms / Goals / Homework):   There has been demonstrated improvement in functioning while patient has been engaged in psychotherapy/psychological service- if withdrawn the patient would deteriorate and/or relapse.      Symptoms: Client reports confronting issues with anxiety and depressed mood.  Working on some relational issues at home and had some productive conversations this weekend after a blow out.     Homework: Achieved / completed to satisfaction  Completed in session      Episode of Care Goals: Satisfactory progress - ACTION (Actively working towards change); Intervened by reinforcing change plan /  affirming steps taken     Current / Ongoing Stressors and Concerns:   -Veena did get approved for disability.   -PMDD has been improving this week.     -[place] there was a productive conversation with Star after a blow out.  Veena felt left out at a friend event and feels Star at times gives others more attention.    -Has struggled with PMDD for a number of years.  Has been working with chiropractic and message therapy on some holistic treatment options.  Has not been able to get in consistently - Continue    -Veena and Deborah have been communicating more at home and getting along.    -Got a positive and encouraging text from her sister that felt like a step forward.    -Finding enjoyment with spending time with friend Forrest.   -Communication struggles with processing speed and formulating. Star sometimes feels he is helping but some of the solution focused ideas are impulsive.  -Veena did put effort into going out with friends this weekend.         Treatment Objective(s) Addressed in This Session:  Safety planning   Patient will develop a plan to help manage PMDD  Patient will work on ways to communicate /patterns   Relational issues   Grief        Intervention:   Practice speaker listening technique.     Spend time on relationship check sheet.    Continue to put effort into consideration being a value.    Use PMDD page for support.   Have a conversation about value in the family.   Follow safety plan and use crisis resources.  Agrees to contract for safety today.   Use support system- Continued   Joint activities with family.    Focus on mindfulness in various areas of life.    Work on finding purposeful and meaningful activities during the day.   Use some holistic approaches to health.     Physical touch is a grounding technique for Veena- Self soothing           Assessments completed prior to visit:  The following assessments were completed by patient for this visit:  PHQ2:       11/29/2023     9:44 AM 10/5/2023    10:34  AM 9/19/2023     7:09 PM 9/7/2023     6:59 AM 8/23/2023    10:14 AM 8/7/2023    11:02 AM 4/11/2023     9:00 PM   PHQ-2 ( 1999 [employer])   Q1: Little interest or pleasure in doing things 3 0 1 1 1 1 1   Q2: Feeling down, depressed or hopeless 3 1 2 1 1 1 1   PHQ-2 Score 6 1 3 2 2 2 2   Q1: Little interest or pleasure in doing things Nearly every day Not at all Several days Several days Several days Several days Several days   Q2: Feeling down, depressed or hopeless Nearly every day Several days More than half the days Several days Several days Several days Several days   PHQ-2 Score 6 1 3 2 2 2 2     PHQ9:       1/5/2023     1:35 PM 2/2/2023    10:13 AM 6/1/2023    10:07 AM 7/17/2023    11:15 AM 9/19/2023     7:09 PM 11/29/2023     9:44 AM 1/3/2024    10:04 AM   PHQ-9 SCORE   PHQ-9 Total Score MyChart 16 (Moderately severe depression)    15 (Moderately severe depression) 15 (Moderately severe depression) 17 (Moderately severe depression)   PHQ-9 Total Score 16 12 15 16 15 15 17     GAD2:       4/6/2023     8:12 AM 8/7/2023    11:04 AM 8/23/2023    10:14 AM 9/7/2023     6:59 AM 9/19/2023     7:10 PM 10/5/2023    10:34 AM 11/29/2023     9:45 AM   YADI-2   Feeling nervous, anxious, or on edge 1 1 1 1 3 1 1   Not being able to stop or control worrying 1 2 1 1 3 1 1   YADI-2 Total Score 2 3 2 2 6 2 2     GAD7:       11/27/2022     5:03 AM 2/2/2023    10:13 AM 6/1/2023    10:07 AM 7/17/2023    11:15 AM 8/7/2023    11:04 AM 9/19/2023     7:10 PM   YADI-7 SCORE   Total Score 17 (severe anxiety)    5 (mild anxiety) 21 (severe anxiety)   Total Score 17 13 12 13 5 21     PROMIS 10-Global Health (all questions and answers displayed):       4/6/2023     8:14 AM 8/7/2023    11:05 AM 8/23/2023    10:14 AM 9/7/2023     7:00 AM 9/19/2023     7:11 PM 10/5/2023    10:35 AM 11/29/2023     9:46 AM   PROMIS 10   In general, would you say your health is: Good Good Good Good Good Good Fair   In general, would you say your quality of life is:  Good Good Fair Good Fair Good Fair   In general, how would you rate your physical health? Good Fair Good Good Fair Fair Fair   In general, how would you rate your mental health, including your mood and your ability to think? Fair Fair Fair Poor Fair Good Fair   In general, how would you rate your satisfaction with your social activities and relationships? Fair Good Fair Fair Fair Good Fair   In general, please rate how well you carry out your usual social activities and roles Poor Good Poor Poor Poor Fair Poor   To what extent are you able to carry out your everyday physical activities such as walking, climbing stairs, carrying groceries, or moving a chair? Mostly Moderately Moderately Mostly Completely Mostly Mostly   In the past 7 days, how often have you been bothered by emotional problems such as feeling anxious, depressed, or irritable? Sometimes Sometimes Often Often Often Sometimes Always   In the past 7 days, how would you rate your fatigue on average? Mild Moderate Moderate Moderate Severe Mild Moderate   In the past 7 days, how would you rate your pain on average, where 0 means no pain, and 10 means worst imaginable pain? 2 4 6 6 5 5 6   In general, would you say your health is: 3 3 3 3 3 3 2   In general, would you say your quality of life is: 3 3 2 3 2 3 2   In general, how would you rate your physical health? 3 2 3 3 2 2 2   In general, how would you rate your mental health, including your mood and your ability to think? 2 2 2 1 2 3 2   In general, how would you rate your satisfaction with your social activities and relationships? 2 3 2 2 2 3 2   In general, please rate how well you carry out your usual social activities and roles. (This includes activities at home, at work and in your community, and responsibilities as a parent, child, spouse, employee, friend, etc.) 1 3 1 1 1 2 1   To what extent are you able to carry out your everyday physical activities such as walking, climbing stairs, carrying  groceries, or moving a chair? 4 3 3 4 5 4 4   In the past 7 days, how often have you been bothered by emotional problems such as feeling anxious, depressed, or irritable? 3 3 4 4 4 3 5   In the past 7 days, how would you rate your fatigue on average? 2 3 3 3 4 2 3   In the past 7 days, how would you rate your pain on average, where 0 means no pain, and 10 means worst imaginable pain? 2 4 6 6 5 5 6   Global Mental Health Score 10 11 8 8 8 12 7   Global Physical Health Score 15 11 12 13 12 13 12   PROMIS TOTAL - SUBSCORES 25 22 20 21 20 25 19     [place] Suicide Severity Rating Scale (Lifetime/Recent)      8/3/2022     9:17 AM   [place] Suicide Severity Rating (Lifetime/Recent)   Q1 Wish to be Dead (Lifetime) N   Q2 Non-Specific Active Suicidal Thoughts (Lifetime) N   Actual Attempt (Lifetime) N   Has subject engaged in non-suicidal self-injurious behavior? (Lifetime) N   Calculated C-SSRS Risk Score (Lifetime/Recent) No Risk Indicated         ASSESSMENT: Current Emotional / Mental Status (status of significant symptoms):   Risk status (Self / Other harm or suicidal ideation)   Patient denies current fears or concerns for personal safety.   Patient denies current or recent suicidal ideation or behaviors.    Patient denies current or recent homicidal ideation or behaviors.   Patient denies current or recent self injurious behavior or ideation.   Patient denies other safety concerns.   Patient reports there has been no change in risk factors since their last session.     Patient reports there has been no change in protective factors since their last session.     A safety and risk management plan has been developed including: Patient consented to co-developed safety plan on 11-30-22.  Safety and risk management plan was reviewed.   Patient agreed to use safety plan should any safety concerns arise.  A copy was made available to the patient. Reviewed 1-3-24        [facility]                              "          Veena RENETTA Parsons     SAFETY PLAN:  Step 1: Warning signs / cues (Thoughts, images, mood, situation, behavior) that a crisis may be developing:  Thoughts: \"I don't matter\", \"People would be better off without me\", \"I'm a burden\", \"I can't do this anymore\", \"I just want this to end\" and \"Nothing makes it better\"  Images: flashbacks  Thinking Processes: ruminations (can't stop thinking about my problems): PMDD and loss of mother, racing thoughts and highly critical and negative thoughts: I cant do anything right  Mood: worsening depression, hopelessness, helplessness, intense worry, agitation and mood swings  Behaviors: isolating/withdrawing , can't stop crying, not taking care of myself, not taking care of my responsibilities, sleeping too much, not sleeping enough and increasing frequency and duration of dissociation  Situations: relationship problems, trauma  and medical condition / diagnosis: PMDD    Step 2: Coping strategies - Things I can do to take my mind off of my problems without contacting another person (relaxation technique, physical activity):  Distress Tolerance Strategies:  relaxation activities, play with my pet , listen to positive and upbeat music, sensory based activities/self-soothe with five senses, watch a funny movie, read a book, change body temperature (ice pack/cold water)  and paced breathing/progressive muscle relaxation  Physical Activities: go for a walk, gardening, meditation, deep breathing and stretching   Focus on helpful thoughts:  \"This is temporary\", \"I will get through this\", \"It always passes\" and \"Ride the wave\"  Step 3: People and social settings that provide distraction:   Name: Spouse Star   Name:  Spending time with daughter     movie theater, pet store/humane society and coffee shop   Step 4: Remind myself of people and things that are important to me and worth living for:    Daughter   Spouse  Pets     Step 5: When I am in crisis, I can ask these people to help " me use my safety plan:   Name: Spouse  Phone: [phone]      Step 6: Making the environment safe:   remove things I could use to hurt myself and be around others  Step 7: Professionals or agencies I can contact during a crisis:  Suicide Prevention Lifeline: Call or Text 790   Local Crisis Services:  [facility] [phone]      Call 911 or go to my nearest emergency department.   I helped develop this safety plan and agree to use it when needed.  I have been given a copy of this plan.      Client signature _________________________________________________________________  Today s date:  11/30/2022  Completed by Provider Name/ Credentials:  Lia Stiles MA, LMFT  November 30, 2022  Adapted from Safety Plan Template 2008 Eleonora Capone and Danny Carmona is reprinted with the express permission of the authors.  No portion of the Safety Plan Template may be reproduced without the express, written permission.  You can contact the authors at [email or web address] or [email or web address].    Name: Veena Parsons  YOB: 1985  Date: November 30, 2022   My primary care provider: Francisco Nails  My primary care clinic: [facility]   My prescriber: PCP  Other care team support:  Holistic medical provider    My Triggers:    Relational problems  Loss of mother recently  Financial stress      Additional People, Places, and Things that I can access for support:   Spouse  Daughter          What is important to me and makes life worth living: Family         GREEN    Good Control  1. I feel good  2. No suicidal thoughts   3. Can work, sleep and play      Action Steps  1. Self-care: balanced meals, exercising, sleep practices, etc.  2. Take your medications as prescribed.  3. Continue meetings with therapist and prescriber.  4.  Do the healthy things that I enjoy.             YELLO Getting Worse  I have ANY of these:  1. I do not feel good  2. Difficulty Concentrating  3. Sleep is  changing  4. Increase/Change in my thoughts to hurt self and/or others, but I can still manage and not act on it.   5. Not taking care of self.               Action Steps (in addition to the above):  1. Inform your therapist and psychiatric prescriber/PCP.  2. Keep taking your medications as prescribed.    3. Turn to people you can ask for help.  4. Use internal coping strategies -see below.  5. Create safe environment: Removing items I can hurt self with              RED  Get Help  If I have ANY of these:  1. Current and uncontrollable thoughts and/or behaviors to hurt self and/or others.   2. Crazy buzzing and spinning.     Actions to manage my safety  1. Contact your emergency person Star  2. Call or Text 938  3. Call my crisis team- [facility] [phone]  3. Or Call 141 or go to the emergency room right away        My Internal Coping Strategies include the following:  take a bath, belly breathing, arts and crafts, play with my pet, use my coping box, exercise and use my coping skills    [End for Brief Safety Plan]     Safety Concerns  How To Identify Situations That Make Your Mental Health Worse:  Triggers are things that make your mental health worse.  Look at the list below to help you find your triggers and what you can do about them.     1. Identify Early Warning Signs:    Sometimes symptoms return, even when people do their best to stay well. Symptoms can develop over a short period of time with little or no warning, but most of the time they emerge gradually over several weeks.  Early warning signs are changes that people experience when a relapse is starting. Some early warning signs are common and others are not as common.   Common Early Warning Signs:    Feeling tense or nervous, Eating less or eating more, Trouble sleeping -either too much or too little sleep, Feeling depressed or low, Feeling irritable, Feeling like not being around other people, Trouble concentrating and Urges to harm  self     2. Identify action steps to take when warning signs are noticed:    Taking Action- It is important to take action if you are experiencing early warning signs of a relapse.  The faster you act, the more likely it is that you can avoid a full relapse.  It is helpful to identify several specific ways to cope with symptoms.      The following is my list of symptoms and coping strategies that I can use when they are present:    Symptom Coping Strategies   Anxiety -Talk with someone in your support system and let him or her know how you are feeling.  -Use relaxation techniques such as deep breathing or imagery.  -Use positive affirmations to counteract negative self-talk such as  I am learning to let go of worry.    Depression - Schedule your day; include activities you have to do and activities you enjoy doing.  - Get some exercise - walk, run, bike, or swim.  - Give yourself credit for even the smallest things you get done.   Sleep Difficulties   - Go to sleep at the same time every day.  - Do something relaxing before bed, such as drinking herbal tea or listening to music.  - Avoid having discussions about upsetting topics before going to bed.   Delusions   - Distract yourself from the disturbing thought by doing something that requires your attention such as a puzzle.  - Check out your beliefs by talking to someone you trust.    Hallucinations   - Use headphones to listen to music.  - Tell voices to  stop  or say to yourself,  I am safe.   - Ignore the hallucinations as much as possible; focus on other things.   Concentration Difficulties - Minimize distractions so there is only one thing for you to focus on at a time.    - Ask the person you are having a conversation with to slow down or repeat things you are unsure of.            Appearance:   Normal   Eye Contact:   Good   Psychomotor Behavior: Normal    Attitude:   Cooperative    Orientation:   All   Speech    Rate / Production: Normal     Volume:  Normal  "   Mood:    Anxious    Affect:    Worrisome    Thought Content:  Clear    Thought Form:  Coherent  Goal Directed    Insight:    Fair      Medication Review:   No changes to current psychiatric medication(s)     Medication Compliance:   Yes     Changes in Health Issues:   None reported     Chemical Use Review:   Substance Use: Chemical use reviewed, no active concerns identified      Tobacco Use: No current tobacco use.      Diagnosis:  296.32 (F33.1) Major Depressive Disorder, Recurrent Episode, Moderate _ and With mixed features  300.01 (F41.0) Panic Disorder  300.02 (F41.1) Generalized Anxiety Disorder   PMDD      Collateral Reports Completed:   Not Applicable    PLAN: (Patient Tasks / Therapist Tasks / Other)  Client will continue to work on the following goals:    -Follow safety plan and use crisis resources-Continued   -Have a conversation about values.  Review relationship check list.   -Focus on consideration.   -Use physical touch as a way to ground.   -Review speaker listening technique and practice the skill.  -Have more active coping skills readily available.   -Work on daily purpose.  -Continue to work on \"staying in own eulalio\" as a couple.   -Have a good concrete daily schedule.   -Consider couples counseling again.   -Prep early for events.       Lia Stiles [facility]                                                         ______________________________________________________________________    Individual Treatment Plan    Patient's Name: Veena Parsons  YOB: 1985    Date of Creation: 9-7-22  Date Treatment Plan Last Reviewed/Revised: 10-16-23    DSM5 Diagnoses:  296.32 (F33.1) Major Depressive Disorder, Recurrent Episode, Moderate _ and With mixed features  300.01 (F41.0) Panic Disorder  300.02 (F41.1) Generalized Anxiety Disorder   PMDD  Psychosocial / Contextual Factors: Some relational issues   PROMIS (reviewed every 90 days): 19    Referral / Collaboration:  Referral to another " professional/service is not indicated at this time..    Anticipated number of session for this episode of care: 6-9 sessions  Anticipation frequency of session: Biweekly  Anticipated Duration of each session: 38-52 minutes  Treatment plan will be reviewed in 90 days or when goals have been changed.       MeasurableTreatment Goal(s) related to diagnosis / functional impairment(s)  Goal 1: Patient will address struggles with anxiety, depression and PMDD.    I will know I've met my goal when I am feeling less reactive through the month with the symptoms.      Objective #A (Patient Action)    Patient will work on establishing a concrete routine with self-care.  Status: Continued - Date(s): 10-16-23    Intervention(s)  Therapist will use CBT and motivational interviewing.      Objective #B  Patient will develop a plan to help manage PMDD  Status: Continued - Date(s): 10-16-23    Intervention(s)  Therapist will use solution focused therapy.    Objective #C  Patient will work on ways to communicate with narcissistic individuals.  Status: Continued - Date(s): 10-16-23    Intervention(s)  Therapist will teach communication skills.          Patient has reviewed and agreed to the above plan.      Lia Stiles, NATACHA  September 7, 2022

## 2024-01-05 ENCOUNTER — PATIENT OUTREACH (OUTPATIENT)
Dept: CARE COORDINATION | Facility: CLINIC | Age: 39
End: 2024-01-05
Payer: COMMERCIAL

## 2024-01-05 NOTE — PROGRESS NOTES
Clinic Care Coordination Contact  Community Health Worker Initial Outreach    CHW Initial Information Gathering:  Referral Source: PCP  Preferred Hospital: [facility], [place]  [phone]  Preferred Urgent Care: Other  Current living arrangement:: I live in a private home with family  Type of residence:: Private home - stairs  Community Resources: [employer], Financial/Insurance  Supplies Currently Used at Home: Other (needles for prescribed shots)  Equipment Currently Used at Home: none  Informal Support system:: Spouse, Children, Friends, Family  No PCP office visit in Past Year: Yes  Transportation means:: Regular car       Patient accepts CC: Yes. Patient scheduled for assessment with Jasmyn Fleming. TAMAR on 1/11/2024 at 10 am. Patient noted desire to discuss disability and working.     Narda Olson  Community Health Worker  [facility] Transitions Program  [facility]  [email or web address]  Office: [phone]

## 2024-01-10 ENCOUNTER — VIRTUAL VISIT (OUTPATIENT)
Dept: PSYCHOLOGY | Facility: CLINIC | Age: 39
End: 2024-01-10
Payer: MEDICARE

## 2024-01-10 DIAGNOSIS — F41.0 PANIC DISORDER WITHOUT AGORAPHOBIA: ICD-10-CM

## 2024-01-10 DIAGNOSIS — F41.1 GENERALIZED ANXIETY DISORDER: ICD-10-CM

## 2024-01-10 DIAGNOSIS — F33.1 MAJOR DEPRESSIVE DISORDER, RECURRENT EPISODE, MODERATE (H): Primary | ICD-10-CM

## 2024-01-10 PROCEDURE — 90834 PSYTX W PT 45 MINUTES: CPT | Mod: 95 | Performed by: MARRIAGE & FAMILY THERAPIST
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
## 2024-01-11 ENCOUNTER — PATIENT OUTREACH (OUTPATIENT)
Dept: FAMILY MEDICINE | Facility: CLINIC | Age: 39
End: 2024-01-11
Payer: COMMERCIAL

## 2024-01-11 ASSESSMENT — ACTIVITIES OF DAILY LIVING (ADL): DEPENDENT_IADLS:: INDEPENDENT

## 2024-01-11 NOTE — PROGRESS NOTES
"Clinic Care Coordination Contact  Clinic Care Coordination Contact  OUTREACH    Referral Information:  Referral Source: PCP    Primary Diagnosis: Behavioral Health    Chief Complaint   Patient presents with    Clinic Care Coordination - Initial     SW CC        [place] Utilization: no concerns yet pt sees outside providers for most of her medical needs.  She does see [facility] counseling regularly and virtual with a [facility] provider yearly  Clinic Utilization  Difficulty keeping appointments:: No  Compliance Concerns: No  No-Show Concerns: No  No PCP office visit in Past Year: No  Utilization      No Show Count (past year)  0             ED Visits  0             Hospital Admissions  0                    Current as of: 1/10/2024  4:31 PM                Clinical Concerns:  Current Medical Concerns:  pt has PMDD that limits her abilities.     Current Behavioral Concerns: pt sees counseling related to her depression and anxiety.    Education Provided to patient: pt noted she struggles with her behavior/actions related to inabilities to work and manage her daily routine.  Long talk about making lists of activities/tasks she can do based on her mood/energy level.  She is keeping track of her mood/energy level by color on the day and we talked about instead of weekly/daily task list do one that matches mood/energy level.  This would reduce her need to make a lot of decisions which gets challenging.  Encouraged her to also add any thoughts to do and don't do to help remind her to not \"beat yourself up\" over low energy/mood days.     Discussed support groups and she is on one for PMDD and we talked about invisible disabilities association as they have support groups as well.     Pain  Pain (GOAL):: Yes  Type: Chronic (>3mo)  Health Maintenance Reviewed: Due/Overdue   Health Maintenance Due   Topic Date Due    NICOTINE/TOBACCO CESSATION COUNSELING Q 1 YR  Never done    HEPATITIS B IMMUNIZATION (1 of 3 - 3-dose series) Never done    " COVID-19 Vaccine (1) Never done    Pneumococcal Vaccine: Pediatrics (0 to 5 Years) and At-Risk Patients (6 to 64 Years) (1 of 2 - PCV) Never done    INFLUENZA VACCINE (1) 09/01/2023       Clinical Pathway: None    Medication Management:  Medication review status:   Not reviewed during today's call     Functional Status:  Dependent ADLs:: Independent (does depend on the day and abilities related to PMDD)  Dependent IADLs:: Independent (does depend on the day and abilities related to PMDD)  Bed or wheelchair confined:: No  Mobility Status: Independent    Living Situation:  Current living arrangement:: I live in a private home with family  Type of residence:: Private home - stairs    Lifestyle & Psychosocial Needs:    Social Determinants of Health     Food Insecurity: Low Risk  (1/3/2024)    Food Insecurity     Within the past 12 months, did you worry that your food would run out before you got money to buy more?: No     Within the past 12 months, did the food you bought just not last and you didn t have money to get more?: No   Depression: Not at risk (1/10/2024)    PHQ-2     PHQ-2 Score: 2   Recent Concern: Depression - At risk (1/3/2024)    PHQ-2     PHQ-2 Score: 6   Housing Stability: Low Risk  (1/3/2024)    Housing Stability     Do you have housing? : Yes     Are you worried about losing your housing?: No   Tobacco Use: High Risk (1/3/2024)    Patient History     Smoking Tobacco Use: Every Day     Smokeless Tobacco Use: Never     Passive Exposure: Not on file   Financial Resource Strain: Low Risk  (1/3/2024)    Financial Resource Strain     Within the past 12 months, have you or your family members you live with been unable to get utilities (heat, electricity) when it was really needed?: No   Alcohol Use: Not on file   Transportation Needs: Low Risk  (1/3/2024)    Transportation Needs     Within the past 12 months, has lack of transportation kept you from medical appointments, getting your medicines, non-medical  meetings or appointments, work, or from getting things that you need?: No   Physical Activity: Not on file   Interpersonal Safety: Not on file   Stress: Not on file   Social Connections: Not on file     Diet:: Regular  Inadequate nutrition (GOAL):: No  Tube Feeding: No  Transportation means:: Regular car     Holiness or spiritual beliefs that impact treatment:: No  Mental health DX:: Yes  Mental health DX how managed:: Medication, Outpatient Counseling  Mental health management concern (GOAL):: Yes  Chemical Dependency Status: No Current Concerns  Informal Support system:: Spouse, Children, Friends, Family        Pt wants to know how much she can work on disability and ways to find work.  She has attempted to get this information from disability hub phone and website and social security and gets different answers.  These sometimes contradict each other and she is fearful of losing her disability.  She also wants to know what benefits she gets with being on disability to utilize.  Discussed options such as senior linkage line for medicare and may have direction for social security information.  Talking with her  who helped her apply as they may have resources and  office may have input.      Discussed that understanding her rights under disability for resources will be better defined as she identifies what areas she needs help in.      Reviewed that care coordination follows PCP and with her PCP outside of [facility] that only a short period of time can be allowed for support.  She was understanding and plan is to help her get systems in place to identify needs and then direct her to where she can get those supports.      Resources and Interventions:  Current Resources:      Community Resources: County Programs, Financial/Insurance  Supplies Currently Used at Home: Other (needles for prescribed shots)  Equipment Currently Used at Home: none  Employment Status: disabled         Advance Care  Plan/Directive  Advanced Care Plans/Directives on file:: No    Referrals Placed: Senior Linkage Line, Financial Services, Disability Linkage line         Care Plan:  N/a    Patient/Caregiver understanding: n/a    Outreach Frequency: monthly, more frequently as needed  Future Appointments                In 2 weeks Lia Stiles LMFT [facility], [facility]    In 1 month Lia Stiles LMFT [facility], [facility]    In 1 month Lia Stiles, NATACHA [facility], [facility]    In 2 months Lia Stiles, NATACHA [facility], [facility]    In 2 months Lia Stiles, [facility], [facility]    In 2 months Lia Stiles, [facility], [facility]            Plan: will send pt resources once found and call 1-2 more times to get her resources before closing.     SONDRA Ferreira  [facility] - Care Coordinator   1/11/2024   11:59 AM  [phone]

## 2024-01-30 ENCOUNTER — VIRTUAL VISIT (OUTPATIENT)
Dept: PSYCHOLOGY | Facility: CLINIC | Age: 39
End: 2024-01-30
Payer: MEDICARE

## 2024-01-30 DIAGNOSIS — F33.1 MAJOR DEPRESSIVE DISORDER, RECURRENT EPISODE, MODERATE (H): Primary | ICD-10-CM

## 2024-01-30 DIAGNOSIS — F41.0 PANIC DISORDER WITHOUT AGORAPHOBIA: ICD-10-CM

## 2024-01-30 DIAGNOSIS — F41.1 GENERALIZED ANXIETY DISORDER: ICD-10-CM

## 2024-01-30 PROCEDURE — 90834 PSYTX W PT 45 MINUTES: CPT | Mod: 95 | Performed by: MARRIAGE & FAMILY THERAPIST
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
## 2024-02-05 ENCOUNTER — PATIENT OUTREACH (OUTPATIENT)
Dept: CARE COORDINATION | Facility: CLINIC | Age: 39
End: 2024-02-05
Payer: COMMERCIAL

## 2024-02-05 NOTE — PROGRESS NOTES
Clinic Care Coordination Contact  [facility]/Voicemail    Clinical Data: Care Coordinator Outreach    Outreach Documentation Number of Outreach Attempt   2/5/2024  10:44 AM 1       Left message on patient's voicemail with call back information and requested return call.    Plan: Care Coordinator will try to reach patient again in 8-10 business days.    SONDRA Ferreira   [facility] - Care Coordination     [phone]

## 2024-02-14 ENCOUNTER — VIRTUAL VISIT (OUTPATIENT)
Dept: PSYCHOLOGY | Facility: CLINIC | Age: 39
End: 2024-02-14
Payer: MEDICARE

## 2024-02-14 DIAGNOSIS — F41.1 GENERALIZED ANXIETY DISORDER: ICD-10-CM

## 2024-02-14 DIAGNOSIS — F33.1 MAJOR DEPRESSIVE DISORDER, RECURRENT EPISODE, MODERATE (H): Primary | ICD-10-CM

## 2024-02-14 DIAGNOSIS — F41.0 PANIC DISORDER WITHOUT AGORAPHOBIA: ICD-10-CM

## 2024-02-14 PROCEDURE — 90834 PSYTX W PT 45 MINUTES: CPT | Mod: 95 | Performed by: MARRIAGE & FAMILY THERAPIST

## 2024-02-14 NOTE — PROGRESS NOTES
[facility]                                     Progress Note    Patient Name: Veena Parsons  Date: 2-14-24         Service Type: Individual      Session Start Time:  11am    Session End Time:    1150am     Session Length: 50min    Session #: 41    Attendees: Client and Star     Service Modality:  Video     Telemedicine Visit: The patient's condition can be safely assessed and treated via synchronous audio and visual telemedicine encounter.      Reason for Telemedicine Visit: Patient has requested telehealth visit    Originating Site (Patient Location): Patient's home        Distant Location (provider location):  Off-site    Consent:  The patient/guardian has verbally consented to: the potential risks and benefits of telemedicine (video visit) versus in person care; bill my insurance or make self-payment for services provided; and responsibility for payment of non-covered services.     Mode of Communication:  Video Conference via [employer]    As the provider I attest to compliance with applicable laws and regulations related to telemedicine.      Treatment Plan- 1-30-24  CGI- 1-30-24  Phq-9 and YADI-7- See check in data  Promis- 1-10-24    DATA  Interactive Complexity: No  Crisis: No        Progress Since Last Session (Related to Symptoms / Goals / Homework):   There has been demonstrated improvement in functioning while patient has been engaged in psychotherapy/psychological service- if withdrawn the patient would deteriorate and/or relapse.      Symptoms: Client reports confronting issues with anxiety and depressed mood.  Wanted to specifically talk about some attachment styles today.       Homework: Achieved / completed to satisfaction  Completed in session      Episode of Care Goals: Satisfactory progress - ACTION (Actively working towards change); Intervened by reinforcing change plan / affirming steps taken     Current / Ongoing Stressors and Concerns:   -Veena did get approved for  disability.   -PMDD has been manageable this month.   -Wanted to talk more about anxious and avoidant attachment styles today.  Veena feels she is anxious and star feels he is avoidant.     -Star feels he was not able to express emotions as a kids but everyone else was.    -Star was told he could not cry when a dog passed away when he was a kid.    -Financial stress continues to be at an all time high. Star needs a procedure and has a really high out of pocket.     -Has struggled with PMDD for a number of years.  Has been working with chiropractic and message therapy on some holistic treatment options.  Has not been able to get in consistently - Continue     -Starting to plan for two graduations.   -Did get information about what amount she can make working part time and on disability. Has to stay under 1000 dollars a month.         Treatment Objective(s) Addressed in This Session:  Safety planning   Patient will develop a plan to help manage PMDD  Patient will work on ways to communicate /patterns   Relational issues - Attachment styles.        Intervention:   Reviewed attachment style tips. Have this accessible and ready to use.     Start to do some part time work and be really mindful about staying under 1000.    Continue to put effort into consideration being a value.   Use PMDD page for support.    Start to do some tentative planing for the graduation party.   Follow safety plan and use crisis resources.  Agrees to contract for safety today.   Use support system- Continued   Work on finding purposeful and meaningful activities during the day.   Use some holistic approaches to health when able.     Really taking the time for self-care and not feeling guilty about it.       Assessments completed prior to visit:  The following assessments were completed by patient for this visit:  PHQ2:       2/14/2024     9:48 AM 1/10/2024    10:50 AM 11/29/2023     9:44 AM 10/5/2023    10:34 AM 9/19/2023     7:09 PM 9/7/2023      6:59 AM 8/23/2023    10:14 AM   PHQ-2 ( 1999 [employer])   Q1: Little interest or pleasure in doing things 1 1 3 0 1 1 1   Q2: Feeling down, depressed or hopeless 1 1 3 1 2 1 1   PHQ-2 Score 2 2 6 1 3 2 2   Q1: Little interest or pleasure in doing things Several days Several days Nearly every day Not at all Several days Several days Several days   Q2: Feeling down, depressed or hopeless Several days Several days Nearly every day Several days More than half the days Several days Several days   PHQ-2 Score 2 2 6 1 3 2 2     PHQ9:       1/5/2023     1:35 PM 2/2/2023    10:13 AM 6/1/2023    10:07 AM 7/17/2023    11:15 AM 9/19/2023     7:09 PM 11/29/2023     9:44 AM 1/3/2024    10:04 AM   PHQ-9 SCORE   PHQ-9 Total Score MyChart 16 (Moderately severe depression)    15 (Moderately severe depression) 15 (Moderately severe depression) 17 (Moderately severe depression)   PHQ-9 Total Score 16 12 15 16 15 15 17     GAD2:       8/7/2023    11:04 AM 8/23/2023    10:14 AM 9/7/2023     6:59 AM 9/19/2023     7:10 PM 10/5/2023    10:34 AM 11/29/2023     9:45 AM 1/10/2024    10:51 AM   YADI-2   Feeling nervous, anxious, or on edge 1 1 1 3 1 1 1   Not being able to stop or control worrying 2 1 1 3 1 1 1   YADI-2 Total Score 3 2 2 6 2 2 2     GAD7:       11/27/2022     5:03 AM 2/2/2023    10:13 AM 6/1/2023    10:07 AM 7/17/2023    11:15 AM 8/7/2023    11:04 AM 9/19/2023     7:10 PM   YADI-7 SCORE   Total Score 17 (severe anxiety)    5 (mild anxiety) 21 (severe anxiety)   Total Score 17 13 12 13 5 21     PROMIS 10-Global Health (all questions and answers displayed):       8/7/2023    11:05 AM 8/23/2023    10:14 AM 9/7/2023     7:00 AM 9/19/2023     7:11 PM 10/5/2023    10:35 AM 11/29/2023     9:46 AM 1/10/2024    10:52 AM   PROMIS 10   In general, would you say your health is: Good Good Good Good Good Fair Fair   In general, would you say your quality of life is: Good Fair Good Fair Good Fair Good   In general, how would you rate your physical  health? Fair Good Good Fair Fair Fair Good   In general, how would you rate your mental health, including your mood and your ability to think? Fair Fair Poor Fair Good Fair Fair   In general, how would you rate your satisfaction with your social activities and relationships? Good Fair Fair Fair Good Fair Fair   In general, please rate how well you carry out your usual social activities and roles Good Poor Poor Poor Fair Poor Fair   To what extent are you able to carry out your everyday physical activities such as walking, climbing stairs, carrying groceries, or moving a chair? Moderately Moderately Mostly Completely Mostly Mostly Completely   In the past 7 days, how often have you been bothered by emotional problems such as feeling anxious, depressed, or irritable? Sometimes Often Often Often Sometimes Always Sometimes   In the past 7 days, how would you rate your fatigue on average? Moderate Moderate Moderate Severe Mild Moderate Moderate   In the past 7 days, how would you rate your pain on average, where 0 means no pain, and 10 means worst imaginable pain? 4 6 6 5 5 6 6   In general, would you say your health is: 3 3 3 3 3 2 2   In general, would you say your quality of life is: 3 2 3 2 3 2 3   In general, how would you rate your physical health? 2 3 3 2 2 2 3   In general, how would you rate your mental health, including your mood and your ability to think? 2 2 1 2 3 2 2   In general, how would you rate your satisfaction with your social activities and relationships? 3 2 2 2 3 2 2   In general, please rate how well you carry out your usual social activities and roles. (This includes activities at home, at work and in your community, and responsibilities as a parent, child, spouse, employee, friend, etc.) 3 1 1 1 2 1 2   To what extent are you able to carry out your everyday physical activities such as walking, climbing stairs, carrying groceries, or moving a chair? 3 3 4 5 4 4 5   In the past 7 days, how often  have you been bothered by emotional problems such as feeling anxious, depressed, or irritable? 3 4 4 4 3 5 3   In the past 7 days, how would you rate your fatigue on average? 3 3 3 4 2 3 3   In the past 7 days, how would you rate your pain on average, where 0 means no pain, and 10 means worst imaginable pain? 4 6 6 5 5 6 6   Global Mental Health Score 11 8 8 8 12 7 10   Global Physical Health Score 11 12 13 12 13 12 14   PROMIS TOTAL - SUBSCORES 22 20 21 20 25 19 24     [place] Suicide Severity Rating Scale (Lifetime/Recent)      8/3/2022     9:17 AM   [place] Suicide Severity Rating (Lifetime/Recent)   Q1 Wish to be Dead (Lifetime) N   Q2 Non-Specific Active Suicidal Thoughts (Lifetime) N   Actual Attempt (Lifetime) N   Has subject engaged in non-suicidal self-injurious behavior? (Lifetime) N   Calculated C-SSRS Risk Score (Lifetime/Recent) No Risk Indicated         ASSESSMENT: Current Emotional / Mental Status (status of significant symptoms):   Risk status (Self / Other harm or suicidal ideation)   Patient denies current fears or concerns for personal safety.   Patient denies current or recent suicidal ideation or behaviors.    Patient denies current or recent homicidal ideation or behaviors.   Patient denies current or recent self injurious behavior or ideation.   Patient denies other safety concerns.   Patient reports there has been no change in risk factors since their last session.     Patient reports there has been no change in protective factors since their last session.     A safety and risk management plan has been developed including: Patient consented to co-developed safety plan on 11-30-22.  Safety and risk management plan was reviewed.   Patient agreed to use safety plan should any safety concerns arise.  A copy was made available to the patient. Reviewed 2-14-24        [facility]                                       Veena Parsons     SAFETY PLAN:  Step 1: Warning signs /  "cues (Thoughts, images, mood, situation, behavior) that a crisis may be developing:  Thoughts: \"I don't matter\", \"People would be better off without me\", \"I'm a burden\", \"I can't do this anymore\", \"I just want this to end\" and \"Nothing makes it better\"  Images: flashbacks  Thinking Processes: ruminations (can't stop thinking about my problems): PMDD and loss of mother, racing thoughts and highly critical and negative thoughts: I cant do anything right  Mood: worsening depression, hopelessness, helplessness, intense worry, agitation and mood swings  Behaviors: isolating/withdrawing , can't stop crying, not taking care of myself, not taking care of my responsibilities, sleeping too much, not sleeping enough and increasing frequency and duration of dissociation  Situations: relationship problems, trauma  and medical condition / diagnosis: PMDD    Step 2: Coping strategies - Things I can do to take my mind off of my problems without contacting another person (relaxation technique, physical activity):  Distress Tolerance Strategies:  relaxation activities, play with my pet , listen to positive and upbeat music, sensory based activities/self-soothe with five senses, watch a funny movie, read a book, change body temperature (ice pack/cold water)  and paced breathing/progressive muscle relaxation  Physical Activities: go for a walk, gardening, meditation, deep breathing and stretching   Focus on helpful thoughts:  \"This is temporary\", \"I will get through this\", \"It always passes\" and \"Ride the wave\"  Step 3: People and social settings that provide distraction:   Name: Spouse Star   Name:  Spending time with daughter     movie theater, pet store/humane society and coffee shop   Step 4: Remind myself of people and things that are important to me and worth living for:    Daughter   Spouse  Pets     Step 5: When I am in crisis, I can ask these people to help me use my safety plan:   Name: Spouse  Phone: [phone]      Step " 6: Making the environment safe:   remove things I could use to hurt myself and be around others  Step 7: Professionals or agencies I can contact during a crisis:  Suicide Prevention Lifeline: Call or Text 771   Local Crisis Services:  [facility] [phone]      Call 911 or go to my nearest emergency department.   I helped develop this safety plan and agree to use it when needed.  I have been given a copy of this plan.      Client signature _________________________________________________________________  Today s date:  11/30/2022  Completed by Provider Name/ Credentials:  Lia Stiles MA, LMFT  November 30, 2022  Adapted from Safety Plan Template 2008 Eleonora Capone and Danny Carmona is reprinted with the express permission of the authors.  No portion of the Safety Plan Template may be reproduced without the express, written permission.  You can contact the authors at [email or web address] or [email or web address].    Name: Veena Parsons  YOB: 1985  Date: November 30, 2022   My primary care provider: Francisco Nails  [facility]   My prescriber: PCP  Other care team support:  Holistic medical provider    My Triggers:    Relational problems  Loss of mother recently  Financial stress      Additional People, Places, and Things that I can access for support:   Spouse  Daughter          What is important to me and makes life worth living: Family         GREEN    Good Control  1. I feel good  2. No suicidal thoughts   3. Can work, sleep and play      Action Steps  1. Self-care: balanced meals, exercising, sleep practices, etc.  2. Take your medications as prescribed.  3. Continue meetings with therapist and prescriber.  4.  Do the healthy things that I enjoy.             YELLO Getting Worse  I have ANY of these:  1. I do not feel good  2. Difficulty Concentrating  3. Sleep is changing  4. Increase/Change in my thoughts to hurt self and/or others, but I  can still manage and not act on it.   5. Not taking care of self.               Action Steps (in addition to the above):  1. Inform your therapist and psychiatric prescriber/PCP.  2. Keep taking your medications as prescribed.    3. Turn to people you can ask for help.  4. Use internal coping strategies -see below.  5. Create safe environment: Removing items I can hurt self with              RED  Get Help  If I have ANY of these:  1. Current and uncontrollable thoughts and/or behaviors to hurt self and/or others.   2. Crazy buzzing and spinning.     Actions to manage my safety  1. Contact your emergency person Star  2. Call or Text 130  3. Call my crisis team- [facility] [phone]  3. Or Call 811 or go to the emergency room right away        My Internal Coping Strategies include the following:  take a bath, belly breathing, arts and crafts, play with my pet, use my coping box, exercise and use my coping skills    [End for Brief Safety Plan]     Safety Concerns  How To Identify Situations That Make Your Mental Health Worse:  Triggers are things that make your mental health worse.  Look at the list below to help you find your triggers and what you can do about them.     1. Identify Early Warning Signs:    Sometimes symptoms return, even when people do their best to stay well. Symptoms can develop over a short period of time with little or no warning, but most of the time they emerge gradually over several weeks.  Early warning signs are changes that people experience when a relapse is starting. Some early warning signs are common and others are not as common.   Common Early Warning Signs:    Feeling tense or nervous, Eating less or eating more, Trouble sleeping -either too much or too little sleep, Feeling depressed or low, Feeling irritable, Feeling like not being around other people, Trouble concentrating and Urges to harm self     2. Identify action steps to take when warning signs are noticed:    Taking  Action- It is important to take action if you are experiencing early warning signs of a relapse.  The faster you act, the more likely it is that you can avoid a full relapse.  It is helpful to identify several specific ways to cope with symptoms.      The following is my list of symptoms and coping strategies that I can use when they are present:    Symptom Coping Strategies   Anxiety -Talk with someone in your support system and let him or her know how you are feeling.  -Use relaxation techniques such as deep breathing or imagery.  -Use positive affirmations to counteract negative self-talk such as  I am learning to let go of worry.    Depression - Schedule your day; include activities you have to do and activities you enjoy doing.  - Get some exercise - walk, run, bike, or swim.  - Give yourself credit for even the smallest things you get done.   Sleep Difficulties   - Go to sleep at the same time every day.  - Do something relaxing before bed, such as drinking herbal tea or listening to music.  - Avoid having discussions about upsetting topics before going to bed.   Delusions   - Distract yourself from the disturbing thought by doing something that requires your attention such as a puzzle.  - Check out your beliefs by talking to someone you trust.    Hallucinations   - Use headphones to listen to music.  - Tell voices to  stop  or say to yourself,  I am safe.   - Ignore the hallucinations as much as possible; focus on other things.   Concentration Difficulties - Minimize distractions so there is only one thing for you to focus on at a time.    - Ask the person you are having a conversation with to slow down or repeat things you are unsure of.            Appearance:   Normal   Eye Contact:   Good   Psychomotor Behavior: Normal    Attitude:   Cooperative    Orientation:   All   Speech    Rate / Production: Normal     Volume:  Normal    Mood:    Anxious    Affect:    Worrisome    Thought Content:  Clear    Thought  Form:  Coherent  Goal Directed    Insight:    Good      Medication Review:   No changes to current psychiatric medication(s)     Medication Compliance:   Yes     Changes in Health Issues:   None reported     Chemical Use Review:   Substance Use: Chemical use reviewed, no active concerns identified      Tobacco Use: No current tobacco use.      Diagnosis:  296.32 (F33.1) Major Depressive Disorder, Recurrent Episode, Moderate _ and With mixed features  300.01 (F41.0) Panic Disorder  300.02 (F41.1) Generalized Anxiety Disorder   PMDD      Collateral Reports Completed:   Not Applicable    PLAN: (Patient Tasks / Therapist Tasks / Other)  Client will continue to work on the following goals:  -Learn more about emotional validation.   -Review tip sheet together on attachment and have it handy.   -Plan ahead for two graduations parties.    -Follow safety plan and use crisis resources-Continued   -Focus on values.   -Work on allowing time for self care and not feeling guilty.   -Use physical touch as a way to ground.   -Have more active coping skills readily available.   -Work on daily purpose.  -Have a good concrete daily schedule.   -Start to think about part time options that she could stick within the income limit.   -Start to think about some more organization and de-cluttering projects.       Lia Stiles, [facility]                                                         ______________________________________________________________________    Individual Treatment Plan    Patient's Name: Veena Parsons  YOB: 1985    Date of Creation: 9-7-22  Date Treatment Plan Last Reviewed/Revised: 1-30-24    DSM5 Diagnoses:  296.32 (F33.1) Major Depressive Disorder, Recurrent Episode, Moderate _ and With mixed features  300.01 (F41.0) Panic Disorder  300.02 (F41.1) Generalized Anxiety Disorder   PMDD  Psychosocial / Contextual Factors: Some relational issues   PROMIS (reviewed every 90 days): 24    Referral /  Collaboration:  Referral to another professional/service is not indicated at this time..    Anticipated number of session for this episode of care: 6-9 sessions  Anticipation frequency of session: Biweekly  Anticipated Duration of each session: 38-52 minutes  Treatment plan will be reviewed in 90 days or when goals have been changed.       MeasurableTreatment Goal(s) related to diagnosis / functional impairment(s)  Goal 1: Patient will address struggles with anxiety, depression and PMDD.    I will know I've met my goal when I am feeling less reactive through the month with the symptoms.      Objective #A (Patient Action)    Patient will work on establishing a concrete routine with self-care.  Status: Continued - Date(s): 1-30-24    Intervention(s)  Therapist will use CBT and motivational interviewing.      Objective #B  Patient will develop a plan to help manage PMDD  Status: Continued - Date(s): 1-30-24    Intervention(s)  Therapist will use solution focused therapy.    Objective #C  Patient will work on ways to communicate with narcissistic individuals.  Status: Continued - Date(s): 1-30-24    Intervention(s)  Therapist will teach communication skills.          Patient has reviewed and agreed to the above plan.      Lia Stiles, NATACHA  September 7, 2022

## 2024-02-15 NOTE — PROGRESS NOTES
Clinic Care Coordination Contact  [facility]/Voicemail    Clinical Data: Care Coordinator Outreach    Outreach Documentation Number of Outreach Attempt   2/5/2024  10:44 AM 1   2/15/2024   1:20 PM 2       Left message on patient's voicemail with call back information and requested return call.    Plan: Care Coordinator will send unable to contact letter with care coordinator contact information via [employer]. Care Coordinator will do no further outreaches at this time.    SONDRA Ferreira   [facility] - Care Coordination  [facility]   [phone]

## 2024-02-28 ENCOUNTER — VIRTUAL VISIT (OUTPATIENT)
Dept: PSYCHOLOGY | Facility: CLINIC | Age: 39
End: 2024-02-28
Payer: MEDICARE

## 2024-02-28 DIAGNOSIS — F41.1 GENERALIZED ANXIETY DISORDER: ICD-10-CM

## 2024-02-28 DIAGNOSIS — F41.0 PANIC DISORDER WITHOUT AGORAPHOBIA: ICD-10-CM

## 2024-02-28 DIAGNOSIS — F33.1 MAJOR DEPRESSIVE DISORDER, RECURRENT EPISODE, MODERATE (H): Primary | ICD-10-CM

## 2024-02-28 PROCEDURE — 90834 PSYTX W PT 45 MINUTES: CPT | Mod: 95 | Performed by: MARRIAGE & FAMILY THERAPIST

## 2024-02-28 ASSESSMENT — ANXIETY QUESTIONNAIRES
2. NOT BEING ABLE TO STOP OR CONTROL WORRYING: SEVERAL DAYS
5. BEING SO RESTLESS THAT IT IS HARD TO SIT STILL: SEVERAL DAYS
7. FEELING AFRAID AS IF SOMETHING AWFUL MIGHT HAPPEN: SEVERAL DAYS
4. TROUBLE RELAXING: SEVERAL DAYS
3. WORRYING TOO MUCH ABOUT DIFFERENT THINGS: SEVERAL DAYS
8. IF YOU CHECKED OFF ANY PROBLEMS, HOW DIFFICULT HAVE THESE MADE IT FOR YOU TO DO YOUR WORK, TAKE CARE OF THINGS AT HOME, OR GET ALONG WITH OTHER PEOPLE?: VERY DIFFICULT
IF YOU CHECKED OFF ANY PROBLEMS ON THIS QUESTIONNAIRE, HOW DIFFICULT HAVE THESE PROBLEMS MADE IT FOR YOU TO DO YOUR WORK, TAKE CARE OF THINGS AT HOME, OR GET ALONG WITH OTHER PEOPLE: VERY DIFFICULT
1. FEELING NERVOUS, ANXIOUS, OR ON EDGE: MORE THAN HALF THE DAYS
GAD7 TOTAL SCORE: 10
6. BECOMING EASILY ANNOYED OR IRRITABLE: NEARLY EVERY DAY
GAD7 TOTAL SCORE: 10
GAD7 TOTAL SCORE: 10
7. FEELING AFRAID AS IF SOMETHING AWFUL MIGHT HAPPEN: SEVERAL DAYS

## 2024-02-28 ASSESSMENT — PATIENT HEALTH QUESTIONNAIRE - PHQ9
10. IF YOU CHECKED OFF ANY PROBLEMS, HOW DIFFICULT HAVE THESE PROBLEMS MADE IT FOR YOU TO DO YOUR WORK, TAKE CARE OF THINGS AT HOME, OR GET ALONG WITH OTHER PEOPLE: VERY DIFFICULT
SUM OF ALL RESPONSES TO PHQ QUESTIONS 1-9: 13
SUM OF ALL RESPONSES TO PHQ QUESTIONS 1-9: 13

## 2024-02-28 NOTE — PROGRESS NOTES
[facility]                                     Progress Note    Patient Name: Veena Parsons  Date: 2-28-24         Service Type: Individual      Session Start Time:  11am   Session End Time:    1150am     Session Length:  50min    Session #: 42    Attendees: Client     Service Modality:  Video     Telemedicine Visit: The patient's condition can be safely assessed and treated via synchronous audio and visual telemedicine encounter.      Reason for Telemedicine Visit: Patient has requested telehealth visit    Originating Site (Patient Location): Patient's home        Distant Location (provider location):  Off-site    Consent:  The patient/guardian has verbally consented to: the potential risks and benefits of telemedicine (video visit) versus in person care; bill my insurance or make self-payment for services provided; and responsibility for payment of non-covered services.     Mode of Communication:  Video Conference via Crack    As the provider I attest to compliance with applicable laws and regulations related to telemedicine.      Treatment Plan- 1-30-24  CGI- 1-30-24  Phq-9 and YADI-7- See check in data  Promis- 1-10-24    DATA  Interactive Complexity: No  Crisis: No        Progress Since Last Session (Related to Symptoms / Goals / Homework):   There has been demonstrated improvement in functioning while patient has been engaged in psychotherapy/psychological service- if withdrawn the patient would deteriorate and/or relapse.      Symptoms: Client reports confronting issues with anxiety and depressed mood.  Working on addressing attachment styles.       Homework: Achieved / completed to satisfaction  Completed in session      Episode of Care Goals: Satisfactory progress - ACTION (Actively working towards change); Intervened by reinforcing change plan / affirming steps taken     Current / Ongoing Stressors and Concerns:   -Veena did get approved for disability.   -Planning ahead for  2 graduation parties.  Worried about some of the expenses.     -PMDD has been manageable this month.   -Wanted to talk more about anxious and avoidant attachment styles today.  Veena feels she is anxious and star feels he is avoidant- Continued    -Star feels he was not able to express emotions as a kid.   -Financial stress is high.  Living on about 1/2 the income they used to have.     -Has struggled with PMDD for a number of years.  Has been working with chiropractic and message therapy on some holistic treatment options.  Has not been able to get in consistently - Continue    -Feels there is a lot of confusion with disability and if she can work for [employer] very part time.   -Dealing with a really sore tooth right now.          Treatment Objective(s) Addressed in This Session:  Safety planning   Patient will develop a plan to help manage PMDD  Patient will work on ways to communicate /patterns   Relational issues - Attachment styles.        Intervention:   Reviewed attachment style tips. Have this accessible and ready to use.     Get more clarification on part time work/ [employer].      Use PMDD page for support.    Start to pay for the graduation each month.   Follow safety plan and use crisis resources.  Agrees to contract for safety today.   Use support system- Continued   Work on finding purposeful and meaningful activities during the day.   Use some holistic approaches to health when able.     Take time for self-care each day.        Assessments completed prior to visit:  The following assessments were completed by patient for this visit:  PHQ2:       2/28/2024    10:46 AM 2/14/2024     9:48 AM 1/10/2024    10:50 AM 11/29/2023     9:44 AM 10/5/2023    10:34 AM 9/19/2023     7:09 PM 9/7/2023     6:59 AM   PHQ-2 ( 1999 [employer])   Q1: Little interest or pleasure in doing things 3 1 1 3 0 1 1   Q2: Feeling down, depressed or hopeless 3 1 1 3 1 2 1   PHQ-2 Score 6 2 2 6 1 3 2   Q1: Little interest or pleasure in  doing things Nearly every day Several days Several days Nearly every day Not at all Several days Several days   Q2: Feeling down, depressed or hopeless Nearly every day Several days Several days Nearly every day Several days More than half the days Several days   PHQ-2 Score 6 2 2 6 1 3 2     PHQ9:       2/2/2023    10:13 AM 6/1/2023    10:07 AM 7/17/2023    11:15 AM 9/19/2023     7:09 PM 11/29/2023     9:44 AM 1/3/2024    10:04 AM 2/28/2024    10:46 AM   PHQ-9 SCORE   PHQ-9 Total Score MyChart    15 (Moderately severe depression) 15 (Moderately severe depression) 17 (Moderately severe depression) 13 (Moderate depression)   PHQ-9 Total Score 12 15 16 15 15 17 13     GAD2:       8/23/2023    10:14 AM 9/7/2023     6:59 AM 9/19/2023     7:10 PM 10/5/2023    10:34 AM 11/29/2023     9:45 AM 1/10/2024    10:51 AM 2/28/2024    10:47 AM   YADI-2   Feeling nervous, anxious, or on edge 1 1 3 1 1 1 2   Not being able to stop or control worrying 1 1 3 1 1 1 1   YADI-2 Total Score 2 2 6 2 2 2 3     GAD7:       11/27/2022     5:03 AM 2/2/2023    10:13 AM 6/1/2023    10:07 AM 7/17/2023    11:15 AM 8/7/2023    11:04 AM 9/19/2023     7:10 PM 2/28/2024    10:47 AM   YADI-7 SCORE   Total Score 17 (severe anxiety)    5 (mild anxiety) 21 (severe anxiety) 10 (moderate anxiety)   Total Score 17 13 12 13 5 21 10     PROMIS 10-Global Health (all questions and answers displayed):       8/23/2023    10:14 AM 9/7/2023     7:00 AM 9/19/2023     7:11 PM 10/5/2023    10:35 AM 11/29/2023     9:46 AM 1/10/2024    10:52 AM 2/28/2024    10:48 AM   PROMIS 10   In general, would you say your health is: Good Good Good Good Fair Fair Good   In general, would you say your quality of life is: Fair Good Fair Good Fair Good Good   In general, how would you rate your physical health? Good Good Fair Fair Fair Good Fair   In general, how would you rate your mental health, including your mood and your ability to think? Fair Poor Fair Good Fair Fair Fair   In  general, how would you rate your satisfaction with your social activities and relationships? Fair Fair Fair Good Fair Fair Fair   In general, please rate how well you carry out your usual social activities and roles Poor Poor Poor Fair Poor Fair Fair   To what extent are you able to carry out your everyday physical activities such as walking, climbing stairs, carrying groceries, or moving a chair? Moderately Mostly Completely Mostly Mostly Completely Mostly   In the past 7 days, how often have you been bothered by emotional problems such as feeling anxious, depressed, or irritable? Often Often Often Sometimes Always Sometimes Often   In the past 7 days, how would you rate your fatigue on average? Moderate Moderate Severe Mild Moderate Moderate Moderate   In the past 7 days, how would you rate your pain on average, where 0 means no pain, and 10 means worst imaginable pain? 6 6 5 5 6 6 6   In general, would you say your health is: 3 3 3 3 2 2 3   In general, would you say your quality of life is: 2 3 2 3 2 3 3   In general, how would you rate your physical health? 3 3 2 2 2 3 2   In general, how would you rate your mental health, including your mood and your ability to think? 2 1 2 3 2 2 2   In general, how would you rate your satisfaction with your social activities and relationships? 2 2 2 3 2 2 2   In general, please rate how well you carry out your usual social activities and roles. (This includes activities at home, at work and in your community, and responsibilities as a parent, child, spouse, employee, friend, etc.) 1 1 1 2 1 2 2   To what extent are you able to carry out your everyday physical activities such as walking, climbing stairs, carrying groceries, or moving a chair? 3 4 5 4 4 5 4   In the past 7 days, how often have you been bothered by emotional problems such as feeling anxious, depressed, or irritable? 4 4 4 3 5 3 4   In the past 7 days, how would you rate your fatigue on average? 3 3 4 2 3 3 3  "  In the past 7 days, how would you rate your pain on average, where 0 means no pain, and 10 means worst imaginable pain? 6 6 5 5 6 6 6   Global Mental Health Score 8 8 8 12 7 10 9   Global Physical Health Score 12 13 12 13 12 14 12   PROMIS TOTAL - SUBSCORES 20 21 20 25 19 24 21     [place] Suicide Severity Rating Scale (Lifetime/Recent)      8/3/2022     9:17 AM   [place] Suicide Severity Rating (Lifetime/Recent)   Q1 Wish to be Dead (Lifetime) N   Q2 Non-Specific Active Suicidal Thoughts (Lifetime) N   Actual Attempt (Lifetime) N   Has subject engaged in non-suicidal self-injurious behavior? (Lifetime) N   Calculated C-SSRS Risk Score (Lifetime/Recent) No Risk Indicated         ASSESSMENT: Current Emotional / Mental Status (status of significant symptoms):   Risk status (Self / Other harm or suicidal ideation)   Patient denies current fears or concerns for personal safety.   Patient denies current or recent suicidal ideation or behaviors.    Patient denies current or recent homicidal ideation or behaviors.   Patient denies current or recent self injurious behavior or ideation.   Patient denies other safety concerns.   Patient reports there has been no change in risk factors since their last session.     Patient reports there has been no change in protective factors since their last session.     A safety and risk management plan has been developed including: Patient consented to co-developed safety plan on 11-30-22.  Safety and risk management plan was reviewed.   Patient agreed to use safety plan should any safety concerns arise.  A copy was made available to the patient. Reviewed 2-28-24        [facility]                                       Veena Parsons     SAFETY PLAN:  Step 1: Warning signs / cues (Thoughts, images, mood, situation, behavior) that a crisis may be developing:  Thoughts: \"I don't matter\", \"People would be better off without me\", \"I'm a burden\", \"I can't do this " "anymore\", \"I just want this to end\" and \"Nothing makes it better\"  Images: flashbacks  Thinking Processes: ruminations (can't stop thinking about my problems): PMDD and loss of mother, racing thoughts and highly critical and negative thoughts: I cant do anything right  Mood: worsening depression, hopelessness, helplessness, intense worry, agitation and mood swings  Behaviors: isolating/withdrawing , can't stop crying, not taking care of myself, not taking care of my responsibilities, sleeping too much, not sleeping enough and increasing frequency and duration of dissociation  Situations: relationship problems, trauma  and medical condition / diagnosis: PMDD    Step 2: Coping strategies - Things I can do to take my mind off of my problems without contacting another person (relaxation technique, physical activity):  Distress Tolerance Strategies:  relaxation activities, play with my pet , listen to positive and upbeat music, sensory based activities/self-soothe with five senses, watch a funny movie, read a book, change body temperature (ice pack/cold water)  and paced breathing/progressive muscle relaxation  Physical Activities: go for a walk, gardening, meditation, deep breathing and stretching   Focus on helpful thoughts:  \"This is temporary\", \"I will get through this\", \"It always passes\" and \"Ride the wave\"  Step 3: People and social settings that provide distraction:   Name: Spouse Star   Name:  Spending time with daughter     movie theater, pet store/humane society and coffee shop   Step 4: Remind myself of people and things that are important to me and worth living for:    Daughter   Spouse  Pets     Step 5: When I am in crisis, I can ask these people to help me use my safety plan:   Name: Spouse  Phone: [phone]      Step 6: Making the environment safe:   remove things I could use to hurt myself and be around others  Step 7: Professionals or agencies I can contact during a crisis:  Suicide Prevention " Lifeline: Call or Text 988   Local Crisis Services:  [facility] [phone]      Call 911 or go to my nearest emergency department.   I helped develop this safety plan and agree to use it when needed.  I have been given a copy of this plan.      Client signature _________________________________________________________________  Today s date:  11/30/2022  Completed by Provider Name/ Credentials:  Lia Stiles MA, LMFT  November 30, 2022  Adapted from Safety Plan Template 2008 Eleonora Capone and Danny Carmona is reprinted with the express permission of the authors.  No portion of the Safety Plan Template may be reproduced without the express, written permission.  You can contact the authors at [email or web address] or [email or web address].    Name: Veena Parsons  YOB: 1985  Date: November 30, 2022   My primary care provider: Francisco Nails  My [facility] clinic[facility]   My prescriber: PCP  Other care team support:  Holistic medical provider    My Triggers:    Relational problems  Loss of mother recently  Financial stress      Additional People, Places, and Things that I can access for support:   Spouse  Daughter          What is important to me and makes life worth living: Family         GREEN    Good Control  1. I feel good  2. No suicidal thoughts   3. Can work, sleep and play      Action Steps  1. Self-care: balanced meals, exercising, sleep practices, etc.  2. Take your medications as prescribed.  3. Continue meetings with therapist and prescriber.  4.  Do the healthy things that I enjoy.             YELLO Getting Worse  I have ANY of these:  1. I do not feel good  2. Difficulty Concentrating  3. Sleep is changing  4. Increase/Change in my thoughts to hurt self and/or others, but I can still manage and not act on it.   5. Not taking care of self.               Action Steps (in addition to the above):  1. Inform your therapist and psychiatric  prescriber/PCP.  2. Keep taking your medications as prescribed.    3. Turn to people you can ask for help.  4. Use internal coping strategies -see below.  5. Create safe environment: Removing items I can hurt self with              RED  Get Help  If I have ANY of these:  1. Current and uncontrollable thoughts and/or behaviors to hurt self and/or others.   2. Crazy buzzing and spinning.     Actions to manage my safety  1. Contact your emergency person Star  2. Call or Text [phone]  3. Call my crisis team- [facility] [phone]  3. Or Call 641 or go to the emergency room right away        My Internal Coping Strategies include the following:  take a bath, belly breathing, arts and crafts, play with my pet, use my coping box, exercise and use my coping skills    [End for Brief Safety Plan]     Safety Concerns  How To Identify Situations That Make Your Mental Health Worse:  Triggers are things that make your mental health worse.  Look at the list below to help you find your triggers and what you can do about them.     1. Identify Early Warning Signs:    Sometimes symptoms return, even when people do their best to stay well. Symptoms can develop over a short period of time with little or no warning, but most of the time they emerge gradually over several weeks.  Early warning signs are changes that people experience when a relapse is starting. Some early warning signs are common and others are not as common.   Common Early Warning Signs:    Feeling tense or nervous, Eating less or eating more, Trouble sleeping -either too much or too little sleep, Feeling depressed or low, Feeling irritable, Feeling like not being around other people, Trouble concentrating and Urges to harm self     2. Identify action steps to take when warning signs are noticed:    Taking Action- It is important to take action if you are experiencing early warning signs of a relapse.  The faster you act, the more likely it is that you can avoid a  full relapse.  It is helpful to identify several specific ways to cope with symptoms.      The following is my list of symptoms and coping strategies that I can use when they are present:    Symptom Coping Strategies   Anxiety -Talk with someone in your support system and let him or her know how you are feeling.  -Use relaxation techniques such as deep breathing or imagery.  -Use positive affirmations to counteract negative self-talk such as  I am learning to let go of worry.    Depression - Schedule your day; include activities you have to do and activities you enjoy doing.  - Get some exercise - walk, run, bike, or swim.  - Give yourself credit for even the smallest things you get done.   Sleep Difficulties   - Go to sleep at the same time every day.  - Do something relaxing before bed, such as drinking herbal tea or listening to music.  - Avoid having discussions about upsetting topics before going to bed.   Delusions   - Distract yourself from the disturbing thought by doing something that requires your attention such as a puzzle.  - Check out your beliefs by talking to someone you trust.    Hallucinations   - Use headphones to listen to music.  - Tell voices to  stop  or say to yourself,  I am safe.   - Ignore the hallucinations as much as possible; focus on other things.   Concentration Difficulties - Minimize distractions so there is only one thing for you to focus on at a time.    - Ask the person you are having a conversation with to slow down or repeat things you are unsure of.            Appearance:   Normal   Eye Contact:   Good   Psychomotor Behavior: Normal    Attitude:   Cooperative    Orientation:   All   Speech    Rate / Production: Normal     Volume:  Normal    Mood:    Anxious Depressed    Affect:    Worrisome    Thought Content:  Clear    Thought Form:  Coherent  Goal Directed    Insight:    Good      Medication Review:   No changes to current psychiatric medication(s)     Medication  Compliance:   Yes     Changes in Health Issues:   None reported     Chemical Use Review:   Substance Use: Chemical use reviewed, no active concerns identified      Tobacco Use: No current tobacco use.      Diagnosis:  296.32 (F33.1) Major Depressive Disorder, Recurrent Episode, Moderate _ and With mixed features  300.01 (F41.0) Panic Disorder  300.02 (F41.1) Generalized Anxiety Disorder   PMDD      Collateral Reports Completed:   Not Applicable    PLAN: (Patient Tasks / Therapist Tasks / Other)  Client will continue to work on the following goals:  -Learn more about emotional validation.   -Review tip sheet together on attachment and have it handy.   -Plan ahead for two graduations parties and pay off a little each month.   -Follow safety plan and use crisis resources-Continued   -Work on allowing time for self care and not feeling guilty.   -Use physical touch as a way to ground.   -Have more active coping skills readily available.  -Have a good concrete daily schedule.   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   -Start to think about some more organization and de-cluttering projects.       Lia Stiles, [facility]                                                         ______________________________________________________________________    Individual Treatment Plan    Patient's Name: Veena Parsons  YOB: 1985    Date of Creation: 9-7-22  Date Treatment Plan Last Reviewed/Revised: 1-30-24    DSM5 Diagnoses:  296.32 (F33.1) Major Depressive Disorder, Recurrent Episode, Moderate _ and With mixed features  300.01 (F41.0) Panic Disorder  300.02 (F41.1) Generalized Anxiety Disorder   PMDD  Psychosocial / Contextual Factors: Some relational issues   PROMIS (reviewed every 90 days): 21    Referral / Collaboration:  Referral to another professional/service is not indicated at this time..    Anticipated number of session for this episode of care: 6-9  sessions  Anticipation frequency of session: Biweekly  Anticipated Duration of each session: 38-52 minutes  Treatment plan will be reviewed in 90 days or when goals have been changed.       MeasurableTreatment Goal(s) related to diagnosis / functional impairment(s)  Goal 1: Patient will address struggles with anxiety, depression and PMDD.    I will know I've met my goal when I am feeling less reactive through the month with the symptoms.      Objective #A (Patient Action)    Patient will work on establishing a concrete routine with self-care.  Status: Continued - Date(s): 1-30-24    Intervention(s)  Therapist will use CBT and motivational interviewing.      Objective #B  Patient will develop a plan to help manage PMDD  Status: Continued - Date(s): 1-30-24    Intervention(s)  Therapist will use solution focused therapy.    Objective #C  Patient will work on ways to communicate with narcissistic individuals.  Status: Continued - Date(s): 1-30-24    Intervention(s)  Therapist will teach communication skills.          Patient has reviewed and agreed to the above plan.      Lia Stiles, NATACHA  September 7, 2022

## 2024-03-12 ENCOUNTER — VIRTUAL VISIT (OUTPATIENT)
Dept: PSYCHOLOGY | Facility: CLINIC | Age: 39
End: 2024-03-12
Payer: MEDICARE

## 2024-03-12 DIAGNOSIS — F41.0 PANIC DISORDER WITHOUT AGORAPHOBIA: ICD-10-CM

## 2024-03-12 DIAGNOSIS — F41.1 GENERALIZED ANXIETY DISORDER: ICD-10-CM

## 2024-03-12 DIAGNOSIS — F33.1 MAJOR DEPRESSIVE DISORDER, RECURRENT EPISODE, MODERATE (H): Primary | ICD-10-CM

## 2024-03-12 PROCEDURE — 90834 PSYTX W PT 45 MINUTES: CPT | Mod: 95 | Performed by: MARRIAGE & FAMILY THERAPIST

## 2024-03-12 NOTE — PROGRESS NOTES
[facility]                                     Progress Note    Patient Name: Veena Parsons  Date: 3-12-24         Service Type: Individual      Session Start Time:  204pm   Session End Time:    254pm     Session Length:  50min    Session #: 43    Attendees: Client     Service Modality:  Video     Telemedicine Visit: The patient's condition can be safely assessed and treated via synchronous audio and visual telemedicine encounter.      Reason for Telemedicine Visit: Patient has requested telehealth visit    Originating Site (Patient Location): Patient's home        Distant Location (provider location):  Off-site    Consent:  The patient/guardian has verbally consented to: the potential risks and benefits of telemedicine (video visit) versus in person care; bill my insurance or make self-payment for services provided; and responsibility for payment of non-covered services.     Mode of Communication:  Video Conference via gifted[employer]you    As the provider I attest to compliance with applicable laws and regulations related to telemedicine.      Treatment Plan- 1-30-24  CGI- 1-30-24  Phq-9 and YADI-7- See check in data  Promis- 2-28-24    DATA  Interactive Complexity: No  Crisis: No        Progress Since Last Session (Related to Symptoms / Goals / Homework):   There has been demonstrated improvement in functioning while patient has been engaged in psychotherapy/psychological service- if withdrawn the patient would deteriorate and/or relapse.      Symptoms: Client reports confronting issues with anxiety and depressed mood.  Reports some relational issues this week.       Homework: Achieved / completed to satisfaction  Completed in session      Episode of Care Goals: Satisfactory progress - ACTION (Actively working towards change); Intervened by reinforcing change plan / affirming steps taken     Current / Ongoing Stressors and Concerns:   -Veena reports more relational issues this week.     -Planning ahead for 2 graduation parties.  Worried about some of the expenses.     -Feels there has been more gas lighting at home.    -Anxious and avoidant attachment styles-  Veena feels she is anxious and star feels he is avoidant- Continued    -Feels her and Star do not have much in common anymore.  Attempted to have a conversation about this and it went in a negative way.    -Financial stress is high.  Living on about 1/2 the income they used to have.     -Has struggled with PMDD for a number of years.  Has been working with chiropractic and message therapy on some holistic treatment options.  Has not been able to get in consistently - Continue    -Feels there is not a lot of reciprocal conversation with Star.         Treatment Objective(s) Addressed in This Session:  Safety planning   Patient will develop a plan to help manage PMDD  Patient will work on ways to communicate /patterns   Relational issues - Attachment styles.        Intervention:   Reviewed attachment style tips. Have this accessible and ready to use.     Do not respond to gas lighting.     Processed through relational stressors.    Start to pay for the graduation each month.   Follow safety plan and use crisis resources.  Agrees to contract for safety today.   Use support system- Continued   Have some conversations about common interests and some shared activities.     Use some holistic approaches to health when able.     Take time for self-care each day.        Assessments completed prior to visit:  The following assessments were completed by patient for this visit:  PHQ2:       2/28/2024    10:46 AM 2/14/2024     9:48 AM 1/10/2024    10:50 AM 11/29/2023     9:44 AM 10/5/2023    10:34 AM 9/19/2023     7:09 PM 9/7/2023     6:59 AM   PHQ-2 ( 1999 [employer])   Q1: Little interest or pleasure in doing things 3 1 1 3 0 1 1   Q2: Feeling down, depressed or hopeless 3 1 1 3 1 2 1   PHQ-2 Score 6 2 2 6 1 3 2   Q1: Little interest or pleasure in doing  things Nearly every day Several days Several days Nearly every day Not at all Several days Several days   Q2: Feeling down, depressed or hopeless Nearly every day Several days Several days Nearly every day Several days More than half the days Several days   PHQ-2 Score 6 2 2 6 1 3 2     PHQ9:       2/2/2023    10:13 AM 6/1/2023    10:07 AM 7/17/2023    11:15 AM 9/19/2023     7:09 PM 11/29/2023     9:44 AM 1/3/2024    10:04 AM 2/28/2024    10:46 AM   PHQ-9 SCORE   PHQ-9 Total Score MyChart    15 (Moderately severe depression) 15 (Moderately severe depression) 17 (Moderately severe depression) 13 (Moderate depression)   PHQ-9 Total Score 12 15 16 15 15 17 13     GAD2:       8/23/2023    10:14 AM 9/7/2023     6:59 AM 9/19/2023     7:10 PM 10/5/2023    10:34 AM 11/29/2023     9:45 AM 1/10/2024    10:51 AM 2/28/2024    10:47 AM   YADI-2   Feeling nervous, anxious, or on edge 1 1 3 1 1 1 2   Not being able to stop or control worrying 1 1 3 1 1 1 1   YADI-2 Total Score 2 2 6 2 2 2 3     GAD7:       11/27/2022     5:03 AM 2/2/2023    10:13 AM 6/1/2023    10:07 AM 7/17/2023    11:15 AM 8/7/2023    11:04 AM 9/19/2023     7:10 PM 2/28/2024    10:47 AM   YADI-7 SCORE   Total Score 17 (severe anxiety)    5 (mild anxiety) 21 (severe anxiety) 10 (moderate anxiety)   Total Score 17 13 12 13 5 21 10     PROMIS 10-Global Health (all questions and answers displayed):       8/23/2023    10:14 AM 9/7/2023     7:00 AM 9/19/2023     7:11 PM 10/5/2023    10:35 AM 11/29/2023     9:46 AM 1/10/2024    10:52 AM 2/28/2024    10:48 AM   PROMIS 10   In general, would you say your health is: Good Good Good Good Fair Fair Good   In general, would you say your quality of life is: Fair Good Fair Good Fair Good Good   In general, how would you rate your physical health? Good Good Fair Fair Fair Good Fair   In general, how would you rate your mental health, including your mood and your ability to think? Fair Poor Fair Good Fair Fair Fair   In general, how  would you rate your satisfaction with your social activities and relationships? Fair Fair Fair Good Fair Fair Fair   In general, please rate how well you carry out your usual social activities and roles Poor Poor Poor Fair Poor Fair Fair   To what extent are you able to carry out your everyday physical activities such as walking, climbing stairs, carrying groceries, or moving a chair? Moderately Mostly Completely Mostly Mostly Completely Mostly   In the past 7 days, how often have you been bothered by emotional problems such as feeling anxious, depressed, or irritable? Often Often Often Sometimes Always Sometimes Often   In the past 7 days, how would you rate your fatigue on average? Moderate Moderate Severe Mild Moderate Moderate Moderate   In the past 7 days, how would you rate your pain on average, where 0 means no pain, and 10 means worst imaginable pain? 6 6 5 5 6 6 6   In general, would you say your health is: 3 3 3 3 2 2 3   In general, would you say your quality of life is: 2 3 2 3 2 3 3   In general, how would you rate your physical health? 3 3 2 2 2 3 2   In general, how would you rate your mental health, including your mood and your ability to think? 2 1 2 3 2 2 2   In general, how would you rate your satisfaction with your social activities and relationships? 2 2 2 3 2 2 2   In general, please rate how well you carry out your usual social activities and roles. (This includes activities at home, at work and in your community, and responsibilities as a parent, child, spouse, employee, friend, etc.) 1 1 1 2 1 2 2   To what extent are you able to carry out your everyday physical activities such as walking, climbing stairs, carrying groceries, or moving a chair? 3 4 5 4 4 5 4   In the past 7 days, how often have you been bothered by emotional problems such as feeling anxious, depressed, or irritable? 4 4 4 3 5 3 4   In the past 7 days, how would you rate your fatigue on average? 3 3 4 2 3 3 3   In the past 7  "days, how would you rate your pain on average, where 0 means no pain, and 10 means worst imaginable pain? 6 6 5 5 6 6 6   Global Mental Health Score 8 8 8 12 7 10 9   Global Physical Health Score 12 13 12 13 12 14 12   PROMIS TOTAL - SUBSCORES 20 21 20 25 19 24 21     Garrard Suicide Severity Rating Scale (Lifetime/Recent)      8/3/2022     9:17 AM   Garrard Suicide Severity Rating (Lifetime/Recent)   Q1 Wish to be Dead (Lifetime) N   Q2 Non-Specific Active Suicidal Thoughts (Lifetime) N   Actual Attempt (Lifetime) N   Has subject engaged in non-suicidal self-injurious behavior? (Lifetime) N   Calculated C-SSRS Risk Score (Lifetime/Recent) No Risk Indicated         ASSESSMENT: Current Emotional / Mental Status (status of significant symptoms):   Risk status (Self / Other harm or suicidal ideation)   Patient denies current fears or concerns for personal safety.   Patient denies current or recent suicidal ideation or behaviors.    Patient denies current or recent homicidal ideation or behaviors.   Patient denies current or recent self injurious behavior or ideation.   Patient denies other safety concerns.   Patient reports there has been no change in risk factors since their last session.     Patient reports there has been no change in protective factors since their last session.     A safety and risk management plan has been developed including: Patient consented to co-developed safety plan on 11-30-22.  Safety and risk management plan was reviewed.   Patient agreed to use safety plan should any safety concerns arise.  A copy was made available to the patient. Reviewed 3-12-24        [facility]                                       Veena Parsons     SAFETY PLAN:  Step 1: Warning signs / cues (Thoughts, images, mood, situation, behavior) that a crisis may be developing:  Thoughts: \"I don't matter\", \"People would be better off without me\", \"I'm a burden\", \"I can't do this anymore\", \"I just want " "this to end\" and \"Nothing makes it better\"  Images: flashbacks  Thinking Processes: ruminations (can't stop thinking about my problems): PMDD and loss of mother, racing thoughts and highly critical and negative thoughts: I cant do anything right  Mood: worsening depression, hopelessness, helplessness, intense worry, agitation and mood swings  Behaviors: isolating/withdrawing , can't stop crying, not taking care of myself, not taking care of my responsibilities, sleeping too much, not sleeping enough and increasing frequency and duration of dissociation  Situations: relationship problems, trauma  and medical condition / diagnosis: PMDD    Step 2: Coping strategies - Things I can do to take my mind off of my problems without contacting another person (relaxation technique, physical activity):  Distress Tolerance Strategies:  relaxation activities, play with my pet , listen to positive and upbeat music, sensory based activities/self-soothe with five senses, watch a funny movie, read a book, change body temperature (ice pack/cold water)  and paced breathing/progressive muscle relaxation  Physical Activities: go for a walk, gardening, meditation, deep breathing and stretching   Focus on helpful thoughts:  \"This is temporary\", \"I will get through this\", \"It always passes\" and \"Ride the wave\"  Step 3: People and social settings that provide distraction:   Name: Spouse Star   Name:  Spending time with daughter     movie theater, pet store/humane society and coffee shop   Step 4: Remind myself of people and things that are important to me and worth living for:    Daughter   Spouse  Pets     Step 5: When I am in crisis, I can ask these people to help me use my safety plan:   Name: Spouse  Phone: [phone]      Step 6: Making the environment safe:   remove things I could use to hurt myself and be around others  Step 7: Professionals or agencies I can contact during a crisis:  Suicide Prevention Lifeline: Call or Text 988 "   Local Crisis Services:  [facility] [phone]      Call 911 or go to my nearest emergency department.   I helped develop this safety plan and agree to use it when needed.  I have been given a copy of this plan.      Client signature _________________________________________________________________  Today s date:  11/30/2022  Completed by Provider Name/ Credentials:  Lia Stiles MA, LMFT  November 30, 2022  Adapted from Safety Plan Template 2008 Eleonora Capone and Danny Carmona is reprinted with the express permission of the authors.  No portion of the Safety Plan Template may be reproduced without the express, written permission.  You can contact the authors at [email or web address] or [email or web address].    Name: Veena Parsons  YOB: 1985  Date: November 30, 2022   My primary care provider: Francisco Nails  My primary care clinic: [facility]   My prescriber: PCP  Other care team support:  Holistic medical provider    My Triggers:    Relational problems  Loss of mother recently  Financial stress      Additional People, Places, and Things that I can access for support:   Spouse  Daughter          What is important to me and makes life worth living: Family         GREEN    Good Control  1. I feel good  2. No suicidal thoughts   3. Can work, sleep and play      Action Steps  1. Self-care: balanced meals, exercising, sleep practices, etc.  2. Take your medications as prescribed.  3. Continue meetings with therapist and prescriber.  4.  Do the healthy things that I enjoy.             YELLO Getting Worse  I have ANY of these:  1. I do not feel good  2. Difficulty Concentrating  3. Sleep is changing  4. Increase/Change in my thoughts to hurt self and/or others, but I can still manage and not act on it.   5. Not taking care of self.               Action Steps (in addition to the above):  1. Inform your therapist and psychiatric prescriber/PCP.  2. Keep taking your  medications as prescribed.    3. Turn to people you can ask for help.  4. Use internal coping strategies -see below.  5. Create safe environment: Removing items I can hurt self with              RED  Get Help  If I have ANY of these:  1. Current and uncontrollable thoughts and/or behaviors to hurt self and/or others.   2. Crazy buzzing and spinning.     Actions to manage my safety  1. Contact your emergency person Star  2. Call or Text 495  3. Call my crisis team- [facility] [phone]  3. Or Call [phone] or go to the emergency room right away        My Internal Coping Strategies include the following:  take a bath, belly breathing, arts and crafts, play with my pet, use my coping box, exercise and use my coping skills    [End for Brief Safety Plan]     Safety Concerns  How To Identify Situations That Make Your Mental Health Worse:  Triggers are things that make your mental health worse.  Look at the list below to help you find your triggers and what you can do about them.     1. Identify Early Warning Signs:    Sometimes symptoms return, even when people do their best to stay well. Symptoms can develop over a short period of time with little or no warning, but most of the time they emerge gradually over several weeks.  Early warning signs are changes that people experience when a relapse is starting. Some early warning signs are common and others are not as common.   Common Early Warning Signs:    Feeling tense or nervous, Eating less or eating more, Trouble sleeping -either too much or too little sleep, Feeling depressed or low, Feeling irritable, Feeling like not being around other people, Trouble concentrating and Urges to harm self     2. Identify action steps to take when warning signs are noticed:    Taking Action- It is important to take action if you are experiencing early warning signs of a relapse.  The faster you act, the more likely it is that you can avoid a full relapse.  It is helpful to  identify several specific ways to cope with symptoms.      The following is my list of symptoms and coping strategies that I can use when they are present:    Symptom Coping Strategies   Anxiety -Talk with someone in your support system and let him or her know how you are feeling.  -Use relaxation techniques such as deep breathing or imagery.  -Use positive affirmations to counteract negative self-talk such as  I am learning to let go of worry.    Depression - Schedule your day; include activities you have to do and activities you enjoy doing.  - Get some exercise - walk, run, bike, or swim.  - Give yourself credit for even the smallest things you get done.   Sleep Difficulties   - Go to sleep at the same time every day.  - Do something relaxing before bed, such as drinking herbal tea or listening to music.  - Avoid having discussions about upsetting topics before going to bed.   Delusions   - Distract yourself from the disturbing thought by doing something that requires your attention such as a puzzle.  - Check out your beliefs by talking to someone you trust.    Hallucinations   - Use headphones to listen to music.  - Tell voices to  stop  or say to yourself,  I am safe.   - Ignore the hallucinations as much as possible; focus on other things.   Concentration Difficulties - Minimize distractions so there is only one thing for you to focus on at a time.    - Ask the person you are having a conversation with to slow down or repeat things you are unsure of.            Appearance:   Normal   Eye Contact:   Good   Psychomotor Behavior: Normal    Attitude:   Cooperative    Orientation:   All   Speech    Rate / Production: Normal     Volume:  Normal    Mood:    Anxious Depressed Upset    Affect:    Worrisome    Thought Content:  Clear    Thought Form:  Coherent  Goal Directed    Insight:    Fair      Medication Review:   No changes to current psychiatric medication(s)     Medication Compliance:   Yes     Changes in  Health Issues:   None reported     Chemical Use Review:   Substance Use: Chemical use reviewed, no active concerns identified      Tobacco Use: No current tobacco use.      Diagnosis:  296.32 (F33.1) Major Depressive Disorder, Recurrent Episode, Moderate _ and With mixed features  300.01 (F41.0) Panic Disorder  300.02 (F41.1) Generalized Anxiety Disorder   PMDD      Collateral Reports Completed:   Not Applicable    PLAN: (Patient Tasks / Therapist Tasks / Other)  Client will continue to work on the following goals:  -Learn more about emotional validation.   -Do not respond to gas lighting.   -Review tip sheet together on attachment and have it handy.   -Plan ahead for two graduations parties and pay off a little each month.   -Follow safety plan and use crisis resources-Continued   -Make self-care a priority.   -Use physical touch as a way to ground.   -Find some new common interests with Star.   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       Lia Stiles, [facility]                                                         ______________________________________________________________________    Individual Treatment Plan    Patient's Name: Veena Parsons  YOB: 1985    Date of Creation: 9-7-22  Date Treatment Plan Last Reviewed/Revised: 1-30-24    DSM5 Diagnoses:  296.32 (F33.1) Major Depressive Disorder, Recurrent Episode, Moderate _ and With mixed features  300.01 (F41.0) Panic Disorder  300.02 (F41.1) Generalized Anxiety Disorder   PMDD  Psychosocial / Contextual Factors: Some relational issues   PROMIS (reviewed every 90 days): 21    Referral / Collaboration:  Referral to another professional/service is not indicated at this time..    Anticipated number of session for this episode of care: 6-9 sessions  Anticipation frequency of session: Biweekly  Anticipated Duration of each session: 38-52 minutes  Treatment plan will be reviewed in 90 days or when goals  have been changed.       MeasurableTreatment Goal(s) related to diagnosis / functional impairment(s)  Goal 1: Patient will address struggles with anxiety, depression and PMDD.    I will know I've met my goal when I am feeling less reactive through the month with the symptoms.      Objective #A (Patient Action)    Patient will work on establishing a concrete routine with self-care.  Status: Continued - Date(s): 1-30-24    Intervention(s)  Therapist will use CBT and motivational interviewing.      Objective #B  Patient will develop a plan to help manage PMDD  Status: Continued - Date(s): 1-30-24    Intervention(s)  Therapist will use solution focused therapy.    Objective #C  Patient will work on ways to communicate with narcissistic individuals.  Status: Continued - Date(s): 1-30-24    Intervention(s)  Therapist will teach communication skills.          Patient has reviewed and agreed to the above plan.      Lia Stiles, NATACHA  September 7, 2022

## 2024-03-20 ENCOUNTER — VIRTUAL VISIT (OUTPATIENT)
Dept: PSYCHOLOGY | Facility: CLINIC | Age: 39
End: 2024-03-20
Payer: MEDICARE

## 2024-03-20 DIAGNOSIS — F41.1 GENERALIZED ANXIETY DISORDER: ICD-10-CM

## 2024-03-20 DIAGNOSIS — F41.0 PANIC DISORDER WITHOUT AGORAPHOBIA: ICD-10-CM

## 2024-03-20 DIAGNOSIS — F33.1 MAJOR DEPRESSIVE DISORDER, RECURRENT EPISODE, MODERATE (H): Primary | ICD-10-CM

## 2024-03-20 PROCEDURE — 90834 PSYTX W PT 45 MINUTES: CPT | Mod: 95 | Performed by: MARRIAGE & FAMILY THERAPIST

## 2024-03-20 NOTE — PROGRESS NOTES
[facility]                                     Progress Note    Patient Name: Veena Parsons  Date: 3-20-24         Service Type: Individual      Session Start Time:  11am   Session End Time:    1150am     Session Length:  50min    Session #: 44    Attendees: Client and Star    Service Modality:  Video     Telemedicine Visit: The patient's condition can be safely assessed and treated via synchronous audio and visual telemedicine encounter.      Reason for Telemedicine Visit: Patient has requested telehealth visit    Originating Site (Patient Location): Patient's home        Distant Location (provider location):  Off-site    Consent:  The patient/guardian has verbally consented to: the potential risks and benefits of telemedicine (video visit) versus in person care; bill my insurance or make self-payment for services provided; and responsibility for payment of non-covered services.     Mode of Communication:  Video Conference via [place]    As the provider I attest to compliance with applicable laws and regulations related to telemedicine.      Treatment Plan- 1-30-24  CGI- 1-30-24  Phq-9 and YADI-7- See check in data  Promis- 2-28-24    DATA  Interactive Complexity: No  Crisis: No        Progress Since Last Session (Related to Symptoms / Goals / Homework):   There has been demonstrated improvement in functioning while patient has been engaged in psychotherapy/psychological service- if withdrawn the patient would deteriorate and/or relapse.      Symptoms: Client reports confronting issues with anxiety and depressed mood.  Star joined to talk about some relational issues.     Homework: Achieved / completed to satisfaction  Completed in session      Episode of Care Goals: Satisfactory progress - ACTION (Actively working towards change); Intervened by reinforcing change plan / affirming steps taken     Current / Ongoing Stressors and Concerns:   -Relational issues have been high.  Lack of  connection and physical touch.    -Affection in general got way off track.    -Planning ahead for 2 graduation parties.  Worried about some of the expenses.     -Star feels he wears two different hats at home and work.    -Anxious and avoidant attachment styles-  Veena feels she is anxious and star feels he is avoidant- Continued    -Feels her and Star do not have much in common anymore.  Attempted to have a conversation about this and it went in a negative way.    -Financial stress is at an all time high.      -Has struggled with PMDD for a number of years.  Has been working with chiropractic and message therapy on some holistic treatment options.  Has not been able to get in consistently - Continue     -Veena feels she gets the short end of the stick with Star and other people get the Star with a good personality.           Treatment Objective(s) Addressed in This Session:  Safety planning   Patient will develop a plan to help manage PMDD  Patient will work on ways to communicate /patterns   Relational issues - Attachment styles.        Intervention:   Continue to consider attachment styles.     Plan to have dinner with Star 2-3 days a week.    Find more ways to narrow in on quality time.      Start to pay for the graduation each month.   Follow safety plan and use crisis resources.  Agrees to contract for safety today.   Use support system- Continued   Have some conversations about common interests and some shared activities.     Use some holistic approaches to health when able- Message and chiropractic.    Take time for self-care each day.       Assessments completed prior to visit:  The following assessments were completed by patient for this visit:  PHQ2:       2/28/2024    10:46 AM 2/14/2024     9:48 AM 1/10/2024    10:50 AM 11/29/2023     9:44 AM 10/5/2023    10:34 AM 9/19/2023     7:09 PM 9/7/2023     6:59 AM   PHQ-2 ( 1999 [employer])   Q1: Little interest or pleasure in doing things 3 1 1 3 0 1 1   Q2: Feeling  down, depressed or hopeless 3 1 1 3 1 2 1   PHQ-2 Score 6 2 2 6 1 3 2   Q1: Little interest or pleasure in doing things Nearly every day Several days Several days Nearly every day Not at all Several days Several days   Q2: Feeling down, depressed or hopeless Nearly every day Several days Several days Nearly every day Several days More than half the days Several days   PHQ-2 Score 6 2 2 6 1 3 2     PHQ9:       2/2/2023    10:13 AM 6/1/2023    10:07 AM 7/17/2023    11:15 AM 9/19/2023     7:09 PM 11/29/2023     9:44 AM 1/3/2024    10:04 AM 2/28/2024    10:46 AM   PHQ-9 SCORE   PHQ-9 Total Score MyChart    15 (Moderately severe depression) 15 (Moderately severe depression) 17 (Moderately severe depression) 13 (Moderate depression)   PHQ-9 Total Score 12 15 16 15 15 17 13     GAD2:       8/23/2023    10:14 AM 9/7/2023     6:59 AM 9/19/2023     7:10 PM 10/5/2023    10:34 AM 11/29/2023     9:45 AM 1/10/2024    10:51 AM 2/28/2024    10:47 AM   YADI-2   Feeling nervous, anxious, or on edge 1 1 3 1 1 1 2   Not being able to stop or control worrying 1 1 3 1 1 1 1   YADI-2 Total Score 2 2 6 2 2 2 3     GAD7:       11/27/2022     5:03 AM 2/2/2023    10:13 AM 6/1/2023    10:07 AM 7/17/2023    11:15 AM 8/7/2023    11:04 AM 9/19/2023     7:10 PM 2/28/2024    10:47 AM   YADI-7 SCORE   Total Score 17 (severe anxiety)    5 (mild anxiety) 21 (severe anxiety) 10 (moderate anxiety)   Total Score 17 13 12 13 5 21 10     PROMIS 10-Global Health (all questions and answers displayed):       8/23/2023    10:14 AM 9/7/2023     7:00 AM 9/19/2023     7:11 PM 10/5/2023    10:35 AM 11/29/2023     9:46 AM 1/10/2024    10:52 AM 2/28/2024    10:48 AM   PROMIS 10   In general, would you say your health is: Good Good Good Good Fair Fair Good   In general, would you say your quality of life is: Fair Good Fair Good Fair Good Good   In general, how would you rate your physical health? Good Good Fair Fair Fair Good Fair   In general, how would you rate your  mental health, including your mood and your ability to think? Fair Poor Fair Good Fair Fair Fair   In general, how would you rate your satisfaction with your social activities and relationships? Fair Fair Fair Good Fair Fair Fair   In general, please rate how well you carry out your usual social activities and roles Poor Poor Poor Fair Poor Fair Fair   To what extent are you able to carry out your everyday physical activities such as walking, climbing stairs, carrying groceries, or moving a chair? Moderately Mostly Completely Mostly Mostly Completely Mostly   In the past 7 days, how often have you been bothered by emotional problems such as feeling anxious, depressed, or irritable? Often Often Often Sometimes Always Sometimes Often   In the past 7 days, how would you rate your fatigue on average? Moderate Moderate Severe Mild Moderate Moderate Moderate   In the past 7 days, how would you rate your pain on average, where 0 means no pain, and 10 means worst imaginable pain? 6 6 5 5 6 6 6   In general, would you say your health is: 3 3 3 3 2 2 3   In general, would you say your quality of life is: 2 3 2 3 2 3 3   In general, how would you rate your physical health? 3 3 2 2 2 3 2   In general, how would you rate your mental health, including your mood and your ability to think? 2 1 2 3 2 2 2   In general, how would you rate your satisfaction with your social activities and relationships? 2 2 2 3 2 2 2   In general, please rate how well you carry out your usual social activities and roles. (This includes activities at home, at work and in your community, and responsibilities as a parent, child, spouse, employee, friend, etc.) 1 1 1 2 1 2 2   To what extent are you able to carry out your everyday physical activities such as walking, climbing stairs, carrying groceries, or moving a chair? 3 4 5 4 4 5 4   In the past 7 days, how often have you been bothered by emotional problems such as feeling anxious, depressed, or  irritable? 4 4 4 3 5 3 4   In the past 7 days, how would you rate your fatigue on average? 3 3 4 2 3 3 3   In the past 7 days, how would you rate your pain on average, where 0 means no pain, and 10 means worst imaginable pain? 6 6 5 5 6 6 6   Global Mental Health Score 8 8 8 12 7 10 9   Global Physical Health Score 12 13 12 13 12 14 12   PROMIS TOTAL - SUBSCORES 20 21 20 25 19 24 21     [place] Suicide Severity Rating Scale (Lifetime/Recent)      8/3/2022     9:17 AM   [place] Suicide Severity Rating (Lifetime/Recent)   Q1 Wish to be Dead (Lifetime) N   Q2 Non-Specific Active Suicidal Thoughts (Lifetime) N   Actual Attempt (Lifetime) N   Has subject engaged in non-suicidal self-injurious behavior? (Lifetime) N   Calculated C-SSRS Risk Score (Lifetime/Recent) No Risk Indicated         ASSESSMENT: Current Emotional / Mental Status (status of significant symptoms):   Risk status (Self / Other harm or suicidal ideation)   Patient denies current fears or concerns for personal safety.   Patient denies current or recent suicidal ideation or behaviors.    Patient denies current or recent homicidal ideation or behaviors.   Patient denies current or recent self injurious behavior or ideation.   Patient denies other safety concerns.   Patient reports there has been no change in risk factors since their last session.     Patient reports there has been no change in protective factors since their last session.     A safety and risk management plan has been developed including: Patient consented to co-developed safety plan on 11-30-22.  Safety and risk management plan was reviewed.   Patient agreed to use safety plan should any safety concerns arise.  A copy was made available to the patient. Reviewed 3-20-24        [facility]                                       Veena Parsons     SAFETY PLAN:  Step 1: Warning signs / cues (Thoughts, images, mood, situation, behavior) that a crisis may be  "developing:  Thoughts: \"I don't matter\", \"People would be better off without me\", \"I'm a burden\", \"I can't do this anymore\", \"I just want this to end\" and \"Nothing makes it better\"  Images: flashbacks  Thinking Processes: ruminations (can't stop thinking about my problems): PMDD and loss of mother, racing thoughts and highly critical and negative thoughts: I cant do anything right  Mood: worsening depression, hopelessness, helplessness, intense worry, agitation and mood swings  Behaviors: isolating/withdrawing , can't stop crying, not taking care of myself, not taking care of my responsibilities, sleeping too much, not sleeping enough and increasing frequency and duration of dissociation  Situations: relationship problems, trauma  and medical condition / diagnosis: PMDD    Step 2: Coping strategies - Things I can do to take my mind off of my problems without contacting another person (relaxation technique, physical activity):  Distress Tolerance Strategies:  relaxation activities, play with my pet , listen to positive and upbeat music, sensory based activities/self-soothe with five senses, watch a funny movie, read a book, change body temperature (ice pack/cold water)  and paced breathing/progressive muscle relaxation  Physical Activities: go for a walk, gardening, meditation, deep breathing and stretching   Focus on helpful thoughts:  \"This is temporary\", \"I will get through this\", \"It always passes\" and \"Ride the wave\"  Step 3: People and social settings that provide distraction:   Name: Spouse Star   Name:  Spending time with daughter     movie theater, pet store/humane society and coffee shop   Step 4: Remind myself of people and things that are important to me and worth living for:    Daughter   Spouse  Pets     Step 5: When I am in crisis, I can ask these people to help me use my safety plan:   Name: Spouse  Phone: [phone]      Step 6: Making the environment safe:   remove things I could use to hurt " myself and be around others  Step 7: Professionals or agencies I can contact during a crisis:  Suicide Prevention Lifeline: Call or Text 207   Local Crisis Services:  [facility] [phone]      Call 911 or go to my nearest emergency department.   I helped develop this safety plan and agree to use it when needed.  I have been given a copy of this plan.      Client signature _________________________________________________________________  Today s date:  11/30/2022  Completed by Provider Name/ Credentials:  Lia Stiles MA, LMFT  November 30, 2022  Adapted from Safety Plan Template 2008 Eleonora Capone and Danny Carmona is reprinted with the express permission of the authors.  No portion of the Safety Plan Template may be reproduced without the express, written permission.  You can contact the authors at [email or web address] or [email or web address].    Name: Veena Parsons  YOB: 1985  Date: November 30, 2022   My primary care provider: Francisco Nails  My primary care clinic: [facility]   My prescriber: PCP  Other care team support:  Holistic medical provider    My Triggers:    Relational problems  Loss of mother recently  Financial stress      Additional People, Places, and Things that I can access for support:   Spouse  Daughter          What is important to me and makes life worth living: Family         GREEN    Good Control  1. I feel good  2. No suicidal thoughts   3. Can work, sleep and play      Action Steps  1. Self-care: balanced meals, exercising, sleep practices, etc.  2. Take your medications as prescribed.  3. Continue meetings with therapist and prescriber.  4.  Do the healthy things that I enjoy.             YELLO Getting Worse  I have ANY of these:  1. I do not feel good  2. Difficulty Concentrating  3. Sleep is changing  4. Increase/Change in my thoughts to hurt self and/or others, but I can still manage and not act on it.   5. Not taking care of self.                Action Steps (in addition to the above):  1. Inform your therapist and psychiatric prescriber/PCP.  2. Keep taking your medications as prescribed.    3. Turn to people you can ask for help.  4. Use internal coping strategies -see below.  5. Create safe environment: Removing items I can hurt self with              RED  Get Help  If I have ANY of these:  1. Current and uncontrollable thoughts and/or behaviors to hurt self and/or others.   2. Crazy buzzing and spinning.     Actions to manage my safety  1. Contact your emergency person Star  2. Call or Text 427  3. Call my crisis team- [facility] [phone]  3. Or Call 271 or go to the emergency room right away        My Internal Coping Strategies include the following:  take a bath, belly breathing, arts and crafts, play with my pet, use my coping box, exercise and use my coping skills    [End for Brief Safety Plan]     Safety Concerns  How To Identify Situations That Make Your Mental Health Worse:  Triggers are things that make your mental health worse.  Look at the list below to help you find your triggers and what you can do about them.     1. Identify Early Warning Signs:    Sometimes symptoms return, even when people do their best to stay well. Symptoms can develop over a short period of time with little or no warning, but most of the time they emerge gradually over several weeks.  Early warning signs are changes that people experience when a relapse is starting. Some early warning signs are common and others are not as common.   Common Early Warning Signs:    Feeling tense or nervous, Eating less or eating more, Trouble sleeping -either too much or too little sleep, Feeling depressed or low, Feeling irritable, Feeling like not being around other people, Trouble concentrating and Urges to harm self     2. Identify action steps to take when warning signs are noticed:    Taking Action- It is important to take action if you are experiencing  early warning signs of a relapse.  The faster you act, the more likely it is that you can avoid a full relapse.  It is helpful to identify several specific ways to cope with symptoms.      The following is my list of symptoms and coping strategies that I can use when they are present:    Symptom Coping Strategies   Anxiety -Talk with someone in your support system and let him or her know how you are feeling.  -Use relaxation techniques such as deep breathing or imagery.  -Use positive affirmations to counteract negative self-talk such as  I am learning to let go of worry.    Depression - Schedule your day; include activities you have to do and activities you enjoy doing.  - Get some exercise - walk, run, bike, or swim.  - Give yourself credit for even the smallest things you get done.   Sleep Difficulties   - Go to sleep at the same time every day.  - Do something relaxing before bed, such as drinking herbal tea or listening to music.  - Avoid having discussions about upsetting topics before going to bed.   Delusions   - Distract yourself from the disturbing thought by doing something that requires your attention such as a puzzle.  - Check out your beliefs by talking to someone you trust.    Hallucinations   - Use headphones to listen to music.  - Tell voices to  stop  or say to yourself,  I am safe.   - Ignore the hallucinations as much as possible; focus on other things.   Concentration Difficulties - Minimize distractions so there is only one thing for you to focus on at a time.    - Ask the person you are having a conversation with to slow down or repeat things you are unsure of.            Appearance:   Normal   Eye Contact:   Good   Psychomotor Behavior: Normal    Attitude:   Cooperative    Orientation:   All   Speech    Rate / Production: Normal     Volume:  Normal    Mood:    Anxious Depressed Upset    Affect:    Worrisome Tearful    Thought Content:  Clear    Thought Form:  Coherent  Goal Directed     Insight:    Fair      Medication Review:   No changes to current psychiatric medication(s)     Medication Compliance:   Yes     Changes in Health Issues:   None reported     Chemical Use Review:   Substance Use: Chemical use reviewed, no active concerns identified      Tobacco Use: No current tobacco use.      Diagnosis:  296.32 (F33.1) Major Depressive Disorder, Recurrent Episode, Moderate _ and With mixed features  300.01 (F41.0) Panic Disorder  300.02 (F41.1) Generalized Anxiety Disorder   PMDD      Collateral Reports Completed:   Not Applicable    PLAN: (Patient Tasks / Therapist Tasks / Other)  Client will continue to work on the following goals:  -Learn more about emotional validation.   -Quality time that is engaging.    -Review tip sheet together on attachment and have it handy.   -Plan ahead for two graduations parties and pay off a little each month.   -Follow safety plan and use crisis resources-Continued   -Work on simple tasks to foster more physical touch.   -Find some new common interests with Star. Have more conversations that go more in depth.   -Do some research on emotional connection.    -Think more about the 5/1 ratio      NATACHA Hernandez                                                         ______________________________________________________________________    Individual Treatment Plan    Patient's Name: Veena Parsons  YOB: 1985    Date of Creation: 9-7-22  Date Treatment Plan Last Reviewed/Revised: 1-30-24    DSM5 Diagnoses:  296.32 (F33.1) Major Depressive Disorder, Recurrent Episode, Moderate _ and With mixed features  300.01 (F41.0) Panic Disorder  300.02 (F41.1) Generalized Anxiety Disorder   PMDD  Psychosocial / Contextual Factors: Some relational issues   PROMIS (reviewed every 90 days): 21    Referral / Collaboration:  Referral to another professional/service is not indicated at this time..    Anticipated number of session for this episode of care: 6-9  sessions  Anticipation frequency of session: Biweekly  Anticipated Duration of each session: 38-52 minutes  Treatment plan will be reviewed in 90 days or when goals have been changed.       MeasurableTreatment Goal(s) related to diagnosis / functional impairment(s)  Goal 1: Patient will address struggles with anxiety, depression and PMDD.    I will know I've met my goal when I am feeling less reactive through the month with the symptoms.      Objective #A (Patient Action)    Patient will work on establishing a concrete routine with self-care.  Status: Continued - Date(s): 1-30-24    Intervention(s)  Therapist will use CBT and motivational interviewing.      Objective #B  Patient will develop a plan to help manage PMDD  Status: Continued - Date(s): 1-30-24    Intervention(s)  Therapist will use solution focused therapy.    Objective #C  Patient will work on ways to communicate with narcissistic individuals.  Status: Continued - Date(s): 1-30-24    Intervention(s)  Therapist will teach communication skills.          Patient has reviewed and agreed to the above plan.      Lia Stiles, NATACHA  September 7, 2022

## 2024-03-27 ENCOUNTER — VIRTUAL VISIT (OUTPATIENT)
Dept: PSYCHOLOGY | Facility: CLINIC | Age: 39
End: 2024-03-27
Payer: MEDICARE

## 2024-03-27 DIAGNOSIS — F41.0 PANIC DISORDER WITHOUT AGORAPHOBIA: ICD-10-CM

## 2024-03-27 DIAGNOSIS — F41.1 GENERALIZED ANXIETY DISORDER: ICD-10-CM

## 2024-03-27 DIAGNOSIS — F33.1 MAJOR DEPRESSIVE DISORDER, RECURRENT EPISODE, MODERATE (H): Primary | ICD-10-CM

## 2024-03-27 PROCEDURE — 90834 PSYTX W PT 45 MINUTES: CPT | Mod: 95 | Performed by: MARRIAGE & FAMILY THERAPIST

## 2024-03-27 NOTE — PROGRESS NOTES
[facility]                                     Progress Note    Patient Name: Veena Parsons  Date: 3-27-24         Service Type: Individual      Session Start Time:  1059am   Session End Time:    1150am     Session Length:  51min    Session #: 45    Attendees: Client and Star    Service Modality:  Video     Telemedicine Visit: The patient's condition can be safely assessed and treated via synchronous audio and visual telemedicine encounter.      Reason for Telemedicine Visit: Patient has requested telehealth visit    Originating Site (Patient Location): Patient's home        Distant Location (provider location):  Off-site    Consent:  The patient/guardian has verbally consented to: the potential risks and benefits of telemedicine (video visit) versus in person care; bill my insurance or make self-payment for services provided; and responsibility for payment of non-covered services.     Mode of Communication:  Video Conference via [employer]    As the provider I attest to compliance with applicable laws and regulations related to telemedicine.      Treatment Plan- 1-30-24  CGI- 1-30-24  Phq-9 and YADI-7- See check in data  Promis- 2-28-24    DATA  Interactive Complexity: No  Crisis: No        Progress Since Last Session (Related to Symptoms / Goals / Homework):   There has been demonstrated improvement in functioning while patient has been engaged in psychotherapy/psychological service- if withdrawn the patient would deteriorate and/or relapse.      Symptoms: Client reports confronting issues with anxiety and depressed mood.  Reports ongoing relationship issues with spouse.     Homework: Achieved / completed to satisfaction  Completed in session      Episode of Care Goals: Satisfactory progress - ACTION (Actively working towards change); Intervened by reinforcing change plan / affirming steps taken     Current / Ongoing Stressors and Concerns:   -Relational issues have been high.  Veena  feels disregarded and does not feel any stimulation except for fighting.    -Star feels there has been some positive days and that Veena only sees the negative.    -Planning ahead for 2 graduation parties.  Worried about some of the expenses.     -Did not follow through on finding ways to be more emotionally connected.    -Anxious and avoidant attachment styles-  Veena feels she is anxious and star feels he is avoidant- Continued    -Feels her and Star do not have much in common anymore.  Attempted to have a conversation about this and it went in a negative way.    -Feels like they are always working out the bugs over and over.    -Has struggled with PMDD for a number of years.  Has been working with chiropractic and message therapy on some holistic treatment options.  Has not been able to get in consistently - Continue     -Veena feels she gets the short end of the stick with Star and other people get the Star with a good personality.           Treatment Objective(s) Addressed in This Session:  Safety planning   Patient will develop a plan to help manage PMDD  Patient will work on ways to communicate /patterns   Relational issues - Attachment styles.        Intervention:   Continue to consider attachment styles.     Research emotional connection.    Plan to have dinner with Star 2-3 days a week on his work break.    Think of activities to do that are low cost.     Start to pay for the graduation each month.   Follow safety plan and use crisis resources.  Agrees to contract for safety today.   Use support system- Continue   Use some holistic approaches to health when able- Message and chiropractic.    Take time for self-care each day.       Assessments completed prior to visit:  The following assessments were completed by patient for this visit:  PHQ2:       2/28/2024    10:46 AM 2/14/2024     9:48 AM 1/10/2024    10:50 AM 11/29/2023     9:44 AM 10/5/2023    10:34 AM 9/19/2023     7:09 PM 9/7/2023     6:59 AM   PHQ-2  ( 1999 [employer])   Q1: Little interest or pleasure in doing things 3 1 1 3 0 1 1   Q2: Feeling down, depressed or hopeless 3 1 1 3 1 2 1   PHQ-2 Score 6 2 2 6 1 3 2   Q1: Little interest or pleasure in doing things Nearly every day Several days Several days Nearly every day Not at all Several days Several days   Q2: Feeling down, depressed or hopeless Nearly every day Several days Several days Nearly every day Several days More than half the days Several days   PHQ-2 Score 6 2 2 6 1 3 2     PHQ9:       2/2/2023    10:13 AM 6/1/2023    10:07 AM 7/17/2023    11:15 AM 9/19/2023     7:09 PM 11/29/2023     9:44 AM 1/3/2024    10:04 AM 2/28/2024    10:46 AM   PHQ-9 SCORE   PHQ-9 Total Score MyChart    15 (Moderately severe depression) 15 (Moderately severe depression) 17 (Moderately severe depression) 13 (Moderate depression)   PHQ-9 Total Score 12 15 16 15 15 17 13     GAD2:       8/23/2023    10:14 AM 9/7/2023     6:59 AM 9/19/2023     7:10 PM 10/5/2023    10:34 AM 11/29/2023     9:45 AM 1/10/2024    10:51 AM 2/28/2024    10:47 AM   YADI-2   Feeling nervous, anxious, or on edge 1 1 3 1 1 1 2   Not being able to stop or control worrying 1 1 3 1 1 1 1   YADI-2 Total Score 2 2 6 2 2 2 3     GAD7:       11/27/2022     5:03 AM 2/2/2023    10:13 AM 6/1/2023    10:07 AM 7/17/2023    11:15 AM 8/7/2023    11:04 AM 9/19/2023     7:10 PM 2/28/2024    10:47 AM   YADI-7 SCORE   Total Score 17 (severe anxiety)    5 (mild anxiety) 21 (severe anxiety) 10 (moderate anxiety)   Total Score 17 13 12 13 5 21 10     PROMIS 10-Global Health (all questions and answers displayed):       8/23/2023    10:14 AM 9/7/2023     7:00 AM 9/19/2023     7:11 PM 10/5/2023    10:35 AM 11/29/2023     9:46 AM 1/10/2024    10:52 AM 2/28/2024    10:48 AM   PROMIS 10   In general, would you say your health is: Good Good Good Good Fair Fair Good   In general, would you say your quality of life is: Fair Good Fair Good Fair Good Good   In general, how would you rate  your physical health? Good Good Fair Fair Fair Good Fair   In general, how would you rate your mental health, including your mood and your ability to think? Fair Poor Fair Good Fair Fair Fair   In general, how would you rate your satisfaction with your social activities and relationships? Fair Fair Fair Good Fair Fair Fair   In general, please rate how well you carry out your usual social activities and roles Poor Poor Poor Fair Poor Fair Fair   To what extent are you able to carry out your everyday physical activities such as walking, climbing stairs, carrying groceries, or moving a chair? Moderately Mostly Completely Mostly Mostly Completely Mostly   In the past 7 days, how often have you been bothered by emotional problems such as feeling anxious, depressed, or irritable? Often Often Often Sometimes Always Sometimes Often   In the past 7 days, how would you rate your fatigue on average? Moderate Moderate Severe Mild Moderate Moderate Moderate   In the past 7 days, how would you rate your pain on average, where 0 means no pain, and 10 means worst imaginable pain? 6 6 5 5 6 6 6   In general, would you say your health is: 3 3 3 3 2 2 3   In general, would you say your quality of life is: 2 3 2 3 2 3 3   In general, how would you rate your physical health? 3 3 2 2 2 3 2   In general, how would you rate your mental health, including your mood and your ability to think? 2 1 2 3 2 2 2   In general, how would you rate your satisfaction with your social activities and relationships? 2 2 2 3 2 2 2   In general, please rate how well you carry out your usual social activities and roles. (This includes activities at home, at work and in your community, and responsibilities as a parent, child, spouse, employee, friend, etc.) 1 1 1 2 1 2 2   To what extent are you able to carry out your everyday physical activities such as walking, climbing stairs, carrying groceries, or moving a chair? 3 4 5 4 4 5 4   In the past 7 days, how  often have you been bothered by emotional problems such as feeling anxious, depressed, or irritable? 4 4 4 3 5 3 4   In the past 7 days, how would you rate your fatigue on average? 3 3 4 2 3 3 3   In the past 7 days, how would you rate your pain on average, where 0 means no pain, and 10 means worst imaginable pain? 6 6 5 5 6 6 6   Global Mental Health Score 8 8 8 12 7 10 9   Global Physical Health Score 12 13 12 13 12 14 12   PROMIS TOTAL - SUBSCORES 20 21 20 25 19 24 21     Brimson Suicide Severity Rating Scale (Lifetime/Recent)      8/3/2022     9:17 AM   Brimson Suicide Severity Rating (Lifetime/Recent)   Q1 Wish to be Dead (Lifetime) N   Q2 Non-Specific Active Suicidal Thoughts (Lifetime) N   Actual Attempt (Lifetime) N   Has subject engaged in non-suicidal self-injurious behavior? (Lifetime) N   Calculated C-SSRS Risk Score (Lifetime/Recent) No Risk Indicated         ASSESSMENT: Current Emotional / Mental Status (status of significant symptoms):   Risk status (Self / Other harm or suicidal ideation)   Patient denies current fears or concerns for personal safety.   Patient denies current or recent suicidal ideation or behaviors.    Patient denies current or recent homicidal ideation or behaviors.   Patient denies current or recent self injurious behavior or ideation.   Patient denies other safety concerns.   Patient reports there has been no change in risk factors since their last session.     Patient reports there has been no change in protective factors since their last session.     A safety and risk management plan has been developed including: Patient consented to co-developed safety plan on 11-30-22.  Safety and risk management plan was reviewed.   Patient agreed to use safety plan should any safety concerns arise.  A copy was made available to the patient. Reviewed 3-27-24        [facility]                                       Veena Parsons     SAFETY PLAN:  Step 1: Warning signs  "/ cues (Thoughts, images, mood, situation, behavior) that a crisis may be developing:  Thoughts: \"I don't matter\", \"People would be better off without me\", \"I'm a burden\", \"I can't do this anymore\", \"I just want this to end\" and \"Nothing makes it better\"  Images: flashbacks  Thinking Processes: ruminations (can't stop thinking about my problems): PMDD and loss of mother, racing thoughts and highly critical and negative thoughts: I cant do anything right  Mood: worsening depression, hopelessness, helplessness, intense worry, agitation and mood swings  Behaviors: isolating/withdrawing , can't stop crying, not taking care of myself, not taking care of my responsibilities, sleeping too much, not sleeping enough and increasing frequency and duration of dissociation  Situations: relationship problems, trauma  and medical condition / diagnosis: PMDD    Step 2: Coping strategies - Things I can do to take my mind off of my problems without contacting another person (relaxation technique, physical activity):  Distress Tolerance Strategies:  relaxation activities, play with my pet , listen to positive and upbeat music, sensory based activities/self-soothe with five senses, watch a funny movie, read a book, change body temperature (ice pack/cold water)  and paced breathing/progressive muscle relaxation  Physical Activities: go for a walk, gardening, meditation, deep breathing and stretching   Focus on helpful thoughts:  \"This is temporary\", \"I will get through this\", \"It always passes\" and \"Ride the wave\"  Step 3: People and social settings that provide distraction:   Name: Spouse Star   Name:  Spending time with daughter     movie theater, pet store/humane society and coffee shop   Step 4: Remind myself of people and things that are important to me and worth living for:    Daughter   Spouse  Pets     Step 5: When I am in crisis, I can ask these people to help me use my safety plan:   Name: Spouse  Phone: [phone]      Step " 6: Making the environment safe:   remove things I could use to hurt myself and be around others  Step 7: Professionals or agencies I can contact during a crisis:  Suicide Prevention Lifeline: Call or Text 246   Local Crisis Services:  [facility] [phone]      Call 911 or go to my nearest emergency department.   I helped develop this safety plan and agree to use it when needed.  I have been given a copy of this plan.      Client signature _________________________________________________________________  Today s date:  11/30/2022  Completed by Provider Name/ Credentials:  Lia Stiles MA, LMFT  November 30, 2022  Adapted from Safety Plan Template 2008 Eleonora Capone and Danny Carmona is reprinted with the express permission of the authors.  No portion of the Safety Plan Template may be reproduced without the express, written permission.  You can contact the authors at [email or web address] or [email or web address].    Name: Veena Parsons  YOB: 1985  Date: November 30, 2022   My primary care provider: Francisco Nails  My primary care clinic: [facility]   My prescriber: PCP  Other care team support:  Holistic medical provider    My Triggers:    Relational problems  Loss of mother recently  Financial stress      Additional People, Places, and Things that I can access for support:   Spouse  Daughter          What is important to me and makes life worth living: Family         GREEN    Good Control  1. I feel good  2. No suicidal thoughts   3. Can work, sleep and play      Action Steps  1. Self-care: balanced meals, exercising, sleep practices, etc.  2. Take your medications as prescribed.  3. Continue meetings with therapist and prescriber.  4.  Do the healthy things that I enjoy.             YELLO Getting Worse  I have ANY of these:  1. I do not feel good  2. Difficulty Concentrating  3. Sleep is changing  4. Increase/Change in my thoughts to hurt self and/or others, but I  can still manage and not act on it.   5. Not taking care of self.               Action Steps (in addition to the above):  1. Inform your therapist and psychiatric prescriber/PCP.  2. Keep taking your medications as prescribed.    3. Turn to people you can ask for help.  4. Use internal coping strategies -see below.  5. Create safe environment: Removing items I can hurt self with              RED  Get Help  If I have ANY of these:  1. Current and uncontrollable thoughts and/or behaviors to hurt self and/or others.   2. Crazy buzzing and spinning.     Actions to manage my safety  1. Contact your emergency person Star  2. Call or Text 614  3. Call my crisis team- [facility] [phone]  3. Or Call [phone] or go to the emergency room right away        My Internal Coping Strategies include the following:  take a bath, belly breathing, arts and crafts, play with my pet, use my coping box, exercise and use my coping skills    [End for Brief Safety Plan]     Safety Concerns  How To Identify Situations That Make Your Mental Health Worse:  Triggers are things that make your mental health worse.  Look at the list below to help you find your triggers and what you can do about them.     1. Identify Early Warning Signs:    Sometimes symptoms return, even when people do their best to stay well. Symptoms can develop over a short period of time with little or no warning, but most of the time they emerge gradually over several weeks.  Early warning signs are changes that people experience when a relapse is starting. Some early warning signs are common and others are not as common.   Common Early Warning Signs:    Feeling tense or nervous, Eating less or eating more, Trouble sleeping -either too much or too little sleep, Feeling depressed or low, Feeling irritable, Feeling like not being around other people, Trouble concentrating and Urges to harm self     2. Identify action steps to take when warning signs are noticed:    Taking  Action- It is important to take action if you are experiencing early warning signs of a relapse.  The faster you act, the more likely it is that you can avoid a full relapse.  It is helpful to identify several specific ways to cope with symptoms.      The following is my list of symptoms and coping strategies that I can use when they are present:    Symptom Coping Strategies   Anxiety -Talk with someone in your support system and let him or her know how you are feeling.  -Use relaxation techniques such as deep breathing or imagery.  -Use positive affirmations to counteract negative self-talk such as  I am learning to let go of worry.    Depression - Schedule your day; include activities you have to do and activities you enjoy doing.  - Get some exercise - walk, run, bike, or swim.  - Give yourself credit for even the smallest things you get done.   Sleep Difficulties   - Go to sleep at the same time every day.  - Do something relaxing before bed, such as drinking herbal tea or listening to music.  - Avoid having discussions about upsetting topics before going to bed.   Delusions   - Distract yourself from the disturbing thought by doing something that requires your attention such as a puzzle.  - Check out your beliefs by talking to someone you trust.    Hallucinations   - Use headphones to listen to music.  - Tell voices to  stop  or say to yourself,  I am safe.   - Ignore the hallucinations as much as possible; focus on other things.   Concentration Difficulties - Minimize distractions so there is only one thing for you to focus on at a time.    - Ask the person you are having a conversation with to slow down or repeat things you are unsure of.            Appearance:   Normal   Eye Contact:   Good   Psychomotor Behavior: Normal    Attitude:   Cooperative    Orientation:   All   Speech    Rate / Production: Normal     Volume:  Normal    Mood:    Anxious Depressed    Affect:    Worrisome Tearful    Thought  Content:  Clear    Thought Form:  Coherent  Goal Directed    Insight:    Fair      Medication Review:   No changes to current psychiatric medication(s)     Medication Compliance:   Yes     Changes in Health Issues:   None reported     Chemical Use Review:   Substance Use: Chemical use reviewed, no active concerns identified      Tobacco Use: No current tobacco use.      Diagnosis:  296.32 (F33.1) Major Depressive Disorder, Recurrent Episode, Moderate _ and With mixed features  300.01 (F41.0) Panic Disorder  300.02 (F41.1) Generalized Anxiety Disorder   PMDD      Collateral Reports Completed:   Not Applicable    PLAN: (Patient Tasks / Therapist Tasks / Other)  Client will continue to work on the following goals:  -Learn more about emotional validation.   -Quality time that is engaging and solution focused conversations.    -Review tip sheet together on attachment and have it handy.   -Plan ahead for two graduations parties and pay off a little each month.   -Follow safety plan and use crisis resources-Continued   -Research examples of emotional connection.   -Find some new common interests.   -Think more about the 5/1 ratio      Lia Stiles [facility]                                                         ______________________________________________________________________    Individual Treatment Plan    Patient's Name: Veena Parsons  YOB: 1985    Date of Creation: 9-7-22  Date Treatment Plan Last Reviewed/Revised: 1-30-24    DSM5 Diagnoses:  296.32 (F33.1) Major Depressive Disorder, Recurrent Episode, Moderate _ and With mixed features  300.01 (F41.0) Panic Disorder  300.02 (F41.1) Generalized Anxiety Disorder   PMDD  Psychosocial / Contextual Factors: Some relational issues   PROMIS (reviewed every 90 days): 21    Referral / Collaboration:  Referral to another professional/service is not indicated at this time..    Anticipated number of session for this episode of care: 6-9  sessions  Anticipation frequency of session: Biweekly  Anticipated Duration of each session: 38-52 minutes  Treatment plan will be reviewed in 90 days or when goals have been changed.       MeasurableTreatment Goal(s) related to diagnosis / functional impairment(s)  Goal 1: Patient will address struggles with anxiety, depression and PMDD.    I will know I've met my goal when I am feeling less reactive through the month with the symptoms.      Objective #A (Patient Action)    Patient will work on establishing a concrete routine with self-care.  Status: Continued - Date(s): 1-30-24    Intervention(s)  Therapist will use CBT and motivational interviewing.      Objective #B  Patient will develop a plan to help manage PMDD  Status: Continued - Date(s): 1-30-24    Intervention(s)  Therapist will use solution focused therapy.    Objective #C  Patient will work on ways to communicate with narcissistic individuals.  Status: Continued - Date(s): 1-30-24    Intervention(s)  Therapist will teach communication skills.          Patient has reviewed and agreed to the above plan.      Lia Stiles, NATACHA  September 7, 2022

## 2024-04-03 ENCOUNTER — VIRTUAL VISIT (OUTPATIENT)
Dept: PSYCHOLOGY | Facility: CLINIC | Age: 39
End: 2024-04-03
Payer: MEDICARE

## 2024-04-03 DIAGNOSIS — F41.1 GENERALIZED ANXIETY DISORDER: ICD-10-CM

## 2024-04-03 DIAGNOSIS — F41.0 PANIC DISORDER WITHOUT AGORAPHOBIA: ICD-10-CM

## 2024-04-03 DIAGNOSIS — F33.1 MAJOR DEPRESSIVE DISORDER, RECURRENT EPISODE, MODERATE (H): Primary | ICD-10-CM

## 2024-04-03 PROCEDURE — 90834 PSYTX W PT 45 MINUTES: CPT | Mod: 95 | Performed by: MARRIAGE & FAMILY THERAPIST

## 2024-04-03 NOTE — PROGRESS NOTES
[facility]                                     Progress Note    Patient Name: Veena Parsons  Date: 4-3-24         Service Type: Individual      Session Start Time:  2pm   Session End Time:    250pm     Session Length:  50min    Session #: 46    Attendees: Client and Star    Service Modality:  Video     Telemedicine Visit: The patient's condition can be safely assessed and treated via synchronous audio and visual telemedicine encounter.      Reason for Telemedicine Visit: Patient has requested telehealth visit    Originating Site (Patient Location): Patient's home        Distant Location (provider location):  Off-site    Consent:  The patient/guardian has verbally consented to: the potential risks and benefits of telemedicine (video visit) versus in person care; bill my insurance or make self-payment for services provided; and responsibility for payment of non-covered services.     Mode of Communication:  Video Conference via BTR    As the provider I attest to compliance with applicable laws and regulations related to telemedicine.      Treatment Plan- 1-30-24  CGI- 1-30-24  Phq-9 and YADI-7- See check in data  Promis- 2-28-24    DATA  Interactive Complexity: No  Crisis: No        Progress Since Last Session (Related to Symptoms / Goals / Homework):   There has been demonstrated improvement in functioning while patient has been engaged in psychotherapy/psychological service- if withdrawn the patient would deteriorate and/or relapse.      Symptoms: Client reports confronting issues with anxiety and depressed mood.  Reports stress this week tied to daughter moving in with a friend.     Homework: Achieved / completed to satisfaction  Completed in session      Episode of Care Goals: Satisfactory progress - ACTION (Actively working towards change); Intervened by reinforcing change plan / affirming steps taken     Current / Ongoing Stressors and Concerns:   -Daughter moved in with a  friend and this has been challenging.  She is a senior in high school.   -Deborah has not been communicating clearly this week.  Has been late to school a couple of times.    -Veena was emotional when Deborah left.    -Planning ahead for 2 graduation parties.  Trying to do a little each month.    -Star is working on emotional connection and researching the concept.    -Anxious and avoidant attachment styles-  Veena feels she is anxious and star feels he is avoidant- Continued    -Lack of things in common.    -Has struggled with PMDD for a number of years.  Has been working with chiropractic and message therapy on some holistic treatment options.  Has not been able to get in consistently - Continue     -Daughter shared with her some important information.        Treatment Objective(s) Addressed in This Session:  Safety planning   Patient will develop a plan to help manage PMDD  Patient will work on ways to communicate /patterns   Relational issues - Attachment styles.   Connection with daughter        Intervention:   Continue to consider attachment styles.   Plan some weekly to biweekly plans with Deborah.    Continue to learn about emotional connection.    Plan to have dinner with Star 2-3 days a week on his work break.    Think of activities that would be free or low cost.    Start to pay for the graduation each month.   Follow safety plan and use crisis resources.  Agrees to contract for safety today.   Use support system- Continue   Use some holistic approaches to health when able- Message and chiropractic.         Assessments completed prior to visit:  The following assessments were completed by patient for this visit:  PHQ2:       2/28/2024    10:46 AM 2/14/2024     9:48 AM 1/10/2024    10:50 AM 11/29/2023     9:44 AM 10/5/2023    10:34 AM 9/19/2023     7:09 PM 9/7/2023     6:59 AM   PHQ-2 ( 1999 [employer])   Q1: Little interest or pleasure in doing things 3 1 1 3 0 1 1   Q2: Feeling down, depressed or hopeless 3 1 1 3 1 2 1    PHQ-2 Score 6 2 2 6 1 3 2   Q1: Little interest or pleasure in doing things Nearly every day Several days Several days Nearly every day Not at all Several days Several days   Q2: Feeling down, depressed or hopeless Nearly every day Several days Several days Nearly every day Several days More than half the days Several days   PHQ-2 Score 6 2 2 6 1 3 2     PHQ9:       2/2/2023    10:13 AM 6/1/2023    10:07 AM 7/17/2023    11:15 AM 9/19/2023     7:09 PM 11/29/2023     9:44 AM 1/3/2024    10:04 AM 2/28/2024    10:46 AM   PHQ-9 SCORE   PHQ-9 Total Score MyChart    15 (Moderately severe depression) 15 (Moderately severe depression) 17 (Moderately severe depression) 13 (Moderate depression)   PHQ-9 Total Score 12 15 16 15 15 17 13     GAD2:       8/23/2023    10:14 AM 9/7/2023     6:59 AM 9/19/2023     7:10 PM 10/5/2023    10:34 AM 11/29/2023     9:45 AM 1/10/2024    10:51 AM 2/28/2024    10:47 AM   YADI-2   Feeling nervous, anxious, or on edge 1 1 3 1 1 1 2   Not being able to stop or control worrying 1 1 3 1 1 1 1   YADI-2 Total Score 2 2 6 2 2 2 3     GAD7:       11/27/2022     5:03 AM 2/2/2023    10:13 AM 6/1/2023    10:07 AM 7/17/2023    11:15 AM 8/7/2023    11:04 AM 9/19/2023     7:10 PM 2/28/2024    10:47 AM   YADI-7 SCORE   Total Score 17 (severe anxiety)    5 (mild anxiety) 21 (severe anxiety) 10 (moderate anxiety)   Total Score 17 13 12 13 5 21 10     PROMIS 10-Global Health (all questions and answers displayed):       8/23/2023    10:14 AM 9/7/2023     7:00 AM 9/19/2023     7:11 PM 10/5/2023    10:35 AM 11/29/2023     9:46 AM 1/10/2024    10:52 AM 2/28/2024    10:48 AM   PROMIS 10   In general, would you say your health is: Good Good Good Good Fair Fair Good   In general, would you say your quality of life is: Fair Good Fair Good Fair Good Good   In general, how would you rate your physical health? Good Good Fair Fair Fair Good Fair   In general, how would you rate your mental health, including your mood and your  ability to think? Fair Poor Fair Good Fair Fair Fair   In general, how would you rate your satisfaction with your social activities and relationships? Fair Fair Fair Good Fair Fair Fair   In general, please rate how well you carry out your usual social activities and roles Poor Poor Poor Fair Poor Fair Fair   To what extent are you able to carry out your everyday physical activities such as walking, climbing stairs, carrying groceries, or moving a chair? Moderately Mostly Completely Mostly Mostly Completely Mostly   In the past 7 days, how often have you been bothered by emotional problems such as feeling anxious, depressed, or irritable? Often Often Often Sometimes Always Sometimes Often   In the past 7 days, how would you rate your fatigue on average? Moderate Moderate Severe Mild Moderate Moderate Moderate   In the past 7 days, how would you rate your pain on average, where 0 means no pain, and 10 means worst imaginable pain? 6 6 5 5 6 6 6   In general, would you say your health is: 3 3 3 3 2 2 3   In general, would you say your quality of life is: 2 3 2 3 2 3 3   In general, how would you rate your physical health? 3 3 2 2 2 3 2   In general, how would you rate your mental health, including your mood and your ability to think? 2 1 2 3 2 2 2   In general, how would you rate your satisfaction with your social activities and relationships? 2 2 2 3 2 2 2   In general, please rate how well you carry out your usual social activities and roles. (This includes activities at home, at work and in your community, and responsibilities as a parent, child, spouse, employee, friend, etc.) 1 1 1 2 1 2 2   To what extent are you able to carry out your everyday physical activities such as walking, climbing stairs, carrying groceries, or moving a chair? 3 4 5 4 4 5 4   In the past 7 days, how often have you been bothered by emotional problems such as feeling anxious, depressed, or irritable? 4 4 4 3 5 3 4   In the past 7 days, how  would you rate your fatigue on average? 3 3 4 2 3 3 3   In the past 7 days, how would you rate your pain on average, where 0 means no pain, and 10 means worst imaginable pain? 6 6 5 5 6 6 6   Global Mental Health Score 8 8 8 12 7 10 9   Global Physical Health Score 12 13 12 13 12 14 12   PROMIS TOTAL - SUBSCORES 20 21 20 25 19 24 21     [place] Suicide Severity Rating Scale (Lifetime/Recent)      8/3/2022     9:17 AM   [place] Suicide Severity Rating (Lifetime/Recent)   Q1 Wish to be Dead (Lifetime) N   Q2 Non-Specific Active Suicidal Thoughts (Lifetime) N   Actual Attempt (Lifetime) N   Has subject engaged in non-suicidal self-injurious behavior? (Lifetime) N   Calculated C-SSRS Risk Score (Lifetime/Recent) No Risk Indicated         ASSESSMENT: Current Emotional / Mental Status (status of significant symptoms):   Risk status (Self / Other harm or suicidal ideation)   Patient denies current fears or concerns for personal safety.   Patient denies current or recent suicidal ideation or behaviors.    Patient denies current or recent homicidal ideation or behaviors.   Patient denies current or recent self injurious behavior or ideation.   Patient denies other safety concerns.   Patient reports there has been no change in risk factors since their last session.     Patient reports there has been no change in protective factors since their last session.     A safety and risk management plan has been developed including: Patient consented to co-developed safety plan on 11-30-22.  Safety and risk management plan was reviewed.   Patient agreed to use safety plan should any safety concerns arise.  A copy was made available to the patient. Reviewed 4-3-24- completed new safety plan through [facility]                                       Veena Parsons     SAFETY PLAN:  Step 1: Warning signs / cues (Thoughts, images, mood, situation, behavior) that a crisis may be  "developing:  Thoughts: \"I don't matter\", \"People would be better off without me\", \"I'm a burden\", \"I can't do this anymore\", \"I just want this to end\" and \"Nothing makes it better\"  Images: flashbacks  Thinking Processes: ruminations (can't stop thinking about my problems): PMDD and loss of mother, racing thoughts and highly critical and negative thoughts: I cant do anything right  Mood: worsening depression, hopelessness, helplessness, intense worry, agitation and mood swings  Behaviors: isolating/withdrawing , can't stop crying, not taking care of myself, not taking care of my responsibilities, sleeping too much, not sleeping enough and increasing frequency and duration of dissociation  Situations: relationship problems, trauma  and medical condition / diagnosis: PMDD    Step 2: Coping strategies - Things I can do to take my mind off of my problems without contacting another person (relaxation technique, physical activity):  Distress Tolerance Strategies:  relaxation activities, play with my pet , listen to positive and upbeat music, sensory based activities/self-soothe with five senses, watch a funny movie, read a book, change body temperature (ice pack/cold water)  and paced breathing/progressive muscle relaxation  Physical Activities: go for a walk, gardening, meditation, deep breathing and stretching   Focus on helpful thoughts:  \"This is temporary\", \"I will get through this\", \"It always passes\" and \"Ride the wave\"  Step 3: People and social settings that provide distraction:   Name: Spouse Star   Name:  Spending time with daughter     movie theater, pet store/humane society and coffee shop   Step 4: Remind myself of people and things that are important to me and worth living for:    Daughter   Spouse  Pets     Step 5: When I am in crisis, I can ask these people to help me use my safety plan:   Name: Spouse  Phone: [phone]      Step 6: Making the environment safe:   remove things I could use to hurt " myself and be around others  Step 7: Professionals or agencies I can contact during a crisis:  Suicide Prevention Lifeline: Call or Text 304   Local Crisis Services:  [facility] [phone]      Call 911 or go to my nearest emergency department.   I helped develop this safety plan and agree to use it when needed.  I have been given a copy of this plan.      Client signature _________________________________________________________________  Today s date:  11/30/2022  Completed by Provider Name/ Credentials:  Lia Stiles MA, LMFT  November 30, 2022  Adapted from Safety Plan Template 2008 Eleonora Capone and Danny Carmona is reprinted with the express permission of the authors.  No portion of the Safety Plan Template may be reproduced without the express, written permission.  You can contact the authors at [email or web address] or [email or web address] [address] [facility].    Name: Veena Parsons  YOB: 1985  Date: November 30, 2022   My primary care provider: Francisco Nails  My primary care clinic: [facility]   My prescriber: PCP  Other care team support:  Holistic medical provider    My Triggers:    Relational problems  Loss of mother recently  Financial stress      Additional People, Places, and Things that I can access for support:   Spouse  Daughter          What is important to me and makes life worth living: Family         GREEN    Good Control  1. I feel good  2. No suicidal thoughts   3. Can work, sleep and play      Action Steps  1. Self-care: balanced meals, exercising, sleep practices, etc.  2. Take your medications as prescribed.  3. Continue meetings with therapist and prescriber.  4.  Do the healthy things that I enjoy.             YELLO Getting Worse  I have ANY of these:  1. I do not feel good  2. Difficulty Concentrating  3. Sleep is changing  4. Increase/Change in my thoughts to hurt self and/or others, but I can still manage and not act on it.   5. Not taking care of self.                Action Steps (in addition to the above):  1. Inform your therapist and psychiatric prescriber/PCP.  2. Keep taking your medications as prescribed.    3. Turn to people you can ask for help.  4. Use internal coping strategies -see below.  5. Create safe environment: Removing items I can hurt self with              RED  Get Help  If I have ANY of these:  1. Current and uncontrollable thoughts and/or behaviors to hurt self and/or others.   2. Crazy buzzing and spinning.     Actions to manage my safety  1. Contact your emergency person Star  2. Call or Text 499  3. Call my crisis team- [facility] [phone]  3. Or Call 751 or go to the emergency room right away        My Internal Coping Strategies include the following:  take a bath, belly breathing, arts and crafts, play with my pet, use my coping box, exercise and use my coping skills    [End for Brief Safety Plan]     Safety Concerns  How To Identify Situations That Make Your Mental Health Worse:  Triggers are things that make your mental health worse.  Look at the list below to help you find your triggers and what you can do about them.     1. Identify Early Warning Signs:    Sometimes symptoms return, even when people do their best to stay well. Symptoms can develop over a short period of time with little or no warning, but most of the time they emerge gradually over several weeks.  Early warning signs are changes that people experience when a relapse is starting. Some early warning signs are common and others are not as common.   Common Early Warning Signs:    Feeling tense or nervous, Eating less or eating more, Trouble sleeping -either too much or too little sleep, Feeling depressed or low, Feeling irritable, Feeling like not being around other people, Trouble concentrating and Urges to harm self     2. Identify action steps to take when warning signs are noticed:    Taking Action- It is important to take action if you are experiencing  early warning signs of a relapse.  The faster you act, the more likely it is that you can avoid a full relapse.  It is helpful to identify several specific ways to cope with symptoms.      The following is my list of symptoms and coping strategies that I can use when they are present:    Symptom Coping Strategies   Anxiety -Talk with someone in your support system and let him or her know how you are feeling.  -Use relaxation techniques such as deep breathing or imagery.  -Use positive affirmations to counteract negative self-talk such as  I am learning to let go of worry.    Depression - Schedule your day; include activities you have to do and activities you enjoy doing.  - Get some exercise - walk, run, bike, or swim.  - Give yourself credit for even the smallest things you get done.   Sleep Difficulties   - Go to sleep at the same time every day.  - Do something relaxing before bed, such as drinking herbal tea or listening to music.  - Avoid having discussions about upsetting topics before going to bed.   Delusions   - Distract yourself from the disturbing thought by doing something that requires your attention such as a puzzle.  - Check out your beliefs by talking to someone you trust.    Hallucinations   - Use headphones to listen to music.  - Tell voices to  stop  or say to yourself,  I am safe.   - Ignore the hallucinations as much as possible; focus on other things.   Concentration Difficulties - Minimize distractions so there is only one thing for you to focus on at a time.    - Ask the person you are having a conversation with to slow down or repeat things you are unsure of.            Appearance:   Normal   Eye Contact:   Good   Psychomotor Behavior: Normal    Attitude:   Cooperative    Orientation:   All   Speech    Rate / Production: Normal     Volume:  Normal    Mood:    Anxious Depressed Sad   Affect:    Worrisome    Thought Content:  Clear    Thought Form:  Coherent  Goal Directed  Logical     Insight:    Fair      Medication Review:   No changes to current psychiatric medication(s)     Medication Compliance:   Yes     Changes in Health Issues:   None reported     Chemical Use Review:   Substance Use: Chemical use reviewed, no active concerns identified      Tobacco Use: No current tobacco use.      Diagnosis:  296.32 (F33.1) Major Depressive Disorder, Recurrent Episode, Moderate _ and With mixed features  300.01 (F41.0) Panic Disorder  300.02 (F41.1) Generalized Anxiety Disorder   PMDD      Collateral Reports Completed:   Not Applicable    PLAN: (Patient Tasks / Therapist Tasks / Other)  Client will continue to work on the following goals:  -Learn more about emotional validation- Continued   -Quality time that is engaging and focused on common interested.   -Review tip sheet together on attachment and have it handy.   -Plan ahead financially for graduation parties.   -Follow safety plan and use crisis resources-Completed [facility] Carm[facility].  -Think more about the 5/1 ratio  -Find ways to connect with daughter now that she is moved out.       Lia Stiles, [facility]                                                         ______________________________________________________________________    Individual Treatment Plan    Patient's Name: Veena Parsons  YOB: 1985    Date of Creation: 9-7-22  Date Treatment Plan Last Reviewed/Revised: 1-30-24    DSM5 Diagnoses:  296.32 (F33.1) Major Depressive Disorder, Recurrent Episode, Moderate _ and With mixed features  300.01 (F41.0) Panic Disorder  300.02 (F41.1) Generalized Anxiety Disorder   PMDD  Psychosocial / Contextual Factors: Some relational issues   PROMIS (reviewed every 90 days): 21    Referral / Collaboration:  Referral to another professional/service is not indicated at this time..    Anticipated number of session for this episode of care: 6-9 sessions  Anticipation frequency of session: Biweekly  Anticipated Duration of each session:  38-52 minutes  Treatment plan will be reviewed in 90 days or when goals have been changed.       MeasurableTreatment Goal(s) related to diagnosis / functional impairment(s)  Goal 1: Patient will address struggles with anxiety, depression and PMDD.    I will know I've met my goal when I am feeling less reactive through the month with the symptoms.      Objective #A (Patient Action)    Patient will work on establishing a concrete routine with self-care.  Status: Continued - Date(s): 1-30-24    Intervention(s)  Therapist will use CBT and motivational interviewing.      Objective #B  Patient will develop a plan to help manage PMDD  Status: Continued - Date(s): 1-30-24    Intervention(s)  Therapist will use solution focused therapy.    Objective #C  Patient will work on ways to communicate with narcissistic individuals.  Status: Continued - Date(s): 1-30-24    Intervention(s)  Therapist will teach communication skills.          Patient has reviewed and agreed to the above plan.      Lia Stiles, NATACHA  September 7, 2022

## 2024-04-10 ENCOUNTER — VIRTUAL VISIT (OUTPATIENT)
Dept: PSYCHOLOGY | Facility: CLINIC | Age: 39
End: 2024-04-10
Payer: MEDICARE

## 2024-04-10 DIAGNOSIS — F41.1 GENERALIZED ANXIETY DISORDER: ICD-10-CM

## 2024-04-10 DIAGNOSIS — F33.1 MAJOR DEPRESSIVE DISORDER, RECURRENT EPISODE, MODERATE (H): Primary | ICD-10-CM

## 2024-04-10 DIAGNOSIS — F41.0 PANIC DISORDER WITHOUT AGORAPHOBIA: ICD-10-CM

## 2024-04-10 PROCEDURE — 90834 PSYTX W PT 45 MINUTES: CPT | Mod: 95 | Performed by: MARRIAGE & FAMILY THERAPIST

## 2024-04-10 NOTE — PROGRESS NOTES
[facility]                                     Progress Note    Patient Name: Veena Parsons  Date: 4-10-24         Service Type: Individual      Session Start Time:  12pm   Session End Time:    1250pm     Session Length:  50min    Session #: 47    Attendees: Client and Star    Service Modality:  Video     Telemedicine Visit: The patient's condition can be safely assessed and treated via synchronous audio and visual telemedicine encounter.      Reason for Telemedicine Visit: Patient has requested telehealth visit    Originating Site (Patient Location): Patient's home        Distant Location (provider location):  Off-site    Consent:  The patient/guardian has verbally consented to: the potential risks and benefits of telemedicine (video visit) versus in person care; bill my insurance or make self-payment for services provided; and responsibility for payment of non-covered services.     Mode of Communication:  Video Conference via [employer]    As the provider I attest to compliance with applicable laws and regulations related to telemedicine.      Treatment Plan- 1-30-24  CGI- 1-30-24  Phq-9 and YADI-7- See check in data  Promis- 4-10-24    DATA  Interactive Complexity: No  Crisis: No        Progress Since Last Session (Related to Symptoms / Goals / Homework):   There has been demonstrated improvement in functioning while patient has been engaged in psychotherapy/psychological service- if withdrawn the patient would deteriorate and/or relapse.      Symptoms: Client reports confronting issues with anxiety and depressed mood.  Adjusting to daughter being out of the home.     Homework: Achieved / completed to satisfaction      Episode of Care Goals: Satisfactory progress - ACTION (Actively working towards change); Intervened by reinforcing change plan / affirming steps taken     Current / Ongoing Stressors and Concerns:   -Daughter moved in with a friend and patient and spouse are working on  adjusting.   -Deborah has been communicating a little more.  She was honest about what happened in a school situation.    -Veena feels like she is sleeping better this week.   -Planning ahead for 2 graduation parties.  Trying to do a little each month.    -Star is working on emotional connection.  We are in the process of finding a podcast. Both Star and Veena feels this has been better this week.    -Anxious and avoidant attachment styles-  Veena feels she is anxious and star feels he is avoidant- Continued    -Starting to work on some outside projects together.    -Has struggled with PMDD for a number of years.  Has been working with chiropractic and message therapy on some holistic treatment options.  Has not been able to get in consistently - Continue     -Daughter shared with her some important information- Continued           Treatment Objective(s) Addressed in This Session:  Safety planning / following safety plan   Patient will develop a plan to help manage PMDD  Patient will work on ways to communicate /patterns   Relational issues - Attachment styles.   Connection with daughter        Intervention:   Get outside daily in the sun.   Continue to consider attachment styles. Continue to look for a podcast.    Plan some weekly to biweekly plans with Deborah. Help her but also let her take the lead and solve issues.     Engage in love languages and emotional connection. Special circumstances between the two of them.    Plan to have dinner with Star 2-3 days a week on his work break.   Start to pay for the graduation each month- Plan for a [employer] company and snack tables with cotton candy and popcorn.    Follow safety plan and use crisis resources.  Agrees to contract for safety today.   Use support system- Continue   Use some holistic approaches to health when able- Message and chiropractic.         Assessments completed prior to visit:  The following assessments were completed by patient for this visit:  PHQ2:        2/28/2024    10:46 AM 2/14/2024     9:48 AM 1/10/2024    10:50 AM 11/29/2023     9:44 AM 10/5/2023    10:34 AM 9/19/2023     7:09 PM 9/7/2023     6:59 AM   PHQ-2 ( 1999 [employer])   Q1: Little interest or pleasure in doing things 3 1 1 3 0 1 1   Q2: Feeling down, depressed or hopeless 3 1 1 3 1 2 1   PHQ-2 Score 6 2 2 6 1 3 2   Q1: Little interest or pleasure in doing things Nearly every day Several days Several days Nearly every day Not at all Several days Several days   Q2: Feeling down, depressed or hopeless Nearly every day Several days Several days Nearly every day Several days More than half the days Several days   PHQ-2 Score 6 2 2 6 1 3 2     PHQ9:       2/2/2023    10:13 AM 6/1/2023    10:07 AM 7/17/2023    11:15 AM 9/19/2023     7:09 PM 11/29/2023     9:44 AM 1/3/2024    10:04 AM 2/28/2024    10:46 AM   PHQ-9 SCORE   PHQ-9 Total Score MyChart    15 (Moderately severe depression) 15 (Moderately severe depression) 17 (Moderately severe depression) 13 (Moderate depression)   PHQ-9 Total Score 12 15 16 15 15 17 13     GAD2:       9/7/2023     6:59 AM 9/19/2023     7:10 PM 10/5/2023    10:34 AM 11/29/2023     9:45 AM 1/10/2024    10:51 AM 2/28/2024    10:47 AM 4/10/2024    10:52 AM   YADI-2   Feeling nervous, anxious, or on edge 1 3 1 1 1 2 1   Not being able to stop or control worrying 1 3 1 1 1 1 1   YADI-2 Total Score 2 6 2 2 2 3 2     GAD7:       11/27/2022     5:03 AM 2/2/2023    10:13 AM 6/1/2023    10:07 AM 7/17/2023    11:15 AM 8/7/2023    11:04 AM 9/19/2023     7:10 PM 2/28/2024    10:47 AM   YADI-7 SCORE   Total Score 17 (severe anxiety)    5 (mild anxiety) 21 (severe anxiety) 10 (moderate anxiety)   Total Score 17 13 12 13 5 21 10     PROMIS 10-Global Health (all questions and answers displayed):       9/7/2023     7:00 AM 9/19/2023     7:11 PM 10/5/2023    10:35 AM 11/29/2023     9:46 AM 1/10/2024    10:52 AM 2/28/2024    10:48 AM 4/10/2024    10:53 AM   PROMIS 10   In general, would you say your health  is: Good Good Good Fair Fair Good Good   In general, would you say your quality of life is: Good Fair Good Fair Good Good Fair   In general, how would you rate your physical health? Good Fair Fair Fair Good Fair Good   In general, how would you rate your mental health, including your mood and your ability to think? Poor Fair Good Fair Fair Fair Fair   In general, how would you rate your satisfaction with your social activities and relationships? Fair Fair Good Fair Fair Fair Fair   In general, please rate how well you carry out your usual social activities and roles Poor Poor Fair Poor Fair Fair Fair   To what extent are you able to carry out your everyday physical activities such as walking, climbing stairs, carrying groceries, or moving a chair? Mostly Completely Mostly Mostly Completely Mostly Mostly   In the past 7 days, how often have you been bothered by emotional problems such as feeling anxious, depressed, or irritable? Often Often Sometimes Always Sometimes Often Often   In the past 7 days, how would you rate your fatigue on average? Moderate Severe Mild Moderate Moderate Moderate Moderate   In the past 7 days, how would you rate your pain on average, where 0 means no pain, and 10 means worst imaginable pain? 6 5 5 6 6 6 6   In general, would you say your health is: 3 3 3 2 2 3 3   In general, would you say your quality of life is: 3 2 3 2 3 3 2   In general, how would you rate your physical health? 3 2 2 2 3 2 3   In general, how would you rate your mental health, including your mood and your ability to think? 1 2 3 2 2 2 2   In general, how would you rate your satisfaction with your social activities and relationships? 2 2 3 2 2 2 2   In general, please rate how well you carry out your usual social activities and roles. (This includes activities at home, at work and in your community, and responsibilities as a parent, child, spouse, employee, friend, etc.) 1 1 2 1 2 2 2   To what extent are you able to  carry out your everyday physical activities such as walking, climbing stairs, carrying groceries, or moving a chair? 4 5 4 4 5 4 4   In the past 7 days, how often have you been bothered by emotional problems such as feeling anxious, depressed, or irritable? 4 4 3 5 3 4 4   In the past 7 days, how would you rate your fatigue on average? 3 4 2 3 3 3 3   In the past 7 days, how would you rate your pain on average, where 0 means no pain, and 10 means worst imaginable pain? 6 5 5 6 6 6 6   Global Mental Health Score 8 8 12 7 10 9 8   Global Physical Health Score 13 12 13 12 14 12 13   PROMIS TOTAL - SUBSCORES 21 20 25 19 24 21 21     [place] Suicide Severity Rating Scale (Lifetime/Recent)      8/3/2022     9:17 AM   [place] Suicide Severity Rating (Lifetime/Recent)   Q1 Wish to be Dead (Lifetime) N   Q2 Non-Specific Active Suicidal Thoughts (Lifetime) N   Actual Attempt (Lifetime) N   Has subject engaged in non-suicidal self-injurious behavior? (Lifetime) N   Calculated C-SSRS Risk Score (Lifetime/Recent) No Risk Indicated         ASSESSMENT: Current Emotional / Mental Status (status of significant symptoms):   Risk status (Self / Other harm or suicidal ideation)   Patient denies current fears or concerns for personal safety.   Patient denies current or recent suicidal ideation or behaviors.    Patient denies current or recent homicidal ideation or behaviors.   Patient denies current or recent self injurious behavior or ideation.   Patient denies other safety concerns.   Patient reports there has been no change in risk factors since their last session.     Patient reports there has been no change in protective factors since their last session.     A safety and risk management plan has been developed including: Patient consented to co-developed safety plan on 11-30-22.  Safety and risk management plan was reviewed.   Patient agreed to use safety plan should any safety concerns arise.  A copy was made available to the  patient. Reviewed 4-10-24- completed new safety plan through Liborio Carmona link.    [facility] Safety Plan      Creation Date: 11/30/22 Last Update Date: 4/3/24      Step 1: Warning signs:    Warning Signs    flashbacks, thoughts about I dont matter, Loss, Worsening depression, isolation, cant stop crying, relationship problems    Medical concerns    Not feeling a part of something      Step 2: Internal coping strategies - Things I can do to take my mind off my problems without contacting another person:    Strategies    Distress tolerance, going for a walk, gardening, deep breathing, stretching, physical touch    Distraction techniiqes    Focus on helpful thoughts    Meditation      Step 3: People and social settings that provide distraction:    Name Contact Information    Star Spouse    Daughter Cell phone       Places    Movie theater, pet store, coffee shop      Step 4: People whom I can ask for help during a crisis:    Name Contact Information    Star Spouse/ cell phone and live in the home      Step 5: Professionals or agencies I can contact during a crisis:    Clinician/Agency Name Phone Emergency Contact    [facility] [phone]       [facility] Emergency Department Emergency Department Address Emergency Department Phone    [facility] [phone]       Suicide Prevention Lifeline Phone: Call or Text 323  Crisis Text Line: Text HOME to 158391     Step 6: Making the environment safer (plan for lethal means safety):   Remove items I could harm myself with     Optional: What is most important to me and worth living for?:   Family  Spending time with daughter       LiborioRonald Safety Plan. Eleonora Capone and Danny Carmona. Used with permission of the authors.              [facility]                                       Veena Parsons     SAFETY PLAN:  Step 1: Warning signs / cues (Thoughts, images, mood, situation, behavior) that a crisis may be  "developing:  Thoughts: \"I don't matter\", \"People would be better off without me\", \"I'm a burden\", \"I can't do this anymore\", \"I just want this to end\" and \"Nothing makes it better\"  Images: flashbacks  Thinking Processes: ruminations (can't stop thinking about my problems): PMDD and loss of mother, racing thoughts and highly critical and negative thoughts: I cant do anything right  Mood: worsening depression, hopelessness, helplessness, intense worry, agitation and mood swings  Behaviors: isolating/withdrawing , can't stop crying, not taking care of myself, not taking care of my responsibilities, sleeping too much, not sleeping enough and increasing frequency and duration of dissociation  Situations: relationship problems, trauma  and medical condition / diagnosis: PMDD    Step 2: Coping strategies - Things I can do to take my mind off of my problems without contacting another person (relaxation technique, physical activity):  Distress Tolerance Strategies:  relaxation activities, play with my pet , listen to positive and upbeat music, sensory based activities/self-soothe with five senses, watch a funny movie, read a book, change body temperature (ice pack/cold water)  and paced breathing/progressive muscle relaxation  Physical Activities: go for a walk, gardening, meditation, deep breathing and stretching   Focus on helpful thoughts:  \"This is temporary\", \"I will get through this\", \"It always passes\" and \"Ride the wave\"  Step 3: People and social settings that provide distraction:   Name: Spouse Star   Name:  Spending time with daughter     movie theater, pet store/humane society and coffee shop   Step 4: Remind myself of people and things that are important to me and worth living for:    Daughter   Spouse  Pets     Step 5: When I am in crisis, I can ask these people to help me use my safety plan:   Name: Spouse  Phone: [phone]      Step 6: Making the environment safe:   remove things I could use to hurt " myself and be around others  Step 7: Professionals or agencies I can contact during a crisis:  Suicide Prevention Lifeline: Call or Text 991   Local Crisis Services:  [facility] [phone]      Call 911 or go to my nearest emergency department.   I helped develop this safety plan and agree to use it when needed.  I have been given a copy of this plan.      Client signature _________________________________________________________________  Today s date:  11/30/2022  Completed by Provider Name/ Credentials:  Lia Stiles MA, LMFT  November 30, 2022  Adapted from Safety Plan Template 2008 Eleonora Capone and Danny Carmona is reprinted with the express permission of the authors.  No portion of the Safety Plan Template may be reproduced without the express, written permission.  You can contact the authors at [email or web address] or [email or web address].    Name: Veena Parsons  YOB: 1985  Date: November 30, 2022   My primary care provider: Francisco Nails  My primary care clinic: [facility]   My prescriber: PCP  Other care team support:  Holistic medical provider    My Triggers:    Relational problems  Loss of mother recently  Financial stress      Additional People, Places, and Things that I can access for support:   Spouse  Daughter          What is important to me and makes life worth living: Family         GREEN    Good Control  1. I feel good  2. No suicidal thoughts   3. Can work, sleep and play      Action Steps  1. Self-care: balanced meals, exercising, sleep practices, etc.  2. Take your medications as prescribed.  3. Continue meetings with therapist and prescriber.  4.  Do the healthy things that I enjoy.             YELLO Getting Worse  I have ANY of these:  1. I do not feel good  2. Difficulty Concentrating  3. Sleep is changing  4. Increase/Change in my thoughts to hurt self and/or others, but I can still manage and not act on it.   5. Not taking care of self.                Action Steps (in addition to the above):  1. Inform your therapist and psychiatric prescriber/PCP.  2. Keep taking your medications as prescribed.    3. Turn to people you can ask for help.  4. Use internal coping strategies -see below.  5. Create safe environment: Removing items I can hurt self with              RED  Get Help  If I have ANY of these:  1. Current and uncontrollable thoughts and/or behaviors to hurt self and/or others.   2. Crazy buzzing and spinning.     Actions to manage my safety  1. Contact your emergency person Star  2. Call or Text 304  3. Call my crisis team- [facility] [phone]  3. Or Call 891 or go to the emergency room right away        My Internal Coping Strategies include the following:  take a bath, belly breathing, arts and crafts, play with my pet, use my coping box, exercise and use my coping skills    [End for Brief Safety Plan]     Safety Concerns  How To Identify Situations That Make Your Mental Health Worse:  Triggers are things that make your mental health worse.  Look at the list below to help you find your triggers and what you can do about them.     1. Identify Early Warning Signs:    Sometimes symptoms return, even when people do their best to stay well. Symptoms can develop over a short period of time with little or no warning, but most of the time they emerge gradually over several weeks.  Early warning signs are changes that people experience when a relapse is starting. Some early warning signs are common and others are not as common.   Common Early Warning Signs:    Feeling tense or nervous, Eating less or eating more, Trouble sleeping -either too much or too little sleep, Feeling depressed or low, Feeling irritable, Feeling like not being around other people, Trouble concentrating and Urges to harm self     2. Identify action steps to take when warning signs are noticed:    Taking Action- It is important to take action if you are experiencing  early warning signs of a relapse.  The faster you act, the more likely it is that you can avoid a full relapse.  It is helpful to identify several specific ways to cope with symptoms.      The following is my list of symptoms and coping strategies that I can use when they are present:    Symptom Coping Strategies   Anxiety -Talk with someone in your support system and let him or her know how you are feeling.  -Use relaxation techniques such as deep breathing or imagery.  -Use positive affirmations to counteract negative self-talk such as  I am learning to let go of worry.    Depression - Schedule your day; include activities you have to do and activities you enjoy doing.  - Get some exercise - walk, run, bike, or swim.  - Give yourself credit for even the smallest things you get done.   Sleep Difficulties   - Go to sleep at the same time every day.  - Do something relaxing before bed, such as drinking herbal tea or listening to music.  - Avoid having discussions about upsetting topics before going to bed.   Delusions   - Distract yourself from the disturbing thought by doing something that requires your attention such as a puzzle.  - Check out your beliefs by talking to someone you trust.    Hallucinations   - Use headphones to listen to music.  - Tell voices to  stop  or say to yourself,  I am safe.   - Ignore the hallucinations as much as possible; focus on other things.   Concentration Difficulties - Minimize distractions so there is only one thing for you to focus on at a time.    - Ask the person you are having a conversation with to slow down or repeat things you are unsure of.            Appearance:   Normal   Eye Contact:   Good   Psychomotor Behavior: Normal    Attitude:   Cooperative    Orientation:   All   Speech    Rate / Production: Normal     Volume:  Normal    Mood:    Anxious    Affect:    Normal   Thought Content:  Clear    Thought Form:  Goal Directed  Logical    Insight:    Fair      Medication  Review:   No changes to current psychiatric medication(s)     Medication Compliance:   Yes     Changes in Health Issues:   None reported     Chemical Use Review:   Substance Use: Chemical use reviewed, no active concerns identified      Tobacco Use: No current tobacco use.      Diagnosis:  296.32 (F33.1) Major Depressive Disorder, Recurrent Episode, Moderate _ and With mixed features  300.01 (F41.0) Panic Disorder  300.02 (F41.1) Generalized Anxiety Disorder   PMDD      Collateral Reports Completed:   Not Applicable    PLAN: (Patient Tasks / Therapist Tasks / Other)  Client will continue to work on the following goals:  -Learn more about emotional validation- Continued   -Watch more movies together. Research more bottle neck films.    -Plan to get a catering company for the party.   -Help out daughter but also let her take the lead on some things.   -Follow safety plan and use crisis resources  -Think more about the 5/1 ratio        NATACHA Hernandez                                                         ______________________________________________________________________    Individual Treatment Plan    Patient's Name: Veena Parsons  YOB: 1985    Date of Creation: 9-7-22  Date Treatment Plan Last Reviewed/Revised: 1-30-24    DSM5 Diagnoses:  296.32 (F33.1) Major Depressive Disorder, Recurrent Episode, Moderate _ and With mixed features  300.01 (F41.0) Panic Disorder  300.02 (F41.1) Generalized Anxiety Disorder   PMDD  Psychosocial / Contextual Factors: Some relational issues   PROMIS (reviewed every 90 days): 21    Referral / Collaboration:  Referral to another professional/service is not indicated at this time..    Anticipated number of session for this episode of care: 6-9 sessions  Anticipation frequency of session: Biweekly  Anticipated Duration of each session: 38-52 minutes  Treatment plan will be reviewed in 90 days or when goals have been changed.       MeasurableTreatment Goal(s)  related to diagnosis / functional impairment(s)  Goal 1: Patient will address struggles with anxiety, depression and PMDD.    I will know I've met my goal when I am feeling less reactive through the month with the symptoms.      Objective #A (Patient Action)    Patient will work on establishing a concrete routine with self-care.  Status: Continued - Date(s): 1-30-24    Intervention(s)  Therapist will use CBT and motivational interviewing.      Objective #B  Patient will develop a plan to help manage PMDD  Status: Continued - Date(s): 1-30-24    Intervention(s)  Therapist will use solution focused therapy.    Objective #C  Patient will work on ways to communicate with narcissistic individuals.  Status: Continued - Date(s): 1-30-24    Intervention(s)  Therapist will teach communication skills.          Patient has reviewed and agreed to the above plan.      Lia Stiles, NATACHA  September 7, 2022

## 2024-04-17 ENCOUNTER — VIRTUAL VISIT (OUTPATIENT)
Dept: PSYCHOLOGY | Facility: CLINIC | Age: 39
End: 2024-04-17
Payer: MEDICARE

## 2024-04-17 DIAGNOSIS — F33.1 MAJOR DEPRESSIVE DISORDER, RECURRENT EPISODE, MODERATE (H): Primary | ICD-10-CM

## 2024-04-17 DIAGNOSIS — F41.0 PANIC DISORDER WITHOUT AGORAPHOBIA: ICD-10-CM

## 2024-04-17 DIAGNOSIS — F41.1 GENERALIZED ANXIETY DISORDER: ICD-10-CM

## 2024-04-17 PROCEDURE — 90834 PSYTX W PT 45 MINUTES: CPT | Mod: 95 | Performed by: MARRIAGE & FAMILY THERAPIST

## 2024-04-17 NOTE — PROGRESS NOTES
[facility]                                     Progress Note    Patient Name: Veena Parsons  Date: 4-17-24         Service Type: Individual      Session Start Time:  1202pm   Session End Time:    1252pm     Session Length:  50min    Session #: 48    Attendees: Carlos    Service Modality:  Video     Telemedicine Visit: The patient's condition can be safely assessed and treated via synchronous audio and visual telemedicine encounter.      Reason for Telemedicine Visit: Patient has requested telehealth visit    Originating Site (Patient Location): Patient's home        Distant Location (provider location):  Off-site    Consent:  The patient/guardian has verbally consented to: the potential risks and benefits of telemedicine (video visit) versus in person care; bill my insurance or make self-payment for services provided; and responsibility for payment of non-covered services.     Mode of Communication:  Video Conference via Nextly    As the provider I attest to compliance with applicable laws and regulations related to telemedicine.      Treatment Plan- 1-30-24  CGI- 1-30-24  Phq-9 and YADI-7- See check in data  Promis- 4-10-24    DATA  Interactive Complexity: No  Crisis: No        Progress Since Last Session (Related to Symptoms / Goals / Homework):   There has been demonstrated improvement in functioning while patient has been engaged in psychotherapy/psychological service- if withdrawn the patient would deteriorate and/or relapse.      Symptoms: Client reports confronting issues with anxiety and depressed mood.  Some stress this week tied to upcoming graduation parties.     Homework: Achieved / completed to satisfaction      Episode of Care Goals: Satisfactory progress - ACTION (Actively working towards change); Intervened by reinforcing change plan / affirming steps taken     Current / Ongoing Stressors and Concerns:   -Step-son let Veena and Star know that his mom planned  his graduation party at their home on the same day.  Anastasiia have had the date planned for months.  This has been very disappointing as they feel they are always 2nd string.   -Adjusting to being empty nesters.    -Sent star a pod cast about relationships and connections.    -Kurtis Mcclendon and Sebas- Step sons.     -Veena and Star have felt cheated out of time with the boys. Felt they have had no support in becoming a family.    -Anxious and avoidant attachment styles-  Veena feels she is anxious and Star feels he is avoidant- Continued    -Has struggled with PMDD for a number of years.  Has been working with chiropractic and message therapy on some holistic treatment options.  Has not been able to get in consistently - Continue         Treatment Objective(s) Addressed in This Session:  Safety planning / following safety plan   Patient will develop a plan to help manage PMDD  Patient will work on ways to communicate /patterns   Relational issues - Attachment styles.   Connection with daughter        Intervention:   Continue to make getting outside a priority.   Star will start the pod cast on relationships.   Processed through the challenges with the graduation.    Have a conversation with Sebas about doing what he can but also offering some understanding.  Follow safety plan and use crisis resources.  Agrees to contract for safety today.   Use support system- Continue   Use some holistic approaches to health when able- Message and chiropractic.         Assessments completed prior to visit:  The following assessments were completed by patient for this visit:  PHQ2:       4/17/2024    10:51 AM 2/28/2024    10:46 AM 2/14/2024     9:48 AM 1/10/2024    10:50 AM 11/29/2023     9:44 AM 10/5/2023    10:34 AM 9/19/2023     7:09 PM   PHQ-2 ( 1999 [employer])   Q1: Little interest or pleasure in doing things 1 3 1 1 3 0 1   Q2: Feeling down, depressed or hopeless 1 3 1 1 3 1 2   PHQ-2 Score 2 6 2 2 6 1 3   Q1: Little interest or  pleasure in doing things Several days Nearly every day Several days Several days Nearly every day Not at all Several days   Q2: Feeling down, depressed or hopeless Several days Nearly every day Several days Several days Nearly every day Several days More than half the days   PHQ-2 Score 2 6 2 2 6 1 3     PHQ9:       2/2/2023    10:13 AM 6/1/2023    10:07 AM 7/17/2023    11:15 AM 9/19/2023     7:09 PM 11/29/2023     9:44 AM 1/3/2024    10:04 AM 2/28/2024    10:46 AM   PHQ-9 SCORE   PHQ-9 Total Score MyChart    15 (Moderately severe depression) 15 (Moderately severe depression) 17 (Moderately severe depression) 13 (Moderate depression)   PHQ-9 Total Score 12 15 16 15 15 17 13     GAD2:       9/7/2023     6:59 AM 9/19/2023     7:10 PM 10/5/2023    10:34 AM 11/29/2023     9:45 AM 1/10/2024    10:51 AM 2/28/2024    10:47 AM 4/10/2024    10:52 AM   YADI-2   Feeling nervous, anxious, or on edge 1 3 1 1 1 2 1   Not being able to stop or control worrying 1 3 1 1 1 1 1   YADI-2 Total Score 2 6 2 2 2 3 2     GAD7:       11/27/2022     5:03 AM 2/2/2023    10:13 AM 6/1/2023    10:07 AM 7/17/2023    11:15 AM 8/7/2023    11:04 AM 9/19/2023     7:10 PM 2/28/2024    10:47 AM   YADI-7 SCORE   Total Score 17 (severe anxiety)    5 (mild anxiety) 21 (severe anxiety) 10 (moderate anxiety)   Total Score 17 13 12 13 5 21 10     PROMIS 10-Global Health (all questions and answers displayed):       9/7/2023     7:00 AM 9/19/2023     7:11 PM 10/5/2023    10:35 AM 11/29/2023     9:46 AM 1/10/2024    10:52 AM 2/28/2024    10:48 AM 4/10/2024    10:53 AM   PROMIS 10   In general, would you say your health is: Good Good Good Fair Fair Good Good   In general, would you say your quality of life is: Good Fair Good Fair Good Good Fair   In general, how would you rate your physical health? Good Fair Fair Fair Good Fair Good   In general, how would you rate your mental health, including your mood and your ability to think? Poor Fair Good Fair Fair Fair Fair    In general, how would you rate your satisfaction with your social activities and relationships? Fair Fair Good Fair Fair Fair Fair   In general, please rate how well you carry out your usual social activities and roles Poor Poor Fair Poor Fair Fair Fair   To what extent are you able to carry out your everyday physical activities such as walking, climbing stairs, carrying groceries, or moving a chair? Mostly Completely Mostly Mostly Completely Mostly Mostly   In the past 7 days, how often have you been bothered by emotional problems such as feeling anxious, depressed, or irritable? Often Often Sometimes Always Sometimes Often Often   In the past 7 days, how would you rate your fatigue on average? Moderate Severe Mild Moderate Moderate Moderate Moderate   In the past 7 days, how would you rate your pain on average, where 0 means no pain, and 10 means worst imaginable pain? 6 5 5 6 6 6 6   In general, would you say your health is: 3 3 3 2 2 3 3   In general, would you say your quality of life is: 3 2 3 2 3 3 2   In general, how would you rate your physical health? 3 2 2 2 3 2 3   In general, how would you rate your mental health, including your mood and your ability to think? 1 2 3 2 2 2 2   In general, how would you rate your satisfaction with your social activities and relationships? 2 2 3 2 2 2 2   In general, please rate how well you carry out your usual social activities and roles. (This includes activities at home, at work and in your community, and responsibilities as a parent, child, spouse, employee, friend, etc.) 1 1 2 1 2 2 2   To what extent are you able to carry out your everyday physical activities such as walking, climbing stairs, carrying groceries, or moving a chair? 4 5 4 4 5 4 4   In the past 7 days, how often have you been bothered by emotional problems such as feeling anxious, depressed, or irritable? 4 4 3 5 3 4 4   In the past 7 days, how would you rate your fatigue on average? 3 4 2 3 3 3 3    In the past 7 days, how would you rate your pain on average, where 0 means no pain, and 10 means worst imaginable pain? 6 5 5 6 6 6 6   Global Mental Health Score 8 8 12 7 10 9 8   Global Physical Health Score 13 12 13 12 14 12 13   PROMIS TOTAL - SUBSCORES 21 20 25 19 24 21 21     [place] Suicide Severity Rating Scale (Lifetime/Recent)      8/3/2022     9:17 AM   [place] Suicide Severity Rating (Lifetime/Recent)   Q1 Wish to be Dead (Lifetime) N   Q2 Non-Specific Active Suicidal Thoughts (Lifetime) N   Actual Attempt (Lifetime) N   Has subject engaged in non-suicidal self-injurious behavior? (Lifetime) N   Calculated C-SSRS Risk Score (Lifetime/Recent) No Risk Indicated         ASSESSMENT: Current Emotional / Mental Status (status of significant symptoms):   Risk status (Self / Other harm or suicidal ideation)   Patient denies current fears or concerns for personal safety.   Patient denies current or recent suicidal ideation or behaviors.    Patient denies current or recent homicidal ideation or behaviors.   Patient denies current or recent self injurious behavior or ideation.   Patient denies other safety concerns.   Patient reports there has been no change in risk factors since their last session.     Patient reports there has been no change in protective factors since their last session.     A safety and risk management plan has been developed including: Patient consented to co-developed safety plan on 11-30-22.  Safety and risk management plan was reviewed.   Patient agreed to use safety plan should any safety concerns arise.  A copy was made available to the patient. Reviewed 4-17-24- completed new safety plan through Liborio Carmona link.    Leyla Safety Plan      Creation Date: 11/30/22 Last Update Date: 4/3/24      Step 1: Warning signs:    Warning Signs    flashbacks, thoughts about I dont matter, Loss, Worsening depression, isolation, cant stop crying, relationship problems    Medical concerns     Not feeling a part of something      Step 2: Internal coping strategies - Things I can do to take my mind off my problems without contacting another person:    Strategies    Distress tolerance, going for a walk, gardening, deep breathing, stretching, physical touch    Distraction techniiqes    Focus on helpful thoughts    Meditation      Step 3: People and social settings that provide distraction:    Name Contact Information    Star Spouse    Daughter Cell phone       Places    Movie theater, pet store, coffee shop      Step 4: People whom I can ask for help during a crisis:    Name Contact Information    Star Spouse/ cell phone and live in the home      Step 5: Professionals or agencies I can contact during a crisis:    Clinician/Agency Name Phone Emergency Contact    [facility] [phone]       Local Emergency Department Emergency Department Address Emergency Department Phone    [facility] [phone]       Suicide Prevention Lifeline Phone: Call or Text 719  Crisis Text Line: Text HOME to 867851     Step 6: Making the environment safer (plan for lethal means safety):   Remove items I could harm myself with     Optional: What is most important to me and worth living for?:   Family  Spending time with daughter       Liborio[employer]ilar[employer] Safety Plan. Eleonora Capone and Danny Carmona. Used with permission of the authors.          Name: Veena Parsons  YOB: 1985  Date: November 30, 2022   My primary care provider: Francisco Nails  My primary care clinic: [facility]   My prescriber: PCP  Other care team support:  Holistic medical provider    My Triggers:    Relational problems  Loss of mother recently  Financial stress      Additional People, Places, and Things that I can access for support:   Spouse  Daughter          What is important to me and makes life worth living: Family         GREEN    Good Control  1. I feel good  2. No suicidal thoughts   3. Can  work, sleep and play      Action Steps  1. Self-care: balanced meals, exercising, sleep practices, etc.  2. Take your medications as prescribed.  3. Continue meetings with therapist and prescriber.  4.  Do the healthy things that I enjoy.             YELLO Getting Worse  I have ANY of these:  1. I do not feel good  2. Difficulty Concentrating  3. Sleep is changing  4. Increase/Change in my thoughts to hurt self and/or others, but I can still manage and not act on it.   5. Not taking care of self.               Action Steps (in addition to the above):  1. Inform your therapist and psychiatric prescriber/PCP.  2. Keep taking your medications as prescribed.    3. Turn to people you can ask for help.  4. Use internal coping strategies -see below.  5. Create safe environment: Removing items I can hurt self with              RED  Get Help  If I have ANY of these:  1. Current and uncontrollable thoughts and/or behaviors to hurt self and/or others.   2. Crazy buzzing and spinning.     Actions to manage my safety  1. Contact your emergency person Star  2. Call or Text [phone]  3. Call my crisis team- [facility] [phone]  3. Or Call 911 or go to the emergency room right away        My Internal Coping Strategies include the following:  take a bath, belly breathing, arts and crafts, play with my pet, use my coping box, exercise and use my coping skills    [End for Brief Safety Plan]     Safety Concerns  How To Identify Situations That Make Your Mental Health Worse:  Triggers are things that make your mental health worse.  Look at the list below to help you find your triggers and what you can do about them.     1. Identify Early Warning Signs:    Sometimes symptoms return, even when people do their best to stay well. Symptoms can develop over a short period of time with little or no warning, but most of the time they emerge gradually over several weeks.  Early warning signs are changes that people experience when a  relapse is starting. Some early warning signs are common and others are not as common.   Common Early Warning Signs:    Feeling tense or nervous, Eating less or eating more, Trouble sleeping -either too much or too little sleep, Feeling depressed or low, Feeling irritable, Feeling like not being around other people, Trouble concentrating and Urges to harm self     2. Identify action steps to take when warning signs are noticed:    Taking Action- It is important to take action if you are experiencing early warning signs of a relapse.  The faster you act, the more likely it is that you can avoid a full relapse.  It is helpful to identify several specific ways to cope with symptoms.      The following is my list of symptoms and coping strategies that I can use when they are present:    Symptom Coping Strategies   Anxiety -Talk with someone in your support system and let him or her know how you are feeling.  -Use relaxation techniques such as deep breathing or imagery.  -Use positive affirmations to counteract negative self-talk such as  I am learning to let go of worry.    Depression - Schedule your day; include activities you have to do and activities you enjoy doing.  - Get some exercise - walk, run, bike, or swim.  - Give yourself credit for even the smallest things you get done.   Sleep Difficulties   - Go to sleep at the same time every day.  - Do something relaxing before bed, such as drinking herbal tea or listening to music.  - Avoid having discussions about upsetting topics before going to bed.   Delusions   - Distract yourself from the disturbing thought by doing something that requires your attention such as a puzzle.  - Check out your beliefs by talking to someone you trust.    Hallucinations   - Use headphones to listen to music.  - Tell voices to  stop  or say to yourself,  I am safe.   - Ignore the hallucinations as much as possible; focus on other things.   Concentration Difficulties - Minimize  distractions so there is only one thing for you to focus on at a time.    - Ask the person you are having a conversation with to slow down or repeat things you are unsure of.            Appearance:   Normal   Eye Contact:   Good   Psychomotor Behavior: Normal    Attitude:   Cooperative    Orientation:   All   Speech    Rate / Production: Normal     Volume:  Normal    Mood:    Anxious Sad   Affect:    Worrisome    Thought Content:  Clear    Thought Form:  Coherent  Goal Directed  Logical    Insight:    Fair      Medication Review:   No changes to current psychiatric medication(s)     Medication Compliance:   Yes     Changes in Health Issues:   None reported     Chemical Use Review:   Substance Use: Chemical use reviewed, no active concerns identified      Tobacco Use: No current tobacco use.      Diagnosis:  296.32 (F33.1) Major Depressive Disorder, Recurrent Episode, Moderate _ and With mixed features  300.01 (F41.0) Panic Disorder  300.02 (F41.1) Generalized Anxiety Disorder   PMDD      Collateral Reports Completed:   Not Applicable    PLAN: (Patient Tasks / Therapist Tasks / Other)  Client will continue to work on the following goals:  -Learn more about emotional validation- Star will start the pod cast.   -Have a conversation with Sebas about wanting him to enjoy the day to take some of the pressure off of him.   -Help out daughter but also let her take the lead on some things- Continued   -Follow safety plan and use crisis resources- has Liborio Carmona complete.     NATACHA Hernandez                                                         ______________________________________________________________________    Individual Treatment Plan    Patient's Name: Veena Parsons  YOB: 1985    Date of Creation: 9-7-22  Date Treatment Plan Last Reviewed/Revised: 1-30-24    DSM5 Diagnoses:  296.32 (F33.1) Major Depressive Disorder, Recurrent Episode, Moderate _ and With mixed features  300.01 (F41.0)  Panic Disorder  300.02 (F41.1) Generalized Anxiety Disorder   PMDD  Psychosocial / Contextual Factors: Some relational issues   PROMIS (reviewed every 90 days): 21    Referral / Collaboration:  Referral to another professional/service is not indicated at this time..    Anticipated number of session for this episode of care: 6-9 sessions  Anticipation frequency of session: Biweekly  Anticipated Duration of each session: 38-52 minutes  Treatment plan will be reviewed in 90 days or when goals have been changed.       MeasurableTreatment Goal(s) related to diagnosis / functional impairment(s)  Goal 1: Patient will address struggles with anxiety, depression and PMDD.    I will know I've met my goal when I am feeling less reactive through the month with the symptoms.      Objective #A (Patient Action)    Patient will work on establishing a concrete routine with self-care.  Status: Continued - Date(s): 1-30-24    Intervention(s)  Therapist will use CBT and motivational interviewing.      Objective #B  Patient will develop a plan to help manage PMDD  Status: Continued - Date(s): 1-30-24    Intervention(s)  Therapist will use solution focused therapy.    Objective #C  Patient will work on ways to communicate with narcissistic individuals.  Status: Continued - Date(s): 1-30-24    Intervention(s)  Therapist will teach communication skills.          Patient has reviewed and agreed to the above plan.      Lia Stiles, NATACHA  September 7, 2022

## 2024-04-24 ENCOUNTER — VIRTUAL VISIT (OUTPATIENT)
Dept: PSYCHOLOGY | Facility: CLINIC | Age: 39
End: 2024-04-24
Payer: COMMERCIAL

## 2024-04-24 DIAGNOSIS — F33.1 MAJOR DEPRESSIVE DISORDER, RECURRENT EPISODE, MODERATE (H): Primary | ICD-10-CM

## 2024-04-24 DIAGNOSIS — F41.0 PANIC DISORDER WITHOUT AGORAPHOBIA: ICD-10-CM

## 2024-04-24 DIAGNOSIS — F41.1 GENERALIZED ANXIETY DISORDER: ICD-10-CM

## 2024-04-24 PROCEDURE — 90834 PSYTX W PT 45 MINUTES: CPT | Mod: 95 | Performed by: MARRIAGE & FAMILY THERAPIST

## 2024-04-24 NOTE — PROGRESS NOTES
[facility]                                     Progress Note    Patient Name: Veena Parsons  Date: 4-24-24         Service Type: Individual      Session Start Time:  1215pm   Session End Time:    1pm     Session Length:  45min    Session #: 49    Attendees: Carlos    Service Modality:  Video     Telemedicine Visit: The patient's condition can be safely assessed and treated via synchronous audio and visual telemedicine encounter.      Reason for Telemedicine Visit: Patient has requested telehealth visit    Originating Site (Patient Location): Patient's home        Distant Location (provider location):  Off-site    Consent:  The patient/guardian has verbally consented to: the potential risks and benefits of telemedicine (video visit) versus in person care; bill my insurance or make self-payment for services provided; and responsibility for payment of non-covered services.     Mode of Communication:  Video Conference via Z-[employer]    As the provider I attest to compliance with applicable laws and regulations related to telemedicine.      Treatment Plan- 1-30-24  CGI- 1-30-24  Phq-9 and YADI-7- See check in data  Promis- 4-10-24    DATA  Interactive Complexity: No  Crisis: No        Progress Since Last Session (Related to Symptoms / Goals / Homework):   There has been demonstrated improvement in functioning while patient has been engaged in psychotherapy/psychological service- if withdrawn the patient would deteriorate and/or relapse.      Symptoms: Client reports confronting issues with anxiety and depressed mood.  Has been working through stressors and practicing radical acceptance.      Homework: Achieved / completed to satisfaction      Episode of Care Goals: Satisfactory progress - ACTION (Actively working towards change); Intervened by reinforcing change plan / affirming steps taken     Current / Ongoing Stressors and Concerns:   -Step-son let Veena and Star know that his mom  planned his graduation party at their home on the same day.  Veena and Star have had the date planned for months.  They did get some clarification on time which was helpful.    -Feeling good about the turn out forecast for the graduation party.    -Have been working on projects together with success.     -Kurtis Mcclendon and Sebas- Step sons.     -Star has been listening to the podcast.    -Anxious and avoidant attachment styles-  Veena feels she is anxious and Star feels he is avoidant- Continued    -Has struggled with PMDD for a number of years.  Has been working with chiropractic and message therapy on some holistic treatment options.  Has not been able to get in consistently - Continue     -Had dinner with Deborah and it went well and she is doing great living with the friend.    -Veena got upset that Star got his phone out during the session.        Treatment Objective(s) Addressed in This Session:  Safety planning / following safety plan   Patient will develop a plan to help manage PMDD  Patient will work on ways to communicate /patterns   Relational issues - Attachment styles.   Connection with daughter        Intervention:   Work on projects as a team and communicate ahead of time.   Star will continue to listen to the pod cast.   Feeling better about the plan for the joint graduation party.   Follow safety plan and use crisis resources.  Agrees to contract for safety today.   Use support system- Continue   Use some holistic approaches to health when able- Message and chiropractic.    Plan for dinner with Deborah once a week. Talk about engagement.    Veena asked that Star have his phone away during therapy and while they are in their room.          Assessments completed prior to visit:  The following assessments were completed by patient for this visit:  PHQ2:       4/17/2024    10:51 AM 2/28/2024    10:46 AM 2/14/2024     9:48 AM 1/10/2024    10:50 AM 11/29/2023     9:44 AM 10/5/2023    10:34 AM 9/19/2023     7:09 PM    PHQ-2 ( 1999 [employer])   Q1: Little interest or pleasure in doing things 1 3 1 1 3 0 1   Q2: Feeling down, depressed or hopeless 1 3 1 1 3 1 2   PHQ-2 Score 2 6 2 2 6 1 3   Q1: Little interest or pleasure in doing things Several days Nearly every day Several days Several days Nearly every day Not at all Several days   Q2: Feeling down, depressed or hopeless Several days Nearly every day Several days Several days Nearly every day Several days More than half the days   PHQ-2 Score 2 6 2 2 6 1 3     PHQ9:       2/2/2023    10:13 AM 6/1/2023    10:07 AM 7/17/2023    11:15 AM 9/19/2023     7:09 PM 11/29/2023     9:44 AM 1/3/2024    10:04 AM 2/28/2024    10:46 AM   PHQ-9 SCORE   PHQ-9 Total Score MyChart    15 (Moderately severe depression) 15 (Moderately severe depression) 17 (Moderately severe depression) 13 (Moderate depression)   PHQ-9 Total Score 12 15 16 15 15 17 13     GAD2:       9/7/2023     6:59 AM 9/19/2023     7:10 PM 10/5/2023    10:34 AM 11/29/2023     9:45 AM 1/10/2024    10:51 AM 2/28/2024    10:47 AM 4/10/2024    10:52 AM   YADI-2   Feeling nervous, anxious, or on edge 1 3 1 1 1 2 1   Not being able to stop or control worrying 1 3 1 1 1 1 1   YADI-2 Total Score 2 6 2 2 2 3 2     GAD7:       11/27/2022     5:03 AM 2/2/2023    10:13 AM 6/1/2023    10:07 AM 7/17/2023    11:15 AM 8/7/2023    11:04 AM 9/19/2023     7:10 PM 2/28/2024    10:47 AM   YADI-7 SCORE   Total Score 17 (severe anxiety)    5 (mild anxiety) 21 (severe anxiety) 10 (moderate anxiety)   Total Score 17 13 12 13 5 21 10     PROMIS 10-Global Health (all questions and answers displayed):       9/7/2023     7:00 AM 9/19/2023     7:11 PM 10/5/2023    10:35 AM 11/29/2023     9:46 AM 1/10/2024    10:52 AM 2/28/2024    10:48 AM 4/10/2024    10:53 AM   PROMIS 10   In general, would you say your health is: Good Good Good Fair Fair Good Good   In general, would you say your quality of life is: Good Fair Good Fair Good Good Fair   In general, how would you  rate your physical health? Good Fair Fair Fair Good Fair Good   In general, how would you rate your mental health, including your mood and your ability to think? Poor Fair Good Fair Fair Fair Fair   In general, how would you rate your satisfaction with your social activities and relationships? Fair Fair Good Fair Fair Fair Fair   In general, please rate how well you carry out your usual social activities and roles Poor Poor Fair Poor Fair Fair Fair   To what extent are you able to carry out your everyday physical activities such as walking, climbing stairs, carrying groceries, or moving a chair? Mostly Completely Mostly Mostly Completely Mostly Mostly   In the past 7 days, how often have you been bothered by emotional problems such as feeling anxious, depressed, or irritable? Often Often Sometimes Always Sometimes Often Often   In the past 7 days, how would you rate your fatigue on average? Moderate Severe Mild Moderate Moderate Moderate Moderate   In the past 7 days, how would you rate your pain on average, where 0 means no pain, and 10 means worst imaginable pain? 6 5 5 6 6 6 6   In general, would you say your health is: 3 3 3 2 2 3 3   In general, would you say your quality of life is: 3 2 3 2 3 3 2   In general, how would you rate your physical health? 3 2 2 2 3 2 3   In general, how would you rate your mental health, including your mood and your ability to think? 1 2 3 2 2 2 2   In general, how would you rate your satisfaction with your social activities and relationships? 2 2 3 2 2 2 2   In general, please rate how well you carry out your usual social activities and roles. (This includes activities at home, at work and in your community, and responsibilities as a parent, child, spouse, employee, friend, etc.) 1 1 2 1 2 2 2   To what extent are you able to carry out your everyday physical activities such as walking, climbing stairs, carrying groceries, or moving a chair? 4 5 4 4 5 4 4   In the past 7 days, how  often have you been bothered by emotional problems such as feeling anxious, depressed, or irritable? 4 4 3 5 3 4 4   In the past 7 days, how would you rate your fatigue on average? 3 4 2 3 3 3 3   In the past 7 days, how would you rate your pain on average, where 0 means no pain, and 10 means worst imaginable pain? 6 5 5 6 6 6 6   Global Mental Health Score 8 8 12 7 10 9 8   Global Physical Health Score 13 12 13 12 14 12 13   PROMIS TOTAL - SUBSCORES 21 20 25 19 24 21 21     [place] Suicide Severity Rating Scale (Lifetime/Recent)      8/3/2022     9:17 AM   [place] Suicide Severity Rating (Lifetime/Recent)   Q1 Wish to be Dead (Lifetime) N   Q2 Non-Specific Active Suicidal Thoughts (Lifetime) N   Actual Attempt (Lifetime) N   Has subject engaged in non-suicidal self-injurious behavior? (Lifetime) N   Calculated C-SSRS Risk Score (Lifetime/Recent) No Risk Indicated         ASSESSMENT: Current Emotional / Mental Status (status of significant symptoms):   Risk status (Self / Other harm or suicidal ideation)   Patient denies current fears or concerns for personal safety.   Patient denies current or recent suicidal ideation or behaviors.    Patient denies current or recent homicidal ideation or behaviors.   Patient denies current or recent self injurious behavior or ideation.   Patient denies other safety concerns.   Patient reports there has been no change in risk factors since their last session.     Patient reports there has been no change in protective factors since their last session.     A safety and risk management plan has been developed including: Patient consented to co-developed safety plan on 11-30-22.  Safety and risk management plan was reviewed.   Patient agreed to use safety plan should any safety concerns arise.  A copy was made available to the patient. Reviewed 4-24-24- completed new safety plan through [employer] link.    [employer] Safety Plan      Creation Date: 11/30/22 Last Update Date:  4/3/24      Step 1: Warning signs:    Warning Signs    flashbacks, thoughts about I dont matter, Loss, Worsening depression, isolation, cant stop crying, relationship problems    Medical concerns    Not feeling a part of something      Step 2: Internal coping strategies - Things I can do to take my mind off my problems without contacting another person:    Strategies    Distress tolerance, going for a walk, gardening, deep breathing, stretching, physical touch    Distraction techniiqes    Focus on helpful thoughts    Meditation      Step 3: People and social settings that provide distraction:    Name Contact Information    Star Spouse    Daughter Cell phone       Places    Movie theater, pet store, coffee shop      Step 4: People whom I can ask for help during a crisis:    Name Contact Information    Star Spouse/ cell phone and live in the home      Step 5: Professionals or agencies I can contact during a crisis:    Clinician/Agency Name Phone Emergency Contact    [facility] [phone]       [facility] Emergency Department Emergency Department Address Emergency Department Phone    [facility] [phone]       Suicide Prevention Lifeline Phone: Call or Text 140  Crisis Text Line: Text HOME to 602255     Step 6: Making the environment safer (plan for lethal means safety):   Remove items I could harm myself with     Optional: What is most important to me and worth living for?:   Family  Spending time with rupal Mayer Safety Plan. Eleonora Capone and Danny Carmona. Used with permission of the authors.          Name: Veena Parsons  YOB: 1985  Date: November 30, 2022   My primary care provider: Francisco Nails  [facility]   My prescriber: PCP  Other care team support:  Holistic medical provider    My Triggers:    Relational problems  Loss of mother recently  Financial stress      Additional People, Places, and Things that  I can access for support:   Spouse  Daughter          What is important to me and makes life worth living: Family         GREEN    Good Control  1. I feel good  2. No suicidal thoughts   3. Can work, sleep and play      Action Steps  1. Self-care: balanced meals, exercising, sleep practices, etc.  2. Take your medications as prescribed.  3. Continue meetings with therapist and prescriber.  4.  Do the healthy things that I enjoy.             YELLO Getting Worse  I have ANY of these:  1. I do not feel good  2. Difficulty Concentrating  3. Sleep is changing  4. Increase/Change in my thoughts to hurt self and/or others, but I can still manage and not act on it.   5. Not taking care of self.               Action Steps (in addition to the above):  1. Inform your therapist and psychiatric prescriber/PCP.  2. Keep taking your medications as prescribed.    3. Turn to people you can ask for help.  4. Use internal coping strategies -see below.  5. Create safe environment: Removing items I can hurt self with              RED  Get Help  If I have ANY of these:  1. Current and uncontrollable thoughts and/or behaviors to hurt self and/or others.   2. Crazy buzzing and spinning.     Actions to manage my safety  1. Contact your emergency person Star  2. Call or Text 497  3. Call my crisis team- [facility] [phone]  3. Or Call 261 or go to the emergency room right away        My Internal Coping Strategies include the following:  take a bath, belly breathing, arts and crafts, play with my pet, use my coping box, exercise and use my coping skills    [End for Brief Safety Plan]     Safety Concerns  How To Identify Situations That Make Your Mental Health Worse:  Triggers are things that make your mental health worse.  Look at the list below to help you find your triggers and what you can do about them.     1. Identify Early Warning Signs:    Sometimes symptoms return, even when people do their best to stay well. Symptoms can  develop over a short period of time with little or no warning, but most of the time they emerge gradually over several weeks.  Early warning signs are changes that people experience when a relapse is starting. Some early warning signs are common and others are not as common.   Common Early Warning Signs:    Feeling tense or nervous, Eating less or eating more, Trouble sleeping -either too much or too little sleep, Feeling depressed or low, Feeling irritable, Feeling like not being around other people, Trouble concentrating and Urges to harm self     2. Identify action steps to take when warning signs are noticed:    Taking Action- It is important to take action if you are experiencing early warning signs of a relapse.  The faster you act, the more likely it is that you can avoid a full relapse.  It is helpful to identify several specific ways to cope with symptoms.      The following is my list of symptoms and coping strategies that I can use when they are present:    Symptom Coping Strategies   Anxiety -Talk with someone in your support system and let him or her know how you are feeling.  -Use relaxation techniques such as deep breathing or imagery.  -Use positive affirmations to counteract negative self-talk such as  I am learning to let go of worry.    Depression - Schedule your day; include activities you have to do and activities you enjoy doing.  - Get some exercise - walk, run, bike, or swim.  - Give yourself credit for even the smallest things you get done.   Sleep Difficulties   - Go to sleep at the same time every day.  - Do something relaxing before bed, such as drinking herbal tea or listening to music.  - Avoid having discussions about upsetting topics before going to bed.   Delusions   - Distract yourself from the disturbing thought by doing something that requires your attention such as a puzzle.  - Check out your beliefs by talking to someone you trust.    Hallucinations   - Use headphones to listen  to music.  - Tell voices to  stop  or say to yourself,  I am safe.   - Ignore the hallucinations as much as possible; focus on other things.   Concentration Difficulties - Minimize distractions so there is only one thing for you to focus on at a time.    - Ask the person you are having a conversation with to slow down or repeat things you are unsure of.            Appearance:   Normal   Eye Contact:   Good   Psychomotor Behavior: Normal    Attitude:   Cooperative    Orientation:   All   Speech    Rate / Production: Normal     Volume:  Normal    Mood:    Anxious Upset    Affect:    Worrisome    Thought Content:  Clear    Thought Form:  Goal Directed  Logical    Insight:    Good      Medication Review:   No changes to current psychiatric medication(s)     Medication Compliance:   Yes     Changes in Health Issues:   None reported     Chemical Use Review:   Substance Use: Chemical use reviewed, no active concerns identified      Tobacco Use: No current tobacco use.      Diagnosis:  296.32 (F33.1) Major Depressive Disorder, Recurrent Episode, Moderate _ and With mixed features  300.01 (F41.0) Panic Disorder  300.02 (F41.1) Generalized Anxiety Disorder   PMDD      Collateral Reports Completed:   Not Applicable    PLAN: (Patient Tasks / Therapist Tasks / Other)  Client will continue to work on the following goals:  -Learn more about emotional validation- Star will continue with podcast.    -Work on projects together and communicate ahead of time.   -Establish some rules with the phone.    -Follow safety plan and use crisis resources- Has [employer] Carmona complete.     NATACHA Hernandez                                                         ______________________________________________________________________    Individual Treatment Plan    Patient's Name: Veena Parsons  YOB: 1985    Date of Creation: 9-7-22  Date Treatment Plan Last Reviewed/Revised: 1-30-24    DSM5 Diagnoses:  296.32 (F33.1) Major  Depressive Disorder, Recurrent Episode, Moderate _ and With mixed features  300.01 (F41.0) Panic Disorder  300.02 (F41.1) Generalized Anxiety Disorder   PMDD  Psychosocial / Contextual Factors: Some relational issues   PROMIS (reviewed every 90 days): 21    Referral / Collaboration:  Referral to another professional/service is not indicated at this time..    Anticipated number of session for this episode of care: 6-9 sessions  Anticipation frequency of session: Biweekly  Anticipated Duration of each session: 38-52 minutes  Treatment plan will be reviewed in 90 days or when goals have been changed.       MeasurableTreatment Goal(s) related to diagnosis / functional impairment(s)  Goal 1: Patient will address struggles with anxiety, depression and PMDD.    I will know I've met my goal when I am feeling less reactive through the month with the symptoms.      Objective #A (Patient Action)    Patient will work on establishing a concrete routine with self-care.  Status: Continued - Date(s): 1-30-24    Intervention(s)  Therapist will use CBT and motivational interviewing.      Objective #B  Patient will develop a plan to help manage PMDD  Status: Continued - Date(s): 1-30-24    Intervention(s)  Therapist will use solution focused therapy.    Objective #C  Patient will work on ways to communicate with narcissistic individuals.  Status: Continued - Date(s): 1-30-24    Intervention(s)  Therapist will teach communication skills.          Patient has reviewed and agreed to the above plan.      Lia Stiles, NATACHA  September 7, 2022

## 2024-05-08 ENCOUNTER — VIRTUAL VISIT (OUTPATIENT)
Dept: PSYCHOLOGY | Facility: CLINIC | Age: 39
End: 2024-05-08
Payer: COMMERCIAL

## 2024-05-08 DIAGNOSIS — F41.1 GENERALIZED ANXIETY DISORDER: ICD-10-CM

## 2024-05-08 DIAGNOSIS — F33.1 MAJOR DEPRESSIVE DISORDER, RECURRENT EPISODE, MODERATE (H): Primary | ICD-10-CM

## 2024-05-08 DIAGNOSIS — F41.0 PANIC DISORDER WITHOUT AGORAPHOBIA: ICD-10-CM

## 2024-05-08 PROCEDURE — 90834 PSYTX W PT 45 MINUTES: CPT | Mod: 95 | Performed by: MARRIAGE & FAMILY THERAPIST

## 2024-05-08 NOTE — PROGRESS NOTES
[facility]                                     Progress Note    Patient Name: Veena Parsons  Date: 5-8-24         Service Type: Individual      Session Start Time:  12pm   Session End Time:    1250pm     Session Length:  50min    Session #: 50    Attendees: Client     Service Modality:  Video     Telemedicine Visit: The patient's condition can be safely assessed and treated via synchronous audio and visual telemedicine encounter.      Reason for Telemedicine Visit: Patient has requested telehealth visit    Originating Site (Patient Location): Patient's home        Distant Location (provider location):  Off-site    Consent:  The patient/guardian has verbally consented to: the potential risks and benefits of telemedicine (video visit) versus in person care; bill my insurance or make self-payment for services provided; and responsibility for payment of non-covered services.     Mode of Communication:  Video Conference via [employer]    As the provider I attest to compliance with applicable laws and regulations related to telemedicine.      Treatment Plan- 5-8-24  CGI- 5-8-24  Phq-9 and YADI-7- See check in data  Promis- 4-10-24    DATA  Interactive Complexity: No  Crisis: No        Progress Since Last Session (Related to Symptoms / Goals / Homework):   There has been demonstrated improvement in functioning while patient has been engaged in psychotherapy/psychological service- if withdrawn the patient would deteriorate and/or relapse.      Symptoms: Client reports confronting issues with anxiety and depressed mood.  Has been working through stressors and relational issues.     Homework: Achieved / completed to satisfaction  Completed in session      Episode of Care Goals: Satisfactory progress - ACTION (Actively working towards change); Intervened by reinforcing change plan / affirming steps taken     Current / Ongoing Stressors and Concerns:   -Working on planning the graduation party.   Feeling she is doing a lot on her own and not a lot of cooperation from others.    -Feeling she is headed into a hard week with PMDD.   -Hakan, Kurtis and Sebas- Step sons.     -Feels there was a message from a young age that being stressed means you are accomplishing.     -Anxious and avoidant attachment styles-  Veena feels she is anxious and Star feels he is avoidant- Continued    -Has struggled with PMDD for a number of years.  Has been working with chiropractic and message therapy on some holistic treatment options.  Has not been able to get in consistently - Continue     -Getting the yard set up for more outdoor activities.     -Daughter got a superior art award at school.         Treatment Objective(s) Addressed in This Session:  Safety planning / following safety plan   Patient will develop a plan to help manage PMDD  Patient will work on ways to communicate /patterns   Relational issues - Attachment styles.   Connection with daughter        Intervention:   Work on projects as a team and communicate ahead of time. Let Star know she really needs him involved with some of the solidifying.    Star will listening to the pod cast while working.   Acknowledging strengths of party planning.   Use regulations skills this week.    Follow safety plan and use crisis resources.  Agrees to contract for safety today.   Use support system- Continue   Plan for dinner with Deborah once a week and have more outdoor activities ready to go when the kids are over.         Assessments completed prior to visit:  The following assessments were completed by patient for this visit:  PHQ2:       5/8/2024    11:42 AM 4/17/2024    10:51 AM 2/28/2024    10:46 AM 2/14/2024     9:48 AM 1/10/2024    10:50 AM 11/29/2023     9:44 AM 10/5/2023    10:34 AM   PHQ-2 ( 1999 [employer])   Q1: Little interest or pleasure in doing things 1 1 3 1 1 3 0   Q2: Feeling down, depressed or hopeless 1 1 3 1 1 3 1   PHQ-2 Score 2 2 6 2 2 6 1   Q1: Little interest or  pleasure in doing things Several days Several days Nearly every day Several days Several days Nearly every day Not at all   Q2: Feeling down, depressed or hopeless Several days Several days Nearly every day Several days Several days Nearly every day Several days   PHQ-2 Score 2 2 6 2 2 6 1     PHQ9:       2/2/2023    10:13 AM 6/1/2023    10:07 AM 7/17/2023    11:15 AM 9/19/2023     7:09 PM 11/29/2023     9:44 AM 1/3/2024    10:04 AM 2/28/2024    10:46 AM   PHQ-9 SCORE   PHQ-9 Total Score MyChart    15 (Moderately severe depression) 15 (Moderately severe depression) 17 (Moderately severe depression) 13 (Moderate depression)   PHQ-9 Total Score 12 15 16 15 15 17 13     GAD2:       9/7/2023     6:59 AM 9/19/2023     7:10 PM 10/5/2023    10:34 AM 11/29/2023     9:45 AM 1/10/2024    10:51 AM 2/28/2024    10:47 AM 4/10/2024    10:52 AM   YADI-2   Feeling nervous, anxious, or on edge 1 3 1 1 1 2 1   Not being able to stop or control worrying 1 3 1 1 1 1 1   YADI-2 Total Score 2 6 2 2 2 3 2     GAD7:       11/27/2022     5:03 AM 2/2/2023    10:13 AM 6/1/2023    10:07 AM 7/17/2023    11:15 AM 8/7/2023    11:04 AM 9/19/2023     7:10 PM 2/28/2024    10:47 AM   YADI-7 SCORE   Total Score 17 (severe anxiety)    5 (mild anxiety) 21 (severe anxiety) 10 (moderate anxiety)   Total Score 17 13 12 13 5 21 10     PROMIS 10-Global Health (all questions and answers displayed):       9/7/2023     7:00 AM 9/19/2023     7:11 PM 10/5/2023    10:35 AM 11/29/2023     9:46 AM 1/10/2024    10:52 AM 2/28/2024    10:48 AM 4/10/2024    10:53 AM   PROMIS 10   In general, would you say your health is: Good Good Good Fair Fair Good Good   In general, would you say your quality of life is: Good Fair Good Fair Good Good Fair   In general, how would you rate your physical health? Good Fair Fair Fair Good Fair Good   In general, how would you rate your mental health, including your mood and your ability to think? Poor Fair Good Fair Fair Fair Fair   In  general, how would you rate your satisfaction with your social activities and relationships? Fair Fair Good Fair Fair Fair Fair   In general, please rate how well you carry out your usual social activities and roles Poor Poor Fair Poor Fair Fair Fair   To what extent are you able to carry out your everyday physical activities such as walking, climbing stairs, carrying groceries, or moving a chair? Mostly Completely Mostly Mostly Completely Mostly Mostly   In the past 7 days, how often have you been bothered by emotional problems such as feeling anxious, depressed, or irritable? Often Often Sometimes Always Sometimes Often Often   In the past 7 days, how would you rate your fatigue on average? Moderate Severe Mild Moderate Moderate Moderate Moderate   In the past 7 days, how would you rate your pain on average, where 0 means no pain, and 10 means worst imaginable pain? 6 5 5 6 6 6 6   In general, would you say your health is: 3 3 3 2 2 3 3   In general, would you say your quality of life is: 3 2 3 2 3 3 2   In general, how would you rate your physical health? 3 2 2 2 3 2 3   In general, how would you rate your mental health, including your mood and your ability to think? 1 2 3 2 2 2 2   In general, how would you rate your satisfaction with your social activities and relationships? 2 2 3 2 2 2 2   In general, please rate how well you carry out your usual social activities and roles. (This includes activities at home, at work and in your community, and responsibilities as a parent, child, spouse, employee, friend, etc.) 1 1 2 1 2 2 2   To what extent are you able to carry out your everyday physical activities such as walking, climbing stairs, carrying groceries, or moving a chair? 4 5 4 4 5 4 4   In the past 7 days, how often have you been bothered by emotional problems such as feeling anxious, depressed, or irritable? 4 4 3 5 3 4 4   In the past 7 days, how would you rate your fatigue on average? 3 4 2 3 3 3 3   In  the past 7 days, how would you rate your pain on average, where 0 means no pain, and 10 means worst imaginable pain? 6 5 5 6 6 6 6   Global Mental Health Score 8 8 12 7 10 9 8   Global Physical Health Score 13 12 13 12 14 12 13   PROMIS TOTAL - SUBSCORES 21 20 25 19 24 21 21     Chappell Suicide Severity Rating Scale (Lifetime/Recent)      8/3/2022     9:17 AM   Chappell Suicide Severity Rating (Lifetime/Recent)   Q1 Wish to be Dead (Lifetime) N   Q2 Non-Specific Active Suicidal Thoughts (Lifetime) N   Actual Attempt (Lifetime) N   Has subject engaged in non-suicidal self-injurious behavior? (Lifetime) N   Calculated C-SSRS Risk Score (Lifetime/Recent) No Risk Indicated         ASSESSMENT: Current Emotional / Mental Status (status of significant symptoms):   Risk status (Self / Other harm or suicidal ideation)   Patient denies current fears or concerns for personal safety.   Patient denies current or recent suicidal ideation or behaviors.    Patient denies current or recent homicidal ideation or behaviors.   Patient denies current or recent self injurious behavior or ideation.   Patient denies other safety concerns.   Patient reports there has been no change in risk factors since their last session.     Patient reports there has been no change in protective factors since their last session.     A safety and risk management plan has been developed including: Patient consented to co-developed safety plan on 11-30-22.  Safety and risk management plan was reviewed.   Patient agreed to use safety plan should any safety concerns arise.  A copy was made available to the patient. Reviewed 5-8-24- completed new safety plan through [employer] Carmona link.    Leyla Safety Plan      Creation Date: 11/30/22 Last Update Date: 4/3/24      Step 1: Warning signs:    Warning Signs    flashbacks, thoughts about I dont matter, Loss, Worsening depression, isolation, cant stop crying, relationship problems    Medical concerns    Not  feeling a part of something      Step 2: Internal coping strategies - Things I can do to take my mind off my problems without contacting another person:    Strategies    Distress tolerance, going for a walk, gardening, deep breathing, stretching, physical touch    Distraction techniiqes    Focus on helpful thoughts    Meditation      Step 3: People and social settings that provide distraction:    Name Contact Information    Star Spouse    Daughter Cell phone       Places    Movie theater, pet store, coffee shop      Step 4: People whom I can ask for help during a crisis:    Name Contact Information    Star Spouse/ cell phone and live in the home      Step 5: Professionals or agencies I can contact during a crisis:    Clinician/Agency Name Phone Emergency Contact    [facility] [phone]       Local Emergency Department Emergency Department Address Emergency Department Phone    [facility] [phone]       Suicide Prevention Lifeline Phone: Call or Text 750  Crisis Text Line: Text HOME to 222621     Step 6: Making the environment safer (plan for lethal means safety):   Remove items I could harm myself with     Optional: What is most important to me and worth living for?:   Family  Spending time with daughter       Liborio[employer]ilario Safety Plan. Eleonora Capone and Danny Carmona. Used with permission of the authors.          Name: Veena Parsons  YOB: 1985  Date: November 30, 2022   My primary care provider: Francisco Nails  My primary care clinic: [facility]   My prescriber: PCP  Other care team support:  Holistic medical provider    My Triggers:    Relational problems  Loss of mother recently  Financial stress      Additional People, Places, and Things that I can access for support:   Spouse  Daughter          What is important to me and makes life worth living: Family         GREEN    Good Control  1. I feel good  2. No suicidal thoughts   3. Can work,  sleep and play      Action Steps  1. Self-care: balanced meals, exercising, sleep practices, etc.  2. Take your medications as prescribed.  3. Continue meetings with therapist and prescriber.  4.  Do the healthy things that I enjoy.             YELLO Getting Worse  I have ANY of these:  1. I do not feel good  2. Difficulty Concentrating  3. Sleep is changing  4. Increase/Change in my thoughts to hurt self and/or others, but I can still manage and not act on it.   5. Not taking care of self.               Action Steps (in addition to the above):  1. Inform your therapist and psychiatric prescriber/PCP.  2. Keep taking your medications as prescribed.    3. Turn to people you can ask for help.  4. Use internal coping strategies -see below.  5. Create safe environment: Removing items I can hurt self with              RED  Get Help  If I have ANY of these:  1. Current and uncontrollable thoughts and/or behaviors to hurt self and/or others.   2. Crazy buzzing and spinning.     Actions to manage my safety  1. Contact your emergency person Star  2. Call or Text 201  3. Call my crisis team- [facility] [phone]  3. Or Call 911 or go to the emergency room right away        My Internal Coping Strategies include the following:  take a bath, belly breathing, arts and crafts, play with my pet, use my coping box, exercise and use my coping skills    [End for Brief Safety Plan]     Safety Concerns  How To Identify Situations That Make Your Mental Health Worse:  Triggers are things that make your mental health worse.  Look at the list below to help you find your triggers and what you can do about them.     1. Identify Early Warning Signs:    Sometimes symptoms return, even when people do their best to stay well. Symptoms can develop over a short period of time with little or no warning, but most of the time they emerge gradually over several weeks.  Early warning signs are changes that people experience when a relapse is  starting. Some early warning signs are common and others are not as common.   Common Early Warning Signs:    Feeling tense or nervous, Eating less or eating more, Trouble sleeping -either too much or too little sleep, Feeling depressed or low, Feeling irritable, Feeling like not being around other people, Trouble concentrating and Urges to harm self     2. Identify action steps to take when warning signs are noticed:    Taking Action- It is important to take action if you are experiencing early warning signs of a relapse.  The faster you act, the more likely it is that you can avoid a full relapse.  It is helpful to identify several specific ways to cope with symptoms.      The following is my list of symptoms and coping strategies that I can use when they are present:    Symptom Coping Strategies   Anxiety -Talk with someone in your support system and let him or her know how you are feeling.  -Use relaxation techniques such as deep breathing or imagery.  -Use positive affirmations to counteract negative self-talk such as  I am learning to let go of worry.    Depression - Schedule your day; include activities you have to do and activities you enjoy doing.  - Get some exercise - walk, run, bike, or swim.  - Give yourself credit for even the smallest things you get done.   Sleep Difficulties   - Go to sleep at the same time every day.  - Do something relaxing before bed, such as drinking herbal tea or listening to music.  - Avoid having discussions about upsetting topics before going to bed.   Delusions   - Distract yourself from the disturbing thought by doing something that requires your attention such as a puzzle.  - Check out your beliefs by talking to someone you trust.    Hallucinations   - Use headphones to listen to music.  - Tell voices to  stop  or say to yourself,  I am safe.   - Ignore the hallucinations as much as possible; focus on other things.   Concentration Difficulties - Minimize distractions so  there is only one thing for you to focus on at a time.    - Ask the person you are having a conversation with to slow down or repeat things you are unsure of.            Appearance:   Normal   Eye Contact:   Good   Psychomotor Behavior: Normal    Attitude:   Cooperative    Orientation:   All   Speech    Rate / Production: Normal     Volume:  Normal    Mood:    Anxious    Affect:    Worrisome    Thought Content:  Clear    Thought Form:  Coherent  Goal Directed    Insight:    Good      Medication Review:   No changes to current psychiatric medication(s)     Medication Compliance:   Yes     Changes in Health Issues:   None reported     Chemical Use Review:   Substance Use: Chemical use reviewed, no active concerns identified      Tobacco Use: No current tobacco use.      Diagnosis:  296.32 (F33.1) Major Depressive Disorder, Recurrent Episode, Moderate _ and With mixed features  300.01 (F41.0) Panic Disorder  300.02 (F41.1) Generalized Anxiety Disorder   PMDD      Collateral Reports Completed:   Not Applicable    PLAN: (Patient Tasks / Therapist Tasks / Other)  Client will continue to work on the following goals:  -Learn more about emotional validation- Star will continue with podcast.    -Let Star know she needs more help with solidifying some plans.    -Follow safety plan and use crisis resources- Has Liborio Carmona complete.   -Use antihistamine and antacid during PMDD flare ups.      Lia Stiles, TANIAFT                                                         ______________________________________________________________________    Individual Treatment Plan    Patient's Name: Veena Parsons  YOB: 1985    Date of Creation: 9-7-22  Date Treatment Plan Last Reviewed/Revised: 5-8-24    DSM5 Diagnoses:  296.32 (F33.1) Major Depressive Disorder, Recurrent Episode, Moderate _ and With mixed features  300.01 (F41.0) Panic Disorder  300.02 (F41.1) Generalized Anxiety Disorder   PMDD  Psychosocial /  Contextual Factors: Some relational issues   PROMIS (reviewed every 90 days): 21    Referral / Collaboration:  Referral to another professional/service is not indicated at this time..    Anticipated number of session for this episode of care: 6-9 sessions  Anticipation frequency of session: Biweekly  Anticipated Duration of each session: 38-52 minutes  Treatment plan will be reviewed in 90 days or when goals have been changed.       MeasurableTreatment Goal(s) related to diagnosis / functional impairment(s)  Goal 1: Patient will address struggles with anxiety, depression and PMDD.    I will know I've met my goal when I am feeling less reactive through the month with the symptoms.      Objective #A (Patient Action)    Patient will work on establishing a concrete routine with self-care.  Status: Continued - Date(s): 5-8-24    Intervention(s)  Therapist will use CBT and motivational interviewing.      Objective #B  Patient will develop a plan to help manage PMDD  Status: Continued - Date(s): 5-8-24    Intervention(s)  Therapist will use solution focused therapy.    Objective #C  Patient will work on ways to communicate with narcissistic individuals.  Status: Continued - Date(s): 5-8-24    Intervention(s)  Therapist will teach communication skills.          Patient has reviewed and agreed to the above plan.      Lia Stiles, NATACHA  September 7, 2022

## 2024-05-15 ENCOUNTER — VIRTUAL VISIT (OUTPATIENT)
Dept: PSYCHOLOGY | Facility: CLINIC | Age: 39
End: 2024-05-15
Payer: COMMERCIAL

## 2024-05-15 DIAGNOSIS — F41.0 PANIC DISORDER WITHOUT AGORAPHOBIA: ICD-10-CM

## 2024-05-15 DIAGNOSIS — F33.1 MAJOR DEPRESSIVE DISORDER, RECURRENT EPISODE, MODERATE (H): Primary | ICD-10-CM

## 2024-05-15 DIAGNOSIS — F41.1 GENERALIZED ANXIETY DISORDER: ICD-10-CM

## 2024-05-15 PROCEDURE — 90834 PSYTX W PT 45 MINUTES: CPT | Mod: 95 | Performed by: MARRIAGE & FAMILY THERAPIST

## 2024-05-15 NOTE — PROGRESS NOTES
[facility]                                     Progress Note    Patient Name: Veena Parsons  Date: 5-15-24         Service Type: Individual      Session Start Time:  420pm   Session End Time:    5pm     Session Length:  40min    Session #: 51    Attendees: Client     Service Modality:  Video     Telemedicine Visit: The patient's condition can be safely assessed and treated via synchronous audio and visual telemedicine encounter.      Reason for Telemedicine Visit: Patient has requested telehealth visit    Originating Site (Patient Location): Patient's home        Distant Location (provider location):  Off-site    Consent:  The patient/guardian has verbally consented to: the potential risks and benefits of telemedicine (video visit) versus in person care; bill my insurance or make self-payment for services provided; and responsibility for payment of non-covered services.     Mode of Communication:  Video Conference via [employer]    As the provider I attest to compliance with applicable laws and regulations related to telemedicine.      Treatment Plan- 5-8-24  CGI- 5-8-24  Phq-9 and YADI-7- See check in data  Promis- 4-10-24    DATA  Interactive Complexity: No  Crisis: No        Progress Since Last Session (Related to Symptoms / Goals / Homework):   There has been demonstrated improvement in functioning while patient has been engaged in psychotherapy/psychological service- if withdrawn the patient would deteriorate and/or relapse.      Symptoms: Client reports confronting issues with anxiety and depressed mood.  Had a relational issue that blew up last week.      Homework: Achieved / completed to satisfaction  Completed in session      Episode of Care Goals: Satisfactory progress - ACTION (Actively working towards change); Intervened by reinforcing change plan / affirming steps taken     Current / Ongoing Stressors and Concerns:   -Working on planning the graduation party.  Feeling she is  doing a lot on her own and not a lot of cooperation from others- Continued    -Had a blow up with Star last week tied to responsibilities in the home.    -Hakan, Kurtis and Sebas- Step sons.   -Feels there was a message from a young age that being stressed means you are accomplishing- Continued    -Anxious and avoidant attachment styles-  Veena feels she is anxious and Star feels he is avoidant- Continued    -Making good progress on outside landscaping.    -Struggling with PMDD for a number of years.    -Star will justify his behavior on someone's reaction.         Treatment Objective(s) Addressed in This Session:  Safety planning / following safety plan   Patient will develop a plan to help manage PMDD  Patient will work on ways to communicate /patterns   Relational issues - Attachment styles.   Connection with daughter        Intervention:   Work on projects as a team and communicate ahead of time. Let Star know she really needs him involved with some of the solidifying- Continued   Processed through some of the challenges with tasks at home and communication.   Star will attend the next session.    Follow safety plan and use crisis resources.  Agrees to contract for safety today.   Use support system- Continue   Plan for one on one time with Deborah.     Assessments completed prior to visit:  The following assessments were completed by patient for this visit:  PHQ2:       5/8/2024    11:42 AM 4/17/2024    10:51 AM 2/28/2024    10:46 AM 2/14/2024     9:48 AM 1/10/2024    10:50 AM 11/29/2023     9:44 AM 10/5/2023    10:34 AM   PHQ-2 ( 1999 [employer])   Q1: Little interest or pleasure in doing things 1 1 3 1 1 3 0   Q2: Feeling down, depressed or hopeless 1 1 3 1 1 3 1   PHQ-2 Score 2 2 6 2 2 6 1   Q1: Little interest or pleasure in doing things Several days Several days Nearly every day Several days Several days Nearly every day Not at all   Q2: Feeling down, depressed or hopeless Several days Several days Nearly every day  Several days Several days Nearly every day Several days   PHQ-2 Score 2 2 6 2 2 6 1     PHQ9:       2/2/2023    10:13 AM 6/1/2023    10:07 AM 7/17/2023    11:15 AM 9/19/2023     7:09 PM 11/29/2023     9:44 AM 1/3/2024    10:04 AM 2/28/2024    10:46 AM   PHQ-9 SCORE   PHQ-9 Total Score MyChart    15 (Moderately severe depression) 15 (Moderately severe depression) 17 (Moderately severe depression) 13 (Moderate depression)   PHQ-9 Total Score 12 15 16 15 15 17 13     GAD2:       9/7/2023     6:59 AM 9/19/2023     7:10 PM 10/5/2023    10:34 AM 11/29/2023     9:45 AM 1/10/2024    10:51 AM 2/28/2024    10:47 AM 4/10/2024    10:52 AM   YADI-2   Feeling nervous, anxious, or on edge 1 3 1 1 1 2 1   Not being able to stop or control worrying 1 3 1 1 1 1 1   YADI-2 Total Score 2 6 2 2 2 3 2     GAD7:       11/27/2022     5:03 AM 2/2/2023    10:13 AM 6/1/2023    10:07 AM 7/17/2023    11:15 AM 8/7/2023    11:04 AM 9/19/2023     7:10 PM 2/28/2024    10:47 AM   YADI-7 SCORE   Total Score 17 (severe anxiety)    5 (mild anxiety) 21 (severe anxiety) 10 (moderate anxiety)   Total Score 17 13 12 13 5 21 10     PROMIS 10-Global Health (all questions and answers displayed):       9/7/2023     7:00 AM 9/19/2023     7:11 PM 10/5/2023    10:35 AM 11/29/2023     9:46 AM 1/10/2024    10:52 AM 2/28/2024    10:48 AM 4/10/2024    10:53 AM   PROMIS 10   In general, would you say your health is: Good Good Good Fair Fair Good Good   In general, would you say your quality of life is: Good Fair Good Fair Good Good Fair   In general, how would you rate your physical health? Good Fair Fair Fair Good Fair Good   In general, how would you rate your mental health, including your mood and your ability to think? Poor Fair Good Fair Fair Fair Fair   In general, how would you rate your satisfaction with your social activities and relationships? Fair Fair Good Fair Fair Fair Fair   In general, please rate how well you carry out your usual social activities and  roles Poor Poor Fair Poor Fair Fair Fair   To what extent are you able to carry out your everyday physical activities such as walking, climbing stairs, carrying groceries, or moving a chair? Mostly Completely Mostly Mostly Completely Mostly Mostly   In the past 7 days, how often have you been bothered by emotional problems such as feeling anxious, depressed, or irritable? Often Often Sometimes Always Sometimes Often Often   In the past 7 days, how would you rate your fatigue on average? Moderate Severe Mild Moderate Moderate Moderate Moderate   In the past 7 days, how would you rate your pain on average, where 0 means no pain, and 10 means worst imaginable pain? 6 5 5 6 6 6 6   In general, would you say your health is: 3 3 3 2 2 3 3   In general, would you say your quality of life is: 3 2 3 2 3 3 2   In general, how would you rate your physical health? 3 2 2 2 3 2 3   In general, how would you rate your mental health, including your mood and your ability to think? 1 2 3 2 2 2 2   In general, how would you rate your satisfaction with your social activities and relationships? 2 2 3 2 2 2 2   In general, please rate how well you carry out your usual social activities and roles. (This includes activities at home, at work and in your community, and responsibilities as a parent, child, spouse, employee, friend, etc.) 1 1 2 1 2 2 2   To what extent are you able to carry out your everyday physical activities such as walking, climbing stairs, carrying groceries, or moving a chair? 4 5 4 4 5 4 4   In the past 7 days, how often have you been bothered by emotional problems such as feeling anxious, depressed, or irritable? 4 4 3 5 3 4 4   In the past 7 days, how would you rate your fatigue on average? 3 4 2 3 3 3 3   In the past 7 days, how would you rate your pain on average, where 0 means no pain, and 10 means worst imaginable pain? 6 5 5 6 6 6 6   Global Mental Health Score 8 8 12 7 10 9 8   Global Physical Health Score 13 12  13 12 14 12 13   PROMIS TOTAL - SUBSCORES 21 20 25 19 24 21 21     [place] Suicide Severity Rating Scale (Lifetime/Recent)      8/3/2022     9:17 AM   [place] Suicide Severity Rating (Lifetime/Recent)   Q1 Wish to be Dead (Lifetime) N   Q2 Non-Specific Active Suicidal Thoughts (Lifetime) N   Actual Attempt (Lifetime) N   Has subject engaged in non-suicidal self-injurious behavior? (Lifetime) N   Calculated C-SSRS Risk Score (Lifetime/Recent) No Risk Indicated         ASSESSMENT: Current Emotional / Mental Status (status of significant symptoms):   Risk status (Self / Other harm or suicidal ideation)   Patient denies current fears or concerns for personal safety.   Patient denies current or recent suicidal ideation or behaviors.    Patient denies current or recent homicidal ideation or behaviors.   Patient denies current or recent self injurious behavior or ideation.   Patient denies other safety concerns.   Patient reports there has been no change in risk factors since their last session.     Patient reports there has been no change in protective factors since their last session.     A safety and risk management plan has been developed including: Patient consented to co-developed safety plan on 11-30-22.  Safety and risk management plan was reviewed.   Patient agreed to use safety plan should any safety concerns arise.  A copy was made available to the patient. Reviewed 5-15-24- completed new safety plan through Liborio Carmona link.    Leyla Safety Plan      Creation Date: 11/30/22 Last Update Date: 4/3/24      Step 1: Warning signs:    Warning Signs    flashbacks, thoughts about I dont matter, Loss, Worsening depression, isolation, cant stop crying, relationship problems    Medical concerns    Not feeling a part of something      Step 2: Internal coping strategies - Things I can do to take my mind off my problems without contacting another person:    Strategies    Distress tolerance, going for a walk,  gardening, deep breathing, stretching, physical touch    Distraction techniiqes    Focus on helpful thoughts    Meditation      Step 3: People and social settings that provide distraction:    Name Contact Information    Star Spouse    Daughter Cell phone       Places    Movie theater, pet store, coffee shop      Step 4: People whom I can ask for help during a crisis:    Name Contact Information    Star Spouse/ cell phone and live in the home      Step 5: Professionals or agencies I can contact during a crisis:    Clinician/Agency Name Phone Emergency Contact    [facility] [phone]       Local Emergency Department Emergency Department Address Emergency Department Phone    [facility] [phone]       Suicide Prevention Lifeline Phone: Call or Text 762  Crisis Text Line: Text HOME to 024973     Step 6: Making the environment safer (plan for lethal means safety):   Remove items I could harm myself with     Optional: What is most important to me and worth living for?:   Family  Spending time with daughter       Leyla Safety Plan. Eleonora Capone and Danny Carmona. Used with permission of the authors.          Name: Veena Parsons  YOB: 1985  Date: November 30, 2022   My primary care provider: Francisco Nails  My primary care clinic: [facility]   My prescriber: PCP  Other care team support:  Holistic medical provider    My Triggers:    Relational problems  Loss of mother recently  Financial stress      Additional People, Places, and Things that I can access for support:   Spouse  Daughter          What is important to me and makes life worth living: Family         GREEN    Good Control  1. I feel good  2. No suicidal thoughts   3. Can work, sleep and play      Action Steps  1. Self-care: balanced meals, exercising, sleep practices, etc.  2. Take your medications as prescribed.  3. Continue meetings with therapist and prescriber.  4.  Do the  healthy things that I enjoy.             YELLO Getting Worse  I have ANY of these:  1. I do not feel good  2. Difficulty Concentrating  3. Sleep is changing  4. Increase/Change in my thoughts to hurt self and/or others, but I can still manage and not act on it.   5. Not taking care of self.               Action Steps (in addition to the above):  1. Inform your therapist and psychiatric prescriber/PCP.  2. Keep taking your medications as prescribed.    3. Turn to people you can ask for help.  4. Use internal coping strategies -see below.  5. Create safe environment: Removing items I can hurt self with              RED  Get Help  If I have ANY of these:  1. Current and uncontrollable thoughts and/or behaviors to hurt self and/or others.   2. Crazy buzzing and spinning.     Actions to manage my safety  1. Contact your emergency person Star  2. Call or Text 519  3. Call my crisis team- [facility] [phone]  3. Or Call [phone] or go to the emergency room right away        My Internal Coping Strategies include the following:  take a bath, belly breathing, arts and crafts, play with my pet, use my coping box, exercise and use my coping skills    [End for Brief Safety Plan]     Safety Concerns  How To Identify Situations That Make Your Mental Health Worse:  Triggers are things that make your mental health worse.  Look at the list below to help you find your triggers and what you can do about them.     1. Identify Early Warning Signs:    Sometimes symptoms return, even when people do their best to stay well. Symptoms can develop over a short period of time with little or no warning, but most of the time they emerge gradually over several weeks.  Early warning signs are changes that people experience when a relapse is starting. Some early warning signs are common and others are not as common.   Common Early Warning Signs:    Feeling tense or nervous, Eating less or eating more, Trouble sleeping -either too much or too  little sleep, Feeling depressed or low, Feeling irritable, Feeling like not being around other people, Trouble concentrating and Urges to harm self     2. Identify action steps to take when warning signs are noticed:    Taking Action- It is important to take action if you are experiencing early warning signs of a relapse.  The faster you act, the more likely it is that you can avoid a full relapse.  It is helpful to identify several specific ways to cope with symptoms.      The following is my list of symptoms and coping strategies that I can use when they are present:    Symptom Coping Strategies   Anxiety -Talk with someone in your support system and let him or her know how you are feeling.  -Use relaxation techniques such as deep breathing or imagery.  -Use positive affirmations to counteract negative self-talk such as  I am learning to let go of worry.    Depression - Schedule your day; include activities you have to do and activities you enjoy doing.  - Get some exercise - walk, run, bike, or swim.  - Give yourself credit for even the smallest things you get done.   Sleep Difficulties   - Go to sleep at the same time every day.  - Do something relaxing before bed, such as drinking herbal tea or listening to music.  - Avoid having discussions about upsetting topics before going to bed.   Delusions   - Distract yourself from the disturbing thought by doing something that requires your attention such as a puzzle.  - Check out your beliefs by talking to someone you trust.    Hallucinations   - Use headphones to listen to music.  - Tell voices to  stop  or say to yourself,  I am safe.   - Ignore the hallucinations as much as possible; focus on other things.   Concentration Difficulties - Minimize distractions so there is only one thing for you to focus on at a time.    - Ask the person you are having a conversation with to slow down or repeat things you are unsure of.            Appearance:   Normal   Eye  Contact:   Good   Psychomotor Behavior: Normal    Attitude:   Cooperative    Orientation:   All   Speech    Rate / Production: Normal     Volume:  Normal    Mood:    Anxious Depressed    Affect:    Worrisome    Thought Content:  Clear    Thought Form:  Goal Directed  Logical    Insight:    Good      Medication Review:   No changes to current psychiatric medication(s)     Medication Compliance:   Yes     Changes in Health Issues:   None reported     Chemical Use Review:   Substance Use: Chemical use reviewed, no active concerns identified      Tobacco Use: No current tobacco use.      Diagnosis:  296.32 (F33.1) Major Depressive Disorder, Recurrent Episode, Moderate _ and With mixed features  300.01 (F41.0) Panic Disorder  300.02 (F41.1) Generalized Anxiety Disorder   PMDD      Collateral Reports Completed:   Not Applicable    PLAN: (Patient Tasks / Therapist Tasks / Other)  Client will continue to work on the following goals:  -Learn more about emotional validation- Star will continue with podcast.    -Have Star attend the next session.   -Let Star know she needs more help with solidifying some plans.    -Follow safety plan and use crisis resources- Has Liborio Carmona complete.   -Use antihistamine and antacid during PMDD flare ups.    -Have a conversation with Star about what sparked the flare up    NATACHA Hernandez                                                         ______________________________________________________________________    Individual Treatment Plan    Patient's Name: Veena Parsons  YOB: 1985    Date of Creation: 9-7-22  Date Treatment Plan Last Reviewed/Revised: 5-8-24    DSM5 Diagnoses:  296.32 (F33.1) Major Depressive Disorder, Recurrent Episode, Moderate _ and With mixed features  300.01 (F41.0) Panic Disorder  300.02 (F41.1) Generalized Anxiety Disorder   PMDD  Psychosocial / Contextual Factors: Some relational issues   PROMIS (reviewed every 90 days): 21    Referral  / Collaboration:  Referral to another professional/service is not indicated at this time..    Anticipated number of session for this episode of care: 6-9 sessions  Anticipation frequency of session: Biweekly  Anticipated Duration of each session: 38-52 minutes  Treatment plan will be reviewed in 90 days or when goals have been changed.       MeasurableTreatment Goal(s) related to diagnosis / functional impairment(s)  Goal 1: Patient will address struggles with anxiety, depression and PMDD.    I will know I've met my goal when I am feeling less reactive through the month with the symptoms.      Objective #A (Patient Action)    Patient will work on establishing a concrete routine with self-care.  Status: Continued - Date(s): 5-8-24    Intervention(s)  Therapist will use CBT and motivational interviewing.      Objective #B  Patient will develop a plan to help manage PMDD  Status: Continued - Date(s): 5-8-24    Intervention(s)  Therapist will use solution focused therapy.    Objective #C  Patient will work on ways to communicate with narcissistic individuals.  Status: Continued - Date(s): 5-8-24    Intervention(s)  Therapist will teach communication skills.          Patient has reviewed and agreed to the above plan.      Lia Stiles, NATACHA  September 7, 2022

## 2024-05-22 ENCOUNTER — VIRTUAL VISIT (OUTPATIENT)
Dept: PSYCHOLOGY | Facility: CLINIC | Age: 39
End: 2024-05-22
Payer: COMMERCIAL

## 2024-05-22 DIAGNOSIS — F41.0 PANIC DISORDER WITHOUT AGORAPHOBIA: ICD-10-CM

## 2024-05-22 DIAGNOSIS — F33.1 MAJOR DEPRESSIVE DISORDER, RECURRENT EPISODE, MODERATE (H): Primary | ICD-10-CM

## 2024-05-22 DIAGNOSIS — F41.1 GENERALIZED ANXIETY DISORDER: ICD-10-CM

## 2024-05-22 PROCEDURE — 90834 PSYTX W PT 45 MINUTES: CPT | Mod: 95 | Performed by: MARRIAGE & FAMILY THERAPIST

## 2024-05-22 NOTE — PROGRESS NOTES
[facility]                                     Progress Note    Patient Name: Veena Parsons  Date: 5-22-24         Service Type: Individual      Session Start Time:  11am   Session End Time:   1150am     Session Length:  50min    Session #: 52    Attendees: Client and spouse Satr    Service Modality:  Video     Telemedicine Visit: The patient's condition can be safely assessed and treated via synchronous audio and visual telemedicine encounter.      Reason for Telemedicine Visit: Patient has requested telehealth visit    Originating Site (Patient Location): Patient's home        Distant Location (provider location):  Off-site    Consent:  The patient/guardian has verbally consented to: the potential risks and benefits of telemedicine (video visit) versus in person care; bill my insurance or make self-payment for services provided; and responsibility for payment of non-covered services.     Mode of Communication:  Video Conference via [employer]    As the provider I attest to compliance with applicable laws and regulations related to telemedicine.      Treatment Plan- 5-8-24  CGI- 5-8-24  Phq-9 and YADI-7- See check in data  Promis- 4-10-24    DATA  Interactive Complexity: No  Crisis: No        Progress Since Last Session (Related to Symptoms / Goals / Homework):   There has been demonstrated improvement in functioning while patient has been engaged in psychotherapy/psychological service- if withdrawn the patient would deteriorate and/or relapse.      Symptoms: Client reports confronting issues with anxiety and depressed mood.  Continued focus this week on relational issues.      Homework: Partially completed  Completed in session      Episode of Care Goals: Satisfactory progress - ACTION (Actively working towards change); Intervened by reinforcing change plan / affirming steps taken     Current / Ongoing Stressors and Concerns:   -Working on planning the graduation party. Making progress     -Star states that Veena did a francisco house clean when she was upset with him and took away all the personal items.    -Hakan, Kurtis and Sebas- Step sons.   -Star is working on being more present and in the moment with Veena.     -Anxious and avoidant attachment styles-  Veena feels she is anxious and Star feels he is avoidant- Continued   -Struggling with PMDD for a number of years. Going into a hard week.    -Star feels Veena will not let things go even after he apologizes.     -Talked about positive couple time and lack there of right now.        Treatment Objective(s) Addressed in This Session:  Safety planning / following safety plan   Patient will develop a plan to help manage PMDD  Patient will work on ways to communicate /patterns   Relational issues - Attachment styles.       Intervention:   Work on projects as a team and communicate ahead of time.   Star will work on being present in the moment.   Find snippets of quality time.   Processed through some of the challenges with tasks at home and communication.   Continue to have Star join some sessions.    Follow safety plan and use crisis resources.  Agrees to contract for safety today.   Use support system- Continue   Encourage some interaction with friends.      Assessments completed prior to visit:  The following assessments were completed by patient for this visit:  PHQ2:       5/22/2024     9:58 AM 5/8/2024    11:42 AM 4/17/2024    10:51 AM 2/28/2024    10:46 AM 2/14/2024     9:48 AM 1/10/2024    10:50 AM 11/29/2023     9:44 AM   PHQ-2 ( 1999 [employer])   Q1: Little interest or pleasure in doing things 1 1 1 3 1 1 3   Q2: Feeling down, depressed or hopeless 1 1 1 3 1 1 3   PHQ-2 Score 2 2 2 6 2 2 6   Q1: Little interest or pleasure in doing things Several days Several days Several days Nearly every day Several days Several days Nearly every day   Q2: Feeling down, depressed or hopeless Several days Several days Several days Nearly every day Several days Several  days Nearly every day   PHQ-2 Score 2 2 2 6 2 2 6     PHQ9:       2/2/2023    10:13 AM 6/1/2023    10:07 AM 7/17/2023    11:15 AM 9/19/2023     7:09 PM 11/29/2023     9:44 AM 1/3/2024    10:04 AM 2/28/2024    10:46 AM   PHQ-9 SCORE   PHQ-9 Total Score MyChart    15 (Moderately severe depression) 15 (Moderately severe depression) 17 (Moderately severe depression) 13 (Moderate depression)   PHQ-9 Total Score 12 15 16 15 15 17 13     GAD2:       9/7/2023     6:59 AM 9/19/2023     7:10 PM 10/5/2023    10:34 AM 11/29/2023     9:45 AM 1/10/2024    10:51 AM 2/28/2024    10:47 AM 4/10/2024    10:52 AM   YADI-2   Feeling nervous, anxious, or on edge 1 3 1 1 1 2 1   Not being able to stop or control worrying 1 3 1 1 1 1 1   YADI-2 Total Score 2 6 2 2 2 3 2     GAD7:       11/27/2022     5:03 AM 2/2/2023    10:13 AM 6/1/2023    10:07 AM 7/17/2023    11:15 AM 8/7/2023    11:04 AM 9/19/2023     7:10 PM 2/28/2024    10:47 AM   YADI-7 SCORE   Total Score 17 (severe anxiety)    5 (mild anxiety) 21 (severe anxiety) 10 (moderate anxiety)   Total Score 17 13 12 13 5 21 10     PROMIS 10-Global Health (all questions and answers displayed):       9/7/2023     7:00 AM 9/19/2023     7:11 PM 10/5/2023    10:35 AM 11/29/2023     9:46 AM 1/10/2024    10:52 AM 2/28/2024    10:48 AM 4/10/2024    10:53 AM   PROMIS 10   In general, would you say your health is: Good Good Good Fair Fair Good Good   In general, would you say your quality of life is: Good Fair Good Fair Good Good Fair   In general, how would you rate your physical health? Good Fair Fair Fair Good Fair Good   In general, how would you rate your mental health, including your mood and your ability to think? Poor Fair Good Fair Fair Fair Fair   In general, how would you rate your satisfaction with your social activities and relationships? Fair Fair Good Fair Fair Fair Fair   In general, please rate how well you carry out your usual social activities and roles Poor Poor Fair Poor Fair Fair  Fair   To what extent are you able to carry out your everyday physical activities such as walking, climbing stairs, carrying groceries, or moving a chair? Mostly Completely Mostly Mostly Completely Mostly Mostly   In the past 7 days, how often have you been bothered by emotional problems such as feeling anxious, depressed, or irritable? Often Often Sometimes Always Sometimes Often Often   In the past 7 days, how would you rate your fatigue on average? Moderate Severe Mild Moderate Moderate Moderate Moderate   In the past 7 days, how would you rate your pain on average, where 0 means no pain, and 10 means worst imaginable pain? 6 5 5 6 6 6 6   In general, would you say your health is: 3 3 3 2 2 3 3   In general, would you say your quality of life is: 3 2 3 2 3 3 2   In general, how would you rate your physical health? 3 2 2 2 3 2 3   In general, how would you rate your mental health, including your mood and your ability to think? 1 2 3 2 2 2 2   In general, how would you rate your satisfaction with your social activities and relationships? 2 2 3 2 2 2 2   In general, please rate how well you carry out your usual social activities and roles. (This includes activities at home, at work and in your community, and responsibilities as a parent, child, spouse, employee, friend, etc.) 1 1 2 1 2 2 2   To what extent are you able to carry out your everyday physical activities such as walking, climbing stairs, carrying groceries, or moving a chair? 4 5 4 4 5 4 4   In the past 7 days, how often have you been bothered by emotional problems such as feeling anxious, depressed, or irritable? 4 4 3 5 3 4 4   In the past 7 days, how would you rate your fatigue on average? 3 4 2 3 3 3 3   In the past 7 days, how would you rate your pain on average, where 0 means no pain, and 10 means worst imaginable pain? 6 5 5 6 6 6 6   Global Mental Health Score 8 8 12 7 10 9 8   Global Physical Health Score 13 12 13 12 14 12 13   PROMIS TOTAL -  SUBSCORES 21 20 25 19 24 21 21     [place] Suicide Severity Rating Scale (Lifetime/Recent)      8/3/2022     9:17 AM   [place] Suicide Severity Rating (Lifetime/Recent)   Q1 Wish to be Dead (Lifetime) N   Q2 Non-Specific Active Suicidal Thoughts (Lifetime) N   Actual Attempt (Lifetime) N   Has subject engaged in non-suicidal self-injurious behavior? (Lifetime) N   Calculated C-SSRS Risk Score (Lifetime/Recent) No Risk Indicated         ASSESSMENT: Current Emotional / Mental Status (status of significant symptoms):   Risk status (Self / Other harm or suicidal ideation)   Patient denies current fears or concerns for personal safety.   Patient denies current or recent suicidal ideation or behaviors.    Patient denies current or recent homicidal ideation or behaviors.   Patient denies current or recent self injurious behavior or ideation.   Patient denies other safety concerns.   Patient reports there has been no change in risk factors since their last session.     Patient reports there has been no change in protective factors since their last session.     A safety and risk management plan has been developed including: Patient consented to co-developed safety plan on 11-30-22.  Safety and risk management plan was reviewed.   Patient agreed to use safety plan should any safety concerns arise.  A copy was made available to the patient. Reviewed 5-22-24- completed new safety plan through Lib[employer] Carmona link.    [employer] Safety Plan      Creation Date: 11/30/22 Last Update Date: 4/3/24      Step 1: Warning signs:    Warning Signs    flashbacks, thoughts about I dont matter, Loss, Worsening depression, isolation, cant stop crying, relationship problems    Medical concerns    Not feeling a part of something      Step 2: Internal coping strategies - Things I can do to take my mind off my problems without contacting another person:    Strategies    Distress tolerance, going for a walk, gardening, deep breathing,  stretching, physical touch    Distraction techniiqes    Focus on helpful thoughts    Meditation      Step 3: People and social settings that provide distraction:    Name Contact Information    Star Spouse    Daughter Cell phone       Places    Movie theater, pet store, coffee shop      Step 4: People whom I can ask for help during a crisis:    Name Contact Information    Star Spouse/ cell phone and live in the home      Step 5: Professionals or agencies I can contact during a crisis:    Clinician/Agency Name Phone Emergency Contact    [facility] [phone]       Local Emergency Department Emergency Department Address Emergency Department Phone    [facility] [phone]       Suicide Prevention Lifeline Phone: Call or Text 803  Crisis Text Line: Text HOME to 850133     Step 6: Making the environment safer (plan for lethal means safety):   Remove items I could harm myself with     Optional: What is most important to me and worth living for?:   Family  Spending time with daughter       [employer] Safety Plan. Eleonora Capone and Danny Carmona. Used with permission of the authors.          Name: Veena Parsons  YOB: 1985  Date: November 30, 2022   My primary care provider: Francisco Nails  My primary care clinic: [facility]   My prescriber: PCP  Other care team support:  Holistic medical provider    My Triggers:    Relational problems  Loss of mother recently  Financial stress      Additional People, Places, and Things that I can access for support:   Spouse  Daughter          What is important to me and makes life worth living: Family         GREEN    Good Control  1. I feel good  2. No suicidal thoughts   3. Can work, sleep and play      Action Steps  1. Self-care: balanced meals, exercising, sleep practices, etc.  2. Take your medications as prescribed.  3. Continue meetings with therapist and prescriber.  4.  Do the healthy things that I enjoy.              YELLO Getting Worse  I have ANY of these:  1. I do not feel good  2. Difficulty Concentrating  3. Sleep is changing  4. Increase/Change in my thoughts to hurt self and/or others, but I can still manage and not act on it.   5. Not taking care of self.               Action Steps (in addition to the above):  1. Inform your therapist and psychiatric prescriber/PCP.  2. Keep taking your medications as prescribed.    3. Turn to people you can ask for help.  4. Use internal coping strategies -see below.  5. Create safe environment: Removing items I can hurt self with              RED  Get Help  If I have ANY of these:  1. Current and uncontrollable thoughts and/or behaviors to hurt self and/or others.   2. Crazy buzzing and spinning.     Actions to manage my safety  1. Contact your emergency person Star  2. Call or Text [phone]  3. Call my crisis team- [facility] [phone]  3. Or Call 601 or go to the emergency room right away        My Internal Coping Strategies include the following:  take a bath, belly breathing, arts and crafts, play with my pet, use my coping box, exercise and use my coping skills    [End for Brief Safety Plan]     Safety Concerns  How To Identify Situations That Make Your Mental Health Worse:  Triggers are things that make your mental health worse.  Look at the list below to help you find your triggers and what you can do about them.     1. Identify Early Warning Signs:    Sometimes symptoms return, even when people do their best to stay well. Symptoms can develop over a short period of time with little or no warning, but most of the time they emerge gradually over several weeks.  Early warning signs are changes that people experience when a relapse is starting. Some early warning signs are common and others are not as common.   Common Early Warning Signs:    Feeling tense or nervous, Eating less or eating more, Trouble sleeping -either too much or too little sleep, Feeling depressed or  low, Feeling irritable, Feeling like not being around other people, Trouble concentrating and Urges to harm self     2. Identify action steps to take when warning signs are noticed:    Taking Action- It is important to take action if you are experiencing early warning signs of a relapse.  The faster you act, the more likely it is that you can avoid a full relapse.  It is helpful to identify several specific ways to cope with symptoms.      The following is my list of symptoms and coping strategies that I can use when they are present:    Symptom Coping Strategies   Anxiety -Talk with someone in your support system and let him or her know how you are feeling.  -Use relaxation techniques such as deep breathing or imagery.  -Use positive affirmations to counteract negative self-talk such as  I am learning to let go of worry.    Depression - Schedule your day; include activities you have to do and activities you enjoy doing.  - Get some exercise - walk, run, bike, or swim.  - Give yourself credit for even the smallest things you get done.   Sleep Difficulties   - Go to sleep at the same time every day.  - Do something relaxing before bed, such as drinking herbal tea or listening to music.  - Avoid having discussions about upsetting topics before going to bed.   Delusions   - Distract yourself from the disturbing thought by doing something that requires your attention such as a puzzle.  - Check out your beliefs by talking to someone you trust.    Hallucinations   - Use headphones to listen to music.  - Tell voices to  stop  or say to yourself,  I am safe.   - Ignore the hallucinations as much as possible; focus on other things.   Concentration Difficulties - Minimize distractions so there is only one thing for you to focus on at a time.    - Ask the person you are having a conversation with to slow down or repeat things you are unsure of.            Appearance:   Normal   Eye Contact:   Good   Psychomotor  Behavior: Normal    Attitude:   Cooperative    Orientation:   All   Speech    Rate / Production: Normal/ Responsive Normal     Volume:  Normal    Mood:    Anxious Depressed    Affect:    Worrisome    Thought Content:  Clear    Thought Form:  Goal Directed    Insight:    Good      Medication Review:   No changes to current psychiatric medication(s)     Medication Compliance:   Yes     Changes in Health Issues:   None reported     Chemical Use Review:   Substance Use: Chemical use reviewed, no active concerns identified      Tobacco Use: No current tobacco use.      Diagnosis:  296.32 (F33.1) Major Depressive Disorder, Recurrent Episode, Moderate _ and With mixed features  300.01 (F41.0) Panic Disorder  300.02 (F41.1) Generalized Anxiety Disorder   PMDD      Collateral Reports Completed:   Not Applicable    PLAN: (Patient Tasks / Therapist Tasks / Other)  Client will continue to work on the following goals:  -Learn more about emotional validation- Star will continue with podcast- Continued   -Star will attend some sessions.   -Focus on being in the present.    -Follow safety plan and use crisis resources- Has Liborio Carmona complete.   -Use antihistamine and antacid during PMDD flare ups- Continued   -Find snippets of quality time.     NATACHA Hernandez                                                         ______________________________________________________________________    Individual Treatment Plan    Patient's Name: Veena Parsons  YOB: 1985    Date of Creation: 9-7-22  Date Treatment Plan Last Reviewed/Revised: 5-8-24    DSM5 Diagnoses:  296.32 (F33.1) Major Depressive Disorder, Recurrent Episode, Moderate _ and With mixed features  300.01 (F41.0) Panic Disorder  300.02 (F41.1) Generalized Anxiety Disorder   PMDD  Psychosocial / Contextual Factors: Some relational issues   PROMIS (reviewed every 90 days): 21    Referral / Collaboration:  Referral to another professional/service is not  indicated at this time..    Anticipated number of session for this episode of care: 6-9 sessions  Anticipation frequency of session: Biweekly  Anticipated Duration of each session: 38-52 minutes  Treatment plan will be reviewed in 90 days or when goals have been changed.       MeasurableTreatment Goal(s) related to diagnosis / functional impairment(s)  Goal 1: Patient will address struggles with anxiety, depression and PMDD.    I will know I've met my goal when I am feeling less reactive through the month with the symptoms.      Objective #A (Patient Action)    Patient will work on establishing a concrete routine with self-care.  Status: Continued - Date(s): 5-8-24    Intervention(s)  Therapist will use CBT and motivational interviewing.      Objective #B  Patient will develop a plan to help manage PMDD  Status: Continued - Date(s): 5-8-24    Intervention(s)  Therapist will use solution focused therapy.    Objective #C  Patient will work on ways to communicate with narcissistic individuals.  Status: Continued - Date(s): 5-8-24    Intervention(s)  Therapist will teach communication skills.          Patient has reviewed and agreed to the above plan.      Lia Stiles, NATACHA  September 7, 2022

## 2024-06-05 ENCOUNTER — VIRTUAL VISIT (OUTPATIENT)
Dept: PSYCHOLOGY | Facility: CLINIC | Age: 39
End: 2024-06-05
Payer: COMMERCIAL

## 2024-06-05 DIAGNOSIS — F41.0 PANIC DISORDER WITHOUT AGORAPHOBIA: ICD-10-CM

## 2024-06-05 DIAGNOSIS — F33.1 MAJOR DEPRESSIVE DISORDER, RECURRENT EPISODE, MODERATE (H): Primary | ICD-10-CM

## 2024-06-05 DIAGNOSIS — F41.1 GENERALIZED ANXIETY DISORDER: ICD-10-CM

## 2024-06-05 PROCEDURE — 90834 PSYTX W PT 45 MINUTES: CPT | Mod: 95 | Performed by: MARRIAGE & FAMILY THERAPIST

## 2024-06-05 NOTE — PROGRESS NOTES
[facility]                                     Progress Note    Patient Name: Veena Parsons  Date: 6-5-24         Service Type: Individual      Session Start Time:  12pm   Session End Time:   1250pm     Session Length:  50min    Session #: 53    Attendees: Client and spouse Star    Service Modality:  Video     Telemedicine Visit: The patient's condition can be safely assessed and treated via synchronous audio and visual telemedicine encounter.      Reason for Telemedicine Visit: Patient has requested telehealth visit    Originating Site (Patient Location): Patient's home        Distant Location (provider location):  Off-site    Consent:  The patient/guardian has verbally consented to: the potential risks and benefits of telemedicine (video visit) versus in person care; bill my insurance or make self-payment for services provided; and responsibility for payment of non-covered services.     Mode of Communication:  Video Conference via [employer]    As the provider I attest to compliance with applicable laws and regulations related to telemedicine.      Treatment Plan- 5-8-24  CGI- 5-8-24  Phq-9 and YADI-7- See check in data  Promis- 4-10-24    DATA  Interactive Complexity: No  Crisis: No        Progress Since Last Session (Related to Symptoms / Goals / Homework):   There has been demonstrated improvement in functioning while patient has been engaged in psychotherapy/psychological service- if withdrawn the patient would deteriorate and/or relapse.      Symptoms: Client reports confronting issues with anxiety and depressed mood.  Finalizing plans for the graduation party.     Homework: Partially completed  Completed in session      Episode of Care Goals: Satisfactory progress - ACTION (Actively working towards change); Intervened by reinforcing change plan / affirming steps taken     Current / Ongoing Stressors and Concerns:   -Ahead of plans for the graduation party and really making good  "progress.    -Working well with Star on the outside projects.     -Kurtis Mcclendon and Sebas- Step sons.   -Star is working on being more present and in the moment with Veena- continued   -Anxious and avoidant attachment styles-  Veena feels she is anxious and Star feels he is avoidant- Continue   -Acknowledges being proud of self for all the time and effort put in with the party.    -Veena feels spouse struggles with emotional connection and she does not feel it will change a whole lot.  Veena feels she is not interested in physical connection if this is not in place.        Treatment Objective(s) Addressed in This Session:  Safety planning / following safety plan   Patient will develop a plan to help manage PMDD  Patient will work on ways to communicate /patterns   Relational issues - Attachment styles.       Intervention:   Work on projects as a team and communicate ahead of time.   Star will work on being present in the moment.   Continue to practice further communication when needed and further explanation.   Follow safety plan and use crisis resources.  Agrees to contract for safety today.   Use support system- Continue   Explored the concept of matching energy.    Use some video tutorials on emotional connection.  \"[employer] Podcast.\"    Assessments completed prior to visit:  The following assessments were completed by patient for this visit:  PHQ2:       5/22/2024     9:58 AM 5/8/2024    11:42 AM 4/17/2024    10:51 AM 2/28/2024    10:46 AM 2/14/2024     9:48 AM 1/10/2024    10:50 AM 11/29/2023     9:44 AM   PHQ-2 ( 1999 [employer])   Q1: Little interest or pleasure in doing things 1 1 1 3 1 1 3   Q2: Feeling down, depressed or hopeless 1 1 1 3 1 1 3   PHQ-2 Score 2 2 2 6 2 2 6   Q1: Little interest or pleasure in doing things Several days Several days Several days Nearly every day Several days Several days Nearly every day   Q2: Feeling down, depressed or hopeless Several days Several days Several days Nearly " every day Several days Several days Nearly every day   PHQ-2 Score 2 2 2 6 2 2 6     PHQ9:       2/2/2023    10:13 AM 6/1/2023    10:07 AM 7/17/2023    11:15 AM 9/19/2023     7:09 PM 11/29/2023     9:44 AM 1/3/2024    10:04 AM 2/28/2024    10:46 AM   PHQ-9 SCORE   PHQ-9 Total Score MyChart    15 (Moderately severe depression) 15 (Moderately severe depression) 17 (Moderately severe depression) 13 (Moderate depression)   PHQ-9 Total Score 12 15 16 15 15 17 13     GAD2:       9/7/2023     6:59 AM 9/19/2023     7:10 PM 10/5/2023    10:34 AM 11/29/2023     9:45 AM 1/10/2024    10:51 AM 2/28/2024    10:47 AM 4/10/2024    10:52 AM   YADI-2   Feeling nervous, anxious, or on edge 1 3 1 1 1 2 1   Not being able to stop or control worrying 1 3 1 1 1 1 1   YADI-2 Total Score 2 6 2 2 2 3 2     GAD7:       11/27/2022     5:03 AM 2/2/2023    10:13 AM 6/1/2023    10:07 AM 7/17/2023    11:15 AM 8/7/2023    11:04 AM 9/19/2023     7:10 PM 2/28/2024    10:47 AM   YADI-7 SCORE   Total Score 17 (severe anxiety)    5 (mild anxiety) 21 (severe anxiety) 10 (moderate anxiety)   Total Score 17 13 12 13 5 21 10     PROMIS 10-Global Health (all questions and answers displayed):       9/7/2023     7:00 AM 9/19/2023     7:11 PM 10/5/2023    10:35 AM 11/29/2023     9:46 AM 1/10/2024    10:52 AM 2/28/2024    10:48 AM 4/10/2024    10:53 AM   PROMIS 10   In general, would you say your health is: Good Good Good Fair Fair Good Good   In general, would you say your quality of life is: Good Fair Good Fair Good Good Fair   In general, how would you rate your physical health? Good Fair Fair Fair Good Fair Good   In general, how would you rate your mental health, including your mood and your ability to think? Poor Fair Good Fair Fair Fair Fair   In general, how would you rate your satisfaction with your social activities and relationships? Fair Fair Good Fair Fair Fair Fair   In general, please rate how well you carry out your usual social activities and roles  Poor Poor Fair Poor Fair Fair Fair   To what extent are you able to carry out your everyday physical activities such as walking, climbing stairs, carrying groceries, or moving a chair? Mostly Completely Mostly Mostly Completely Mostly Mostly   In the past 7 days, how often have you been bothered by emotional problems such as feeling anxious, depressed, or irritable? Often Often Sometimes Always Sometimes Often Often   In the past 7 days, how would you rate your fatigue on average? Moderate Severe Mild Moderate Moderate Moderate Moderate   In the past 7 days, how would you rate your pain on average, where 0 means no pain, and 10 means worst imaginable pain? 6 5 5 6 6 6 6   In general, would you say your health is: 3 3 3 2 2 3 3   In general, would you say your quality of life is: 3 2 3 2 3 3 2   In general, how would you rate your physical health? 3 2 2 2 3 2 3   In general, how would you rate your mental health, including your mood and your ability to think? 1 2 3 2 2 2 2   In general, how would you rate your satisfaction with your social activities and relationships? 2 2 3 2 2 2 2   In general, please rate how well you carry out your usual social activities and roles. (This includes activities at home, at work and in your community, and responsibilities as a parent, child, spouse, employee, friend, etc.) 1 1 2 1 2 2 2   To what extent are you able to carry out your everyday physical activities such as walking, climbing stairs, carrying groceries, or moving a chair? 4 5 4 4 5 4 4   In the past 7 days, how often have you been bothered by emotional problems such as feeling anxious, depressed, or irritable? 4 4 3 5 3 4 4   In the past 7 days, how would you rate your fatigue on average? 3 4 2 3 3 3 3   In the past 7 days, how would you rate your pain on average, where 0 means no pain, and 10 means worst imaginable pain? 6 5 5 6 6 6 6   Global Mental Health Score 8 8 12 7 10 9 8   Global Physical Health Score 13 12 13 12  14 12 13   PROMIS TOTAL - SUBSCORES 21 20 25 19 24 21 21     [place] Suicide Severity Rating Scale (Lifetime/Recent)      8/3/2022     9:17 AM   [place] Suicide Severity Rating (Lifetime/Recent)   Q1 Wish to be Dead (Lifetime) N   Q2 Non-Specific Active Suicidal Thoughts (Lifetime) N   Actual Attempt (Lifetime) N   Has subject engaged in non-suicidal self-injurious behavior? (Lifetime) N   Calculated C-SSRS Risk Score (Lifetime/Recent) No Risk Indicated         ASSESSMENT: Current Emotional / Mental Status (status of significant symptoms):   Risk status (Self / Other harm or suicidal ideation)   Patient denies current fears or concerns for personal safety.   Patient denies current or recent suicidal ideation or behaviors.    Patient denies current or recent homicidal ideation or behaviors.   Patient denies current or recent self injurious behavior or ideation.   Patient denies other safety concerns.   Patient reports there has been no change in risk factors since their last session.     Patient reports there has been no change in protective factors since their last session.     A safety and risk management plan has been developed including: Patient consented to co-developed safety plan on 11-30-22.  Safety and risk management plan was reviewed.   Patient agreed to use safety plan should any safety concerns arise.  A copy was made available to the patient. Reviewed 6-5-24- completed new safety plan through [employer] link.    [employer] Safety Plan      Creation Date: 11/30/22 Last Update Date: 4/3/24      Step 1: Warning signs:    Warning Signs    flashbacks, thoughts about I dont matter, Loss, Worsening depression, isolation, cant stop crying, relationship problems    Medical concerns    Not feeling a part of something      Step 2: Internal coping strategies - Things I can do to take my mind off my problems without contacting another person:    Strategies    Distress tolerance, going for a walk, gardening,  deep breathing, stretching, physical touch    Distraction techniiqes    Focus on helpful thoughts    Meditation      Step 3: People and social settings that provide distraction:    Name Contact Information    Star Spouse    Daughter Cell phone       Places    Movie theater, pet store, coffee shop      Step 4: People whom I can ask for help during a crisis:    Name Contact Information    Star Spouse/ cell phone and live in the home      Step 5: Professionals or agencies I can contact during a crisis:    Clinician/Agency Name Phone Emergency Contact    [facility] [phone]       Local Emergency Department Emergency Department Address Emergency Department Phone    [facility] [phone]       Suicide Prevention Lifeline Phone: Call or Text 665  Crisis Text Line: Text HOME to 674944     Step 6: Making the environment safer (plan for lethal means safety):   Remove items I could harm myself with     Optional: What is most important to me and worth living for?:   Family  Spending time with daughter       Leyla Safety Plan. Eleonora Capone and Danny Carmona. Used with permission of the authors.          Name: Veena Parsons  YOB: 1985  Date: November 30, 2022   My primary care provider: Francisco Nails  My primary care clinic: [facility]   My prescriber: PCP  Other care team support:  Holistic medical provider    My Triggers:    Relational problems  Loss of mother recently  Financial stress      Additional People, Places, and Things that I can access for support:   Spouse  Daughter          What is important to me and makes life worth living: Family         GREEN    Good Control  1. I feel good  2. No suicidal thoughts   3. Can work, sleep and play      Action Steps  1. Self-care: balanced meals, exercising, sleep practices, etc.  2. Take your medications as prescribed.  3. Continue meetings with therapist and prescriber.  4.  Do the healthy things  that I enjoy.             YELLO Getting Worse  I have ANY of these:  1. I do not feel good  2. Difficulty Concentrating  3. Sleep is changing  4. Increase/Change in my thoughts to hurt self and/or others, but I can still manage and not act on it.   5. Not taking care of self.               Action Steps (in addition to the above):  1. Inform your therapist and psychiatric prescriber/PCP.  2. Keep taking your medications as prescribed.    3. Turn to people you can ask for help.  4. Use internal coping strategies -see below.  5. Create safe environment: Removing items I can hurt self with              RED  Get Help  If I have ANY of these:  1. Current and uncontrollable thoughts and/or behaviors to hurt self and/or others.   2. Crazy buzzing and spinning.     Actions to manage my safety  1. Contact your emergency person Star  2. Call or Text [phone]  3. Call my crisis team- [facility] [phone]  3. Or Call 911 or go to the emergency room right away        My Internal Coping Strategies include the following:  take a bath, belly breathing, arts and crafts, play with my pet, use my coping box, exercise and use my coping skills    [End for Brief Safety Plan]     Safety Concerns  How To Identify Situations That Make Your Mental Health Worse:  Triggers are things that make your mental health worse.  Look at the list below to help you find your triggers and what you can do about them.     1. Identify Early Warning Signs:    Sometimes symptoms return, even when people do their best to stay well. Symptoms can develop over a short period of time with little or no warning, but most of the time they emerge gradually over several weeks.  Early warning signs are changes that people experience when a relapse is starting. Some early warning signs are common and others are not as common.   Common Early Warning Signs:    Feeling tense or nervous, Eating less or eating more, Trouble sleeping -either too much or too little sleep,  Feeling depressed or low, Feeling irritable, Feeling like not being around other people, Trouble concentrating and Urges to harm self     2. Identify action steps to take when warning signs are noticed:    Taking Action- It is important to take action if you are experiencing early warning signs of a relapse.  The faster you act, the more likely it is that you can avoid a full relapse.  It is helpful to identify several specific ways to cope with symptoms.      The following is my list of symptoms and coping strategies that I can use when they are present:    Symptom Coping Strategies   Anxiety -Talk with someone in your support system and let him or her know how you are feeling.  -Use relaxation techniques such as deep breathing or imagery.  -Use positive affirmations to counteract negative self-talk such as  I am learning to let go of worry.    Depression - Schedule your day; include activities you have to do and activities you enjoy doing.  - Get some exercise - walk, run, bike, or swim.  - Give yourself credit for even the smallest things you get done.   Sleep Difficulties   - Go to sleep at the same time every day.  - Do something relaxing before bed, such as drinking herbal tea or listening to music.  - Avoid having discussions about upsetting topics before going to bed.   Delusions   - Distract yourself from the disturbing thought by doing something that requires your attention such as a puzzle.  - Check out your beliefs by talking to someone you trust.    Hallucinations   - Use headphones to listen to music.  - Tell voices to  stop  or say to yourself,  I am safe.   - Ignore the hallucinations as much as possible; focus on other things.   Concentration Difficulties - Minimize distractions so there is only one thing for you to focus on at a time.    - Ask the person you are having a conversation with to slow down or repeat things you are unsure of.            Appearance:   Normal   Eye  "Contact:   Good   Psychomotor Behavior: Normal    Attitude:   Cooperative    Orientation:   All   Speech    Rate / Production: Normal/ Responsive Normal     Volume:  Normal    Mood:    Anxious     Affect:    Worrisome    Thought Content:  Clear    Thought Form:  Coherent  Goal Directed    Insight:    Good      Medication Review:   No changes to current psychiatric medication(s)     Medication Compliance:   Yes     Changes in Health Issues:   None reported     Chemical Use Review:   Substance Use: Chemical use reviewed, no active concerns identified      Tobacco Use: No current tobacco use.      Diagnosis:  296.32 (F33.1) Major Depressive Disorder, Recurrent Episode, Moderate _ and With mixed features  300.01 (F41.0) Panic Disorder  300.02 (F41.1) Generalized Anxiety Disorder   PMDD      Collateral Reports Completed:   Not Applicable    PLAN: (Patient Tasks / Therapist Tasks / Other)  Client will continue to work on the following goals:  -Learn more about emotional validation- Star will continue with podcast and follow the \"[employer]or.\"   -Star will attend some sessions.   -Focus on being in the present.    -Follow safety plan and use crisis resources- Has [employer]ona complete.   -Use antihistamine and antacid during PMDD flare ups- Continued   -Massena back to night time routine and quality time.        NATACHA Hernandez                                                         ______________________________________________________________________    Individual Treatment Plan    Patient's Name: Veena Parsons  YOB: 1985    Date of Creation: 9-7-22  Date Treatment Plan Last Reviewed/Revised: 5-8-24    DSM5 Diagnoses:  296.32 (F33.1) Major Depressive Disorder, Recurrent Episode, Moderate _ and With mixed features  300.01 (F41.0) Panic Disorder  300.02 (F41.1) Generalized Anxiety Disorder   PMDD  Psychosocial / Contextual Factors: Some relational issues   PROMIS (reviewed every 90 days): " 21    Referral / Collaboration:  Referral to another professional/service is not indicated at this time..    Anticipated number of session for this episode of care: 6-9 sessions  Anticipation frequency of session: Biweekly  Anticipated Duration of each session: 38-52 minutes  Treatment plan will be reviewed in 90 days or when goals have been changed.       MeasurableTreatment Goal(s) related to diagnosis / functional impairment(s)  Goal 1: Patient will address struggles with anxiety, depression and PMDD.    I will know I've met my goal when I am feeling less reactive through the month with the symptoms.      Objective #A (Patient Action)    Patient will work on establishing a concrete routine with self-care.  Status: Continued - Date(s): 5-8-24    Intervention(s)  Therapist will use CBT and motivational interviewing.      Objective #B  Patient will develop a plan to help manage PMDD  Status: Continued - Date(s): 5-8-24    Intervention(s)  Therapist will use solution focused therapy.    Objective #C  Patient will work on ways to communicate with narcissistic individuals.  Status: Continued - Date(s): 5-8-24    Intervention(s)  Therapist will teach communication skills.          Patient has reviewed and agreed to the above plan.      Lia Stiles, NATACHA  September 7, 2022

## 2024-06-12 ENCOUNTER — VIRTUAL VISIT (OUTPATIENT)
Dept: PSYCHOLOGY | Facility: CLINIC | Age: 39
End: 2024-06-12
Payer: COMMERCIAL

## 2024-06-12 DIAGNOSIS — F41.0 PANIC DISORDER WITHOUT AGORAPHOBIA: ICD-10-CM

## 2024-06-12 DIAGNOSIS — F33.1 MAJOR DEPRESSIVE DISORDER, RECURRENT EPISODE, MODERATE (H): Primary | ICD-10-CM

## 2024-06-12 DIAGNOSIS — F41.1 GENERALIZED ANXIETY DISORDER: ICD-10-CM

## 2024-06-12 PROCEDURE — 90834 PSYTX W PT 45 MINUTES: CPT | Mod: 95 | Performed by: MARRIAGE & FAMILY THERAPIST

## 2024-06-12 NOTE — PROGRESS NOTES
[facility]                                     Progress Note    Patient Name: Veena Parsons  Date: 6-12-24         Service Type: Individual      Session Start Time:  11am   Session End Time:   1150am     Session Length:  50min    Session #: 54    Attendees: Client and spouse Star    Service Modality:  Video     Telemedicine Visit: The patient's condition can be safely assessed and treated via synchronous audio and visual telemedicine encounter.      Reason for Telemedicine Visit: Patient has requested telehealth visit    Originating Site (Patient Location): Patient's home        Distant Location (provider location):  Off-site    Consent:  The patient/guardian has verbally consented to: the potential risks and benefits of telemedicine (video visit) versus in person care; bill my insurance or make self-payment for services provided; and responsibility for payment of non-covered services.     Mode of Communication:  Video Conference via [place]    As the provider I attest to compliance with applicable laws and regulations related to telemedicine.      Treatment Plan- 5-8-24  CGI- 5-8-24  Phq-9 and YADI-7- See check in data  Promis- 4-10-24    DATA  Interactive Complexity: No  Crisis: No        Progress Since Last Session (Related to Symptoms / Goals / Homework):   There has been demonstrated improvement in functioning while patient has been engaged in psychotherapy/psychological service- if withdrawn the patient would deteriorate and/or relapse.      Symptoms: Client reports confronting issues with anxiety and depressed mood.  Graduation party this weekend and some concerns about the weather.     Homework: Partially completed  Completed in session      Episode of Care Goals: Satisfactory progress - ACTION (Actively working towards change); Intervened by reinforcing change plan / affirming steps taken     Current / Ongoing Stressors and Concerns:   -Finalizing all plans for the graduation  "party.     -Wrapping up outside projects.    -Kurtis Mcclendon and Sebas- Step sons.   -Star is working on being more present and in the moment with Veena- continued   -Anxious and avoidant attachment styles-  Veena feels she is anxious and Star feels he is avoidant- Continue   -Putting effort into self care and highlighting features.    -Working on emotional connection and physical connection with Star.    -Daughter is doing well with her roommate and is happy in this environment. She is dating and this is going well.        Treatment Objective(s) Addressed in This Session:  Safety planning / following safety plan   Patient will develop a plan to help manage PMDD  Patient will work on ways to communicate /patterns   Relational issues - Attachment styles.       Intervention:   Put extra effort into positive communication this week preparing for the party.    Star will work on being present in the moment.   Celebrate both kids this weekend and add the special touches.   Follow safety plan and use crisis resources.  Agrees to contract for safety today.   Use support system- Continue   Use some video tutorials on emotional connection.  \"[employer] Podcast.\"-Continued    Acknowledging that Star needs some decompression time when he gets home.     Assessments completed prior to visit:  The following assessments were completed by patient for this visit:  PHQ2:       5/22/2024     9:58 AM 5/8/2024    11:42 AM 4/17/2024    10:51 AM 2/28/2024    10:46 AM 2/14/2024     9:48 AM 1/10/2024    10:50 AM 11/29/2023     9:44 AM   PHQ-2 ( 1999 [employer])   Q1: Little interest or pleasure in doing things 1 1 1 3 1 1 3   Q2: Feeling down, depressed or hopeless 1 1 1 3 1 1 3   PHQ-2 Score 2 2 2 6 2 2 6   Q1: Little interest or pleasure in doing things Several days Several days Several days Nearly every day Several days Several days Nearly every day   Q2: Feeling down, depressed or hopeless Several days Several days Several days Nearly every " day Several days Several days Nearly every day   PHQ-2 Score 2 2 2 6 2 2 6     PHQ9:       2/2/2023    10:13 AM 6/1/2023    10:07 AM 7/17/2023    11:15 AM 9/19/2023     7:09 PM 11/29/2023     9:44 AM 1/3/2024    10:04 AM 2/28/2024    10:46 AM   PHQ-9 SCORE   PHQ-9 Total Score MyChart    15 (Moderately severe depression) 15 (Moderately severe depression) 17 (Moderately severe depression) 13 (Moderate depression)   PHQ-9 Total Score 12 15 16 15 15 17 13     GAD2:       9/7/2023     6:59 AM 9/19/2023     7:10 PM 10/5/2023    10:34 AM 11/29/2023     9:45 AM 1/10/2024    10:51 AM 2/28/2024    10:47 AM 4/10/2024    10:52 AM   YADI-2   Feeling nervous, anxious, or on edge 1 3 1 1 1 2 1   Not being able to stop or control worrying 1 3 1 1 1 1 1   YADI-2 Total Score 2 6 2 2 2 3 2     GAD7:       11/27/2022     5:03 AM 2/2/2023    10:13 AM 6/1/2023    10:07 AM 7/17/2023    11:15 AM 8/7/2023    11:04 AM 9/19/2023     7:10 PM 2/28/2024    10:47 AM   YADI-7 SCORE   Total Score 17 (severe anxiety)    5 (mild anxiety) 21 (severe anxiety) 10 (moderate anxiety)   Total Score 17 13 12 13 5 21 10     PROMIS 10-Global Health (all questions and answers displayed):       9/7/2023     7:00 AM 9/19/2023     7:11 PM 10/5/2023    10:35 AM 11/29/2023     9:46 AM 1/10/2024    10:52 AM 2/28/2024    10:48 AM 4/10/2024    10:53 AM   PROMIS 10   In general, would you say your health is: Good Good Good Fair Fair Good Good   In general, would you say your quality of life is: Good Fair Good Fair Good Good Fair   In general, how would you rate your physical health? Good Fair Fair Fair Good Fair Good   In general, how would you rate your mental health, including your mood and your ability to think? Poor Fair Good Fair Fair Fair Fair   In general, how would you rate your satisfaction with your social activities and relationships? Fair Fair Good Fair Fair Fair Fair   In general, please rate how well you carry out your usual social activities and roles Poor  Poor Fair Poor Fair Fair Fair   To what extent are you able to carry out your everyday physical activities such as walking, climbing stairs, carrying groceries, or moving a chair? Mostly Completely Mostly Mostly Completely Mostly Mostly   In the past 7 days, how often have you been bothered by emotional problems such as feeling anxious, depressed, or irritable? Often Often Sometimes Always Sometimes Often Often   In the past 7 days, how would you rate your fatigue on average? Moderate Severe Mild Moderate Moderate Moderate Moderate   In the past 7 days, how would you rate your pain on average, where 0 means no pain, and 10 means worst imaginable pain? 6 5 5 6 6 6 6   In general, would you say your health is: 3 3 3 2 2 3 3   In general, would you say your quality of life is: 3 2 3 2 3 3 2   In general, how would you rate your physical health? 3 2 2 2 3 2 3   In general, how would you rate your mental health, including your mood and your ability to think? 1 2 3 2 2 2 2   In general, how would you rate your satisfaction with your social activities and relationships? 2 2 3 2 2 2 2   In general, please rate how well you carry out your usual social activities and roles. (This includes activities at home, at work and in your community, and responsibilities as a parent, child, spouse, employee, friend, etc.) 1 1 2 1 2 2 2   To what extent are you able to carry out your everyday physical activities such as walking, climbing stairs, carrying groceries, or moving a chair? 4 5 4 4 5 4 4   In the past 7 days, how often have you been bothered by emotional problems such as feeling anxious, depressed, or irritable? 4 4 3 5 3 4 4   In the past 7 days, how would you rate your fatigue on average? 3 4 2 3 3 3 3   In the past 7 days, how would you rate your pain on average, where 0 means no pain, and 10 means worst imaginable pain? 6 5 5 6 6 6 6   Global Mental Health Score 8 8 12 7 10 9 8   Global Physical Health Score 13 12 13 12 14  12 13   PROMIS TOTAL - SUBSCORES 21 20 25 19 24 21 21     [place] Suicide Severity Rating Scale (Lifetime/Recent)      8/3/2022     9:17 AM   [place] Suicide Severity Rating (Lifetime/Recent)   Q1 Wish to be Dead (Lifetime) N   Q2 Non-Specific Active Suicidal Thoughts (Lifetime) N   Actual Attempt (Lifetime) N   Has subject engaged in non-suicidal self-injurious behavior? (Lifetime) N   Calculated C-SSRS Risk Score (Lifetime/Recent) No Risk Indicated         ASSESSMENT: Current Emotional / Mental Status (status of significant symptoms):   Risk status (Self / Other harm or suicidal ideation)   Patient denies current fears or concerns for personal safety.   Patient denies current or recent suicidal ideation or behaviors.    Patient denies current or recent homicidal ideation or behaviors.   Patient denies current or recent self injurious behavior or ideation.   Patient denies other safety concerns.   Patient reports there has been no change in risk factors since their last session.     Patient reports there has been no change in protective factors since their last session.     A safety and risk management plan has been developed including: Patient consented to co-developed safety plan on 11-30-22.  Safety and risk management plan was reviewed.   Patient agreed to use safety plan should any safety concerns arise.  A copy was made available to the patient. Reviewed 6-12-24- completed new safety plan through Liborio Carmona link.    [employer] Safety Plan      Creation Date: 11/30/22 Last Update Date: 4/3/24      Step 1: Warning signs:    Warning Signs    flashbacks, thoughts about I dont matter, Loss, Worsening depression, isolation, cant stop crying, relationship problems    Medical concerns    Not feeling a part of something      Step 2: Internal coping strategies - Things I can do to take my mind off my problems without contacting another person:    Strategies    Distress tolerance, going for a walk, gardening,  deep breathing, stretching, physical touch    Distraction techniiqes    Focus on helpful thoughts    Meditation      Step 3: People and social settings that provide distraction:    Name Contact Information    Star Spouse    Daughter Cell phone       Places    Movie theater, pet store, coffee shop      Step 4: People whom I can ask for help during a crisis:    Name Contact Information    Star Spouse/ cell phone and live in the home      Step 5: Professionals or agencies I can contact during a crisis:    Clinician/Agency Name Phone Emergency Contact    [facility] [phone]       Local Emergency Department Emergency Department Address Emergency Department Phone    [facility] [phone]       Suicide Prevention Lifeline Phone: Call or Text 315  Crisis Text Line: Text HOME to 509828     Step 6: Making the environment safer (plan for lethal means safety):   Remove items I could harm myself with     Optional: What is most important to me and worth living for?:   Family  Spending time with daughter       Leyla Safety Plan. Eleonora Capone and Danny Carmona. Used with permission of the authors.          Name: Veena Parsons  YOB: 1985  Date: November 30, 2022   My primary care provider: Francisco Nails  My primary care clinic: [facility]   My prescriber: PCP  Other care team support:  Holistic medical provider    My Triggers:    Relational problems  Loss of mother recently  Financial stress      Additional People, Places, and Things that I can access for support:   Spouse  Daughter          What is important to me and makes life worth living: Family         GREEN    Good Control  1. I feel good  2. No suicidal thoughts   3. Can work, sleep and play      Action Steps  1. Self-care: balanced meals, exercising, sleep practices, etc.  2. Take your medications as prescribed.  3. Continue meetings with therapist and prescriber.  4.  Do the healthy things  that I enjoy.             YELLO Getting Worse  I have ANY of these:  1. I do not feel good  2. Difficulty Concentrating  3. Sleep is changing  4. Increase/Change in my thoughts to hurt self and/or others, but I can still manage and not act on it.   5. Not taking care of self.               Action Steps (in addition to the above):  1. Inform your therapist and psychiatric prescriber/PCP.  2. Keep taking your medications as prescribed.    3. Turn to people you can ask for help.  4. Use internal coping strategies -see below.  5. Create safe environment: Removing items I can hurt self with              RED  Get Help  If I have ANY of these:  1. Current and uncontrollable thoughts and/or behaviors to hurt self and/or others.   2. Crazy buzzing and spinning.     Actions to manage my safety  1. Contact your emergency person Star  2. Call or Text 351  3. Call my crisis team- [facility] [phone]  3. Or Call 911 or go to the emergency room right away        My Internal Coping Strategies include the following:  take a bath, belly breathing, arts and crafts, play with my pet, use my coping box, exercise and use my coping skills    [End for Brief Safety Plan]     Safety Concerns  How To Identify Situations That Make Your Mental Health Worse:  Triggers are things that make your mental health worse.  Look at the list below to help you find your triggers and what you can do about them.     1. Identify Early Warning Signs:    Sometimes symptoms return, even when people do their best to stay well. Symptoms can develop over a short period of time with little or no warning, but most of the time they emerge gradually over several weeks.  Early warning signs are changes that people experience when a relapse is starting. Some early warning signs are common and others are not as common.   Common Early Warning Signs:    Feeling tense or nervous, Eating less or eating more, Trouble sleeping -either too much or too little sleep,  Feeling depressed or low, Feeling irritable, Feeling like not being around other people, Trouble concentrating and Urges to harm self     2. Identify action steps to take when warning signs are noticed:    Taking Action- It is important to take action if you are experiencing early warning signs of a relapse.  The faster you act, the more likely it is that you can avoid a full relapse.  It is helpful to identify several specific ways to cope with symptoms.      The following is my list of symptoms and coping strategies that I can use when they are present:    Symptom Coping Strategies   Anxiety -Talk with someone in your support system and let him or her know how you are feeling.  -Use relaxation techniques such as deep breathing or imagery.  -Use positive affirmations to counteract negative self-talk such as  I am learning to let go of worry.    Depression - Schedule your day; include activities you have to do and activities you enjoy doing.  - Get some exercise - walk, run, bike, or swim.  - Give yourself credit for even the smallest things you get done.   Sleep Difficulties   - Go to sleep at the same time every day.  - Do something relaxing before bed, such as drinking herbal tea or listening to music.  - Avoid having discussions about upsetting topics before going to bed.   Delusions   - Distract yourself from the disturbing thought by doing something that requires your attention such as a puzzle.  - Check out your beliefs by talking to someone you trust.    Hallucinations   - Use headphones to listen to music.  - Tell voices to  stop  or say to yourself,  I am safe.   - Ignore the hallucinations as much as possible; focus on other things.   Concentration Difficulties - Minimize distractions so there is only one thing for you to focus on at a time.    - Ask the person you are having a conversation with to slow down or repeat things you are unsure of.            Appearance:   Normal   Eye  "Contact:   Good   Psychomotor Behavior: Normal    Attitude:   Cooperative    Orientation:   All   Speech    Rate / Production: Normal/ Responsive Normal     Volume:  Normal    Mood:    Anxious     Affect:    Worrisome    Thought Content:  Clear    Thought Form:  Goal Directed  Logical    Insight:    Good      Medication Review:   No changes to current psychiatric medication(s)     Medication Compliance:   Yes     Changes in Health Issues:   None reported     Chemical Use Review:   Substance Use: Chemical use reviewed, no active concerns identified      Tobacco Use: No current tobacco use.      Diagnosis:  296.32 (F33.1) Major Depressive Disorder, Recurrent Episode, Moderate _ and With mixed features  300.01 (F41.0) Panic Disorder  300.02 (F41.1) Generalized Anxiety Disorder   PMDD      Collateral Reports Completed:   Not Applicable    PLAN: (Patient Tasks / Therapist Tasks / Other)  Client will continue to work on the following goals:  -Learn more about emotional validation- Star will continue with podcast and follow the \"[employer].\"   -Both put effort into decompression time.   -Follow safety plan and use crisis resources- Has Liborio Carmona complete.   -Use antihistamine and antacid during PMDD flare ups- Continued   -Participate in the party and have a system.        NATACHA Hernandez                                                         ______________________________________________________________________    Individual Treatment Plan    Patient's Name: Veena Parsons  YOB: 1985    Date of Creation: 9-7-22  Date Treatment Plan Last Reviewed/Revised: 5-8-24    DSM5 Diagnoses:  296.32 (F33.1) Major Depressive Disorder, Recurrent Episode, Moderate _ and With mixed features  300.01 (F41.0) Panic Disorder  300.02 (F41.1) Generalized Anxiety Disorder   PMDD  Psychosocial / Contextual Factors: Some relational issues   PROMIS (reviewed every 90 days): 21    Referral / " Collaboration:  Referral to another professional/service is not indicated at this time..    Anticipated number of session for this episode of care: 6-9 sessions  Anticipation frequency of session: Biweekly  Anticipated Duration of each session: 38-52 minutes  Treatment plan will be reviewed in 90 days or when goals have been changed.       MeasurableTreatment Goal(s) related to diagnosis / functional impairment(s)  Goal 1: Patient will address struggles with anxiety, depression and PMDD.    I will know I've met my goal when I am feeling less reactive through the month with the symptoms.      Objective #A (Patient Action)    Patient will work on establishing a concrete routine with self-care.  Status: Continued - Date(s): 5-8-24    Intervention(s)  Therapist will use CBT and motivational interviewing.      Objective #B  Patient will develop a plan to help manage PMDD  Status: Continued - Date(s): 5-8-24    Intervention(s)  Therapist will use solution focused therapy.    Objective #C  Patient will work on ways to communicate with narcissistic individuals.  Status: Continued - Date(s): 5-8-24    Intervention(s)  Therapist will teach communication skills.          Patient has reviewed and agreed to the above plan.      Lia Stiles, NATACHA  September 7, 2022

## 2024-06-19 ENCOUNTER — VIRTUAL VISIT (OUTPATIENT)
Dept: PSYCHOLOGY | Facility: CLINIC | Age: 39
End: 2024-06-19
Payer: COMMERCIAL

## 2024-06-19 DIAGNOSIS — F33.1 MAJOR DEPRESSIVE DISORDER, RECURRENT EPISODE, MODERATE (H): Primary | ICD-10-CM

## 2024-06-19 DIAGNOSIS — F41.1 GENERALIZED ANXIETY DISORDER: ICD-10-CM

## 2024-06-19 DIAGNOSIS — F41.0 PANIC DISORDER WITHOUT AGORAPHOBIA: ICD-10-CM

## 2024-06-19 PROCEDURE — 90834 PSYTX W PT 45 MINUTES: CPT | Mod: 95 | Performed by: MARRIAGE & FAMILY THERAPIST

## 2024-06-19 NOTE — PROGRESS NOTES
[facility]                                     Progress Note    Patient Name: Veena Parsons  Date: 6-19-24         Service Type: Individual      Session Start Time:  10am   Session End Time:   1050am     Session Length:  50min    Session #: 55    Attendees: Client and spouse Star    Service Modality:  Video     Telemedicine Visit: The patient's condition can be safely assessed and treated via synchronous audio and visual telemedicine encounter.      Reason for Telemedicine Visit: Patient has requested telehealth visit    Originating Site (Patient Location): Patient's home        Distant Location (provider location):  Off-site    Consent:  The patient/guardian has verbally consented to: the potential risks and benefits of telemedicine (video visit) versus in person care; bill my insurance or make self-payment for services provided; and responsibility for payment of non-covered services.     Mode of Communication:  Video Conference via [employer]    As the provider I attest to compliance with applicable laws and regulations related to telemedicine.      Treatment Plan- 5-8-24  CGI- 5-8-24  Phq-9 and YADI-7- See check in data  Promis- 4-10-24    DATA  Interactive Complexity: No  Crisis: No        Progress Since Last Session (Related to Symptoms / Goals / Homework):   There has been demonstrated improvement in functioning while patient has been engaged in psychotherapy/psychological service- if withdrawn the patient would deteriorate and/or relapse.      Symptoms: Client reports confronting issues with anxiety and depressed mood.       Homework: Partially completed  Completed in session      Episode of Care Goals: Satisfactory progress - ACTION (Actively working towards change); Intervened by reinforcing change plan / affirming steps taken     Current / Ongoing Stressors and Concerns:   -Graduation party went well but it was poor weather.    -Struggling to find a purpose now that party  planning is complete.    -Kurtis Mcclendon and Sebas- Step sons.   -Star is working on being more present and in the moment with Veena- continued   -Anxious and avoidant attachment styles-  Veena feels she is anxious and Star feels he is avoidant- Continue   -Working on emotional connection and physical connection with Star- Continued    -Does not want her life to revolve around 100% home life.    -PMDD flare up right now.            Treatment Objective(s) Addressed in This Session:  Safety planning / following safety plan   Patient will develop a plan to help manage PMDD  Patient will work on ways to communicate /patterns   Relational issues - Attachment styles.  Purpose in life.        Intervention:   Ask some very specifics questions about part time work and disability.     Star will work on being present in the moment-Continued    Talked about the pros of the party given the weather circumstances.   Follow safety plan and use crisis resources.  Agrees to contract for safety today.   Use support system- Continue  Use social media supports.     Assessments completed prior to visit:  The following assessments were completed by patient for this visit:  PHQ2:       6/19/2024     9:55 AM 5/22/2024     9:58 AM 5/8/2024    11:42 AM 4/17/2024    10:51 AM 2/28/2024    10:46 AM 2/14/2024     9:48 AM 1/10/2024    10:50 AM   PHQ-2 ( 1999 [employer])   Q1: Little interest or pleasure in doing things 1 1 1 1 3 1 1   Q2: Feeling down, depressed or hopeless 1 1 1 1 3 1 1   PHQ-2 Score 2 2 2 2 6 2 2   Q1: Little interest or pleasure in doing things Several days Several days Several days Several days Nearly every day Several days Several days   Q2: Feeling down, depressed or hopeless Several days Several days Several days Several days Nearly every day Several days Several days   PHQ-2 Score 2 2 2 2 6 2 2     PHQ9:       2/2/2023    10:13 AM 6/1/2023    10:07 AM 7/17/2023    11:15 AM 9/19/2023     7:09 PM 11/29/2023     9:44 AM 1/3/2024     10:04 AM 2/28/2024    10:46 AM   PHQ-9 SCORE   PHQ-9 Total Score MyChart    15 (Moderately severe depression) 15 (Moderately severe depression) 17 (Moderately severe depression) 13 (Moderate depression)   PHQ-9 Total Score 12 15 16 15 15 17 13     GAD2:       9/7/2023     6:59 AM 9/19/2023     7:10 PM 10/5/2023    10:34 AM 11/29/2023     9:45 AM 1/10/2024    10:51 AM 2/28/2024    10:47 AM 4/10/2024    10:52 AM   YADI-2   Feeling nervous, anxious, or on edge 1 3 1 1 1 2 1   Not being able to stop or control worrying 1 3 1 1 1 1 1   YADI-2 Total Score 2 6 2 2 2 3 2     GAD7:       11/27/2022     5:03 AM 2/2/2023    10:13 AM 6/1/2023    10:07 AM 7/17/2023    11:15 AM 8/7/2023    11:04 AM 9/19/2023     7:10 PM 2/28/2024    10:47 AM   YADI-7 SCORE   Total Score 17 (severe anxiety)    5 (mild anxiety) 21 (severe anxiety) 10 (moderate anxiety)   Total Score 17 13 12 13 5 21 10     PROMIS 10-Global Health (all questions and answers displayed):       9/7/2023     7:00 AM 9/19/2023     7:11 PM 10/5/2023    10:35 AM 11/29/2023     9:46 AM 1/10/2024    10:52 AM 2/28/2024    10:48 AM 4/10/2024    10:53 AM   PROMIS 10   In general, would you say your health is: Good Good Good Fair Fair Good Good   In general, would you say your quality of life is: Good Fair Good Fair Good Good Fair   In general, how would you rate your physical health? Good Fair Fair Fair Good Fair Good   In general, how would you rate your mental health, including your mood and your ability to think? Poor Fair Good Fair Fair Fair Fair   In general, how would you rate your satisfaction with your social activities and relationships? Fair Fair Good Fair Fair Fair Fair   In general, please rate how well you carry out your usual social activities and roles Poor Poor Fair Poor Fair Fair Fair   To what extent are you able to carry out your everyday physical activities such as walking, climbing stairs, carrying groceries, or moving a chair? Mostly Completely Mostly Mostly  Completely Mostly Mostly   In the past 7 days, how often have you been bothered by emotional problems such as feeling anxious, depressed, or irritable? Often Often Sometimes Always Sometimes Often Often   In the past 7 days, how would you rate your fatigue on average? Moderate Severe Mild Moderate Moderate Moderate Moderate   In the past 7 days, how would you rate your pain on average, where 0 means no pain, and 10 means worst imaginable pain? 6 5 5 6 6 6 6   In general, would you say your health is: 3 3 3 2 2 3 3   In general, would you say your quality of life is: 3 2 3 2 3 3 2   In general, how would you rate your physical health? 3 2 2 2 3 2 3   In general, how would you rate your mental health, including your mood and your ability to think? 1 2 3 2 2 2 2   In general, how would you rate your satisfaction with your social activities and relationships? 2 2 3 2 2 2 2   In general, please rate how well you carry out your usual social activities and roles. (This includes activities at home, at work and in your community, and responsibilities as a parent, child, spouse, employee, friend, etc.) 1 1 2 1 2 2 2   To what extent are you able to carry out your everyday physical activities such as walking, climbing stairs, carrying groceries, or moving a chair? 4 5 4 4 5 4 4   In the past 7 days, how often have you been bothered by emotional problems such as feeling anxious, depressed, or irritable? 4 4 3 5 3 4 4   In the past 7 days, how would you rate your fatigue on average? 3 4 2 3 3 3 3   In the past 7 days, how would you rate your pain on average, where 0 means no pain, and 10 means worst imaginable pain? 6 5 5 6 6 6 6   Global Mental Health Score 8 8 12 7 10 9 8   Global Physical Health Score 13 12 13 12 14 12 13   PROMIS TOTAL - SUBSCORES 21 20 25 19 24 21 21     [place] Suicide Severity Rating Scale (Lifetime/Recent)      8/3/2022     9:17 AM   [place] Suicide Severity Rating (Lifetime/Recent)   Q1 Wish to be  Dead (Lifetime) N   Q2 Non-Specific Active Suicidal Thoughts (Lifetime) N   Actual Attempt (Lifetime) N   Has subject engaged in non-suicidal self-injurious behavior? (Lifetime) N   Calculated C-SSRS Risk Score (Lifetime/Recent) No Risk Indicated         ASSESSMENT: Current Emotional / Mental Status (status of significant symptoms):   Risk status (Self / Other harm or suicidal ideation)   Patient denies current fears or concerns for personal safety.   Patient denies current or recent suicidal ideation or behaviors.    Patient denies current or recent homicidal ideation or behaviors.   Patient denies current or recent self injurious behavior or ideation.   Patient denies other safety concerns.   Patient reports there has been no change in risk factors since their last session.     Patient reports there has been no change in protective factors since their last session.     A safety and risk management plan has been developed including: Patient consented to co-developed safety plan on 11-30-22.  Safety and risk management plan was reviewed.   Patient agreed to use safety plan should any safety concerns arise.  A copy was made available to the patient. Reviewed 6-19-24- completed new safety plan through Liborio Carmona link.    [facility] Safety Plan      Creation Date: 11/30/22 Last Update Date: 4/3/24      Step 1: Warning signs:    Warning Signs    flashbacks, thoughts about I dont matter, Loss, Worsening depression, isolation, cant stop crying, relationship problems    Medical concerns    Not feeling a part of something      Step 2: Internal coping strategies - Things I can do to take my mind off my problems without contacting another person:    Strategies    Distress tolerance, going for a walk, gardening, deep breathing, stretching, physical touch    Distraction techniiqes    Focus on helpful thoughts    Meditation      Step 3: People and social settings that provide distraction:    Name Contact Information     Star Spouse    Daughter Cell phone       Places    Movie theater, pet store, coffee shop      Step 4: People whom I can ask for help during a crisis:    Name Contact Information    Star Spouse/ cell phone and live in the home      Step 5: Professionals or agencies I can contact during a crisis:    Clinician/Agency Name Phone Emergency Contact    [facility] [phone]       Local Emergency Department Emergency Department Address Emergency Department Phone    [facility] [phone]       Suicide Prevention Lifeline Phone: Call or Text 498  Crisis Text Line: Text HOME to 321118     Step 6: Making the environment safer (plan for lethal means safety):   Remove items I could harm myself with     Optional: What is most important to me and worth living for?:   Family  Spending time with daughter       [employer] Safety Plan. Eleonora Capone and Danny Carmona. Used with permission of the authors.          Name: Veena Parsons  YOB: 1985  Date: November 30, 2022   My primary care provider: Francisco Nails  My primary care clinic:  [facility]   My prescriber: PCP  Other care team support:  Holistic medical provider    My Triggers:    Relational problems  Loss of mother recently  Financial stress      Additional People, Places, and Things that I can access for support:   Spouse  Daughter          What is important to me and makes life worth living: Family         GREEN    Good Control  1. I feel good  2. No suicidal thoughts   3. Can work, sleep and play      Action Steps  1. Self-care: balanced meals, exercising, sleep practices, etc.  2. Take your medications as prescribed.  3. Continue meetings with therapist and prescriber.  4.  Do the healthy things that I enjoy.             YELLO Getting Worse  I have ANY of these:  1. I do not feel good  2. Difficulty Concentrating  3. Sleep is changing  4. Increase/Change in my thoughts to hurt self and/or others, but I  can still manage and not act on it.   5. Not taking care of self.               Action Steps (in addition to the above):  1. Inform your therapist and psychiatric prescriber/PCP.  2. Keep taking your medications as prescribed.    3. Turn to people you can ask for help.  4. Use internal coping strategies -see below.  5. Create safe environment: Removing items I can hurt self with              RED  Get Help  If I have ANY of these:  1. Current and uncontrollable thoughts and/or behaviors to hurt self and/or others.   2. Crazy buzzing and spinning.     Actions to manage my safety  1. Contact your emergency person Star  2. Call or Text 511  3. Call my crisis team- [facility] [phone]  3. Or Call [phone] or go to the emergency room right away        My Internal Coping Strategies include the following:  take a bath, belly breathing, arts and crafts, play with my pet, use my coping box, exercise and use my coping skills    [End for Brief Safety Plan]     Safety Concerns  How To Identify Situations That Make Your Mental Health Worse:  Triggers are things that make your mental health worse.  Look at the list below to help you find your triggers and what you can do about them.     1. Identify Early Warning Signs:    Sometimes symptoms return, even when people do their best to stay well. Symptoms can develop over a short period of time with little or no warning, but most of the time they emerge gradually over several weeks.  Early warning signs are changes that people experience when a relapse is starting. Some early warning signs are common and others are not as common.   Common Early Warning Signs:    Feeling tense or nervous, Eating less or eating more, Trouble sleeping -either too much or too little sleep, Feeling depressed or low, Feeling irritable, Feeling like not being around other people, Trouble concentrating and Urges to harm self     2. Identify action steps to take when warning signs are noticed:    Taking  Action- It is important to take action if you are experiencing early warning signs of a relapse.  The faster you act, the more likely it is that you can avoid a full relapse.  It is helpful to identify several specific ways to cope with symptoms.      The following is my list of symptoms and coping strategies that I can use when they are present:    Symptom Coping Strategies   Anxiety -Talk with someone in your support system and let him or her know how you are feeling.  -Use relaxation techniques such as deep breathing or imagery.  -Use positive affirmations to counteract negative self-talk such as  I am learning to let go of worry.    Depression - Schedule your day; include activities you have to do and activities you enjoy doing.  - Get some exercise - walk, run, bike, or swim.  - Give yourself credit for even the smallest things you get done.   Sleep Difficulties   - Go to sleep at the same time every day.  - Do something relaxing before bed, such as drinking herbal tea or listening to music.  - Avoid having discussions about upsetting topics before going to bed.   Delusions   - Distract yourself from the disturbing thought by doing something that requires your attention such as a puzzle.  - Check out your beliefs by talking to someone you trust.    Hallucinations   - Use headphones to listen to music.  - Tell voices to  stop  or say to yourself,  I am safe.   - Ignore the hallucinations as much as possible; focus on other things.   Concentration Difficulties - Minimize distractions so there is only one thing for you to focus on at a time.    - Ask the person you are having a conversation with to slow down or repeat things you are unsure of.            Appearance:   Normal   Eye Contact:   Good   Psychomotor Behavior: Normal    Attitude:   Cooperative    Orientation:   All   Speech    Rate / Production: Normal/ Responsive Normal     Volume:  Normal    Mood:    Anxious  Sad   Affect:    Worrisome  "Tearful   Thought Content:  Clear    Thought Form:  Goal Directed    Insight:    Good      Medication Review:   No changes to current psychiatric medication(s)     Medication Compliance:   Yes     Changes in Health Issues:   None reported     Chemical Use Review:   Substance Use: Chemical use reviewed, no active concerns identified      Tobacco Use: No current tobacco use.      Diagnosis:  296.32 (F33.1) Major Depressive Disorder, Recurrent Episode, Moderate _ and With mixed features  300.01 (F41.0) Panic Disorder  300.02 (F41.1) Generalized Anxiety Disorder   PMDD      Collateral Reports Completed:   Not Applicable    PLAN: (Patient Tasks / Therapist Tasks / Other)  Client will continue to work on the following goals:  -Learn more about emotional validation- Star will continue with podcast and follow the \"[employer].\"   -Use social media support this week for support.   -Follow safety plan and use crisis resources- Has Liborio Carmona complete.   -Use antihistamine and antacid during PMDD flare ups- Continued   -Able to highlight positives about the party even though the weather was a challenge.         Lia Stiles, [facility]                                                         ______________________________________________________________________    Individual Treatment Plan    Patient's Name: Veena Parsons  YOB: 1985    Date of Creation: 9-7-22  Date Treatment Plan Last Reviewed/Revised: 5-8-24    DSM5 Diagnoses:  296.32 (F33.1) Major Depressive Disorder, Recurrent Episode, Moderate _ and With mixed features  300.01 (F41.0) Panic Disorder  300.02 (F41.1) Generalized Anxiety Disorder   PMDD  Psychosocial / Contextual Factors: Some relational issues   PROMIS (reviewed every 90 days): 21    Referral / Collaboration:  Referral to another professional/service is not indicated at this time..    Anticipated number of session for this episode of care: 6-9 sessions  Anticipation frequency of " session: Biweekly  Anticipated Duration of each session: 38-52 minutes  Treatment plan will be reviewed in 90 days or when goals have been changed.       MeasurableTreatment Goal(s) related to diagnosis / functional impairment(s)  Goal 1: Patient will address struggles with anxiety, depression and PMDD.    I will know I've met my goal when I am feeling less reactive through the month with the symptoms.      Objective #A (Patient Action)    Patient will work on establishing a concrete routine with self-care.  Status: Continued - Date(s): 5-8-24    Intervention(s)  Therapist will use CBT and motivational interviewing.      Objective #B  Patient will develop a plan to help manage PMDD  Status: Continued - Date(s): 5-8-24    Intervention(s)  Therapist will use solution focused therapy.    Objective #C  Patient will work on ways to communicate with narcissistic individuals.  Status: Continued - Date(s): 5-8-24    Intervention(s)  Therapist will teach communication skills.          Patient has reviewed and agreed to the above plan.      Lia Stiles, NATACHA  September 7, 2022

## 2024-06-26 ENCOUNTER — VIRTUAL VISIT (OUTPATIENT)
Dept: PSYCHOLOGY | Facility: CLINIC | Age: 39
End: 2024-06-26
Payer: COMMERCIAL

## 2024-06-26 DIAGNOSIS — F41.1 GENERALIZED ANXIETY DISORDER: ICD-10-CM

## 2024-06-26 DIAGNOSIS — F33.1 MAJOR DEPRESSIVE DISORDER, RECURRENT EPISODE, MODERATE (H): Primary | ICD-10-CM

## 2024-06-26 DIAGNOSIS — F41.0 PANIC DISORDER WITHOUT AGORAPHOBIA: ICD-10-CM

## 2024-06-26 PROCEDURE — 90834 PSYTX W PT 45 MINUTES: CPT | Mod: 95 | Performed by: MARRIAGE & FAMILY THERAPIST
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
## 2024-07-08 ENCOUNTER — VIRTUAL VISIT (OUTPATIENT)
Dept: PSYCHOLOGY | Facility: CLINIC | Age: 39
End: 2024-07-08
Payer: COMMERCIAL

## 2024-07-08 DIAGNOSIS — F41.0 PANIC DISORDER WITHOUT AGORAPHOBIA: ICD-10-CM

## 2024-07-08 DIAGNOSIS — F33.1 MAJOR DEPRESSIVE DISORDER, RECURRENT EPISODE, MODERATE (H): Primary | ICD-10-CM

## 2024-07-08 DIAGNOSIS — F41.1 GENERALIZED ANXIETY DISORDER: ICD-10-CM

## 2024-07-08 PROCEDURE — 90834 PSYTX W PT 45 MINUTES: CPT | Mod: 95 | Performed by: MARRIAGE & FAMILY THERAPIST

## 2024-07-08 NOTE — PROGRESS NOTES
[facility]                                     Progress Note    Patient Name: Veena Parsons  Date: 7-8-24         Service Type: Individual      Session Start Time:  11am   Session End Time:   1150am     Session Length: 50min    Session #: 57    Attendees: Client and spouse Star    Service Modality:  Video     Telemedicine Visit: The patient's condition can be safely assessed and treated via synchronous audio and visual telemedicine encounter.      Reason for Telemedicine Visit: Patient has requested telehealth visit    Originating Site (Patient Location): Patient's home        Distant Location (provider location):  Off-site    Consent:  The patient/guardian has verbally consented to: the potential risks and benefits of telemedicine (video visit) versus in person care; bill my insurance or make self-payment for services provided; and responsibility for payment of non-covered services.     Mode of Communication:  Video Conference via [employer]    As the provider I attest to compliance with applicable laws and regulations related to telemedicine.      Treatment Plan- 5-8-24  CGI- 5-8-24  Phq-9 and YADI-7- See check in data  Promis- 4-10-24    DATA  Interactive Complexity: No  Crisis: No        Progress Since Last Session (Related to Symptoms / Goals / Homework):   There has been demonstrated improvement in functioning while patient has been engaged in psychotherapy/psychological service- if withdrawn the patient would deteriorate and/or relapse.      Symptoms: Client reports confronting issues with anxiety, depression and PMDD.  Has had high relational issues this past week.     Homework: Completed in session      Episode of Care Goals: Satisfactory progress - ACTION (Actively working towards change); Intervened by reinforcing change plan / affirming steps taken     Current / Ongoing Stressors and Concerns:   -Veena reports it has been a high stress week with the marriage.    -Working on  defining daily purpose.     -Star feels Veena is in her cycle right now.    -Kurtis Mcclendon and Sebas- Step sons.   -Star is working on being more present and in the moment with Veena- continued   -Anxious and avoidant attachment styles-  Veena feels she is anxious and Star feels he is avoidant- Continue   -Struggling with communication patterns about misinterpretation.       Treatment Objective(s) Addressed in This Session:  Safety planning / following safety plan   Patient will develop a plan to help manage PMDD  Patient will work on ways to communicate /patterns   Relational issues - Attachment styles.  Purpose in life.        Intervention:   Talked about what a separation would look like and if we are at that point.    Star will work on being present in the moment-Continued    Pay attention to communication styles.   Follow safety plan and use crisis resources.  Agrees to contract for safety today.   Use support system- Continue  Connect with social media supports.   Work on not interrupting and being present during conversations.         Assessments completed prior to visit:  The following assessments were completed by patient for this visit:  PHQ2:       6/26/2024    12:54 AM 6/19/2024     9:55 AM 5/22/2024     9:58 AM 5/8/2024    11:42 AM 4/17/2024    10:51 AM 2/28/2024    10:46 AM 2/14/2024     9:48 AM   PHQ-2 ( 1999 [employer])   Q1: Little interest or pleasure in doing things 1 1 1 1 1 3 1   Q2: Feeling down, depressed or hopeless 1 1 1 1 1 3 1   PHQ-2 Score 2 2 2 2 2 6 2   Q1: Little interest or pleasure in doing things Several days Several days Several days Several days Several days Nearly every day Several days   Q2: Feeling down, depressed or hopeless Several days Several days Several days Several days Several days Nearly every day Several days   PHQ-2 Score 2 2 2 2 2 6 2     PHQ9:       2/2/2023    10:13 AM 6/1/2023    10:07 AM 7/17/2023    11:15 AM 9/19/2023     7:09 PM 11/29/2023     9:44 AM 1/3/2024    10:04  AM 2/28/2024    10:46 AM   PHQ-9 SCORE   PHQ-9 Total Score Orlyhart    15 (Moderately severe depression) 15 (Moderately severe depression) 17 (Moderately severe depression) 13 (Moderate depression)   PHQ-9 Total Score 12 15 16 15 15 17 13     GAD2:       9/7/2023     6:59 AM 9/19/2023     7:10 PM 10/5/2023    10:34 AM 11/29/2023     9:45 AM 1/10/2024    10:51 AM 2/28/2024    10:47 AM 4/10/2024    10:52 AM   YADI-2   Feeling nervous, anxious, or on edge 1 3 1 1 1 2 1   Not being able to stop or control worrying 1 3 1 1 1 1 1   YADI-2 Total Score 2 6 2 2 2 3 2     GAD7:       11/27/2022     5:03 AM 2/2/2023    10:13 AM 6/1/2023    10:07 AM 7/17/2023    11:15 AM 8/7/2023    11:04 AM 9/19/2023     7:10 PM 2/28/2024    10:47 AM   YADI-7 SCORE   Total Score 17 (severe anxiety)    5 (mild anxiety) 21 (severe anxiety) 10 (moderate anxiety)   Total Score 17 13 12 13 5 21 10     PROMIS 10-Global Health (all questions and answers displayed):       9/7/2023     7:00 AM 9/19/2023     7:11 PM 10/5/2023    10:35 AM 11/29/2023     9:46 AM 1/10/2024    10:52 AM 2/28/2024    10:48 AM 4/10/2024    10:53 AM   PROMIS 10   In general, would you say your health is: Good Good Good Fair Fair Good Good   In general, would you say your quality of life is: Good Fair Good Fair Good Good Fair   In general, how would you rate your physical health? Good Fair Fair Fair Good Fair Good   In general, how would you rate your mental health, including your mood and your ability to think? Poor Fair Good Fair Fair Fair Fair   In general, how would you rate your satisfaction with your social activities and relationships? Fair Fair Good Fair Fair Fair Fair   In general, please rate how well you carry out your usual social activities and roles Poor Poor Fair Poor Fair Fair Fair   To what extent are you able to carry out your everyday physical activities such as walking, climbing stairs, carrying groceries, or moving a chair? Mostly Completely Mostly Mostly  Completely Mostly Mostly   In the past 7 days, how often have you been bothered by emotional problems such as feeling anxious, depressed, or irritable? Often Often Sometimes Always Sometimes Often Often   In the past 7 days, how would you rate your fatigue on average? Moderate Severe Mild Moderate Moderate Moderate Moderate   In the past 7 days, how would you rate your pain on average, where 0 means no pain, and 10 means worst imaginable pain? 6 5 5 6 6 6 6   In general, would you say your health is: 3 3 3 2 2 3 3   In general, would you say your quality of life is: 3 2 3 2 3 3 2   In general, how would you rate your physical health? 3 2 2 2 3 2 3   In general, how would you rate your mental health, including your mood and your ability to think? 1 2 3 2 2 2 2   In general, how would you rate your satisfaction with your social activities and relationships? 2 2 3 2 2 2 2   In general, please rate how well you carry out your usual social activities and roles. (This includes activities at home, at work and in your community, and responsibilities as a parent, child, spouse, employee, friend, etc.) 1 1 2 1 2 2 2   To what extent are you able to carry out your everyday physical activities such as walking, climbing stairs, carrying groceries, or moving a chair? 4 5 4 4 5 4 4   In the past 7 days, how often have you been bothered by emotional problems such as feeling anxious, depressed, or irritable? 4 4 3 5 3 4 4   In the past 7 days, how would you rate your fatigue on average? 3 4 2 3 3 3 3   In the past 7 days, how would you rate your pain on average, where 0 means no pain, and 10 means worst imaginable pain? 6 5 5 6 6 6 6   Global Mental Health Score 8 8 12 7 10 9 8   Global Physical Health Score 13 12 13 12 14 12 13   PROMIS TOTAL - SUBSCORES 21 20 25 19 24 21 21     [place] Suicide Severity Rating Scale (Lifetime/Recent)      8/3/2022     9:17 AM   [place] Suicide Severity Rating (Lifetime/Recent)   Q1 Wish to be  Dead (Lifetime) N   Q2 Non-Specific Active Suicidal Thoughts (Lifetime) N   Actual Attempt (Lifetime) N   Has subject engaged in non-suicidal self-injurious behavior? (Lifetime) N   Calculated C-SSRS Risk Score (Lifetime/Recent) No Risk Indicated         ASSESSMENT: Current Emotional / Mental Status (status of significant symptoms):   Risk status (Self / Other harm or suicidal ideation)   Patient denies current fears or concerns for personal safety.   Patient denies current or recent suicidal ideation or behaviors.    Patient denies current or recent homicidal ideation or behaviors.   Patient denies current or recent self injurious behavior or ideation.   Patient denies other safety concerns.   Patient reports there has been no change in risk factors since their last session.     Patient reports there has been no change in protective factors since their last session.     A safety and risk management plan has been developed including: Patient consented to co-developed safety plan on 11-30-22.  Safety and risk management plan was reviewed.   Patient agreed to use safety plan should any safety concerns arise.  A copy was made available to the patient. Reviewed 7-8-24- completed new safety plan through Liborio Carmona link.    [place] Safety Plan      Creation Date: 11/30/22 Last Update Date: 4/3/24      Step 1: Warning signs:    Warning Signs    flashbacks, thoughts about I dont matter, Loss, Worsening depression, isolation, cant stop crying, relationship problems    Medical concerns    Not feeling a part of something      Step 2: Internal coping strategies - Things I can do to take my mind off my problems without contacting another person:    Strategies    Distress tolerance, going for a walk, gardening, deep breathing, stretching, physical touch    Distraction techniiqes    Focus on helpful thoughts    Meditation      Step 3: People and social settings that provide distraction:    Name Contact Information    Star  Spouse    Daughter Cell phone       Places    Movie theater, pet store, coffee shop      Step 4: People whom I can ask for help during a crisis:    Name Contact Information    Star Spouse/ cell phone and live in the home      Step 5: Professionals or agencies I can contact during a crisis:    Clinician/Agency Name Phone Emergency Contact    [facility] [phone]       Local Emergency Department Emergency Department Address Emergency Department Phone    [facility] [phone]       Suicide Prevention Lifeline Phone: Call or Text 980  Crisis Text Line: Text HOME to 608516     Step 6: Making the environment safer (plan for lethal means safety):   Remove items I could harm myself with     Optional: What is most important to me and worth living for?:   Family  Spending time with daughter       [employer] Safety Plan. Eleonora Capone and Danny Carmona. Used with permission of the authors.          Name: Veena Parsons  YOB: 1985  Date: November 30, 2022   My primary care provider: Francisco Nails  My primary care clinic:  [facility]   My prescriber: PCP  Other care team support:  Holistic medical provider    My Triggers:    Relational problems  Loss of mother recently  Financial stress      Additional People, Places, and Things that I can access for support:   Spouse  Daughter          What is important to me and makes life worth living: Family         GREEN    Good Control  1. I feel good  2. No suicidal thoughts   3. Can work, sleep and play      Action Steps  1. Self-care: balanced meals, exercising, sleep practices, etc.  2. Take your medications as prescribed.  3. Continue meetings with therapist and prescriber.  4.  Do the healthy things that I enjoy.             YELLO Getting Worse  I have ANY of these:  1. I do not feel good  2. Difficulty Concentrating  3. Sleep is changing  4. Increase/Change in my thoughts to hurt self and/or others, but I can  still manage and not act on it.   5. Not taking care of self.               Action Steps (in addition to the above):  1. Inform your therapist and psychiatric prescriber/PCP.  2. Keep taking your medications as prescribed.    3. Turn to people you can ask for help.  4. Use internal coping strategies -see below.  5. Create safe environment: Removing items I can hurt self with              RED  Get Help  If I have ANY of these:  1. Current and uncontrollable thoughts and/or behaviors to hurt self and/or others.   2. Crazy buzzing and spinning.     Actions to manage my safety  1. Contact your emergency person Star  2. Call or Text [phone]  3. Call my crisis team- [facility] [phone]  3. Or Call 311 or go to the emergency room right away        My Internal Coping Strategies include the following:  take a bath, belly breathing, arts and crafts, play with my pet, use my coping box, exercise and use my coping skills    [End for Brief Safety Plan]     Safety Concerns  How To Identify Situations That Make Your Mental Health Worse:  Triggers are things that make your mental health worse.  Look at the list below to help you find your triggers and what you can do about them.     1. Identify Early Warning Signs:    Sometimes symptoms return, even when people do their best to stay well. Symptoms can develop over a short period of time with little or no warning, but most of the time they emerge gradually over several weeks.  Early warning signs are changes that people experience when a relapse is starting. Some early warning signs are common and others are not as common.   Common Early Warning Signs:    Feeling tense or nervous, Eating less or eating more, Trouble sleeping -either too much or too little sleep, Feeling depressed or low, Feeling irritable, Feeling like not being around other people, Trouble concentrating and Urges to harm self     2. Identify action steps to take when warning signs are noticed:    Taking  Action- It is important to take action if you are experiencing early warning signs of a relapse.  The faster you act, the more likely it is that you can avoid a full relapse.  It is helpful to identify several specific ways to cope with symptoms.      The following is my list of symptoms and coping strategies that I can use when they are present:    Symptom Coping Strategies   Anxiety -Talk with someone in your support system and let him or her know how you are feeling.  -Use relaxation techniques such as deep breathing or imagery.  -Use positive affirmations to counteract negative self-talk such as  I am learning to let go of worry.    Depression - Schedule your day; include activities you have to do and activities you enjoy doing.  - Get some exercise - walk, run, bike, or swim.  - Give yourself credit for even the smallest things you get done.   Sleep Difficulties   - Go to sleep at the same time every day.  - Do something relaxing before bed, such as drinking herbal tea or listening to music.  - Avoid having discussions about upsetting topics before going to bed.   Delusions   - Distract yourself from the disturbing thought by doing something that requires your attention such as a puzzle.  - Check out your beliefs by talking to someone you trust.    Hallucinations   - Use headphones to listen to music.  - Tell voices to  stop  or say to yourself,  I am safe.   - Ignore the hallucinations as much as possible; focus on other things.   Concentration Difficulties - Minimize distractions so there is only one thing for you to focus on at a time.    - Ask the person you are having a conversation with to slow down or repeat things you are unsure of.            Appearance:   Normal   Eye Contact:   Good   Psychomotor Behavior: Normal    Attitude:   Cooperative    Orientation:   All   Speech    Rate / Production: Normal     Volume:  Normal    Mood:    Anxious  Upset    Affect:    Worrisome    Thought Content:  Clear  "   Thought Form:  Goal Directed    Insight:    Fair      Medication Review:   No changes to current psychiatric medication(s)     Medication Compliance:   Yes     Changes in Health Issues:   None reported     Chemical Use Review:   Substance Use: Chemical use reviewed, no active concerns identified      Tobacco Use: No current tobacco use.      Diagnosis:  296.32 (F33.1) Major Depressive Disorder, Recurrent Episode, Moderate _ and With mixed features  300.01 (F41.0) Panic Disorder  300.02 (F41.1) Generalized Anxiety Disorder   PMDD      Collateral Reports Completed:   Not Applicable    PLAN: (Patient Tasks / Therapist Tasks / Other)  Client will continue to work on the following goals:  -Learn more about emotional validation- Star will continue with podcast and follow the \"[employer] [facility].\" -Continued   -Really focus on communication this week and respect.    -Follow safety plan and use crisis resources- Has Liborio Carmona complete.   -Use antihistamine and antacid during PMDD flare ups- Continued       Lia Stiles, LMFT                                                         ______________________________________________________________________    Individual Treatment Plan    Patient's Name: Veena Parsons  YOB: 1985    Date of Creation: 9-7-22  Date Treatment Plan Last Reviewed/Revised: 5-8-24    DSM5 Diagnoses:  296.32 (F33.1) Major Depressive Disorder, Recurrent Episode, Moderate _ and With mixed features  300.01 (F41.0) Panic Disorder  300.02 (F41.1) Generalized Anxiety Disorder   PMDD  Psychosocial / Contextual Factors: Some relational issues   PROMIS (reviewed every 90 days): 21    Referral / Collaboration:  Referral to another professional/service is not indicated at this time..    Anticipated number of session for this episode of care: 6-9 sessions  Anticipation frequency of session: Biweekly  Anticipated Duration of each session: 38-52 minutes  Treatment plan will be reviewed in 90 " days or when goals have been changed.       MeasurableTreatment Goal(s) related to diagnosis / functional impairment(s)  Goal 1: Patient will address struggles with anxiety, depression and PMDD.    I will know I've met my goal when I am feeling less reactive through the month with the symptoms.      Objective #A (Patient Action)    Patient will work on establishing a concrete routine with self-care.  Status: Continued - Date(s): 5-8-24    Intervention(s)  Therapist will use CBT and motivational interviewing.      Objective #B  Patient will develop a plan to help manage PMDD  Status: Continued - Date(s): 5-8-24    Intervention(s)  Therapist will use solution focused therapy.    Objective #C  Patient will work on ways to communicate with narcissistic individuals.  Status: Continued - Date(s): 5-8-24    Intervention(s)  Therapist will teach communication skills.          Patient has reviewed and agreed to the above plan.      Lia Stiles, NATACHA  September 7, 2022

## 2024-07-17 ENCOUNTER — VIRTUAL VISIT (OUTPATIENT)
Dept: PSYCHOLOGY | Facility: CLINIC | Age: 39
End: 2024-07-17
Payer: COMMERCIAL

## 2024-07-17 DIAGNOSIS — F41.0 PANIC DISORDER WITHOUT AGORAPHOBIA: ICD-10-CM

## 2024-07-17 DIAGNOSIS — F33.1 MAJOR DEPRESSIVE DISORDER, RECURRENT EPISODE, MODERATE (H): Primary | ICD-10-CM

## 2024-07-17 DIAGNOSIS — F41.1 GENERALIZED ANXIETY DISORDER: ICD-10-CM

## 2024-07-17 PROCEDURE — 90834 PSYTX W PT 45 MINUTES: CPT | Mod: 95 | Performed by: MARRIAGE & FAMILY THERAPIST

## 2024-07-17 NOTE — PROGRESS NOTES
[facility]                                     Progress Note    Patient Name: Veena Parsons  Date: 7-17-24         Service Type: Individual      Session Start Time:  12pm   Session End Time:   1250pm     Session Length: 50min    Session #: 58    Attendees: Client     Service Modality:  Video     Telemedicine Visit: The patient's condition can be safely assessed and treated via synchronous audio and visual telemedicine encounter.      Reason for Telemedicine Visit: Patient has requested telehealth visit    Originating Site (Patient Location): Patient's home        Distant Location (provider location):  Off-site    Consent:  The patient/guardian has verbally consented to: the potential risks and benefits of telemedicine (video visit) versus in person care; bill my insurance or make self-payment for services provided; and responsibility for payment of non-covered services.     Mode of Communication:  Video Conference via [employer].    As the provider I attest to compliance with applicable laws and regulations related to telemedicine.      Treatment Plan- 5-8-24  CGI- 5-8-24  Phq-9 and YADI-7- See check in data  Promis- 4-10-24    DATA  Interactive Complexity: No  Crisis: No        Progress Since Last Session (Related to Symptoms / Goals / Homework):   There has been demonstrated improvement in functioning while patient has been engaged in psychotherapy/psychological service- if withdrawn the patient would deteriorate and/or relapse.      Symptoms: Client reports confronting issues with anxiety, depression and PMDD.  Has been working on addressing relational dynamics with spouse.     Homework: Achieved / completed to satisfaction  Completed in session      Episode of Care Goals: Satisfactory progress - ACTION (Actively working towards change); Intervened by reinforcing change plan / affirming steps taken     Current / Ongoing Stressors and Concerns:   -Veena reports some minor improvement with  the marriage this week.    -Working on defining daily purpose and has been working on things around the house as health allows.    -Adjusting to daughter being out of the house.    -Kurtis Mcclendon and Sebas- Step sons.   -Star is working on being more present and in the moment with Veena- continued   -Anxious and avoidant attachment styles-  Veena feels she is anxious and Star feels he is avoidant- Continue   -Has been giving specific example to Star which has lead to some recognition.     -Working on building positive friendships.         Treatment Objective(s) Addressed in This Session:  Safety planning / following safety plan   Patient will develop a plan to help manage PMDD  Patient will work on ways to communicate /patterns   Relational issues - Attachment styles.  Purpose in life.        Intervention:   Give specific examples to Star.    Star will work on being present in the moment-Continued    Be mindful about communication styles.   Follow safety plan and use crisis resources.  Agrees to contract for safety today.   Use support system- Continue  Connect with friends and focus on friendships that bring Lawanda in.   Work on not interrupting and being present during conversations.         Assessments completed prior to visit:  The following assessments were completed by patient for this visit:  See data in EPIC as it is not auto populating.    PHQ2:       7/17/2024    12:45 PM 6/26/2024    12:54 AM 6/19/2024     9:55 AM 5/22/2024     9:58 AM 5/8/2024    11:42 AM 4/17/2024    10:51 AM 2/28/2024    10:46 AM   PHQ-2 ( 1999 [employer])   Q1: Little interest or pleasure in doing things 1 1 1 1 1 1 3   Q2: Feeling down, depressed or hopeless 1 1 1 1 1 1 3   PHQ-2 Score 2 2 2 2 2 2 6   Q1: Little interest or pleasure in doing things  Several days Several days Several days Several days Several days Nearly every day   Q2: Feeling down, depressed or hopeless  Several days Several days Several days Several days Several days Nearly  every day   PHQ-2 Score  2 2 2 2 2 6     PHQ9:       2/2/2023    10:13 AM 6/1/2023    10:07 AM 7/17/2023    11:15 AM 9/19/2023     7:09 PM 11/29/2023     9:44 AM 1/3/2024    10:04 AM 2/28/2024    10:46 AM   PHQ-9 SCORE   PHQ-9 Total Score MyChart    15 (Moderately severe depression) 15 (Moderately severe depression) 17 (Moderately severe depression) 13 (Moderate depression)   PHQ-9 Total Score 12 15 16 15 15 17 13     GAD2:       9/7/2023     6:59 AM 9/19/2023     7:10 PM 10/5/2023    10:34 AM 11/29/2023     9:45 AM 1/10/2024    10:51 AM 2/28/2024    10:47 AM 4/10/2024    10:52 AM   YADI-2   Feeling nervous, anxious, or on edge 1 3 1 1 1 2 1   Not being able to stop or control worrying 1 3 1 1 1 1 1   YADI-2 Total Score 2 6 2 2 2 3 2     GAD7:       11/27/2022     5:03 AM 2/2/2023    10:13 AM 6/1/2023    10:07 AM 7/17/2023    11:15 AM 8/7/2023    11:04 AM 9/19/2023     7:10 PM 2/28/2024    10:47 AM   YADI-7 SCORE   Total Score 17 (severe anxiety)    5 (mild anxiety) 21 (severe anxiety) 10 (moderate anxiety)   Total Score 17 13 12 13 5 21 10     PROMIS 10-Global Health (all questions and answers displayed):       9/7/2023     7:00 AM 9/19/2023     7:11 PM 10/5/2023    10:35 AM 11/29/2023     9:46 AM 1/10/2024    10:52 AM 2/28/2024    10:48 AM 4/10/2024    10:53 AM   PROMIS 10   In general, would you say your health is: Good Good Good Fair Fair Good Good   In general, would you say your quality of life is: Good Fair Good Fair Good Good Fair   In general, how would you rate your physical health? Good Fair Fair Fair Good Fair Good   In general, how would you rate your mental health, including your mood and your ability to think? Poor Fair Good Fair Fair Fair Fair   In general, how would you rate your satisfaction with your social activities and relationships? Fair Fair Good Fair Fair Fair Fair   In general, please rate how well you carry out your usual social activities and roles Poor Poor Fair Poor Fair Fair Fair   To  what extent are you able to carry out your everyday physical activities such as walking, climbing stairs, carrying groceries, or moving a chair? Mostly Completely Mostly Mostly Completely Mostly Mostly   In the past 7 days, how often have you been bothered by emotional problems such as feeling anxious, depressed, or irritable? Often Often Sometimes Always Sometimes Often Often   In the past 7 days, how would you rate your fatigue on average? Moderate Severe Mild Moderate Moderate Moderate Moderate   In the past 7 days, how would you rate your pain on average, where 0 means no pain, and 10 means worst imaginable pain? 6 5 5 6 6 6 6   In general, would you say your health is: 3 3 3 2 2 3 3   In general, would you say your quality of life is: 3 2 3 2 3 3 2   In general, how would you rate your physical health? 3 2 2 2 3 2 3   In general, how would you rate your mental health, including your mood and your ability to think? 1 2 3 2 2 2 2   In general, how would you rate your satisfaction with your social activities and relationships? 2 2 3 2 2 2 2   In general, please rate how well you carry out your usual social activities and roles. (This includes activities at home, at work and in your community, and responsibilities as a parent, child, spouse, employee, friend, etc.) 1 1 2 1 2 2 2   To what extent are you able to carry out your everyday physical activities such as walking, climbing stairs, carrying groceries, or moving a chair? 4 5 4 4 5 4 4   In the past 7 days, how often have you been bothered by emotional problems such as feeling anxious, depressed, or irritable? 4 4 3 5 3 4 4   In the past 7 days, how would you rate your fatigue on average? 3 4 2 3 3 3 3   In the past 7 days, how would you rate your pain on average, where 0 means no pain, and 10 means worst imaginable pain? 6 5 5 6 6 6 6   Global Mental Health Score 8 8 12 7 10 9 8   Global Physical Health Score 13 12 13 12 14 12 13   PROMIS TOTAL - SUBSCORES 21  20 25 19 24 21 21     [place] Suicide Severity Rating Scale (Lifetime/Recent)      8/3/2022     9:17 AM   [place] Suicide Severity Rating (Lifetime/Recent)   Q1 Wish to be Dead (Lifetime) N   Q2 Non-Specific Active Suicidal Thoughts (Lifetime) N   Actual Attempt (Lifetime) N   Has subject engaged in non-suicidal self-injurious behavior? (Lifetime) N   Calculated C-SSRS Risk Score (Lifetime/Recent) No Risk Indicated         ASSESSMENT: Current Emotional / Mental Status (status of significant symptoms):   Risk status (Self / Other harm or suicidal ideation)   Patient denies current fears or concerns for personal safety.   Patient denies current or recent suicidal ideation or behaviors.    Patient denies current or recent homicidal ideation or behaviors.   Patient denies current or recent self injurious behavior or ideation.   Patient denies other safety concerns.   Patient reports there has been no change in risk factors since their last session.     Patient reports there has been no change in protective factors since their last session.     A safety and risk management plan has been developed including: Patient consented to co-developed safety plan on 11-30-22.  Safety and risk management plan was reviewed.   Patient agreed to use safety plan should any safety concerns arise.  A copy was made available to the patient. Reviewed 7-17-24- completed new safety plan through [employer].    [employer] Safety Plan      Creation Date: 11/30/22 Last Update Date: 4/3/24      Step 1: Warning signs:    Warning Signs    flashbacks, thoughts about I dont matter, Loss, Worsening depression, isolation, cant stop crying, relationship problems    Medical concerns    Not feeling a part of something      Step 2: Internal coping strategies - Things I can do to take my mind off my problems without contacting another person:    Strategies    Distress tolerance, going for a walk, gardening, deep breathing, stretching, physical  touch    Distraction techniiqes    Focus on helpful thoughts    Meditation      Step 3: People and social settings that provide distraction:    Name Contact Information    Star Spouse    Daughter Cell phone       Places    Movie theater, pet store, coffee shop      Step 4: People whom I can ask for help during a crisis:    Name Contact Information    Star Spouse/ cell phone and live in the home      Step 5: Professionals or agencies I can contact during a crisis:    Clinician/Agency Name Phone Emergency Contact    [facility] [phone]       Local Emergency Department Emergency Department Address Emergency Department Phone    [facility] [phone]       Suicide Prevention Lifeline Phone: Call or Text 438  Crisis Text Line: Text HOME to 449450     Step 6: Making the environment safer (plan for lethal means safety):   Remove items I could harm myself with     Optional: What is most important to me and worth living for?:   Family  Spending time with daughter       [employer] Safety Plan. Eleonora Capone and Danny Carmona. Used with permission of the authors.          Name: Veena Parsons  YOB: 1985  Date: November 30, 2022   My primary care provider: Francisco Nails  My primary care clinic: [facility]   My prescriber: PCP  Other care team support:  Holistic medical provider    My Triggers:    Relational problems  Loss of mother recently  Financial stress      Additional People, Places, and Things that I can access for support:   Spouse  Daughter          What is important to me and makes life worth living: Family         GREEN    Good Control  1. I feel good  2. No suicidal thoughts   3. Can work, sleep and play      Action Steps  1. Self-care: balanced meals, exercising, sleep practices, etc.  2. Take your medications as prescribed.  3. Continue meetings with therapist and prescriber.  4.  Do the healthy things that I enjoy.             YELLO Getting  Worse  I have ANY of these:  1. I do not feel good  2. Difficulty Concentrating  3. Sleep is changing  4. Increase/Change in my thoughts to hurt self and/or others, but I can still manage and not act on it.   5. Not taking care of self.               Action Steps (in addition to the above):  1. Inform your therapist and psychiatric prescriber/PCP.  2. Keep taking your medications as prescribed.    3. Turn to people you can ask for help.  4. Use internal coping strategies -see below.  5. Create safe environment: Removing items I can hurt self with              RED  Get Help  If I have ANY of these:  1. Current and uncontrollable thoughts and/or behaviors to hurt self and/or others.   2. Crazy buzzing and spinning.     Actions to manage my safety  1. Contact your emergency person Star  2. Call or Text 605  3. Call my crisis team- [facility] [phone]  3. Or Call [phone] or go to the emergency room right away        My Internal Coping Strategies include the following:  take a bath, belly breathing, arts and crafts, play with my pet, use my coping box, exercise and use my coping skills    [End for Brief Safety Plan]     Safety Concerns  How To Identify Situations That Make Your Mental Health Worse:  Triggers are things that make your mental health worse.  Look at the list below to help you find your triggers and what you can do about them.     1. Identify Early Warning Signs:    Sometimes symptoms return, even when people do their best to stay well. Symptoms can develop over a short period of time with little or no warning, but most of the time they emerge gradually over several weeks.  Early warning signs are changes that people experience when a relapse is starting. Some early warning signs are common and others are not as common.   Common Early Warning Signs:    Feeling tense or nervous, Eating less or eating more, Trouble sleeping -either too much or too little sleep, Feeling depressed or low, Feeling  irritable, Feeling like not being around other people, Trouble concentrating and Urges to harm self     2. Identify action steps to take when warning signs are noticed:    Taking Action- It is important to take action if you are experiencing early warning signs of a relapse.  The faster you act, the more likely it is that you can avoid a full relapse.  It is helpful to identify several specific ways to cope with symptoms.      The following is my list of symptoms and coping strategies that I can use when they are present:    Symptom Coping Strategies   Anxiety -Talk with someone in your support system and let him or her know how you are feeling.  -Use relaxation techniques such as deep breathing or imagery.  -Use positive affirmations to counteract negative self-talk such as  I am learning to let go of worry.    Depression - Schedule your day; include activities you have to do and activities you enjoy doing.  - Get some exercise - walk, run, bike, or swim.  - Give yourself credit for even the smallest things you get done.   Sleep Difficulties   - Go to sleep at the same time every day.  - Do something relaxing before bed, such as drinking herbal tea or listening to music.  - Avoid having discussions about upsetting topics before going to bed.   Delusions   - Distract yourself from the disturbing thought by doing something that requires your attention such as a puzzle.  - Check out your beliefs by talking to someone you trust.    Hallucinations   - Use headphones to listen to music.  - Tell voices to  stop  or say to yourself,  I am safe.   - Ignore the hallucinations as much as possible; focus on other things.   Concentration Difficulties - Minimize distractions so there is only one thing for you to focus on at a time.    - Ask the person you are having a conversation with to slow down or repeat things you are unsure of.            Appearance:   Normal   Eye Contact:   Good   Psychomotor Behavior: Normal  "   Attitude:   Cooperative    Orientation:   All   Speech    Rate / Production: Normal/ Responsive Normal     Volume:  Normal    Mood:    Anxious     Affect:    Worrisome    Thought Content:  Clear    Thought Form:  Goal Directed    Insight:    Good      Medication Review:   No changes to current psychiatric medication(s)     Medication Compliance:   Yes     Changes in Health Issues:   None reported     Chemical Use Review:   Substance Use: Chemical use reviewed, no active concerns identified      Tobacco Use: No current tobacco use.      Diagnosis:  296.32 (F33.1) Major Depressive Disorder, Recurrent Episode, Moderate _ and With mixed features  300.01 (F41.0) Panic Disorder  300.02 (F41.1) Generalized Anxiety Disorder   PMDD      Collateral Reports Completed:   Not Applicable    PLAN: (Patient Tasks / Therapist Tasks / Other)  Client will continue to work on the following goals:  -Learn more about emotional validation- Star will continue with podcast and follow the \"Bad Ass Counselor.\" -Continued   -Focus on respect and active listening.   -Follow safety plan and use crisis resources- Has Liborio Carmona complete.   -Use antihistamine and antacid during PMDD flare ups- Continued   -Focus on positive friends that bring marley.   -Listen to uplifting topics and tictok.       NATACHA Hernandez                                                         ______________________________________________________________________    Individual Treatment Plan    Patient's Name: Veena Parsons  YOB: 1985    Date of Creation: 9-7-22  Date Treatment Plan Last Reviewed/Revised: 5-8-24    DSM5 Diagnoses:  296.32 (F33.1) Major Depressive Disorder, Recurrent Episode, Moderate _ and With mixed features  300.01 (F41.0) Panic Disorder  300.02 (F41.1) Generalized Anxiety Disorder   PMDD  Psychosocial / Contextual Factors: Some relational issues   PROMIS (reviewed every 90 days): 21    Referral / Collaboration:  Referral to " another professional/service is not indicated at this time..    Anticipated number of session for this episode of care: 6-9 sessions  Anticipation frequency of session: Biweekly  Anticipated Duration of each session: 38-52 minutes  Treatment plan will be reviewed in 90 days or when goals have been changed.       MeasurableTreatment Goal(s) related to diagnosis / functional impairment(s)  Goal 1: Patient will address struggles with anxiety, depression and PMDD.    I will know I've met my goal when I am feeling less reactive through the month with the symptoms.      Objective #A (Patient Action)    Patient will work on establishing a concrete routine with self-care.  Status: Continued - Date(s): 5-8-24    Intervention(s)  Therapist will use CBT and motivational interviewing.      Objective #B  Patient will develop a plan to help manage PMDD  Status: Continued - Date(s): 5-8-24    Intervention(s)  Therapist will use solution focused therapy.    Objective #C  Patient will work on ways to communicate with narcissistic individuals.  Status: Continued - Date(s): 5-8-24    Intervention(s)  Therapist will teach communication skills.          Patient has reviewed and agreed to the above plan.      Lia Stiles, TANIAFT  September 7, 2022

## 2024-07-24 ENCOUNTER — VIRTUAL VISIT (OUTPATIENT)
Dept: PSYCHOLOGY | Facility: CLINIC | Age: 39
End: 2024-07-24
Payer: COMMERCIAL

## 2024-07-24 DIAGNOSIS — F41.1 GENERALIZED ANXIETY DISORDER: ICD-10-CM

## 2024-07-24 DIAGNOSIS — F33.1 MAJOR DEPRESSIVE DISORDER, RECURRENT EPISODE, MODERATE (H): Primary | ICD-10-CM

## 2024-07-24 DIAGNOSIS — F41.0 PANIC DISORDER WITHOUT AGORAPHOBIA: ICD-10-CM

## 2024-07-24 PROCEDURE — 90834 PSYTX W PT 45 MINUTES: CPT | Mod: 95 | Performed by: MARRIAGE & FAMILY THERAPIST

## 2024-07-24 NOTE — PROGRESS NOTES
[facility]                                     Progress Note    Patient Name: Veena Parsons  Date:  7-24-24         Service Type: Individual      Session Start Time:  2pm   Session End Time:   250pm     Session Length: 50min    Session #: 59    Attendees: Client     Service Modality:  Video     Telemedicine Visit: The patient's condition can be safely assessed and treated via synchronous audio and visual telemedicine encounter.      Reason for Telemedicine Visit: Patient has requested telehealth visit    Originating Site (Patient Location): Patient's home        Distant Location (provider location):  Off-site    Consent:  The patient/guardian has verbally consented to: the potential risks and benefits of telemedicine (video visit) versus in person care; bill my insurance or make self-payment for services provided; and responsibility for payment of non-covered services.     Mode of Communication:  Video Conference via [employer]    As the provider I attest to compliance with applicable laws and regulations related to telemedicine.      Treatment Plan- 5-8-24  CGI- 5-8-24  Phq-9 and YADI-7- See check in data  Promis- 4-10-24    DATA  Interactive Complexity: No  Crisis: No        Progress Since Last Session (Related to Symptoms / Goals / Homework):   There has been demonstrated improvement in functioning while patient has been engaged in psychotherapy/psychological service- if withdrawn the patient would deteriorate and/or relapse.      Symptoms: Client reports confronting issues with anxiety, depression and PMDD.  Has been working on finding purpose within the day.      Homework: Achieved / completed to satisfaction  Completed in session      Episode of Care Goals: Satisfactory progress - ACTION (Actively working towards change); Intervened by reinforcing change plan / affirming steps taken     Current / Ongoing Stressors and Concerns:   -Veena reports she is struggling with purpose during  "the day.     -Feels she is the \"supportive character\" in a lot of situations.    -Adjusting to daughter being out of the house.    -Kurtis Mcclendon and Sebas- Step sons.   -Working on relational dynamics in the home with spouse.    -Anxious and avoidant attachment styles-  Veena feels she is anxious and Star feels he is avoidant- Continue   -Has been giving specific example to Star which has lead to some recognition.     -Feels Star changes plans with no conversation.        Treatment Objective(s) Addressed in This Session:  Safety planning / following safety plan   Patient will develop a plan to help manage PMDD  Patient will work on ways to communicate /patterns   Relational issues - Attachment styles.  Purpose in life.        Intervention:   Give specific examples to Star   Work on communication patterns.  Follow safety plan and use crisis resources.  Agrees to contract for safety today.   Use support system- Continue.   Discussed where Veena is with the relationship.         Assessments completed prior to visit:  The following assessments were completed by patient for this visit:  See data in EPIC as it is not auto populating.    PHQ2:       7/17/2024    12:45 PM 6/26/2024    12:54 AM 6/19/2024     9:55 AM 5/22/2024     9:58 AM 5/8/2024    11:42 AM 4/17/2024    10:51 AM 2/28/2024    10:46 AM   PHQ-2 ( 1999 [employer])   Q1: Little interest or pleasure in doing things 1 1 1 1 1 1 3   Q2: Feeling down, depressed or hopeless 1 1 1 1 1 1 3   PHQ-2 Score 2 2 2 2 2 2 6   Q1: Little interest or pleasure in doing things  Several days Several days Several days Several days Several days Nearly every day   Q2: Feeling down, depressed or hopeless  Several days Several days Several days Several days Several days Nearly every day   PHQ-2 Score  2 2 2 2 2 6     PHQ9:       2/2/2023    10:13 AM 6/1/2023    10:07 AM 7/17/2023    11:15 AM 9/19/2023     7:09 PM 11/29/2023     9:44 AM 1/3/2024    10:04 AM 2/28/2024    10:46 AM   PHQ-9 SCORE "   PHQ-9 Total Score [facility] [place]    15 (Moderately severe depression) 15 (Moderately severe depression) 17 (Moderately severe depression) 13 (Moderate depression)   PHQ-9 Total Score 12 15 16 15 15 17 13     GAD2:       9/7/2023     6:59 AM 9/19/2023     7:10 PM 10/5/2023    10:34 AM 11/29/2023     9:45 AM 1/10/2024    10:51 AM 2/28/2024    10:47 AM 4/10/2024    10:52 AM   YADI-2   Feeling nervous, anxious, or on edge 1 3 1 1 1 2 1   Not being able to stop or control worrying 1 3 1 1 1 1 1   YADI-2 Total Score 2 6 2 2 2 3 2     GAD7:       11/27/2022     5:03 AM 2/2/2023    10:13 AM 6/1/2023    10:07 AM 7/17/2023    11:15 AM 8/7/2023    11:04 AM 9/19/2023     7:10 PM 2/28/2024    10:47 AM   YADI-7 SCORE   Total Score 17 (severe anxiety)    5 (mild anxiety) 21 (severe anxiety) 10 (moderate anxiety)   Total Score 17 13 12 13 5 21 10     PROMIS 10-Global Health (all questions and answers displayed):       9/7/2023     7:00 AM 9/19/2023     7:11 PM 10/5/2023    10:35 AM 11/29/2023     9:46 AM 1/10/2024    10:52 AM 2/28/2024    10:48 AM 4/10/2024    10:53 AM   PROMIS 10   In general, would you say your health is: Good Good Good Fair Fair Good Good   In general, would you say your quality of life is: Good Fair Good Fair Good Good Fair   In general, how would you rate your physical health? Good Fair Fair Fair Good Fair Good   In general, how would you rate your mental health, including your mood and your ability to think? Poor Fair Good Fair Fair Fair Fair   In general, how would you rate your satisfaction with your social activities and relationships? Fair Fair Good Fair Fair Fair Fair   In general, please rate how well you carry out your usual social activities and roles Poor Poor Fair Poor Fair Fair Fair   To what extent are you able to carry out your everyday physical activities such as walking, climbing stairs, carrying groceries, or moving a chair? Mostly Completely Mostly Mostly Completely Mostly Mostly   In the past 7  days, how often have you been bothered by emotional problems such as feeling anxious, depressed, or irritable? Often Often Sometimes Always Sometimes Often Often   In the past 7 days, how would you rate your fatigue on average? Moderate Severe Mild Moderate Moderate Moderate Moderate   In the past 7 days, how would you rate your pain on average, where 0 means no pain, and 10 means worst imaginable pain? 6 5 5 6 6 6 6   In general, would you say your health is: 3 3 3 2 2 3 3   In general, would you say your quality of life is: 3 2 3 2 3 3 2   In general, how would you rate your physical health? 3 2 2 2 3 2 3   In general, how would you rate your mental health, including your mood and your ability to think? 1 2 3 2 2 2 2   In general, how would you rate your satisfaction with your social activities and relationships? 2 2 3 2 2 2 2   In general, please rate how well you carry out your usual social activities and roles. (This includes activities at home, at work and in your community, and responsibilities as a parent, child, spouse, employee, friend, etc.) 1 1 2 1 2 2 2   To what extent are you able to carry out your everyday physical activities such as walking, climbing stairs, carrying groceries, or moving a chair? 4 5 4 4 5 4 4   In the past 7 days, how often have you been bothered by emotional problems such as feeling anxious, depressed, or irritable? 4 4 3 5 3 4 4   In the past 7 days, how would you rate your fatigue on average? 3 4 2 3 3 3 3   In the past 7 days, how would you rate your pain on average, where 0 means no pain, and 10 means worst imaginable pain? 6 5 5 6 6 6 6   Global Mental Health Score 8 8 12 7 10 9 8   Global Physical Health Score 13 12 13 12 14 12 13   PROMIS TOTAL - SUBSCORES 21 20 25 19 24 21 21     [place] Suicide Severity Rating Scale (Lifetime/Recent)      8/3/2022     9:17 AM   [place] Suicide Severity Rating (Lifetime/Recent)   Q1 Wish to be Dead (Lifetime) N   Q2 Non-Specific Active  Suicidal Thoughts (Lifetime) N   Actual Attempt (Lifetime) N   Has subject engaged in non-suicidal self-injurious behavior? (Lifetime) N   Calculated C-SSRS Risk Score (Lifetime/Recent) No Risk Indicated         ASSESSMENT: Current Emotional / Mental Status (status of significant symptoms):   Risk status (Self / Other harm or suicidal ideation)   Patient denies current fears or concerns for personal safety.   Patient denies current or recent suicidal ideation or behaviors.    Patient denies current or recent homicidal ideation or behaviors.   Patient denies current or recent self injurious behavior or ideation.   Patient denies other safety concerns.   Patient reports there has been no change in risk factors since their last session.     Patient reports there has been no change in protective factors since their last session.     A safety and risk management plan has been developed including: Patient consented to co-developed safety plan on 11-30-22.  Safety and risk management plan was reviewed.   Patient agreed to use safety plan should any safety concerns arise.  A copy was made available to the patient. Reviewed 7-24-24- completed new safety plan through [facility] link.    [place] Safety Plan      Creation Date: 11/30/22 Last Update Date: 4/3/24      Step 1: Warning signs:    Warning Signs    flashbacks, thoughts about I dont matter, Loss, Worsening depression, isolation, cant stop crying, relationship problems    Medical concerns    Not feeling a part of something      Step 2: Internal coping strategies - Things I can do to take my mind off my problems without contacting another person:    Strategies    Distress tolerance, going for a walk, gardening, deep breathing, stretching, physical touch    Distraction techniiqes    Focus on helpful thoughts    Meditation      Step 3: People and social settings that provide distraction:    Name Contact Information    Star Spouse    Daughter Cell phone        Places    Movie theater, pet store, coffee shop      Step 4: People whom I can ask for help during a crisis:    Name Contact Information    Star Spouse/ cell phone and live in the home      Step 5: Professionals or agencies I can contact during a crisis:    Clinician/Agency Name Phone Emergency Contact    [facility] [phone]       Local Emergency Department Emergency Department Address Emergency Department Phone    [facility] [phone]       Suicide Prevention Lifeline Phone: Call or Text 790  Crisis Text Line: Text HOME to 379042     Step 6: Making the environment safer (plan for lethal means safety):   Remove items I could harm myself with     Optional: What is most important to me and worth living for?:   Family  Spending time with daughter       [employer] Safety Plan. Eleonora Capone and Danny Carmona. Used with permission of the authors.          Name: Veena Parsons  YOB: 1985  Date: November 30, 2022   My primary care provider: Francisco Nails  My primary care clinic:  [facility]   My prescriber: PCP  Other care team support:  Holistic medical provider    My Triggers:    Relational problems  Loss of mother recently  Financial stress      Additional People, Places, and Things that I can access for support:   Spouse  Daughter          What is important to me and makes life worth living: Family         GREEN    Good Control  1. I feel good  2. No suicidal thoughts   3. Can work, sleep and play      Action Steps  1. Self-care: balanced meals, exercising, sleep practices, etc.  2. Take your medications as prescribed.  3. Continue meetings with therapist and prescriber.  4.  Do the healthy things that I enjoy.             YELLO Getting Worse  I have ANY of these:  1. I do not feel good  2. Difficulty Concentrating  3. Sleep is changing  4. Increase/Change in my thoughts to hurt self and/or others, but I can still manage and not act on it.   5.  Not taking care of self.               Action Steps (in addition to the above):  1. Inform your therapist and psychiatric prescriber/PCP.  2. Keep taking your medications as prescribed.    3. Turn to people you can ask for help.  4. Use internal coping strategies -see below.  5. Create safe environment: Removing items I can hurt self with              RED  Get Help  If I have ANY of these:  1. Current and uncontrollable thoughts and/or behaviors to hurt self and/or others.   2. Crazy buzzing and spinning.     Actions to manage my safety  1. Contact your emergency person Star  2. Call or Text [phone]  3. Call my crisis team- [facility] [phone]  3. Or Call 911 or go to the emergency room right away        My Internal Coping Strategies include the following:  take a bath, belly breathing, arts and crafts, play with my pet, use my coping box, exercise and use my coping skills    [End for Brief Safety Plan]     Safety Concerns  How To Identify Situations That Make Your Mental Health Worse:  Triggers are things that make your mental health worse.  Look at the list below to help you find your triggers and what you can do about them.     1. Identify Early Warning Signs:    Sometimes symptoms return, even when people do their best to stay well. Symptoms can develop over a short period of time with little or no warning, but most of the time they emerge gradually over several weeks.  Early warning signs are changes that people experience when a relapse is starting. Some early warning signs are common and others are not as common.   Common Early Warning Signs:    Feeling tense or nervous, Eating less or eating more, Trouble sleeping -either too much or too little sleep, Feeling depressed or low, Feeling irritable, Feeling like not being around other people, Trouble concentrating and Urges to harm self     2. Identify action steps to take when warning signs are noticed:    Taking Action- It is important to take action  if you are experiencing early warning signs of a relapse.  The faster you act, the more likely it is that you can avoid a full relapse.  It is helpful to identify several specific ways to cope with symptoms.      The following is my list of symptoms and coping strategies that I can use when they are present:    Symptom Coping Strategies   Anxiety -Talk with someone in your support system and let him or her know how you are feeling.  -Use relaxation techniques such as deep breathing or imagery.  -Use positive affirmations to counteract negative self-talk such as  I am learning to let go of worry.    Depression - Schedule your day; include activities you have to do and activities you enjoy doing.  - Get some exercise - walk, run, bike, or swim.  - Give yourself credit for even the smallest things you get done.   Sleep Difficulties   - Go to sleep at the same time every day.  - Do something relaxing before bed, such as drinking herbal tea or listening to music.  - Avoid having discussions about upsetting topics before going to bed.   Delusions   - Distract yourself from the disturbing thought by doing something that requires your attention such as a puzzle.  - Check out your beliefs by talking to someone you trust.    Hallucinations   - Use headphones to listen to music.  - Tell voices to  stop  or say to yourself,  I am safe.   - Ignore the hallucinations as much as possible; focus on other things.   Concentration Difficulties - Minimize distractions so there is only one thing for you to focus on at a time.    - Ask the person you are having a conversation with to slow down or repeat things you are unsure of.            Appearance:   Normal   Eye Contact:   Good   Psychomotor Behavior: Normal    Attitude:   Cooperative    Orientation:   All   Speech    Rate / Production: Normal/ Responsive Normal     Volume:  Normal    Mood:    Anxious  Upset    Affect:    Worrisome    Thought Content:  Clear    Thought Form:  Goal  "Directed    Insight:    Good      Medication Review:   No changes to current psychiatric medication(s)     Medication Compliance:   Yes     Changes in Health Issues:   None reported     Chemical Use Review:   Substance Use: Chemical use reviewed, no active concerns identified      Tobacco Use: No current tobacco use.      Diagnosis:  296.32 (F33.1) Major Depressive Disorder, Recurrent Episode, Moderate _ and With mixed features  300.01 (F41.0) Panic Disorder  300.02 (F41.1) Generalized Anxiety Disorder   PMDD      Collateral Reports Completed:   Not Applicable    PLAN: (Patient Tasks / Therapist Tasks / Other)  Client will continue to work on the following goals:    -Learn more about emotional validation- Star will continue with podcast and follow the \"[employer] Ass Counselor.\" -Continued   -Focus on respectful communication.    -Follow safety plan and use crisis resources- Has [facility] [facility] complete.   -Use antihistamine and antacid during PMDD flare ups- Continued   -Focus on being around people who bring marley.         Lia Stiles, [facility]                                                         ______________________________________________________________________    Individual Treatment Plan    Patient's Name: Veena Parsons  YOB: 1985    Date of Creation: 9-7-22  Date Treatment Plan Last Reviewed/Revised: 5-8-24    DSM5 Diagnoses:  296.32 (F33.1) Major Depressive Disorder, Recurrent Episode, Moderate _ and With mixed features  300.01 (F41.0) Panic Disorder  300.02 (F41.1) Generalized Anxiety Disorder   PMDD  Psychosocial / Contextual Factors: Some relational issues   PROMIS (reviewed every 90 days): 21    Referral / Collaboration:  Referral to another professional/service is not indicated at this time..    Anticipated number of session for this episode of care: 6-9 sessions  Anticipation frequency of session: Biweekly  Anticipated Duration of each session: 38-52 minutes  Treatment plan will be " reviewed in 90 days or when goals have been changed.       MeasurableTreatment Goal(s) related to diagnosis / functional impairment(s)  Goal 1: Patient will address struggles with anxiety, depression and PMDD.    I will know I've met my goal when I am feeling less reactive through the month with the symptoms.      Objective #A (Patient Action)    Patient will work on establishing a concrete routine with self-care.  Status: Continued - Date(s): 5-8-24    Intervention(s)  Therapist will use CBT and motivational interviewing.      Objective #B  Patient will develop a plan to help manage PMDD  Status: Continued - Date(s): 5-8-24    Intervention(s)  Therapist will use solution focused therapy.    Objective #C  Patient will work on ways to communicate with narcissistic individuals.  Status: Continued - Date(s): 5-8-24    Intervention(s)  Therapist will teach communication skills.          Patient has reviewed and agreed to the above plan.      Lia Stiles, NATACHA  September 7, 2022

## 2024-08-01 ENCOUNTER — VIRTUAL VISIT (OUTPATIENT)
Dept: PSYCHOLOGY | Facility: CLINIC | Age: 39
End: 2024-08-01
Payer: COMMERCIAL

## 2024-08-01 DIAGNOSIS — F43.10 PTSD (POST-TRAUMATIC STRESS DISORDER): ICD-10-CM

## 2024-08-01 DIAGNOSIS — F33.1 MAJOR DEPRESSIVE DISORDER, RECURRENT EPISODE, MODERATE (H): Primary | ICD-10-CM

## 2024-08-01 DIAGNOSIS — F41.1 GENERALIZED ANXIETY DISORDER: ICD-10-CM

## 2024-08-01 DIAGNOSIS — F41.0 PANIC DISORDER WITHOUT AGORAPHOBIA: ICD-10-CM

## 2024-08-01 DIAGNOSIS — F32.81 PMDD (PREMENSTRUAL DYSPHORIC DISORDER): ICD-10-CM

## 2024-08-01 PROCEDURE — 90834 PSYTX W PT 45 MINUTES: CPT | Mod: 95 | Performed by: MARRIAGE & FAMILY THERAPIST

## 2024-08-01 NOTE — PROGRESS NOTES
[facility]                                     Progress Note    Patient Name: Veena Parsons  Date:  8-1-24         Service Type: Individual      Session Start Time:  1pm   Session End Time:   150pm     Session Length: 50min    Session #: 60    Attendees: Client     Service Modality:  Video     Telemedicine Visit: The patient's condition can be safely assessed and treated via synchronous audio and visual telemedicine encounter.      Reason for Telemedicine Visit: Patient has requested telehealth visit    Originating Site (Patient Location): Patient's home        Distant Location (provider location):  On-site    Consent:  The patient/guardian has verbally consented to: the potential risks and benefits of telemedicine (video visit) versus in person care; bill my insurance or make self-payment for services provided; and responsibility for payment of non-covered services.     Mode of Communication:  Video Conference via ZUGGI    As the provider I attest to compliance with applicable laws and regulations related to telemedicine.      Treatment Plan- 5-8-24  CGI- 5-8-24  Phq-9 and YADI-7- See check in data  Promis- 8-1-24    DATA  Interactive Complexity: No  Crisis: No        Progress Since Last Session (Related to Symptoms / Goals / Homework):   There has been demonstrated improvement in functioning while patient has been engaged in psychotherapy/psychological service- if withdrawn the patient would deteriorate and/or relapse.      Symptoms: Client reports confronting issues with anxiety, depression and PMDD.  Has been working on relational issues with success this week.     Homework: Achieved / completed to satisfaction  Completed in session      Episode of Care Goals: Satisfactory progress - ACTION (Actively working towards change); Intervened by reinforcing change plan / affirming steps taken     Current / Ongoing Stressors and Concerns:   -Veena is working on finding purpose during the  day.   -Feels some improvement with Star this week.  He has been paying attention and recognizing when some of his comments come out the wrong way.    -Daughter has been out of the house for about 6 months now.    -Hakan, Kurtis and Sebas- Step sons.   -Anxious and avoidant attachment styles-  Veena feels she is anxious and Star feels he is avoidant- Continue   -Recognizes some of Star's thinking patterns and responses have a lot of ties to his parents and upbringing as well as high anxiety.     -Feels her strengths can be interpreted as weaknesses at times with Star.    -Feeling there are good relationships right now with all the adult kids.    -Sebas leaves for basic training on Monday.    -Deborah has made some decisions about college and studying sociology.         Treatment Objective(s) Addressed in This Session:  Safety planning / following safety plan   Patient will develop a plan to help manage PMDD  Patient will work on ways to communicate /patterns   Relational issues - Attachment styles.  Purpose in life.        Intervention:   Explored upbringing and reactions.    Working on communication patterns and taking responsibility.   Follow safety plan and use crisis resources.  Agrees to contract for safety today.   Use support system- Continue.   Discussed some of the positives in the relationship this week.    Feeling really good about connections with adult children.         Assessments completed prior to visit:  The following assessments were completed by patient for this visit:  See data in EPIC as it is not auto populating.    PHQ2:       7/17/2024    12:45 PM 6/26/2024    12:54 AM 6/19/2024     9:55 AM 5/22/2024     9:58 AM 5/8/2024    11:42 AM 4/17/2024    10:51 AM 2/28/2024    10:46 AM   PHQ-2 ( 1999 [employer])   Q1: Little interest or pleasure in doing things 1 1 1 1 1 1 3   Q2: Feeling down, depressed or hopeless 1 1 1 1 1 1 3   PHQ-2 Score 2 2 2 2 2 2 6   Q1: Little interest or pleasure in doing things   Several days Several days Several days Several days Several days Nearly every day   Q2: Feeling down, depressed or hopeless  Several days Several days Several days Several days Several days Nearly every day   PHQ-2 Score  2 2 2 2 2 6     PHQ9:       2/2/2023    10:13 AM 6/1/2023    10:07 AM 7/17/2023    11:15 AM 9/19/2023     7:09 PM 11/29/2023     9:44 AM 1/3/2024    10:04 AM 2/28/2024    10:46 AM   PHQ-9 SCORE   PHQ-9 Total Score MyChart    15 (Moderately severe depression) 15 (Moderately severe depression) 17 (Moderately severe depression) 13 (Moderate depression)   PHQ-9 Total Score 12 15 16 15 15 17 13     GAD2:       9/19/2023     7:10 PM 10/5/2023    10:34 AM 11/29/2023     9:45 AM 1/10/2024    10:51 AM 2/28/2024    10:47 AM 4/10/2024    10:52 AM 8/1/2024    12:56 PM   YADI-2   Feeling nervous, anxious, or on edge 3 1 1 1 2 1 1   Not being able to stop or control worrying 3 1 1 1 1 1 1   YADI-2 Total Score 6 2 2 2 3 2 2     GAD7:       11/27/2022     5:03 AM 2/2/2023    10:13 AM 6/1/2023    10:07 AM 7/17/2023    11:15 AM 8/7/2023    11:04 AM 9/19/2023     7:10 PM 2/28/2024    10:47 AM   YADI-7 SCORE   Total Score 17 (severe anxiety)    5 (mild anxiety) 21 (severe anxiety) 10 (moderate anxiety)   Total Score 17 13 12 13 5 21 10     PROMIS 10-Global Health (all questions and answers displayed):       9/19/2023     7:11 PM 10/5/2023    10:35 AM 11/29/2023     9:46 AM 1/10/2024    10:52 AM 2/28/2024    10:48 AM 4/10/2024    10:53 AM 8/1/2024    12:57 PM   PROMIS 10   In general, would you say your health is: Good Good Fair Fair Good Good Good   In general, would you say your quality of life is: Fair Good Fair Good Good Fair Fair   In general, how would you rate your physical health? Fair Fair Fair Good Fair Good Good   In general, how would you rate your mental health, including your mood and your ability to think? Fair Good Fair Fair Fair Fair Fair   In general, how would you rate your satisfaction with your social  activities and relationships? Fair Good Fair Fair Fair Fair Poor   In general, please rate how well you carry out your usual social activities and roles Poor Fair Poor Fair Fair Fair Poor   To what extent are you able to carry out your everyday physical activities such as walking, climbing stairs, carrying groceries, or moving a chair? Completely Mostly Mostly Completely Mostly Mostly Mostly   In the past 7 days, how often have you been bothered by emotional problems such as feeling anxious, depressed, or irritable? Often Sometimes Always Sometimes Often Often Always   In the past 7 days, how would you rate your fatigue on average? Severe Mild Moderate Moderate Moderate Moderate Moderate   In the past 7 days, how would you rate your pain on average, where 0 means no pain, and 10 means worst imaginable pain? 5 5 6 6 6 6 6   In general, would you say your health is: 3 3 2 2 3 3 3   In general, would you say your quality of life is: 2 3 2 3 3 2 2   In general, how would you rate your physical health? 2 2 2 3 2 3 3   In general, how would you rate your mental health, including your mood and your ability to think? 2 3 2 2 2 2 2   In general, how would you rate your satisfaction with your social activities and relationships? 2 3 2 2 2 2 1   In general, please rate how well you carry out your usual social activities and roles. (This includes activities at home, at work and in your community, and responsibilities as a parent, child, spouse, employee, friend, etc.) 1 2 1 2 2 2 1   To what extent are you able to carry out your everyday physical activities such as walking, climbing stairs, carrying groceries, or moving a chair? 5 4 4 5 4 4 4   In the past 7 days, how often have you been bothered by emotional problems such as feeling anxious, depressed, or irritable? 4 3 5 3 4 4 5   In the past 7 days, how would you rate your fatigue on average? 4 2 3 3 3 3 3   In the past 7 days, how would you rate your pain on average, where  0 means no pain, and 10 means worst imaginable pain? 5 5 6 6 6 6 6   Global Mental Health Score 8 12 7 10 9 8 6   Global Physical Health Score 12 13 12 14 12 13 13   PROMIS TOTAL - SUBSCORES 20 25 19 24 21 21 19     [place] Suicide Severity Rating Scale (Lifetime/Recent)      8/3/2022     9:17 AM   [place] Suicide Severity Rating (Lifetime/Recent)   Q1 Wish to be Dead (Lifetime) N   Q2 Non-Specific Active Suicidal Thoughts (Lifetime) N   Actual Attempt (Lifetime) N   Has subject engaged in non-suicidal self-injurious behavior? (Lifetime) N   Calculated C-SSRS Risk Score (Lifetime/Recent) No Risk Indicated         ASSESSMENT: Current Emotional / Mental Status (status of significant symptoms):   Risk status (Self / Other harm or suicidal ideation)   Patient denies current fears or concerns for personal safety.   Patient denies current or recent suicidal ideation or behaviors.    Patient denies current or recent homicidal ideation or behaviors.   Patient denies current or recent self injurious behavior or ideation.   Patient denies other safety concerns.   Patient reports there has been no change in risk factors since their last session.     Patient reports there has been no change in protective factors since their last session.     A safety and risk management plan has been developed including: Patient consented to co-developed safety plan on 11-30-22.  Safety and risk management plan was reviewed.   Patient agreed to use safety plan should any safety concerns arise.  A copy was made available to the patient. Reviewed 8-1-24- completed new safety plan through [employer] link.    [employer] Safety Plan      Creation Date: 11/30/22 Last Update Date: 4/3/24      Step 1: Warning signs:    Warning Signs    flashbacks, thoughts about I dont matter, Loss, Worsening depression, isolation, cant stop crying, relationship problems    Medical concerns    Not feeling a part of something      Step 2: Internal coping  strategies - Things I can do to take my mind off my problems without contacting another person:    Strategies    Distress tolerance, going for a walk, gardening, deep breathing, stretching, physical touch    Distraction techniiqes    Focus on helpful thoughts    Meditation      Step 3: People and social settings that provide distraction:    Name Contact Information    Star Spouse    Daughter Cell phone       Places    Movie theater, pet store, coffee shop      Step 4: People whom I can ask for help during a crisis:    Name Contact Information    Star Spouse/ cell phone and live in the home      Step 5: Professionals or agencies I can contact during a crisis:    Clinician/Agency Name Phone Emergency Contact    [facility] [phone]       Local Emergency Department Emergency Department Address Emergency Department Phone    [facility] [phone]       Suicide Prevention Lifeline Phone: Call or Text 467  Crisis Text Line: Text HOME to 365172     Step 6: Making the environment safer (plan for lethal means safety):   Remove items I could harm myself with     Optional: What is most important to me and worth living for?:   Family  Spending time with daughter       [employer]ve[employer] Safety Plan. Eleonora Capone and Danny Carmona. Used with permission of the authors.          Name: Veena Parsons  YOB: 1985  Date: November 30, 2022   My primary care provider: Francisco Nails  My primary care clinic: [facility]   My prescriber: PCP  Other care team support:  Holistic medical provider    My Triggers:    Relational problems  Loss of mother recently  Financial stress      Additional People, Places, and Things that I can access for support:   Spouse  Daughter          What is important to me and makes life worth living: Family         GREEN    Good Control  1. I feel good  2. No suicidal thoughts   3. Can work, sleep and play      Action Steps  1. Self-care: balanced  meals, exercising, sleep practices, etc.  2. Take your medications as prescribed.  3. Continue meetings with therapist and prescriber.  4.  Do the healthy things that I enjoy.             YELLO Getting Worse  I have ANY of these:  1. I do not feel good  2. Difficulty Concentrating  3. Sleep is changing  4. Increase/Change in my thoughts to hurt self and/or others, but I can still manage and not act on it.   5. Not taking care of self.               Action Steps (in addition to the above):  1. Inform your therapist and psychiatric prescriber/PCP.  2. Keep taking your medications as prescribed.    3. Turn to people you can ask for help.  4. Use internal coping strategies -see below.  5. Create safe environment: Removing items I can hurt self with              RED  Get Help  If I have ANY of these:  1. Current and uncontrollable thoughts and/or behaviors to hurt self and/or others.   2. Crazy buzzing and spinning.     Actions to manage my safety  1. Contact your emergency person Star  2. Call or Text 211  3. Call my crisis team- [facility] [phone]  3. Or Call 131 or go to the emergency room right away        My Internal Coping Strategies include the following:  take a bath, belly breathing, arts and crafts, play with my pet, use my coping box, exercise and use my coping skills    [End for Brief Safety Plan]     Safety Concerns  How To Identify Situations That Make Your Mental Health Worse:  Triggers are things that make your mental health worse.  Look at the list below to help you find your triggers and what you can do about them.     1. Identify Early Warning Signs:    Sometimes symptoms return, even when people do their best to stay well. Symptoms can develop over a short period of time with little or no warning, but most of the time they emerge gradually over several weeks.  Early warning signs are changes that people experience when a relapse is starting. Some early warning signs are common and others  are not as common.   Common Early Warning Signs:    Feeling tense or nervous, Eating less or eating more, Trouble sleeping -either too much or too little sleep, Feeling depressed or low, Feeling irritable, Feeling like not being around other people, Trouble concentrating and Urges to harm self     2. Identify action steps to take when warning signs are noticed:    Taking Action- It is important to take action if you are experiencing early warning signs of a relapse.  The faster you act, the more likely it is that you can avoid a full relapse.  It is helpful to identify several specific ways to cope with symptoms.      The following is my list of symptoms and coping strategies that I can use when they are present:    Symptom Coping Strategies   Anxiety -Talk with someone in your support system and let him or her know how you are feeling.  -Use relaxation techniques such as deep breathing or imagery.  -Use positive affirmations to counteract negative self-talk such as  I am learning to let go of worry.    Depression - Schedule your day; include activities you have to do and activities you enjoy doing.  - Get some exercise - walk, run, bike, or swim.  - Give yourself credit for even the smallest things you get done.   Sleep Difficulties   - Go to sleep at the same time every day.  - Do something relaxing before bed, such as drinking herbal tea or listening to music.  - Avoid having discussions about upsetting topics before going to bed.   Delusions   - Distract yourself from the disturbing thought by doing something that requires your attention such as a puzzle.  - Check out your beliefs by talking to someone you trust.    Hallucinations   - Use headphones to listen to music.  - Tell voices to  stop  or say to yourself,  I am safe.   - Ignore the hallucinations as much as possible; focus on other things.   Concentration Difficulties - Minimize distractions so there is only one thing for you to focus on at a time.    -  "Ask the person you are having a conversation with to slow down or repeat things you are unsure of.            Appearance:   Normal   Eye Contact:   Good   Psychomotor Behavior: Normal    Attitude:   Cooperative    Orientation:   All   Speech    Rate / Production: Normal/ Responsive Normal     Volume:  Normal    Mood:    Anxious     Affect:    Worrisome    Thought Content:  Clear    Thought Form:  Goal Directed Logical    Insight:    Good      Medication Review:   No changes to current psychiatric medication(s)     Medication Compliance:   Yes     Changes in Health Issues:   None reported     Chemical Use Review:   Substance Use: Chemical use reviewed, no active concerns identified      Tobacco Use: No current tobacco use.      Diagnosis:  296.32 (F33.1) Major Depressive Disorder, Recurrent Episode, Moderate _ and With mixed features  300.01 (F41.0) Panic Disorder  300.02 (F41.1) Generalized Anxiety Disorder   F43.10 PTSD  PMDD      Collateral Reports Completed:   Not Applicable    PLAN: (Patient Tasks / Therapist Tasks / Other)  Client will continue to work on the following goals:    -Learn more about emotional validation- Star will continue with podcast and follow the \"[employer] Ass Counselor.\" -Continued   -Focus on positive interaction this week.   -Have positive time carved out with the adult kids.   -Follow safety plan and use Liborio Carmona.   -Use antihistamine and antacid during PMDD flare ups- Continued   -Continue to explore past trauma's within family dynamics.         NATACHA Hernandez                                                         ______________________________________________________________________    Individual Treatment Plan    Patient's Name: Veena Parsons  YOB: 1985    Date of Creation: 9-7-22  Date Treatment Plan Last Reviewed/Revised: 5-8-24    DSM5 Diagnoses:  296.32 (F33.1) Major Depressive Disorder, Recurrent Episode, Moderate _ and With mixed features  300.01 " (F41.0) Panic Disorder  300.02 (F41.1) Generalized Anxiety Disorder   F43.10 PTSD  PMDD  Psychosocial / Contextual Factors: Some relational issues   PROMIS (reviewed every 90 days): 19    Referral / Collaboration:  Referral to another professional/service is not indicated at this time..    Anticipated number of session for this episode of care: 6-9 sessions  Anticipation frequency of session: Biweekly  Anticipated Duration of each session: 38-52 minutes  Treatment plan will be reviewed in 90 days or when goals have been changed.       MeasurableTreatment Goal(s) related to diagnosis / functional impairment(s)  Goal 1: Patient will address struggles with anxiety, depression and PMDD.    I will know I've met my goal when I am feeling less reactive through the month with the symptoms.      Objective #A (Patient Action)    Patient will work on establishing a concrete routine with self-care.  Status: Continued - Date(s): 5-8-24    Intervention(s)  Therapist will use CBT and motivational interviewing.      Objective #B  Patient will develop a plan to help manage PMDD  Status: Continued - Date(s): 5-8-24    Intervention(s)  Therapist will use solution focused therapy.    Objective #C  Patient will work on ways to communicate with narcissistic individuals.  Status: Continued - Date(s): 5-8-24    Intervention(s)  Therapist will teach communication skills.          Patient has reviewed and agreed to the above plan.      Lia Stiles, NATACHA  September 7, 2022

## 2024-08-14 ENCOUNTER — VIRTUAL VISIT (OUTPATIENT)
Dept: PSYCHOLOGY | Facility: CLINIC | Age: 39
End: 2024-08-14
Payer: COMMERCIAL

## 2024-08-14 DIAGNOSIS — F33.1 MAJOR DEPRESSIVE DISORDER, RECURRENT EPISODE, MODERATE (H): Primary | ICD-10-CM

## 2024-08-14 DIAGNOSIS — F43.10 PTSD (POST-TRAUMATIC STRESS DISORDER): ICD-10-CM

## 2024-08-14 DIAGNOSIS — F41.0 PANIC DISORDER WITHOUT AGORAPHOBIA: ICD-10-CM

## 2024-08-14 DIAGNOSIS — F32.81 PMDD (PREMENSTRUAL DYSPHORIC DISORDER): ICD-10-CM

## 2024-08-14 DIAGNOSIS — F41.1 GENERALIZED ANXIETY DISORDER: ICD-10-CM

## 2024-08-14 PROCEDURE — 90834 PSYTX W PT 45 MINUTES: CPT | Mod: 95 | Performed by: MARRIAGE & FAMILY THERAPIST

## 2024-08-14 NOTE — PROGRESS NOTES
[facility]                                     Progress Note    Patient Name: Veena Parsons  Date:  8-14-24         Service Type: Individual      Session Start Time:  12pm   Session End Time:   1250pm     Session Length: 50min    Session #: 61    Attendees: Client and Star    Service Modality:  Video     Telemedicine Visit: The patient's condition can be safely assessed and treated via synchronous audio and visual telemedicine encounter.      Reason for Telemedicine Visit: Patient has requested telehealth visit    Originating Site (Patient Location): Patient's home        Distant Location (provider location):  Off-site    Consent:  The patient/guardian has verbally consented to: the potential risks and benefits of telemedicine (video visit) versus in person care; bill my insurance or make self-payment for services provided; and responsibility for payment of non-covered services.     Mode of Communication:  Video Conference via [employer]    As the provider I attest to compliance with applicable laws and regulations related to telemedicine.      Treatment Plan- 8-14-24  CGI- 8-14-24  Phq-9 and YADI-7- See check in data  Promis- 8-14-24    DATA  Interactive Complexity: No  Crisis: No        Progress Since Last Session (Related to Symptoms / Goals / Homework):   There has been demonstrated improvement in functioning while patient has been engaged in psychotherapy/psychological service- if withdrawn the patient would deteriorate and/or relapse.      Symptoms: Client reports confronting issues with anxiety, depression and PMDD. Star is joining the session today.     Homework: Achieved / completed to satisfaction      Episode of Care Goals: Satisfactory progress - ACTION (Actively working towards change); Intervened by reinforcing change plan / affirming steps taken     Current / Ongoing Stressors and Concerns:   -Veena continues to try and find some new purpose in everyday life.    Anthony  reports it has been up and down with PMDD symptoms.    -Veena has been attempting to be more social but feels she pays for it the next day.   Has been physically ill the day after and dealing with temperature fluctuations.    -Hakan, Kurtis and Sebas- Step sons.   -Anxious and avoidant attachment styles-  Veena feels she is anxious and Star feels he is avoidant- Continue   -Sebas left for basic training. [place] left out due to Star's ex-wife being involved. Really wants to be a part of graduation.    -Deborah has made some decisions about college and studying sociology.     -Will be celebrating anniversary next week. Going out to the [employer].    -Dealing with a pinched nerve in neck.        Treatment Objective(s) Addressed in This Session:  Safety planning / following safety plan   Patient will develop a plan to help manage PMDD  Patient will work on ways to communicate /patterns   Relational issues - Attachment styles.  Purpose in life.        Intervention:   Working on communication and recognizing each others strengths.    Attempts to find some social outlets.   Follow safety plan and use crisis resources.  Agrees to contract for safety today.   Use support system- Continue.   Star has been working on ways to say things without talking impulsively.   Plan ahead for Sebas's graduation from basic.    Outline more positives and write them down.        Assessments completed prior to visit:  The following assessments were completed by patient for this visit:  See data in EPIC as it is not auto populating.    PHQ2:       7/17/2024    12:45 PM 6/26/2024    12:54 AM 6/19/2024     9:55 AM 5/22/2024     9:58 AM 5/8/2024    11:42 AM 4/17/2024    10:51 AM 2/28/2024    10:46 AM   PHQ-2 ( 1999 [employer])   Q1: Little interest or pleasure in doing things 1 1 1 1 1 1 3   Q2: Feeling down, depressed or hopeless 1 1 1 1 1 1 3   PHQ-2 Score 2 2 2 2 2 2 6   Q1: Little interest or pleasure in doing things  Several days Several days Several  days Several days Several days Nearly every day   Q2: Feeling down, depressed or hopeless  Several days Several days Several days Several days Several days Nearly every day   PHQ-2 Score  2 2 2 2 2 6     PHQ9:       2/2/2023    10:13 AM 6/1/2023    10:07 AM 7/17/2023    11:15 AM 9/19/2023     7:09 PM 11/29/2023     9:44 AM 1/3/2024    10:04 AM 2/28/2024    10:46 AM   PHQ-9 SCORE   PHQ-9 Total Score MyChart    15 (Moderately severe depression) 15 (Moderately severe depression) 17 (Moderately severe depression) 13 (Moderate depression)   PHQ-9 Total Score 12 15 16 15 15 17 13     GAD2:       10/5/2023    10:34 AM 11/29/2023     9:45 AM 1/10/2024    10:51 AM 2/28/2024    10:47 AM 4/10/2024    10:52 AM 8/1/2024    12:56 PM 8/14/2024    10:01 AM   YADI-2   Feeling nervous, anxious, or on edge 1 1 1 2 1 1 1   Not being able to stop or control worrying 1 1 1 1 1 1 1   YADI-2 Total Score 2 2 2 3 2 2 2     GAD7:       11/27/2022     5:03 AM 2/2/2023    10:13 AM 6/1/2023    10:07 AM 7/17/2023    11:15 AM 8/7/2023    11:04 AM 9/19/2023     7:10 PM 2/28/2024    10:47 AM   YADI-7 SCORE   Total Score 17 (severe anxiety)    5 (mild anxiety) 21 (severe anxiety) 10 (moderate anxiety)   Total Score 17 13 12 13 5 21 10     PROMIS 10-Global Health (all questions and answers displayed):       10/5/2023    10:35 AM 11/29/2023     9:46 AM 1/10/2024    10:52 AM 2/28/2024    10:48 AM 4/10/2024    10:53 AM 8/1/2024    12:57 PM 8/14/2024    10:01 AM   PROMIS 10   In general, would you say your health is: Good Fair Fair Good Good Good Good   In general, would you say your quality of life is: Good Fair Good Good Fair Fair Good   In general, how would you rate your physical health? Fair Fair Good Fair Good Good Fair   In general, how would you rate your mental health, including your mood and your ability to think? Good Fair Fair Fair Fair Fair Fair   In general, how would you rate your satisfaction with your social activities and relationships?  Good Fair Fair Fair Fair Poor Fair   In general, please rate how well you carry out your usual social activities and roles Fair Poor Fair Fair Fair Poor Fair   To what extent are you able to carry out your everyday physical activities such as walking, climbing stairs, carrying groceries, or moving a chair? Mostly Mostly Completely Mostly Mostly Mostly Moderately   In the past 7 days, how often have you been bothered by emotional problems such as feeling anxious, depressed, or irritable? Sometimes Always Sometimes Often Often Always Often   In the past 7 days, how would you rate your fatigue on average? Mild Moderate Moderate Moderate Moderate Moderate Moderate   In the past 7 days, how would you rate your pain on average, where 0 means no pain, and 10 means worst imaginable pain? 5 6 6 6 6 6 6   In general, would you say your health is: 3 2 2 3 3 3 3   In general, would you say your quality of life is: 3 2 3 3 2 2 3   In general, how would you rate your physical health? 2 2 3 2 3 3 2   In general, how would you rate your mental health, including your mood and your ability to think? 3 2 2 2 2 2 2   In general, how would you rate your satisfaction with your social activities and relationships? 3 2 2 2 2 1 2   In general, please rate how well you carry out your usual social activities and roles. (This includes activities at home, at work and in your community, and responsibilities as a parent, child, spouse, employee, friend, etc.) 2 1 2 2 2 1 2   To what extent are you able to carry out your everyday physical activities such as walking, climbing stairs, carrying groceries, or moving a chair? 4 4 5 4 4 4 3   In the past 7 days, how often have you been bothered by emotional problems such as feeling anxious, depressed, or irritable? 3 5 3 4 4 5 4   In the past 7 days, how would you rate your fatigue on average? 2 3 3 3 3 3 3   In the past 7 days, how would you rate your pain on average, where 0 means no pain, and 10  means worst imaginable pain? 5 6 6 6 6 6 6   Global Mental Health Score 12 7 10 9 8 6 9   Global Physical Health Score 13 12 14 12 13 13 11   PROMIS TOTAL - SUBSCORES 25 19 24 21 21 19 20     [place] Suicide Severity Rating Scale (Lifetime/Recent)      8/3/2022     9:17 AM   [place] Suicide Severity Rating (Lifetime/Recent)   Q1 Wish to be Dead (Lifetime) N   Q2 Non-Specific Active Suicidal Thoughts (Lifetime) N   Actual Attempt (Lifetime) N   Has subject engaged in non-suicidal self-injurious behavior? (Lifetime) N   Calculated C-SSRS Risk Score (Lifetime/Recent) No Risk Indicated         ASSESSMENT: Current Emotional / Mental Status (status of significant symptoms):   Risk status (Self / Other harm or suicidal ideation)   Patient denies current fears or concerns for personal safety.   Patient denies current or recent suicidal ideation or behaviors.    Patient denies current or recent homicidal ideation or behaviors.   Patient denies current or recent self injurious behavior or ideation.   Patient denies other safety concerns.   Patient reports there has been no change in risk factors since their last session.     Patient reports there has been no change in protective factors since their last session.     A safety and risk management plan has been developed including: Patient consented to co-developed safety plan on 11-30-22.  Safety and risk management plan was reviewed.   Patient agreed to use safety plan should any safety concerns arise.  A copy was made available to the patient. Reviewed 8-14-24- completed new safety plan through [employer] link.    [facility] Safety Plan      Creation Date: 11/30/22 Last Update Date: 4/3/24      Step 1: Warning signs:    Warning Signs    flashbacks, thoughts about I dont matter, Loss, Worsening depression, isolation, cant stop crying, relationship problems    Medical concerns    Not feeling a part of something      Step 2: Internal coping strategies - Things I can do  to take my mind off my problems without contacting another person:    Strategies    Distress tolerance, going for a walk, gardening, deep breathing, stretching, physical touch    Distraction techniiqes    Focus on helpful thoughts    Meditation      Step 3: People and social settings that provide distraction:    Name Contact Information    Star Spouse    Daughter Cell phone       Places    Movie theater, pet store, coffee shop      Step 4: People whom I can ask for help during a crisis:    Name Contact Information    Star Spouse/ cell phone and live in the home      Step 5: Professionals or agencies I can contact during a crisis:    Clinician/Agency Name Phone Emergency Contact    [facility] [phone]       Local Emergency Department Emergency Department Address Emergency Department Phone    [facility] [phone]       Suicide Prevention Lifeline Phone: Call or Text 930  Crisis Text Line: Text HOME to 326009     Step 6: Making the environment safer (plan for lethal means safety):   Remove items I could harm myself with     Optional: What is most important to me and worth living for?:   Family  Spending time with daughter       Leyla Safety Plan. Eleonora Capone and Danny Carmona. Used with permission of the authors.          Name: Veena Parsons  YOB: 1985  Date: November 30, 2022   My primary care provider: Francisco Nails  [facility]   My prescriber: PCP  Other care team support:  Holistic medical provider    My Triggers:    Relational problems  Loss of mother recently  Financial stress      Additional People, Places, and Things that I can access for support:   Spouse  Daughter          What is important to me and makes life worth living: Family         GREEN    Good Control  1. I feel good  2. No suicidal thoughts   3. Can work, sleep and play      Action Steps  1. Self-care: balanced meals, exercising, sleep  practices, etc.  2. Take your medications as prescribed.  3. Continue meetings with therapist and prescriber.  4.  Do the healthy things that I enjoy.             YELLO Getting Worse  I have ANY of these:  1. I do not feel good  2. Difficulty Concentrating  3. Sleep is changing  4. Increase/Change in my thoughts to hurt self and/or others, but I can still manage and not act on it.   5. Not taking care of self.               Action Steps (in addition to the above):  1. Inform your therapist and psychiatric prescriber/PCP.  2. Keep taking your medications as prescribed.    3. Turn to people you can ask for help.  4. Use internal coping strategies -see below.  5. Create safe environment: Removing items I can hurt self with              RED  Get Help  If I have ANY of these:  1. Current and uncontrollable thoughts and/or behaviors to hurt self and/or others.   2. Crazy buzzing and spinning.     Actions to manage my safety  1. Contact your emergency person Star  2. Call or Text 034  3. Call my crisis team- [facility] [phone]  3. Or Call 241 or go to the emergency room right away        My Internal Coping Strategies include the following:  take a bath, belly breathing, arts and crafts, play with my pet, use my coping box, exercise and use my coping skills    [End for Brief Safety Plan]     Safety Concerns  How To Identify Situations That Make Your Mental Health Worse:  Triggers are things that make your mental health worse.  Look at the list below to help you find your triggers and what you can do about them.     1. Identify Early Warning Signs:    Sometimes symptoms return, even when people do their best to stay well. Symptoms can develop over a short period of time with little or no warning, but most of the time they emerge gradually over several weeks.  Early warning signs are changes that people experience when a relapse is starting. Some early warning signs are common and others are not as common.    Common Early Warning Signs:    Feeling tense or nervous, Eating less or eating more, Trouble sleeping -either too much or too little sleep, Feeling depressed or low, Feeling irritable, Feeling like not being around other people, Trouble concentrating and Urges to harm self     2. Identify action steps to take when warning signs are noticed:    Taking Action- It is important to take action if you are experiencing early warning signs of a relapse.  The faster you act, the more likely it is that you can avoid a full relapse.  It is helpful to identify several specific ways to cope with symptoms.      The following is my list of symptoms and coping strategies that I can use when they are present:    Symptom Coping Strategies   Anxiety -Talk with someone in your support system and let him or her know how you are feeling.  -Use relaxation techniques such as deep breathing or imagery.  -Use positive affirmations to counteract negative self-talk such as  I am learning to let go of worry.    Depression - Schedule your day; include activities you have to do and activities you enjoy doing.  - Get some exercise - walk, run, bike, or swim.  - Give yourself credit for even the smallest things you get done.   Sleep Difficulties   - Go to sleep at the same time every day.  - Do something relaxing before bed, such as drinking herbal tea or listening to music.  - Avoid having discussions about upsetting topics before going to bed.   Delusions   - Distract yourself from the disturbing thought by doing something that requires your attention such as a puzzle.  - Check out your beliefs by talking to someone you trust.    Hallucinations   - Use headphones to listen to music.  - Tell voices to  stop  or say to yourself,  I am safe.   - Ignore the hallucinations as much as possible; focus on other things.   Concentration Difficulties - Minimize distractions so there is only one thing for you to focus on at a time.    - Ask the person you  "are having a conversation with to slow down or repeat things you are unsure of.            Appearance:   Normal   Eye Contact:   Good   Psychomotor Behavior: Normal    Attitude:   Cooperative    Orientation:   All   Speech    Rate / Production: Normal     Volume:  Normal    Mood:    Anxious     Affect:    Worrisome    Thought Content:  Clear    Thought Form:  Goal Directed    Insight:    Good      Medication Review:   No changes to current psychiatric medication(s)     Medication Compliance:   Yes     Changes in Health Issues:   None reported     Chemical Use Review:   Substance Use: Chemical use reviewed, no active concerns identified      Tobacco Use: No current tobacco use.      Diagnosis:  296.32 (F33.1) Major Depressive Disorder, Recurrent Episode, Moderate _ and With mixed features  300.01 (F41.0) Panic Disorder  300.02 (F41.1) Generalized Anxiety Disorder   F43.10 PTSD  PMDD      Collateral Reports Completed:   Not Applicable    PLAN: (Patient Tasks / Therapist Tasks / Other)  Client will continue to work on the following goals:    -Learn more about emotional validation- Star will continue with podcast and follow the \"[employer] Counsel[employer].\" -Continued   -Highlight more positives and write them down.   -Plan ahead for Sebas's graduation.   -Follow safety plan and use Liborio Carmona.   -Use antihistamine and antacid during PMDD flare ups- Continued   -Continue to explore past trauma's within family dynamics.   -Continue to find more purpose during the day.          NATACHA Hernandez                                                         ______________________________________________________________________    Individual Treatment Plan    Patient's Name: Veena Parsons  YOB: 1985    Date of Creation: 9-7-22  Date Treatment Plan Last Reviewed/Revised: 8-14-24    DSM5 Diagnoses:  296.32 (F33.1) Major Depressive Disorder, Recurrent Episode, Moderate _ and With mixed features  300.01 (F41.0) Panic " Disorder  300.02 (F41.1) Generalized Anxiety Disorder   F43.10 PTSD  PMDD  Psychosocial / Contextual Factors: Some relational issues   PROMIS (reviewed every 90 days): 19    Referral / Collaboration:  Referral to another professional/service is not indicated at this time..    Anticipated number of session for this episode of care: 6-9 sessions  Anticipation frequency of session: Biweekly  Anticipated Duration of each session: 38-52 minutes  Treatment plan will be reviewed in 90 days or when goals have been changed.       MeasurableTreatment Goal(s) related to diagnosis / functional impairment(s)  Goal 1: Patient will address struggles with anxiety, depression and PMDD.    I will know I've met my goal when I am feeling less reactive through the month with the symptoms.      Objective #A (Patient Action)    Patient will work on establishing a concrete routine with self-care.  Status: Continued - Date(s): 8-14-24    Intervention(s)  Therapist will use CBT and motivational interviewing.      Objective #B  Patient will develop a plan to help manage PMDD  Status: Continued - Date(s): 8-14-24    Intervention(s)  Therapist will use solution focused therapy.    Objective #C  Patient will work on ways to communicate with narcissistic individuals.  Status: Continued - Date(s): 8-14-24    Intervention(s)  Therapist will teach communication skills.          Patient has reviewed and agreed to the above plan.      Lia Stiles, NATACHA  September 7, 2022

## 2024-08-21 ENCOUNTER — VIRTUAL VISIT (OUTPATIENT)
Dept: PSYCHOLOGY | Facility: CLINIC | Age: 39
End: 2024-08-21
Payer: COMMERCIAL

## 2024-08-21 DIAGNOSIS — F33.1 MAJOR DEPRESSIVE DISORDER, RECURRENT EPISODE, MODERATE (H): Primary | ICD-10-CM

## 2024-08-21 DIAGNOSIS — F32.81 PMDD (PREMENSTRUAL DYSPHORIC DISORDER): ICD-10-CM

## 2024-08-21 DIAGNOSIS — F43.10 PTSD (POST-TRAUMATIC STRESS DISORDER): ICD-10-CM

## 2024-08-21 DIAGNOSIS — F41.0 PANIC DISORDER WITHOUT AGORAPHOBIA: ICD-10-CM

## 2024-08-21 DIAGNOSIS — F41.1 GENERALIZED ANXIETY DISORDER: ICD-10-CM

## 2024-08-21 PROCEDURE — 90834 PSYTX W PT 45 MINUTES: CPT | Mod: 95 | Performed by: MARRIAGE & FAMILY THERAPIST

## 2024-08-21 NOTE — PROGRESS NOTES
[facility]                                     Progress Note    Patient Name: Veena Parsons  Date:  8-21-24         Service Type: Individual      Session Start Time:  10am   Session End Time:   1050am     Session Length:   50min    Session #: 62    Attendees: Client     Service Modality:  Video     Telemedicine Visit: The patient's condition can be safely assessed and treated via synchronous audio and visual telemedicine encounter.      Reason for Telemedicine Visit: Patient has requested telehealth visit    Originating Site (Patient Location): Patient's home        Distant Location (provider location):  Off-site    Consent:  The patient/guardian has verbally consented to: the potential risks and benefits of telemedicine (video visit) versus in person care; bill my insurance or make self-payment for services provided; and responsibility for payment of non-covered services.     Mode of Communication:  Video Conference via [employer]    As the provider I attest to compliance with applicable laws and regulations related to telemedicine.      Treatment Plan- 8-14-24  CGI- 8-14-24  Phq-9 and YADI-7- See check in data  Promis- 8-14-24    DATA  Interactive Complexity: No  Crisis: No        Progress Since Last Session (Related to Symptoms / Goals / Homework):   There has been demonstrated improvement in functioning while patient has been engaged in psychotherapy/psychological service- if withdrawn the patient would deteriorate and/or relapse.      Symptoms: Client reports confronting issues with anxiety, depression and PMDD. Going on a self care trip this weekend.     Homework: Achieved / completed to satisfaction  Completed in session      Episode of Care Goals: Satisfactory progress - ACTION (Actively working towards change); Intervened by reinforcing change plan / affirming steps taken     Current / Ongoing Stressors and Concerns:   -Veena is going on a self-care vacation this weekend.  Some  anxiety about seeing Star's father who sometimes does not include everyone. .    -Veena is upset that Star has not been acknowledging their anniversary.    -Hakan, Kurtis and Sebas- Step sons.   -Anxious and avoidant attachment styles-  Veena feels she is anxious and Star feels he is avoidant- Continue   -Sebas continues with basic training.     -Deborah has made some decisions about college and studying sociology.     -Dealing with a pinched nerve in neck- Has been getting minimal relief.        Treatment Objective(s) Addressed in This Session:  Safety planning / following safety plan   Patient will develop a plan to help manage PMDD  Patient will work on ways to communicate /patterns   Relational issues - Attachment styles.  Purpose in life.        Intervention:   Attempt to do some one on one activities on the trip away from extended family. Veena specifies not wanting to be alone on the vacation.   Follow safety plan and use crisis resources.  Agrees to contract for safety today.   Use support system- Continue.   Continue to work on communication patterns.    Plan ahead for Sebas's graduation from basic.    Follow positives on social media.        Assessments completed prior to visit:  The following assessments were completed by patient for this visit:  See data in EPIC as it is not auto populating.    PHQ2:       7/17/2024    12:45 PM 6/26/2024    12:54 AM 6/19/2024     9:55 AM 5/22/2024     9:58 AM 5/8/2024    11:42 AM 4/17/2024    10:51 AM 2/28/2024    10:46 AM   PHQ-2 ( 1999 [employer])   Q1: Little interest or pleasure in doing things 1 1 1 1 1 1 3   Q2: Feeling down, depressed or hopeless 1 1 1 1 1 1 3   PHQ-2 Score 2 2 2 2 2 2 6   Q1: Little interest or pleasure in doing things  Several days Several days Several days Several days Several days Nearly every day   Q2: Feeling down, depressed or hopeless  Several days Several days Several days Several days Several days Nearly every day   PHQ-2 Score  2 2 2 2 2 6     PHQ9:        2/2/2023    10:13 AM 6/1/2023    10:07 AM 7/17/2023    11:15 AM 9/19/2023     7:09 PM 11/29/2023     9:44 AM 1/3/2024    10:04 AM 2/28/2024    10:46 AM   PHQ-9 SCORE   PHQ-9 Total Score MyChart    15 (Moderately severe depression) 15 (Moderately severe depression) 17 (Moderately severe depression) 13 (Moderate depression)   PHQ-9 Total Score 12 15 16 15 15 17 13     GAD2:       10/5/2023    10:34 AM 11/29/2023     9:45 AM 1/10/2024    10:51 AM 2/28/2024    10:47 AM 4/10/2024    10:52 AM 8/1/2024    12:56 PM 8/14/2024    10:01 AM   YADI-2   Feeling nervous, anxious, or on edge 1 1 1 2 1 1 1   Not being able to stop or control worrying 1 1 1 1 1 1 1   YADI-2 Total Score 2 2 2 3 2 2 2     GAD7:       11/27/2022     5:03 AM 2/2/2023    10:13 AM 6/1/2023    10:07 AM 7/17/2023    11:15 AM 8/7/2023    11:04 AM 9/19/2023     7:10 PM 2/28/2024    10:47 AM   YADI-7 SCORE   Total Score 17 (severe anxiety)    5 (mild anxiety) 21 (severe anxiety) 10 (moderate anxiety)   Total Score 17 13 12 13 5 21 10     PROMIS 10-Global Health (all questions and answers displayed):       10/5/2023    10:35 AM 11/29/2023     9:46 AM 1/10/2024    10:52 AM 2/28/2024    10:48 AM 4/10/2024    10:53 AM 8/1/2024    12:57 PM 8/14/2024    10:01 AM   PROMIS 10   In general, would you say your health is: Good Fair Fair Good Good Good Good   In general, would you say your quality of life is: Good Fair Good Good Fair Fair Good   In general, how would you rate your physical health? Fair Fair Good Fair Good Good Fair   In general, how would you rate your mental health, including your mood and your ability to think? Good Fair Fair Fair Fair Fair Fair   In general, how would you rate your satisfaction with your social activities and relationships? Good Fair Fair Fair Fair Poor Fair   In general, please rate how well you carry out your usual social activities and roles Fair Poor Fair Fair Fair Poor Fair   To what extent are you able to carry out your  everyday physical activities such as walking, climbing stairs, carrying groceries, or moving a chair? Mostly Mostly Completely Mostly Mostly Mostly Moderately   In the past 7 days, how often have you been bothered by emotional problems such as feeling anxious, depressed, or irritable? Sometimes Always Sometimes Often Often Always Often   In the past 7 days, how would you rate your fatigue on average? Mild Moderate Moderate Moderate Moderate Moderate Moderate   In the past 7 days, how would you rate your pain on average, where 0 means no pain, and 10 means worst imaginable pain? 5 6 6 6 6 6 6   In general, would you say your health is: 3 2 2 3 3 3 3   In general, would you say your quality of life is: 3 2 3 3 2 2 3   In general, how would you rate your physical health? 2 2 3 2 3 3 2   In general, how would you rate your mental health, including your mood and your ability to think? 3 2 2 2 2 2 2   In general, how would you rate your satisfaction with your social activities and relationships? 3 2 2 2 2 1 2   In general, please rate how well you carry out your usual social activities and roles. (This includes activities at home, at work and in your community, and responsibilities as a parent, child, spouse, employee, friend, etc.) 2 1 2 2 2 1 2   To what extent are you able to carry out your everyday physical activities such as walking, climbing stairs, carrying groceries, or moving a chair? 4 4 5 4 4 4 3   In the past 7 days, how often have you been bothered by emotional problems such as feeling anxious, depressed, or irritable? 3 5 3 4 4 5 4   In the past 7 days, how would you rate your fatigue on average? 2 3 3 3 3 3 3   In the past 7 days, how would you rate your pain on average, where 0 means no pain, and 10 means worst imaginable pain? 5 6 6 6 6 6 6   Global Mental Health Score 12 7 10 9 8 6 9   Global Physical Health Score 13 12 14 12 13 13 11   PROMIS TOTAL - SUBSCORES 25 19 24 21 21 19 20     [facility]  Severity Rating Scale (Lifetime/Recent)      8/3/2022     9:17 AM   [place] Suicide Severity Rating (Lifetime/Recent)   Q1 Wish to be Dead (Lifetime) N   Q2 Non-Specific Active Suicidal Thoughts (Lifetime) N   Actual Attempt (Lifetime) N   Has subject engaged in non-suicidal self-injurious behavior? (Lifetime) N   Calculated C-SSRS Risk Score (Lifetime/Recent) No Risk Indicated         ASSESSMENT: Current Emotional / Mental Status (status of significant symptoms):   Risk status (Self / Other harm or suicidal ideation)   Patient denies current fears or concerns for personal safety.   Patient denies current or recent suicidal ideation or behaviors.    Patient denies current or recent homicidal ideation or behaviors.   Patient denies current or recent self injurious behavior or ideation.   Patient denies other safety concerns.   Patient reports there has been no change in risk factors since their last session.     Patient reports there has been no change in protective factors since their last session.     A safety and risk management plan has been developed including: Patient consented to co-developed safety plan on 11-30-22.  Safety and risk management plan was reviewed.   Patient agreed to use safety plan should any safety concerns arise.  A copy was made available to the patient. Reviewed 8-21-24- completed new safety plan through [employer] link.    [employer] Safety Plan      Creation Date: 11/30/22 Last Update Date: 4/3/24      Step 1: Warning signs:    Warning Signs    flashbacks, thoughts about I dont matter, Loss, Worsening depression, isolation, cant stop crying, relationship problems    Medical concerns    Not feeling a part of something      Step 2: Internal coping strategies - Things I can do to take my mind off my problems without contacting another person:    Strategies    Distress tolerance, going for a walk, gardening, deep breathing, stretching, physical touch    Distraction techniiqes    Focus  on helpful thoughts    Meditation      Step 3: People and social settings that provide distraction:    Name Contact Information    Star Spouse    Daughter Cell phone       Places    Movie theater, pet store, coffee shop      Step 4: People whom I can ask for help during a crisis:    Name Contact Information    Star Spouse/ cell phone and live in the home      Step 5: Professionals or agencies I can contact during a crisis:    Clinician/Agency Name Phone Emergency Contact    [facility] [phone]       Local Emergency Department Emergency Department Address Emergency Department Phone    [facility] [phone]       Suicide Prevention Lifeline Phone: Call or Text 047  Crisis Text Line: Text HOME to 454959     Step 6: Making the environment safer (plan for lethal means safety):   Remove items I could harm myself with     Optional: What is most important to me and worth living for?:   Family  Spending time with daughter       [employer] Safety Plan. Eleonora Capone and Danny Carmona. Used with permission of the authors.          Name: Veena Parsons  YOB: 1985  Date: November 30, 2022   My primary care provider: Francisco Nails  My primary care clinic: [facility]   My prescriber: PCP  Other care team support:  Holistic medical provider    My Triggers:    Relational problems  Loss of mother recently  Financial stress      Additional People, Places, and Things that I can access for support:   Spouse  Daughter          What is important to me and makes life worth living: Family         GREEN    Good Control  1. I feel good  2. No suicidal thoughts   3. Can work, sleep and play      Action Steps  1. Self-care: balanced meals, exercising, sleep practices, etc.  2. Take your medications as prescribed.  3. Continue meetings with therapist and prescriber.  4.  Do the healthy things that I enjoy.             YELLO Getting Worse  I have ANY of these:  1. I do not  feel good  2. Difficulty Concentrating  3. Sleep is changing  4. Increase/Change in my thoughts to hurt self and/or others, but I can still manage and not act on it.   5. Not taking care of self.               Action Steps (in addition to the above):  1. Inform your therapist and psychiatric prescriber/PCP.  2. Keep taking your medications as prescribed.    3. Turn to people you can ask for help.  4. Use internal coping strategies -see below.  5. Create safe environment: Removing items I can hurt self with              RED  Get Help  If I have ANY of these:  1. Current and uncontrollable thoughts and/or behaviors to hurt self and/or others.   2. Crazy buzzing and spinning.     Actions to manage my safety  1. Contact your emergency person Star  2. Call or Text [phone]  3. Call my crisis team- [facility] [phone]  3. Or Call 1 or go to the emergency room right away        My Internal Coping Strategies include the following:  take a bath, belly breathing, arts and crafts, play with my pet, use my coping box, exercise and use my coping skills    [End for Brief Safety Plan]     Safety Concerns  How To Identify Situations That Make Your Mental Health Worse:  Triggers are things that make your mental health worse.  Look at the list below to help you find your triggers and what you can do about them.     1. Identify Early Warning Signs:    Sometimes symptoms return, even when people do their best to stay well. Symptoms can develop over a short period of time with little or no warning, but most of the time they emerge gradually over several weeks.  Early warning signs are changes that people experience when a relapse is starting. Some early warning signs are common and others are not as common.   Common Early Warning Signs:    Feeling tense or nervous, Eating less or eating more, Trouble sleeping -either too much or too little sleep, Feeling depressed or low, Feeling irritable, Feeling like not being around other  people, Trouble concentrating and Urges to harm self     2. Identify action steps to take when warning signs are noticed:    Taking Action- It is important to take action if you are experiencing early warning signs of a relapse.  The faster you act, the more likely it is that you can avoid a full relapse.  It is helpful to identify several specific ways to cope with symptoms.      The following is my list of symptoms and coping strategies that I can use when they are present:    Symptom Coping Strategies   Anxiety -Talk with someone in your support system and let him or her know how you are feeling.  -Use relaxation techniques such as deep breathing or imagery.  -Use positive affirmations to counteract negative self-talk such as  I am learning to let go of worry.    Depression - Schedule your day; include activities you have to do and activities you enjoy doing.  - Get some exercise - walk, run, bike, or swim.  - Give yourself credit for even the smallest things you get done.   Sleep Difficulties   - Go to sleep at the same time every day.  - Do something relaxing before bed, such as drinking herbal tea or listening to music.  - Avoid having discussions about upsetting topics before going to bed.   Delusions   - Distract yourself from the disturbing thought by doing something that requires your attention such as a puzzle.  - Check out your beliefs by talking to someone you trust.    Hallucinations   - Use headphones to listen to music.  - Tell voices to  stop  or say to yourself,  I am safe.   - Ignore the hallucinations as much as possible; focus on other things.   Concentration Difficulties - Minimize distractions so there is only one thing for you to focus on at a time.    - Ask the person you are having a conversation with to slow down or repeat things you are unsure of.            Appearance:   Normal   Eye Contact:   Good   Psychomotor Behavior: Normal    Attitude:   Cooperative  "   Orientation:   All   Speech    Rate / Production: Normal/ Responsive Normal     Volume:  Normal    Mood:    Anxious  Depressed     Affect:    Worrisome    Thought Content:  Clear    Thought Form:  Goal Directed  Logical    Insight:    Good      Medication Review:   No changes to current psychiatric medication(s)     Medication Compliance:   Yes     Changes in Health Issues:   None reported     Chemical Use Review:   Substance Use: Chemical use reviewed, no active concerns identified      Tobacco Use: Current tobacco use     Diagnosis:  296.32 (F33.1) Major Depressive Disorder, Recurrent Episode, Moderate _ and With mixed features  300.01 (F41.0) Panic Disorder  300.02 (F41.1) Generalized Anxiety Disorder   F43.10 PTSD  PMDD      Collateral Reports Completed:   Not Applicable    PLAN: (Patient Tasks / Therapist Tasks / Other)  Client will continue to work on the following goals:    -Learn more about emotional validation- Star will continue with podcast and follow the \"[employer] Ass Counselor.\" -Continued   -Find time for one on one on the vacation. Veena states she does not want to feel alone on the vacation.   -Plan ahead for Sebas's graduation and make it a priority to go.   -Following Liborio Carmona and using resources.   -Use antihistamine and antacid during PMDD flare ups- Continued   -Continue to explore past trauma's within family dynamics.         Lia Stiles, NATACHA                                                         ______________________________________________________________________    Individual Treatment Plan    Patient's Name: Veena Parsons  YOB: 1985    Date of Creation: 9-7-22  Date Treatment Plan Last Reviewed/Revised: 8-14-24    DSM5 Diagnoses:  296.32 (F33.1) Major Depressive Disorder, Recurrent Episode, Moderate _ and With mixed features  300.01 (F41.0) Panic Disorder  300.02 (F41.1) Generalized Anxiety Disorder   F43.10 PTSD  PMDD  Psychosocial / Contextual Factors: Some " relational issues   PROMIS (reviewed every 90 days): 19    Referral / Collaboration:  Referral to another professional/service is not indicated at this time..    Anticipated number of session for this episode of care: 6-9 sessions  Anticipation frequency of session: Biweekly  Anticipated Duration of each session: 38-52 minutes  Treatment plan will be reviewed in 90 days or when goals have been changed.       MeasurableTreatment Goal(s) related to diagnosis / functional impairment(s)  Goal 1: Patient will address struggles with anxiety, depression and PMDD.    I will know I've met my goal when I am feeling less reactive through the month with the symptoms.      Objective #A (Patient Action)    Patient will work on establishing a concrete routine with self-care.  Status: Continued - Date(s): 8-14-24    Intervention(s)  Therapist will use CBT and motivational interviewing.      Objective #B  Patient will develop a plan to help manage PMDD  Status: Continued - Date(s): 8-14-24    Intervention(s)  Therapist will use solution focused therapy.    Objective #C  Patient will work on ways to communicate with narcissistic individuals.  Status: Continued - Date(s): 8-14-24    Intervention(s)  Therapist will teach communication skills.          Patient has reviewed and agreed to the above plan.      Lia Stiles, TANIAFT  September 7, 2022

## 2024-08-28 ENCOUNTER — VIRTUAL VISIT (OUTPATIENT)
Dept: PSYCHOLOGY | Facility: CLINIC | Age: 39
End: 2024-08-28
Payer: COMMERCIAL

## 2024-08-28 DIAGNOSIS — F41.0 PANIC DISORDER WITHOUT AGORAPHOBIA: ICD-10-CM

## 2024-08-28 DIAGNOSIS — F33.1 MAJOR DEPRESSIVE DISORDER, RECURRENT EPISODE, MODERATE (H): Primary | ICD-10-CM

## 2024-08-28 DIAGNOSIS — F41.1 GENERALIZED ANXIETY DISORDER: ICD-10-CM

## 2024-08-28 DIAGNOSIS — F43.10 PTSD (POST-TRAUMATIC STRESS DISORDER): ICD-10-CM

## 2024-08-28 DIAGNOSIS — F32.81 PMDD (PREMENSTRUAL DYSPHORIC DISORDER): ICD-10-CM

## 2024-08-28 PROCEDURE — 90834 PSYTX W PT 45 MINUTES: CPT | Mod: 95 | Performed by: MARRIAGE & FAMILY THERAPIST

## 2024-08-28 NOTE — PROGRESS NOTES
[facility]                                     Progress Note    Patient Name: Veena Parsons  Date:  8-28-24         Service Type: Individual      Session Start Time:  1002am   Session End Time:   1052am     Session Length:   50min    Session #: 63    Attendees: Client and Star     Service Modality:  Video     Telemedicine Visit: The patient's condition can be safely assessed and treated via synchronous audio and visual telemedicine encounter.      Reason for Telemedicine Visit: Patient has requested telehealth visit    Originating Site (Patient Location): Patient's home        Distant Location (provider location):  Off-site    Consent:  The patient/guardian has verbally consented to: the potential risks and benefits of telemedicine (video visit) versus in person care; bill my insurance or make self-payment for services provided; and responsibility for payment of non-covered services.     Mode of Communication:  Video Conference via [employer]    As the provider I attest to compliance with applicable laws and regulations related to telemedicine.      Treatment Plan- 8-14-24  CGI- 8-14-24  Phq-9 and YADI-7- See check in data  Promis- 8-28-24    DATA  Interactive Complexity: No  Crisis: No        Progress Since Last Session (Related to Symptoms / Goals / Homework):   There has been demonstrated improvement in functioning while patient has been engaged in psychotherapy/psychological service- if withdrawn the patient would deteriorate and/or relapse.      Symptoms: Client reports confronting issues with anxiety, depression and PMDD. Trip with Star was a challenge.  Star joins the appointment to talk about this today.     Homework: Achieved / completed to satisfaction  Completed in session      Episode of Care Goals: Satisfactory progress - ACTION (Actively working towards change); Intervened by reinforcing change plan / affirming steps taken       Current / Ongoing Stressors and  Concerns:   -Veena reports she felt ignored on the trip.  Star was a car show  and this took up a big portion of the day.    -Star states he attempted to balance the time with Veena and with his family but he had to do the obligation with the judging. He feels like they did not do all the things they wanted.    -Veena is missing both of her parents.   -Hakan, Kurtis and Sebas- Step sons.   -Anxious and avoidant attachment styles-  Veena feels she is anxious and Star feels he is avoidant- Continue   -Sebas is working through basic training.    -Deborah is getting more serious with her boyfriend.    -Dealing with a pinched nerve in neck- Has been doing chiropractic.         Treatment Objective(s) Addressed in This Session:  Safety planning / following safety plan   Patient will develop a plan to help manage PMDD  Patient will work on ways to communicate /patterns   Relational issues - Attachment styles.  Purpose in life.        Intervention:   Processed through the trip and the ups and down.    Follow safety plan and use crisis resources.  Agrees to contract for safety today.   Use support system- Continue.   Work on outlining more positives in the relationship.   Plan ahead for the basic training graduation.  Do chiropractic and message therapy.   Follow positives on social media.   Connect with more positive family- Lisbet and significant other        Assessments completed prior to visit:  The following assessments were completed by patient for this visit:  See data in EPIC as it is not auto populating.    PHQ2:       7/17/2024    12:45 PM 6/26/2024    12:54 AM 6/19/2024     9:55 AM 5/22/2024     9:58 AM 5/8/2024    11:42 AM 4/17/2024    10:51 AM 2/28/2024    10:46 AM   PHQ-2 ( 1999 [employer])   Q1: Little interest or pleasure in doing things 1 1 1 1 1 1 3   Q2: Feeling down, depressed or hopeless 1 1 1 1 1 1 3   PHQ-2 Score 2 2 2 2 2 2 6   Q1: Little interest or pleasure in doing things  Several days Several days Several  days Several days Several days Nearly every day   Q2: Feeling down, depressed or hopeless  Several days Several days Several days Several days Several days Nearly every day   PHQ-2 Score  2 2 2 2 2 6     PHQ9:       2/2/2023    10:13 AM 6/1/2023    10:07 AM 7/17/2023    11:15 AM 9/19/2023     7:09 PM 11/29/2023     9:44 AM 1/3/2024    10:04 AM 2/28/2024    10:46 AM   PHQ-9 SCORE   PHQ-9 Total Score MyChart    15 (Moderately severe depression) 15 (Moderately severe depression) 17 (Moderately severe depression) 13 (Moderate depression)   PHQ-9 Total Score 12 15 16 15 15 17 13     GAD2:       11/29/2023     9:45 AM 1/10/2024    10:51 AM 2/28/2024    10:47 AM 4/10/2024    10:52 AM 8/1/2024    12:56 PM 8/14/2024    10:01 AM 8/28/2024     9:55 AM   YADI-2   Feeling nervous, anxious, or on edge 1 1 2 1 1 1 1   Not being able to stop or control worrying 1 1 1 1 1 1 1   YADI-2 Total Score 2 2 3 2 2 2 2    2     GAD7:       11/27/2022     5:03 AM 2/2/2023    10:13 AM 6/1/2023    10:07 AM 7/17/2023    11:15 AM 8/7/2023    11:04 AM 9/19/2023     7:10 PM 2/28/2024    10:47 AM   YADI-7 SCORE   Total Score 17 (severe anxiety)    5 (mild anxiety) 21 (severe anxiety) 10 (moderate anxiety)   Total Score 17 13 12 13 5 21 10     PROMIS 10-Global Health (all questions and answers displayed):       11/29/2023     9:46 AM 1/10/2024    10:52 AM 2/28/2024    10:48 AM 4/10/2024    10:53 AM 8/1/2024    12:57 PM 8/14/2024    10:01 AM 8/28/2024     9:56 AM   PROMIS 10   In general, would you say your health is: Fair Fair Good Good Good Good Fair   In general, would you say your quality of life is: Fair Good Good Fair Fair Good Fair   In general, how would you rate your physical health? Fair Good Fair Good Good Fair Fair   In general, how would you rate your mental health, including your mood and your ability to think? Fair Fair Fair Fair Fair Fair Fair   In general, how would you rate your satisfaction with your social activities and  relationships? Fair Fair Fair Fair Poor Fair Poor   In general, please rate how well you carry out your usual social activities and roles Poor Fair Fair Fair Poor Fair Poor   To what extent are you able to carry out your everyday physical activities such as walking, climbing stairs, carrying groceries, or moving a chair? Mostly Completely Mostly Mostly Mostly Moderately Mostly   In the past 7 days, how often have you been bothered by emotional problems such as feeling anxious, depressed, or irritable? Always Sometimes Often Often Always Often Often   In the past 7 days, how would you rate your fatigue on average? Moderate Moderate Moderate Moderate Moderate Moderate Severe   In the past 7 days, how would you rate your pain on average, where 0 means no pain, and 10 means worst imaginable pain? 6 6 6 6 6 6 7   In general, would you say your health is: 2 2 3 3 3 3 2   In general, would you say your quality of life is: 2 3 3 2 2 3 2   In general, how would you rate your physical health? 2 3 2 3 3 2 2   In general, how would you rate your mental health, including your mood and your ability to think? 2 2 2 2 2 2 2   In general, how would you rate your satisfaction with your social activities and relationships? 2 2 2 2 1 2 1   In general, please rate how well you carry out your usual social activities and roles. (This includes activities at home, at work and in your community, and responsibilities as a parent, child, spouse, employee, friend, etc.) 1 2 2 2 1 2 1   To what extent are you able to carry out your everyday physical activities such as walking, climbing stairs, carrying groceries, or moving a chair? 4 5 4 4 4 3 4   In the past 7 days, how often have you been bothered by emotional problems such as feeling anxious, depressed, or irritable? 5 3 4 4 5 4 4   In the past 7 days, how would you rate your fatigue on average? 3 3 3 3 3 3 4   In the past 7 days, how would you rate your pain on average, where 0 means no  pain, and 10 means worst imaginable pain? 6 6 6 6 6 6 7   Global Mental Health Score 7 10 9 8 6 9 7    7   Global Physical Health Score 12 14 12 13 13 11 10    10   PROMIS TOTAL - SUBSCORES 19 24 21 21 19 20 17    17     [place] Suicide Severity Rating Scale (Lifetime/Recent)      8/3/2022     9:17 AM   [place] Suicide Severity Rating (Lifetime/Recent)   Q1 Wish to be Dead (Lifetime) N   Q2 Non-Specific Active Suicidal Thoughts (Lifetime) N   Actual Attempt (Lifetime) N   Has subject engaged in non-suicidal self-injurious behavior? (Lifetime) N   Calculated C-SSRS Risk Score (Lifetime/Recent) No Risk Indicated         ASSESSMENT: Current Emotional / Mental Status (status of significant symptoms):   Risk status (Self / Other harm or suicidal ideation)   Patient denies current fears or concerns for personal safety.   Patient denies current or recent suicidal ideation or behaviors.    Patient denies current or recent homicidal ideation or behaviors.   Patient denies current or recent self injurious behavior or ideation.   Patient denies other safety concerns.   Patient reports there has been no change in risk factors since their last session.     Patient reports there has been no change in protective factors since their last session.     A safety and risk management plan has been developed including: Patient consented to co-developed safety plan on 11-30-22.  Safety and risk management plan was reviewed.   Patient agreed to use safety plan should any safety concerns arise.  A copy was made available to the patient. Reviewed 8-28-24- completed new safety plan through [employer] link.    [employer] Safety Plan      Creation Date: 11/30/22 Last Update Date: 4/3/24      Step 1: Warning signs:    Warning Signs    flashbacks, thoughts about I dont matter, Loss, Worsening depression, isolation, cant stop crying, relationship problems    Medical concerns    Not feeling a part of something      Step 2: Internal coping  strategies - Things I can do to take my mind off my problems without contacting another person:    Strategies    Distress tolerance, going for a walk, gardening, deep breathing, stretching, physical touch    Distraction techniiqes    Focus on helpful thoughts    Meditation      Step 3: People and social settings that provide distraction:    Name Contact Information    Star Spouse    Daughter Cell phone       Places    Movie theater, pet store, coffee shop      Step 4: People whom I can ask for help during a crisis:    Name Contact Information    Star Spouse/ cell phone and live in the home      Step 5: Professionals or agencies I can contact during a crisis:    Clinician/Agency Name Phone Emergency Contact    [facility] [phone]       Local Emergency Department Emergency Department Address Emergency Department Phone    [facility] [phone]       Suicide Prevention Lifeline Phone: Call or Text 076  Crisis Text Line: Text HOME to 683597     Step 6: Making the environment safer (plan for lethal means safety):   Remove items I could harm myself with     Optional: What is most important to me and worth living for?:   Family  Spending time with daughter       [employer] Safety Plan. Eleonora Capone and Danny Carmona. Used with permission of the authors.          Name: Veena Parsons  YOB: 1985  Date: November 30, 2022   My primary care provider: Francisco Nails  My primary care clinic: [facility]   My prescriber: PCP  Other care team support:  Holistic medical provider    My Triggers:    Relational problems  Loss of mother recently  Financial stress      Additional People, Places, and Things that I can access for support:   Spouse  Daughter          What is important to me and makes life worth living: Family         GREEN    Good Control  1. I feel good  2. No suicidal thoughts   3. Can work, sleep and play      Action Steps  1. Self-care: balanced  meals, exercising, sleep practices, etc.  2. Take your medications as prescribed.  3. Continue meetings with therapist and prescriber.  4.  Do the healthy things that I enjoy.             YELLO Getting Worse  I have ANY of these:  1. I do not feel good  2. Difficulty Concentrating  3. Sleep is changing  4. Increase/Change in my thoughts to hurt self and/or others, but I can still manage and not act on it.   5. Not taking care of self.               Action Steps (in addition to the above):  1. Inform your therapist and psychiatric prescriber/PCP.  2. Keep taking your medications as prescribed.    3. Turn to people you can ask for help.  4. Use internal coping strategies -see below.  5. Create safe environment: Removing items I can hurt self with              RED  Get Help  If I have ANY of these:  1. Current and uncontrollable thoughts and/or behaviors to hurt self and/or others.   2. Crazy buzzing and spinning.     Actions to manage my safety  1. Contact your emergency person Star  2. Call or Text 200  3. Call my crisis team- [facility] [phone]  3. Or Call 251 or go to the emergency room right away        My Internal Coping Strategies include the following:  take a bath, belly breathing, arts and crafts, play with my pet, use my coping box, exercise and use my coping skills    [End for Brief Safety Plan]     Safety Concerns  How To Identify Situations That Make Your Mental Health Worse:  Triggers are things that make your mental health worse.  Look at the list below to help you find your triggers and what you can do about them.     1. Identify Early Warning Signs:    Sometimes symptoms return, even when people do their best to stay well. Symptoms can develop over a short period of time with little or no warning, but most of the time they emerge gradually over several weeks.  Early warning signs are changes that people experience when a relapse is starting. Some early warning signs are common and others  are not as common.   Common Early Warning Signs:    Feeling tense or nervous, Eating less or eating more, Trouble sleeping -either too much or too little sleep, Feeling depressed or low, Feeling irritable, Feeling like not being around other people, Trouble concentrating and Urges to harm self     2. Identify action steps to take when warning signs are noticed:    Taking Action- It is important to take action if you are experiencing early warning signs of a relapse.  The faster you act, the more likely it is that you can avoid a full relapse.  It is helpful to identify several specific ways to cope with symptoms.      The following is my list of symptoms and coping strategies that I can use when they are present:    Symptom Coping Strategies   Anxiety -Talk with someone in your support system and let him or her know how you are feeling.  -Use relaxation techniques such as deep breathing or imagery.  -Use positive affirmations to counteract negative self-talk such as  I am learning to let go of worry.    Depression - Schedule your day; include activities you have to do and activities you enjoy doing.  - Get some exercise - walk, run, bike, or swim.  - Give yourself credit for even the smallest things you get done.   Sleep Difficulties   - Go to sleep at the same time every day.  - Do something relaxing before bed, such as drinking herbal tea or listening to music.  - Avoid having discussions about upsetting topics before going to bed.   Delusions   - Distract yourself from the disturbing thought by doing something that requires your attention such as a puzzle.  - Check out your beliefs by talking to someone you trust.    Hallucinations   - Use headphones to listen to music.  - Tell voices to  stop  or say to yourself,  I am safe.   - Ignore the hallucinations as much as possible; focus on other things.   Concentration Difficulties - Minimize distractions so there is only one thing for you to focus on at a time.    -  "Ask the person you are having a conversation with to slow down or repeat things you are unsure of.            Appearance:   Normal   Eye Contact:   Good   Psychomotor Behavior: Normal    Attitude:   Cooperative    Orientation:   All   Speech    Rate / Production: Normal/ Responsive Normal     Volume:  Normal    Mood:    Anxious  Depressed  Upset    Affect:    Worrisome Tearful   Thought Content:  Clear    Thought Form:  Goal Directed    Insight:    Good      Medication Review:   No changes to current psychiatric medication(s)     Medication Compliance:   Yes     Changes in Health Issues:   None reported     Chemical Use Review:   Substance Use: Chemical use reviewed, no active concerns identified      Tobacco Use: Current tobacco use     Diagnosis:  296.32 (F33.1) Major Depressive Disorder, Recurrent Episode, Moderate _ and With mixed features  300.01 (F41.0) Panic Disorder  300.02 (F41.1) Generalized Anxiety Disorder   F43.10 PTSD  PMDD      Collateral Reports Completed:   Not Applicable    PLAN: (Patient Tasks / Therapist Tasks / Other)  Client will continue to work on the following goals:    -Learn more about emotional validation- Star will continue with podcast and follow the \"Bad Ass Counselor.\" -Continued   -Outline the positives and say it out loud.   -Plan ahead for Sebas's graduation and make it a priority to go.   -Follow safety plan and use crisis resources.   -Participate in message and chiropractic.   -Find ways to connect with Lisbet and her significant other.         NATACHA Hernandez                                                         ______________________________________________________________________    Individual Treatment Plan    Patient's Name: Veena Parsons  YOB: 1985    Date of Creation: 9-7-22  Date Treatment Plan Last Reviewed/Revised: 8-14-24    DSM5 Diagnoses:  296.32 (F33.1) Major Depressive Disorder, Recurrent Episode, Moderate _ and With mixed " features  300.01 (F41.0) Panic Disorder  300.02 (F41.1) Generalized Anxiety Disorder   F43.10 PTSD  PMDD  Psychosocial / Contextual Factors: Some relational issues   PROMIS (reviewed every 90 days): 17    Referral / Collaboration:  Referral to another professional/service is not indicated at this time..    Anticipated number of session for this episode of care: 6-9 sessions  Anticipation frequency of session: Biweekly  Anticipated Duration of each session: 38-52 minutes  Treatment plan will be reviewed in 90 days or when goals have been changed.       MeasurableTreatment Goal(s) related to diagnosis / functional impairment(s)  Goal 1: Patient will address struggles with anxiety, depression and PMDD.    I will know I've met my goal when I am feeling less reactive through the month with the symptoms.      Objective #A (Patient Action)    Patient will work on establishing a concrete routine with self-care.  Status: Continued - Date(s): 8-14-24    Intervention(s)  Therapist will use CBT and motivational interviewing.      Objective #B  Patient will develop a plan to help manage PMDD  Status: Continued - Date(s): 8-14-24    Intervention(s)  Therapist will use solution focused therapy.    Objective #C  Patient will work on ways to communicate with narcissistic individuals.  Status: Continued - Date(s): 8-14-24    Intervention(s)  Therapist will teach communication skills.          Patient has reviewed and agreed to the above plan.      Lia Stiles, NATACHA  September 7, 2022

## 2024-09-04 ENCOUNTER — VIRTUAL VISIT (OUTPATIENT)
Dept: PSYCHOLOGY | Facility: CLINIC | Age: 39
End: 2024-09-04
Payer: COMMERCIAL

## 2024-09-04 DIAGNOSIS — F43.10 PTSD (POST-TRAUMATIC STRESS DISORDER): ICD-10-CM

## 2024-09-04 DIAGNOSIS — F32.81 PMDD (PREMENSTRUAL DYSPHORIC DISORDER): ICD-10-CM

## 2024-09-04 DIAGNOSIS — F41.1 GENERALIZED ANXIETY DISORDER: ICD-10-CM

## 2024-09-04 DIAGNOSIS — F33.1 MAJOR DEPRESSIVE DISORDER, RECURRENT EPISODE, MODERATE (H): Primary | ICD-10-CM

## 2024-09-04 DIAGNOSIS — F41.0 PANIC DISORDER WITHOUT AGORAPHOBIA: ICD-10-CM

## 2024-09-04 PROCEDURE — 90834 PSYTX W PT 45 MINUTES: CPT | Mod: 95 | Performed by: MARRIAGE & FAMILY THERAPIST

## 2024-09-04 NOTE — PROGRESS NOTES
[facility]                                     Progress Note    Patient Name: Veena Parsons  Date:  9-4-24         Service Type: Individual      Session Start Time:  10am   Session End Time:   1050am     Session Length:   50min    Session #: 64    Attendees: Carlos    Service Modality:  Video     Telemedicine Visit: The patient's condition can be safely assessed and treated via synchronous audio and visual telemedicine encounter.      Reason for Telemedicine Visit: Patient has requested telehealth visit    Originating Site (Patient Location): Patient's home        Distant Location (provider location):  Off-site    Consent:  The patient/guardian has verbally consented to: the potential risks and benefits of telemedicine (video visit) versus in person care; bill my insurance or make self-payment for services provided; and responsibility for payment of non-covered services.     Mode of Communication:  Video Conference via [employer]    As the provider I attest to compliance with applicable laws and regulations related to telemedicine.      Treatment Plan- 8-14-24  CGI- 8-14-24  Phq-9 and YADI-7- See check in data  Promis- 8-28-24    DATA  Interactive Complexity: No  Crisis: No        Progress Since Last Session (Related to Symptoms / Goals / Homework):   There has been demonstrated improvement in functioning while patient has been engaged in psychotherapy/psychological service- if withdrawn the patient would deteriorate and/or relapse.      Symptoms: Client reports confronting issues with anxiety, depression and PMDD.     Homework: Achieved / completed to satisfaction  Completed in session      Episode of Care Goals: Satisfactory progress - ACTION (Actively working towards change); Intervened by reinforcing change plan / affirming steps taken       Current / Ongoing Stressors and Concerns:   -One of Veena sandra Star's friends (González) will be moving into the house.  They are both  feeling this will be helpful.  He is helpful, clean and loves to laugh.     -Continues to work on purpose throughout the day.    - Working together on getting the garage set up for football season so they have a good space.    -Veena is missing both of her parents.   -Hakan, Kurtis and Sebas- Step sons.   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    -Deborah is getting more serious with her boyfriend. This will lead to some challenges around the holidays.   -Working with chiropractic and message.        Treatment Objective(s) Addressed in This Session:  Safety planning / following safety plan   Patient will develop a plan to help manage PMDD  Patient will work on ways to communicate /patterns   Relational issues - Attachment styles.  Purpose in life.        Intervention:   Good plan in place with friend moving in.     Make the garage a positive shared space.    Follow safety plan and use crisis resources.  Agrees to contract for safety today.   Use support system- Continue.   Work on defining purpose during the day.    Plan ahead for the basic training graduation.  Work on self-compassion.   Participate in Fantasy Football with friends for self-care   Look into seeing if school loans can get paid off if someone is on disability.        Assessments completed prior to visit:  The following assessments were completed by patient for this visit:  See data in EPIC as it is not auto populating.    PHQ2:       7/17/2024    12:45 PM 6/26/2024    12:54 AM 6/19/2024     9:55 AM 5/22/2024     9:58 AM 5/8/2024    11:42 AM 4/17/2024    10:51 AM 2/28/2024    10:46 AM   PHQ-2 ( 1999 [employer])   Q1: Little interest or pleasure in doing things 1 1 1 1 1 1 3   Q2: Feeling down, depressed or hopeless 1 1 1 1 1 1 3   PHQ-2 Score 2 2 2 2 2 2 6   Q1: Little interest or pleasure in doing things  Several days Several days Several days  Several days Several days Nearly every day   Q2: Feeling down, depressed or hopeless  Several days Several days Several days Several days Several days Nearly every day   PHQ-2 Score  2 2 2 2 2 6     PHQ9:       2/2/2023    10:13 AM 6/1/2023    10:07 AM 7/17/2023    11:15 AM 9/19/2023     7:09 PM 11/29/2023     9:44 AM 1/3/2024    10:04 AM 2/28/2024    10:46 AM   PHQ-9 SCORE   PHQ-9 Total Score MyChart    15 (Moderately severe depression) 15 (Moderately severe depression) 17 (Moderately severe depression) 13 (Moderate depression)   PHQ-9 Total Score 12 15 16 15 15 17 13     GAD2:       11/29/2023     9:45 AM 1/10/2024    10:51 AM 2/28/2024    10:47 AM 4/10/2024    10:52 AM 8/1/2024    12:56 PM 8/14/2024    10:01 AM 8/28/2024     9:55 AM   YADI-2   Feeling nervous, anxious, or on edge 1 1 2 1 1 1 1   Not being able to stop or control worrying 1 1 1 1 1 1 1   YADI-2 Total Score 2 2 3 2 2 2 2    2     GAD7:       11/27/2022     5:03 AM 2/2/2023    10:13 AM 6/1/2023    10:07 AM 7/17/2023    11:15 AM 8/7/2023    11:04 AM 9/19/2023     7:10 PM 2/28/2024    10:47 AM   YADI-7 SCORE   Total Score 17 (severe anxiety)    5 (mild anxiety) 21 (severe anxiety) 10 (moderate anxiety)   Total Score 17 13 12 13 5 21 10     PROMIS 10-Global Health (all questions and answers displayed):       11/29/2023     9:46 AM 1/10/2024    10:52 AM 2/28/2024    10:48 AM 4/10/2024    10:53 AM 8/1/2024    12:57 PM 8/14/2024    10:01 AM 8/28/2024     9:56 AM   PROMIS 10   In general, would you say your health is: Fair Fair Good Good Good Good Fair   In general, would you say your quality of life is: Fair Good Good Fair Fair Good Fair   In general, how would you rate your physical health? Fair Good Fair Good Good Fair Fair   In general, how would you rate your mental health, including your mood and your ability to think? Fair Fair Fair Fair Fair Fair Fair   In general, how would you rate your satisfaction with your social activities and relationships?  Fair Fair Fair Fair Poor Fair Poor   In general, please rate how well you carry out your usual social activities and roles Poor Fair Fair Fair Poor Fair Poor   To what extent are you able to carry out your everyday physical activities such as walking, climbing stairs, carrying groceries, or moving a chair? Mostly Completely Mostly Mostly Mostly Moderately Mostly   In the past 7 days, how often have you been bothered by emotional problems such as feeling anxious, depressed, or irritable? Always Sometimes Often Often Always Often Often   In the past 7 days, how would you rate your fatigue on average? Moderate Moderate Moderate Moderate Moderate Moderate Severe   In the past 7 days, how would you rate your pain on average, where 0 means no pain, and 10 means worst imaginable pain? 6 6 6 6 6 6 7   In general, would you say your health is: 2 2 3 3 3 3 2   In general, would you say your quality of life is: 2 3 3 2 2 3 2   In general, how would you rate your physical health? 2 3 2 3 3 2 2   In general, how would you rate your mental health, including your mood and your ability to think? 2 2 2 2 2 2 2   In general, how would you rate your satisfaction with your social activities and relationships? 2 2 2 2 1 2 1   In general, please rate how well you carry out your usual social activities and roles. (This includes activities at home, at work and in your community, and responsibilities as a parent, child, spouse, employee, friend, etc.) 1 2 2 2 1 2 1   To what extent are you able to carry out your everyday physical activities such as walking, climbing stairs, carrying groceries, or moving a chair? 4 5 4 4 4 3 4   In the past 7 days, how often have you been bothered by emotional problems such as feeling anxious, depressed, or irritable? 5 3 4 4 5 4 4   In the past 7 days, how would you rate your fatigue on average? 3 3 3 3 3 3 4   In the past 7 days, how would you rate your pain on average, where 0 means no pain, and 10 means  worst imaginable pain? 6 6 6 6 6 6 7   Global Mental Health Score 7 10 9 8 6 9 7    7   Global Physical Health Score 12 14 12 13 13 11 10    10   PROMIS TOTAL - SUBSCORES 19 24 21 21 19 20 17    17     Barren Suicide Severity Rating Scale (Lifetime/Recent)      8/3/2022     9:17 AM   Barren Suicide Severity Rating (Lifetime/Recent)   Q1 Wish to be Dead (Lifetime) N   Q2 Non-Specific Active Suicidal Thoughts (Lifetime) N   Actual Attempt (Lifetime) N   Has subject engaged in non-suicidal self-injurious behavior? (Lifetime) N   Calculated C-SSRS Risk Score (Lifetime/Recent) No Risk Indicated         ASSESSMENT: Current Emotional / Mental Status (status of significant symptoms):   Risk status (Self / Other harm or suicidal ideation)   Patient denies current fears or concerns for personal safety.   Patient denies current or recent suicidal ideation or behaviors.    Patient denies current or recent homicidal ideation or behaviors.   Patient denies current or recent self injurious behavior or ideation.   Patient denies other safety concerns.   Patient reports there has been no change in risk factors since their last session.     Patient reports there has been no change in protective factors since their last session.     A safety and risk management plan has been developed including: Patient consented to co-developed safety plan on 11-30-22.  Safety and risk management plan was reviewed.   Patient agreed to use safety plan should any safety concerns arise.  A copy was made available to the patient. Reviewed 9-4-24- completed new safety plan through [employer] link.    [employer] Safety Plan      Creation Date: 11/30/22 Last Update Date: 4/3/24      Step 1: Warning signs:    Warning Signs    flashbacks, thoughts about I dont matter, Loss, Worsening depression, isolation, cant stop crying, relationship problems    Medical concerns    Not feeling a part of something      Step 2: Internal coping strategies - Things I  can do to take my mind off my problems without contacting another person:    Strategies    Distress tolerance, going for a walk, gardening, deep breathing, stretching, physical touch    Distraction techniiqes    Focus on helpful thoughts    Meditation      Step 3: People and social settings that provide distraction:    Name Contact Information    Star Spouse    Daughter Cell phone       Places    Movie theater, pet store, coffee shop      Step 4: People whom I can ask for help during a crisis:    Name Contact Information    Star Spouse/ cell phone and live in the home      Step 5: Professionals or agencies I can contact during a crisis:    Clinician/Agency Name Phone Emergency Contact    [facility] [phone]       Local Emergency Department Emergency Department Address Emergency Department Phone    [facility] [phone]       Suicide Prevention Lifeline Phone: Call or Text 056  Crisis Text Line: Text HOME to 853612     Step 6: Making the environment safer (plan for lethal means safety):   Remove items I could harm myself with     Optional: What is most important to me and worth living for?:   Family  Spending time with daughter       Leyla Safety Plan. Eleonora Capone and Danny Carmona. Used with permission of the authors.          Name: Veena Parsons  YOB: 1985  Date: November 30, 2022   My primary care provider: Francisco Nails  My primary care clinic: [facility]   My prescriber: PCP  Other care team support:  Holistic medical provider    My Triggers:    Relational problems  Loss of mother recently  Financial stress      Additional People, Places, and Things that I can access for support:   Spouse  Daughter          What is important to me and makes life worth living: Family         GREEN    Good Control  1. I feel good  2. No suicidal thoughts   3. Can work, sleep and play      Action Steps  1. Self-care: balanced meals, exercising, sleep  practices, etc.  2. Take your medications as prescribed.  3. Continue meetings with therapist and prescriber.  4.  Do the healthy things that I enjoy.             YELLO Getting Worse  I have ANY of these:  1. I do not feel good  2. Difficulty Concentrating  3. Sleep is changing  4. Increase/Change in my thoughts to hurt self and/or others, but I can still manage and not act on it.   5. Not taking care of self.               Action Steps (in addition to the above):  1. Inform your therapist and psychiatric prescriber/PCP.  2. Keep taking your medications as prescribed.    3. Turn to people you can ask for help.  4. Use internal coping strategies -see below.  5. Create safe environment: Removing items I can hurt self with              RED  Get Help  If I have ANY of these:  1. Current and uncontrollable thoughts and/or behaviors to hurt self and/or others.   2. Crazy buzzing and spinning.     Actions to manage my safety  1. Contact your emergency person Stra  2. Call or Text 602  3. Call my crisis team- [facility] [phone]  3. Or Call [phone] or go to the emergency room right away        My Internal Coping Strategies include the following:  take a bath, belly breathing, arts and crafts, play with my pet, use my coping box, exercise and use my coping skills    [End for Brief Safety Plan]     Safety Concerns  How To Identify Situations That Make Your Mental Health Worse:  Triggers are things that make your mental health worse.  Look at the list below to help you find your triggers and what you can do about them.     1. Identify Early Warning Signs:    Sometimes symptoms return, even when people do their best to stay well. Symptoms can develop over a short period of time with little or no warning, but most of the time they emerge gradually over several weeks.  Early warning signs are changes that people experience when a relapse is starting. Some early warning signs are common and others are not as common.    Common Early Warning Signs:    Feeling tense or nervous, Eating less or eating more, Trouble sleeping -either too much or too little sleep, Feeling depressed or low, Feeling irritable, Feeling like not being around other people, Trouble concentrating and Urges to harm self     2. Identify action steps to take when warning signs are noticed:    Taking Action- It is important to take action if you are experiencing early warning signs of a relapse.  The faster you act, the more likely it is that you can avoid a full relapse.  It is helpful to identify several specific ways to cope with symptoms.      The following is my list of symptoms and coping strategies that I can use when they are present:    Symptom Coping Strategies   Anxiety -Talk with someone in your support system and let him or her know how you are feeling.  -Use relaxation techniques such as deep breathing or imagery.  -Use positive affirmations to counteract negative self-talk such as  I am learning to let go of worry.    Depression - Schedule your day; include activities you have to do and activities you enjoy doing.  - Get some exercise - walk, run, bike, or swim.  - Give yourself credit for even the smallest things you get done.   Sleep Difficulties   - Go to sleep at the same time every day.  - Do something relaxing before bed, such as drinking herbal tea or listening to music.  - Avoid having discussions about upsetting topics before going to bed.   Delusions   - Distract yourself from the disturbing thought by doing something that requires your attention such as a puzzle.  - Check out your beliefs by talking to someone you trust.    Hallucinations   - Use headphones to listen to music.  - Tell voices to  stop  or say to yourself,  I am safe.   - Ignore the hallucinations as much as possible; focus on other things.   Concentration Difficulties - Minimize distractions so there is only one thing for you to focus on at a time.    - Ask the person you  "are having a conversation with to slow down or repeat things you are unsure of.            Appearance:   Normal   Eye Contact:   Good   Psychomotor Behavior: Normal    Attitude:   Cooperative    Orientation:   All   Speech    Rate / Production: Talkative    Volume:  Normal    Mood:    Anxious  Depressed     Affect:    Worrisome   Thought Content:  Clear    Thought Form:  Goal Directed    Insight:    Good      Medication Review:   No changes to current psychiatric medication(s)     Medication Compliance:   Yes     Changes in Health Issues:   None reported     Chemical Use Review:   Substance Use: Chemical use reviewed, no active concerns identified      Tobacco Use: Current tobacco use     Diagnosis:  296.32 (F33.1) Major Depressive Disorder, Recurrent Episode, Moderate _ and With mixed features  300.01 (F41.0) Panic Disorder  300.02 (F41.1) Generalized Anxiety Disorder   F43.10 PTSD  PMDD      Collateral Reports Completed:   Not Applicable    PLAN: (Patient Tasks / Therapist Tasks / Other)  Client will continue to work on the following goals:    -Learn more about emotional validation- Star will continue with podcast and follow the \"Bad Ass Counselor.\" -Continued   -Make the garage space a positive shared area.    -Plan ahead for Sebas's graduation and make it a priority to go.   -Use safety plan if needed. Agree's to contract for safety.   -Work on purpose during the day.    -Do chiropractic and message.          NATACHA Hernandez                                                         ______________________________________________________________________    Individual Treatment Plan    Patient's Name: Veena Parsons  YOB: 1985    Date of Creation: 9-7-22  Date Treatment Plan Last Reviewed/Revised: 8-14-24    DSM5 Diagnoses:  296.32 (F33.1) Major Depressive Disorder, Recurrent Episode, Moderate _ and With mixed features  300.01 (F41.0) Panic Disorder  300.02 (F41.1) Generalized Anxiety Disorder "   F43.10 PTSD  PMDD  Psychosocial / Contextual Factors: Some relational issues   PROMIS (reviewed every 90 days): 17    Referral / Collaboration:  Referral to another professional/service is not indicated at this time..    Anticipated number of session for this episode of care: 6-9 sessions  Anticipation frequency of session: Biweekly  Anticipated Duration of each session: 38-52 minutes  Treatment plan will be reviewed in 90 days or when goals have been changed.       MeasurableTreatment Goal(s) related to diagnosis / functional impairment(s)  Goal 1: Patient will address struggles with anxiety, depression and PMDD.    I will know I've met my goal when I am feeling less reactive through the month with the symptoms.      Objective #A (Patient Action)    Patient will work on establishing a concrete routine with self-care.  Status: Continued - Date(s): 8-14-24    Intervention(s)  Therapist will use CBT and motivational interviewing.      Objective #B  Patient will develop a plan to help manage PMDD  Status: Continued - Date(s): 8-14-24    Intervention(s)  Therapist will use solution focused therapy.    Objective #C  Patient will work on ways to communicate with narcissistic individuals.  Status: Continued - Date(s): 8-14-24    Intervention(s)  Therapist will teach communication skills.          Patient has reviewed and agreed to the above plan.      Lia Stiles, NATACHA  September 7, 2022

## 2024-09-11 ENCOUNTER — VIRTUAL VISIT (OUTPATIENT)
Dept: PSYCHOLOGY | Facility: CLINIC | Age: 39
End: 2024-09-11
Payer: COMMERCIAL

## 2024-09-11 DIAGNOSIS — F32.81 PMDD (PREMENSTRUAL DYSPHORIC DISORDER): ICD-10-CM

## 2024-09-11 DIAGNOSIS — F33.1 MAJOR DEPRESSIVE DISORDER, RECURRENT EPISODE, MODERATE (H): Primary | ICD-10-CM

## 2024-09-11 DIAGNOSIS — F41.0 PANIC DISORDER WITHOUT AGORAPHOBIA: ICD-10-CM

## 2024-09-11 DIAGNOSIS — F41.1 GENERALIZED ANXIETY DISORDER: ICD-10-CM

## 2024-09-11 DIAGNOSIS — F43.10 PTSD (POST-TRAUMATIC STRESS DISORDER): ICD-10-CM

## 2024-09-11 PROCEDURE — 90834 PSYTX W PT 45 MINUTES: CPT | Mod: 95 | Performed by: MARRIAGE & FAMILY THERAPIST

## 2024-09-11 NOTE — PROGRESS NOTES
[facility]                                     Progress Note    Patient Name: Veena Parsons  Date:  9-11-24         Service Type: Individual      Session Start Time:  10am   Session End Time:   1050am     Session Length:   50min    Session #: 65    Attendees: Client and Star    Service Modality:  Video     Telemedicine Visit: The patient's condition can be safely assessed and treated via synchronous audio and visual telemedicine encounter.      Reason for Telemedicine Visit: Patient has requested telehealth visit    Originating Site (Patient Location): Patient's home        Distant Location (provider location):  Off-site    Consent:  The patient/guardian has verbally consented to: the potential risks and benefits of telemedicine (video visit) versus in person care; bill my insurance or make self-payment for services provided; and responsibility for payment of non-covered services.     Mode of Communication:  Video Conference via [employer]    As the provider I attest to compliance with applicable laws and regulations related to telemedicine.      Treatment Plan- 8-14-24  CGI- 8-14-24  Phq-9 and YADI-7- See check in data  Promis- 9-11-24    DATA  Interactive Complexity: No  Crisis: No        Progress Since Last Session (Related to Symptoms / Goals / Homework):   There has been demonstrated improvement in functioning while patient has been engaged in psychotherapy/psychological service- if withdrawn the patient would deteriorate and/or relapse.      Symptoms: Client reports confronting issues with anxiety, depression and PMDD. Has been working on addressing some pain issues through chiropractic.      Homework: Achieved / completed to satisfaction  Completed in session      Episode of Care Goals: Satisfactory progress - ACTION (Actively working towards change); Intervened by reinforcing change plan / affirming steps taken       Current / Ongoing Stressors and Concerns:   -One of Veena flores  Star's friends (González) did move in and so far it is going well.     -Working on communication patterns and outlining positives.     - Working together on getting the garage set up for football season so they have a good space- Continued    -Veena is coming up on some anniversary time with loss.     -Hakan, Kurtis and Sebas- Step sons.   -Anxious and avoidant attachment styles-  Veena feels she is anxious and Star feels he is avoidant- Continue   -Sebas continues with basic training. Some anxiety about the costs of the airfare and accommodations to go to graduation.    -Wrapping up with chiropractic care.        Treatment Objective(s) Addressed in This Session:  Safety planning / following safety plan   Patient will develop a plan to help manage PMDD  Patient will work on ways to communicate /patterns   Relational issues - Attachment styles.  Purpose in life.        Intervention:   Help friend transition into the home.     Make the garage a positive shared space for the football season.    Follow safety plan and use crisis resources.  Agrees to contract for safety today.   Use support system- Continue.   Have a plan for the week with certain tasks.   Plan ahead for the basic training graduation and the financial pieces of the trip.   Work on self-compassion and self-esteem.           Assessments completed prior to visit:  The following assessments were completed by patient for this visit:  See data in EPIC as it is not auto populating.    PHQ2:       7/17/2024    12:45 PM 6/26/2024    12:54 AM 6/19/2024     9:55 AM 5/22/2024     9:58 AM 5/8/2024    11:42 AM 4/17/2024    10:51 AM 2/28/2024    10:46 AM   PHQ-2 ( 1999 [employer])   Q1: Little interest or pleasure in doing things 1 1 1 1 1 1 3   Q2: Feeling down, depressed or hopeless 1 1 1 1 1 1 3   PHQ-2 Score 2 2 2 2 2 2 6   Q1: Little interest or pleasure in doing things  Several days Several days Several days Several days Several days Nearly every day   Q2: Feeling down,  depressed or hopeless  Several days Several days Several days Several days Several days Nearly every day   PHQ-2 Score  2 2 2 2 2 6     PHQ9:       2/2/2023    10:13 AM 6/1/2023    10:07 AM 7/17/2023    11:15 AM 9/19/2023     7:09 PM 11/29/2023     9:44 AM 1/3/2024    10:04 AM 2/28/2024    10:46 AM   PHQ-9 SCORE   PHQ-9 Total Score MyChart    15 (Moderately severe depression) 15 (Moderately severe depression) 17 (Moderately severe depression) 13 (Moderate depression)   PHQ-9 Total Score 12 15 16 15 15 17 13     GAD2:       1/10/2024    10:51 AM 2/28/2024    10:47 AM 4/10/2024    10:52 AM 8/1/2024    12:56 PM 8/14/2024    10:01 AM 8/28/2024     9:55 AM 9/11/2024     9:55 AM   YADI-2   Feeling nervous, anxious, or on edge 1 2 1 1 1 1 1   Not being able to stop or control worrying 1 1 1 1 1 1 1   YADI-2 Total Score 2 3 2 2 2 2    2 2    2     GAD7:       11/27/2022     5:03 AM 2/2/2023    10:13 AM 6/1/2023    10:07 AM 7/17/2023    11:15 AM 8/7/2023    11:04 AM 9/19/2023     7:10 PM 2/28/2024    10:47 AM   YADI-7 SCORE   Total Score 17 (severe anxiety)    5 (mild anxiety) 21 (severe anxiety) 10 (moderate anxiety)   Total Score 17 13 12 13 5 21 10     PROMIS 10-Global Health (all questions and answers displayed):       1/10/2024    10:52 AM 2/28/2024    10:48 AM 4/10/2024    10:53 AM 8/1/2024    12:57 PM 8/14/2024    10:01 AM 8/28/2024     9:56 AM 9/11/2024     9:55 AM   PROMIS 10   In general, would you say your health is: Fair Good Good Good Good Fair Good   In general, would you say your quality of life is: Good Good Fair Fair Good Fair Fair   In general, how would you rate your physical health? Good Fair Good Good Fair Fair Fair   In general, how would you rate your mental health, including your mood and your ability to think? Fair Fair Fair Fair Fair Fair Fair   In general, how would you rate your satisfaction with your social activities and relationships? Fair Fair Fair Poor Fair Poor Fair   In general, please rate  how well you carry out your usual social activities and roles Fair Fair Fair Poor Fair Poor Fair   To what extent are you able to carry out your everyday physical activities such as walking, climbing stairs, carrying groceries, or moving a chair? Completely Mostly Mostly Mostly Moderately Mostly Moderately   In the past 7 days, how often have you been bothered by emotional problems such as feeling anxious, depressed, or irritable? Sometimes Often Often Always Often Often Often   In the past 7 days, how would you rate your fatigue on average? Moderate Moderate Moderate Moderate Moderate Severe Moderate   In the past 7 days, how would you rate your pain on average, where 0 means no pain, and 10 means worst imaginable pain? 6 6 6 6 6 7 6   In general, would you say your health is: 2 3 3 3 3 2 3   In general, would you say your quality of life is: 3 3 2 2 3 2 2   In general, how would you rate your physical health? 3 2 3 3 2 2 2   In general, how would you rate your mental health, including your mood and your ability to think? 2 2 2 2 2 2 2   In general, how would you rate your satisfaction with your social activities and relationships? 2 2 2 1 2 1 2   In general, please rate how well you carry out your usual social activities and roles. (This includes activities at home, at work and in your community, and responsibilities as a parent, child, spouse, employee, friend, etc.) 2 2 2 1 2 1 2   To what extent are you able to carry out your everyday physical activities such as walking, climbing stairs, carrying groceries, or moving a chair? 5 4 4 4 3 4 3   In the past 7 days, how often have you been bothered by emotional problems such as feeling anxious, depressed, or irritable? 3 4 4 5 4 4 4   In the past 7 days, how would you rate your fatigue on average? 3 3 3 3 3 4 3   In the past 7 days, how would you rate your pain on average, where 0 means no pain, and 10 means worst imaginable pain? 6 6 6 6 6 7 6   Global Mental  Health Score 10 9 8 6 9 7    7 8    8   Global Physical Health Score 14 12 13 13 11 10    10 11    11   PROMIS TOTAL - SUBSCORES 24 21 21 19 20 17    17 19    19     [place] Suicide Severity Rating Scale (Lifetime/Recent)      8/3/2022     9:17 AM   [place] Suicide Severity Rating (Lifetime/Recent)   Q1 Wish to be Dead (Lifetime) N   Q2 Non-Specific Active Suicidal Thoughts (Lifetime) N   Actual Attempt (Lifetime) N   Has subject engaged in non-suicidal self-injurious behavior? (Lifetime) N   Calculated C-SSRS Risk Score (Lifetime/Recent) No Risk Indicated         ASSESSMENT: Current Emotional / Mental Status (status of significant symptoms):   Risk status (Self / Other harm or suicidal ideation)   Patient denies current fears or concerns for personal safety.   Patient denies current or recent suicidal ideation or behaviors.    Patient denies current or recent homicidal ideation or behaviors.   Patient denies current or recent self injurious behavior or ideation.   Patient denies other safety concerns.   Patient reports there has been no change in risk factors since their last session.     Patient reports there has been no change in protective factors since their last session.     A safety and risk management plan has been developed including: Patient consented to co-developed safety plan on 11-30-22.  Safety and risk management plan was reviewed.   Patient agreed to use safety plan should any safety concerns arise.  A copy was made available to the patient. Reviewed 9-11-24- completed new safety plan through [employer] link.    Leyla Safety Plan      Creation Date: 11/30/22 Last Update Date: 4/3/24      Step 1: Warning signs:    Warning Signs    flashbacks, thoughts about I dont matter, Loss, Worsening depression, isolation, cant stop crying, relationship problems    Medical concerns    Not feeling a part of something      Step 2: Internal coping strategies - Things I can do to take my mind off my  problems without contacting another person:    Strategies    Distress tolerance, going for a walk, gardening, deep breathing, stretching, physical touch    Distraction techniiqes    Focus on helpful thoughts    Meditation      Step 3: People and social settings that provide distraction:    Name Contact Information    Star Spouse    Daughter Cell phone       Places    Movie theater, pet store, coffee shop      Step 4: People whom I can ask for help during a crisis:    Name Contact Information    Star Spouse/ cell phone and live in the home      Step 5: Professionals or agencies I can contact during a crisis:    Clinician/Agency Name Phone Emergency Contact    [facility] [phone]       Local Emergency Department Emergency Department Address Emergency Department Phone    [facility] [phone]       Suicide Prevention Lifeline Phone: Call or Text 400  Crisis Text Line: Text HOME to 210308     Step 6: Making the environment safer (plan for lethal means safety):   Remove items I could harm myself with     Optional: What is most important to me and worth living for?:   Family  Spending time with rupal       [employer] Safety Plan. Eleonora Capone and Danny Carmona. Used with permission of the authors.          Name: Veena Parsons  YOB: 1985  Date: November 30, 2022   My primary care provider: Francisco Nails  My primary care clinic: [facility]   My prescriber: PCP  Other care team support:  Holistic medical provider    My Triggers:    Relational problems  Loss of mother recently  Financial stress      Additional People, Places, and Things that I can access for support:   Spouse  Daughter          What is important to me and makes life worth living: Family         GREEN    Good Control  1. I feel good  2. No suicidal thoughts   3. Can work, sleep and play      Action Steps  1. Self-care: balanced meals, exercising, sleep practices, etc.  2. Take your  medications as prescribed.  3. Continue meetings with therapist and prescriber.  4.  Do the healthy things that I enjoy.             YELLO Getting Worse  I have ANY of these:  1. I do not feel good  2. Difficulty Concentrating  3. Sleep is changing  4. Increase/Change in my thoughts to hurt self and/or others, but I can still manage and not act on it.   5. Not taking care of self.               Action Steps (in addition to the above):  1. Inform your therapist and psychiatric prescriber/PCP.  2. Keep taking your medications as prescribed.    3. Turn to people you can ask for help.  4. Use internal coping strategies -see below.  5. Create safe environment: Removing items I can hurt self with              RED  Get Help  If I have ANY of these:  1. Current and uncontrollable thoughts and/or behaviors to hurt self and/or others.   2. Crazy buzzing and spinning.     Actions to manage my safety  1. Contact your emergency person Star  2. Call or Text [phone]  3. Call my crisis team- [facility] [phone]  3. Or Call 911 or go to the emergency room right away        My Internal Coping Strategies include the following:  take a bath, belly breathing, arts and crafts, play with my pet, use my coping box, exercise and use my coping skills    [End for Brief Safety Plan]     Safety Concerns  How To Identify Situations That Make Your Mental Health Worse:  Triggers are things that make your mental health worse.  Look at the list below to help you find your triggers and what you can do about them.     1. Identify Early Warning Signs:    Sometimes symptoms return, even when people do their best to stay well. Symptoms can develop over a short period of time with little or no warning, but most of the time they emerge gradually over several weeks.  Early warning signs are changes that people experience when a relapse is starting. Some early warning signs are common and others are not as common.   Common Early Warning Signs:     Feeling tense or nervous, Eating less or eating more, Trouble sleeping -either too much or too little sleep, Feeling depressed or low, Feeling irritable, Feeling like not being around other people, Trouble concentrating and Urges to harm self     2. Identify action steps to take when warning signs are noticed:    Taking Action- It is important to take action if you are experiencing early warning signs of a relapse.  The faster you act, the more likely it is that you can avoid a full relapse.  It is helpful to identify several specific ways to cope with symptoms.      The following is my list of symptoms and coping strategies that I can use when they are present:    Symptom Coping Strategies   Anxiety -Talk with someone in your support system and let him or her know how you are feeling.  -Use relaxation techniques such as deep breathing or imagery.  -Use positive affirmations to counteract negative self-talk such as  I am learning to let go of worry.    Depression - Schedule your day; include activities you have to do and activities you enjoy doing.  - Get some exercise - walk, run, bike, or swim.  - Give yourself credit for even the smallest things you get done.   Sleep Difficulties   - Go to sleep at the same time every day.  - Do something relaxing before bed, such as drinking herbal tea or listening to music.  - Avoid having discussions about upsetting topics before going to bed.   Delusions   - Distract yourself from the disturbing thought by doing something that requires your attention such as a puzzle.  - Check out your beliefs by talking to someone you trust.    Hallucinations   - Use headphones to listen to music.  - Tell voices to  stop  or say to yourself,  I am safe.   - Ignore the hallucinations as much as possible; focus on other things.   Concentration Difficulties - Minimize distractions so there is only one thing for you to focus on at a time.    - Ask the person you are having a conversation with to  "slow down or repeat things you are unsure of.            Appearance:   Normal   Eye Contact:   Good   Psychomotor Behavior: Normal    Attitude:   Cooperative    Orientation:   All   Speech    Rate / Production: Normal/ Responsive    Volume:  Normal    Mood:    Anxious  Depressed     Affect:    Worrisome   Thought Content:  Clear    Thought Form:  Goal Directed Logical    Insight:    Good      Medication Review:   No changes to current psychiatric medication(s)     Medication Compliance:   Yes     Changes in Health Issues:   None reported     Chemical Use Review:   Substance Use: Chemical use reviewed, no active concerns identified      Tobacco Use: Current tobacco use     Diagnosis:  296.32 (F33.1) Major Depressive Disorder, Recurrent Episode, Moderate _ and With mixed features  300.01 (F41.0) Panic Disorder  300.02 (F41.1) Generalized Anxiety Disorder   F43.10 PTSD  PMDD      Collateral Reports Completed:   Not Applicable    PLAN: (Patient Tasks / Therapist Tasks / Other)  Client will continue to work on the following goals:    -Learn more about emotional validation- Star will continue with podcast and follow the \"[employer].\" -Continued   -Make the garage space a positive shared area for football.    -Plan ahead for Sebas's graduation and make it a priority to go. Start to budget.    -Agrees to contract for safety.    -Work on purpose during the day and self compassion.    -Wrap up with chiropractic today.          NATACHA Hernandez                                                         ______________________________________________________________________    Individual Treatment Plan    Patient's Name: Veena Parsons  YOB: 1985    Date of Creation: 9-7-22  Date Treatment Plan Last Reviewed/Revised: 8-14-24    DSM5 Diagnoses:  296.32 (F33.1) Major Depressive Disorder, Recurrent Episode, Moderate _ and With mixed features  300.01 (F41.0) Panic Disorder  300.02 (F41.1) Generalized " Anxiety Disorder   F43.10 PTSD  PMDD  Psychosocial / Contextual Factors: Some relational issues   PROMIS (reviewed every 90 days): 19    Referral / Collaboration:  Referral to another professional/service is not indicated at this time..    Anticipated number of session for this episode of care: 6-9 sessions  Anticipation frequency of session: Biweekly  Anticipated Duration of each session: 38-52 minutes  Treatment plan will be reviewed in 90 days or when goals have been changed.       MeasurableTreatment Goal(s) related to diagnosis / functional impairment(s)  Goal 1: Patient will address struggles with anxiety, depression and PMDD.    I will know I've met my goal when I am feeling less reactive through the month with the symptoms.      Objective #A (Patient Action)    Patient will work on establishing a concrete routine with self-care.  Status: Continued - Date(s): 8-14-24    Intervention(s)  Therapist will use CBT and motivational interviewing.      Objective #B  Patient will develop a plan to help manage PMDD  Status: Continued - Date(s): 8-14-24    Intervention(s)  Therapist will use solution focused therapy.    Objective #C  Patient will work on ways to communicate with narcissistic individuals.  Status: Continued - Date(s): 8-14-24    Intervention(s)  Therapist will teach communication skills.          Patient has reviewed and agreed to the above plan.      Lia Stiles, NATACHA  September 7, 2022

## 2024-09-19 ENCOUNTER — VIRTUAL VISIT (OUTPATIENT)
Dept: PSYCHOLOGY | Facility: CLINIC | Age: 39
End: 2024-09-19
Payer: COMMERCIAL

## 2024-09-19 DIAGNOSIS — F32.81 PMDD (PREMENSTRUAL DYSPHORIC DISORDER): ICD-10-CM

## 2024-09-19 DIAGNOSIS — F33.1 MAJOR DEPRESSIVE DISORDER, RECURRENT EPISODE, MODERATE (H): Primary | ICD-10-CM

## 2024-09-19 DIAGNOSIS — F41.0 PANIC DISORDER WITHOUT AGORAPHOBIA: ICD-10-CM

## 2024-09-19 DIAGNOSIS — F41.1 GENERALIZED ANXIETY DISORDER: ICD-10-CM

## 2024-09-19 DIAGNOSIS — F43.10 PTSD (POST-TRAUMATIC STRESS DISORDER): ICD-10-CM

## 2024-09-19 PROCEDURE — 90834 PSYTX W PT 45 MINUTES: CPT | Mod: 95 | Performed by: MARRIAGE & FAMILY THERAPIST

## 2024-09-19 NOTE — PROGRESS NOTES
[facility]                                     Progress Note    Patient Name: Veena Parsons  Date:  9-19-24         Service Type: Individual      Session Start Time:  2pm   Session End Time:   250pm     Session Length:   50min    Session #: 66    Attendees: Client     Service Modality:  Video     Telemedicine Visit: The patient's condition can be safely assessed and treated via synchronous audio and visual telemedicine encounter.      Reason for Telemedicine Visit: Patient has requested telehealth visit    Originating Site (Patient Location): Patient's home        Distant Location (provider location):  On-site    Consent:  The patient/guardian has verbally consented to: the potential risks and benefits of telemedicine (video visit) versus in person care; bill my insurance or make self-payment for services provided; and responsibility for payment of non-covered services.     Mode of Communication:  Video Conference via JumpTime    As the provider I attest to compliance with applicable laws and regulations related to telemedicine.      Treatment Plan- 8-14-24  CGI- 8-14-24  Phq-9 and YADI-7- See check in data  Promis- 9-11-24    DATA  Interactive Complexity: No  Crisis: No        Progress Since Last Session (Related to Symptoms / Goals / Homework):   There has been demonstrated improvement in functioning while patient has been engaged in psychotherapy/psychological service- if withdrawn the patient would deteriorate and/or relapse.      Symptoms: Client reports confronting issues with anxiety, depression and PMDD. Has been having an emotional week.       Homework: Achieved / completed to satisfaction  Completed in session      Episode of Care Goals: Satisfactory progress - ACTION (Actively working towards change); Intervened by reinforcing change plan / affirming steps taken       Current / Ongoing Stressors and Concerns:   -One of Anastasiia's friends (González) did move in and so far it  is going well- Continued   -Has had an emotional week in general.  Was feeling a lot of grief and loss around various topics throughout life.   -Spouse Star was able to communicate he has been feeling more depressed and down.  Veena really appreciated that he was able to express these feelings.    -Working on communication patterns and outlining positives.     - Working on home projects together with more success.    -Kurtis Mcclendon and Sebas- Step sons.   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        Treatment Objective(s) Addressed in This Session:  Safety planning / following safety plan   Patient will develop a plan to help manage PMDD  Patient will work on ways to communicate /patterns   Relational issues - Attachment styles.  Purpose in life.        Intervention:   Let Star know how much she appreciates him expressing his feelings more.    Participate in fantasy football as a couple.   Follow safety plan and use crisis resources.  Agrees to contract for safety today.   Use support system- Continue.   Weigh the pros and cons of going in person to [facility] basic training graduation.   Work on self-compassion and self-esteem.           Assessments completed prior to visit:  The following assessments were completed by patient for this visit:  See data in EPIC as it is not auto populating.    PHQ2:       7/17/2024    12:45 PM 6/26/2024    12:54 AM 6/19/2024     9:55 AM 5/22/2024     9:58 AM 5/8/2024    11:42 AM 4/17/2024    10:51 AM 2/28/2024    10:46 AM   PHQ-2 ( 1999 [employer])   Q1: Little interest or pleasure in doing things 1 1 1 1 1 1 3   Q2: Feeling down, depressed or hopeless 1 1 1 1 1 1 3   PHQ-2 Score 2 2 2 2 2 2 6   Q1: Little interest or pleasure in doing things  Several days Several days Several days Several days Several days Nearly every day   Q2: Feeling down,  depressed or hopeless  Several days Several days Several days Several days Several days Nearly every day   PHQ-2 Score  2 2 2 2 2 6     PHQ9:       2/2/2023    10:13 AM 6/1/2023    10:07 AM 7/17/2023    11:15 AM 9/19/2023     7:09 PM 11/29/2023     9:44 AM 1/3/2024    10:04 AM 2/28/2024    10:46 AM   PHQ-9 SCORE   PHQ-9 Total Score MyChart    15 (Moderately severe depression) 15 (Moderately severe depression) 17 (Moderately severe depression) 13 (Moderate depression)   PHQ-9 Total Score 12 15 16 15 15 17 13     GAD2:       1/10/2024    10:51 AM 2/28/2024    10:47 AM 4/10/2024    10:52 AM 8/1/2024    12:56 PM 8/14/2024    10:01 AM 8/28/2024     9:55 AM 9/11/2024     9:55 AM   YADI-2   Feeling nervous, anxious, or on edge 1 2 1 1 1 1 1   Not being able to stop or control worrying 1 1 1 1 1 1 1   YADI-2 Total Score 2 3 2 2 2 2    2 2    2     GAD7:       11/27/2022     5:03 AM 2/2/2023    10:13 AM 6/1/2023    10:07 AM 7/17/2023    11:15 AM 8/7/2023    11:04 AM 9/19/2023     7:10 PM 2/28/2024    10:47 AM   YADI-7 SCORE   Total Score 17 (severe anxiety)    5 (mild anxiety) 21 (severe anxiety) 10 (moderate anxiety)   Total Score 17 13 12 13 5 21 10     PROMIS 10-Global Health (all questions and answers displayed):       1/10/2024    10:52 AM 2/28/2024    10:48 AM 4/10/2024    10:53 AM 8/1/2024    12:57 PM 8/14/2024    10:01 AM 8/28/2024     9:56 AM 9/11/2024     9:55 AM   PROMIS 10   In general, would you say your health is: Fair Good Good Good Good Fair Good   In general, would you say your quality of life is: Good Good Fair Fair Good Fair Fair   In general, how would you rate your physical health? Good Fair Good Good Fair Fair Fair   In general, how would you rate your mental health, including your mood and your ability to think? Fair Fair Fair Fair Fair Fair Fair   In general, how would you rate your satisfaction with your social activities and relationships? Fair Fair Fair Poor Fair Poor Fair   In general, please rate  how well you carry out your usual social activities and roles Fair Fair Fair Poor Fair Poor Fair   To what extent are you able to carry out your everyday physical activities such as walking, climbing stairs, carrying groceries, or moving a chair? Completely Mostly Mostly Mostly Moderately Mostly Moderately   In the past 7 days, how often have you been bothered by emotional problems such as feeling anxious, depressed, or irritable? Sometimes Often Often Always Often Often Often   In the past 7 days, how would you rate your fatigue on average? Moderate Moderate Moderate Moderate Moderate Severe Moderate   In the past 7 days, how would you rate your pain on average, where 0 means no pain, and 10 means worst imaginable pain? 6 6 6 6 6 7 6   In general, would you say your health is: 2 3 3 3 3 2 3   In general, would you say your quality of life is: 3 3 2 2 3 2 2   In general, how would you rate your physical health? 3 2 3 3 2 2 2   In general, how would you rate your mental health, including your mood and your ability to think? 2 2 2 2 2 2 2   In general, how would you rate your satisfaction with your social activities and relationships? 2 2 2 1 2 1 2   In general, please rate how well you carry out your usual social activities and roles. (This includes activities at home, at work and in your community, and responsibilities as a parent, child, spouse, employee, friend, etc.) 2 2 2 1 2 1 2   To what extent are you able to carry out your everyday physical activities such as walking, climbing stairs, carrying groceries, or moving a chair? 5 4 4 4 3 4 3   In the past 7 days, how often have you been bothered by emotional problems such as feeling anxious, depressed, or irritable? 3 4 4 5 4 4 4   In the past 7 days, how would you rate your fatigue on average? 3 3 3 3 3 4 3   In the past 7 days, how would you rate your pain on average, where 0 means no pain, and 10 means worst imaginable pain? 6 6 6 6 6 7 6   Global Mental  Health Score 10 9 8 6 9 7    7 8    8   Global Physical Health Score 14 12 13 13 11 10    10 11    11   PROMIS TOTAL - SUBSCORES 24 21 21 19 20 17    17 19    19     [place] Suicide Severity Rating Scale (Lifetime/Recent)      8/3/2022     9:17 AM   [place] Suicide Severity Rating (Lifetime/Recent)   Q1 Wish to be Dead (Lifetime) N   Q2 Non-Specific Active Suicidal Thoughts (Lifetime) N   Actual Attempt (Lifetime) N   Has subject engaged in non-suicidal self-injurious behavior? (Lifetime) N   Calculated C-SSRS Risk Score (Lifetime/Recent) No Risk Indicated         ASSESSMENT: Current Emotional / Mental Status (status of significant symptoms):   Risk status (Self / Other harm or suicidal ideation)   Patient denies current fears or concerns for personal safety.   Patient denies current or recent suicidal ideation or behaviors.    Patient denies current or recent homicidal ideation or behaviors.   Patient denies current or recent self injurious behavior or ideation.   Patient denies other safety concerns.   Patient reports there has been no change in risk factors since their last session.     Patient reports there has been no change in protective factors since their last session.     A safety and risk management plan has been developed including: Patient consented to co-developed safety plan on 11-30-22.  Safety and risk management plan was reviewed.   Patient agreed to use safety plan should any safety concerns arise.  A copy was made available to the patient. Reviewed 9-19-24- completed new safety plan through Liborio Carmona link.    Leyla Safety Plan      Creation Date: 11/30/22 Last Update Date: 4/3/24      Step 1: Warning signs:    Warning Signs    flashbacks, thoughts about I dont matter, Loss, Worsening depression, isolation, cant stop crying, relationship problems    Medical concerns    Not feeling a part of something      Step 2: Internal coping strategies - Things I can do to take my mind off my  problems without contacting another person:    Strategies    Distress tolerance, going for a walk, gardening, deep breathing, stretching, physical touch    Distraction techniiqes    Focus on helpful thoughts    Meditation      Step 3: People and social settings that provide distraction:    Name Contact Information    Star Spouse    Daughter Cell phone       Places    Movie theater, pet store, coffee shop      Step 4: People whom I can ask for help during a crisis:    Name Contact Information    Star Spouse/ cell phone and live in the home      Step 5: Professionals or agencies I can contact during a crisis:    Clinician/Agency Name Phone Emergency Contact    [facility] [phone]       Local Emergency Department Emergency Department Address Emergency Department Phone    [facility] [phone]       Suicide Prevention Lifeline Phone: Call or Text 291  Crisis Text Line: Text HOME to 203826     Step 6: Making the environment safer (plan for lethal means safety):   Remove items I could harm myself with     Optional: What is most important to me and worth living for?:   Family  Spending time with rupal       LiborioRonald Safety Plan. Eleonora Capone and Danny Carmona. Used with permission of the authors.          Name: Veena Parsons  YOB: 1985  Date: November 30, 2022   My primary care provider: Francisco Nails  My primary care clinic: [facility]   My prescriber: PCP  Other care team support:  Holistic medical provider    My Triggers:    Relational problems  Loss of mother recently  Financial stress      Additional People, Places, and Things that I can access for support:   Spouse  Daughter          What is important to me and makes life worth living: Family         GREEN    Good Control  1. I feel good  2. No suicidal thoughts   3. Can work, sleep and play      Action Steps  1. Self-care: balanced meals, exercising, sleep practices, etc.  2. Take your  medications as prescribed.  3. Continue meetings with therapist and prescriber.  4.  Do the healthy things that I enjoy.             YELLO Getting Worse  I have ANY of these:  1. I do not feel good  2. Difficulty Concentrating  3. Sleep is changing  4. Increase/Change in my thoughts to hurt self and/or others, but I can still manage and not act on it.   5. Not taking care of self.               Action Steps (in addition to the above):  1. Inform your therapist and psychiatric prescriber/PCP.  2. Keep taking your medications as prescribed.    3. Turn to people you can ask for help.  4. Use internal coping strategies -see below.  5. Create safe environment: Removing items I can hurt self with              RED  Get Help  If I have ANY of these:  1. Current and uncontrollable thoughts and/or behaviors to hurt self and/or others.   2. Crazy buzzing and spinning.     Actions to manage my safety  1. Contact your emergency person Star  2. Call or Text [phone]  3. Call my crisis team- [facility] [phone]  3. Or Call 911 or go to the emergency room right away        My Internal Coping Strategies include the following:  take a bath, belly breathing, arts and crafts, play with my pet, use my coping box, exercise and use my coping skills    [End for Brief Safety Plan]     Safety Concerns  How To Identify Situations That Make Your Mental Health Worse:  Triggers are things that make your mental health worse.  Look at the list below to help you find your triggers and what you can do about them.     1. Identify Early Warning Signs:    Sometimes symptoms return, even when people do their best to stay well. Symptoms can develop over a short period of time with little or no warning, but most of the time they emerge gradually over several weeks.  Early warning signs are changes that people experience when a relapse is starting. Some early warning signs are common and others are not as common.   Common Early Warning Signs:     Feeling tense or nervous, Eating less or eating more, Trouble sleeping -either too much or too little sleep, Feeling depressed or low, Feeling irritable, Feeling like not being around other people, Trouble concentrating and Urges to harm self     2. Identify action steps to take when warning signs are noticed:    Taking Action- It is important to take action if you are experiencing early warning signs of a relapse.  The faster you act, the more likely it is that you can avoid a full relapse.  It is helpful to identify several specific ways to cope with symptoms.      The following is my list of symptoms and coping strategies that I can use when they are present:    Symptom Coping Strategies   Anxiety -Talk with someone in your support system and let him or her know how you are feeling.  -Use relaxation techniques such as deep breathing or imagery.  -Use positive affirmations to counteract negative self-talk such as  I am learning to let go of worry.    Depression - Schedule your day; include activities you have to do and activities you enjoy doing.  - Get some exercise - walk, run, bike, or swim.  - Give yourself credit for even the smallest things you get done.   Sleep Difficulties   - Go to sleep at the same time every day.  - Do something relaxing before bed, such as drinking herbal tea or listening to music.  - Avoid having discussions about upsetting topics before going to bed.   Delusions   - Distract yourself from the disturbing thought by doing something that requires your attention such as a puzzle.  - Check out your beliefs by talking to someone you trust.    Hallucinations   - Use headphones to listen to music.  - Tell voices to  stop  or say to yourself,  I am safe.   - Ignore the hallucinations as much as possible; focus on other things.   Concentration Difficulties - Minimize distractions so there is only one thing for you to focus on at a time.    - Ask the person you are having a conversation with to  "slow down or repeat things you are unsure of.            Appearance:   Normal   Eye Contact:   Good   Psychomotor Behavior: Normal    Attitude:   Cooperative    Orientation:   All   Speech    Rate / Production: Normal/ Responsive    Volume:  Normal    Mood:    Anxious     Affect:    Worrisome   Thought Content:  Clear    Thought Form:  Goal Directed    Insight:    Good      Medication Review:   No changes to current psychiatric medication(s)     Medication Compliance:   Yes     Changes in Health Issues:   None reported     Chemical Use Review:   Substance Use: Chemical use reviewed, no active concerns identified      Tobacco Use: Current tobacco use     Diagnosis:  296.32 (F33.1) Major Depressive Disorder, Recurrent Episode, Moderate _ and With mixed features  300.01 (F41.0) Panic Disorder  300.02 (F41.1) Generalized Anxiety Disorder   F43.10 PTSD  PMDD      Collateral Reports Completed:   Not Applicable    PLAN: (Patient Tasks / Therapist Tasks / Other)  Client will continue to work on the following goals:    -Learn more about emotional validation- Star will continue with podcast and follow the \"[employer] Ass Counselor.\" -Continued   -Let Star know how much she appreciates him expressing   -Plan ahead for Sebas's graduation and weigh the pros and cons of going in person.   -Agrees to contract for safety.    -Participate in [employer] football as a couple together.         Lia Stiles LMFT                                                         ______________________________________________________________________    Individual Treatment Plan    Patient's Name: Veena Parsons  YOB: 1985    Date of Creation: 9-7-22  Date Treatment Plan Last Reviewed/Revised: 8-14-24    DSM5 Diagnoses:  296.32 (F33.1) Major Depressive Disorder, Recurrent Episode, Moderate _ and With mixed features  300.01 (F41.0) Panic Disorder  300.02 (F41.1) Generalized Anxiety Disorder   F43.10 PTSD  PMDD  Psychosocial / Contextual " Factors: Some relational issues   PROMIS (reviewed every 90 days): 19    Referral / Collaboration:  Referral to another professional/service is not indicated at this time..    Anticipated number of session for this episode of care: 6-9 sessions  Anticipation frequency of session: Biweekly  Anticipated Duration of each session: 38-52 minutes  Treatment plan will be reviewed in 90 days or when goals have been changed.       MeasurableTreatment Goal(s) related to diagnosis / functional impairment(s)  Goal 1: Patient will address struggles with anxiety, depression and PMDD.    I will know I've met my goal when I am feeling less reactive through the month with the symptoms.      Objective #A (Patient Action)    Patient will work on establishing a concrete routine with self-care.  Status: Continued - Date(s): 8-14-24    Intervention(s)  Therapist will use CBT and motivational interviewing.      Objective #B  Patient will develop a plan to help manage PMDD  Status: Continued - Date(s): 8-14-24    Intervention(s)  Therapist will use solution focused therapy.    Objective #C  Patient will work on ways to communicate with narcissistic individuals.  Status: Continued - Date(s): 8-14-24    Intervention(s)  Therapist will teach communication skills.          Patient has reviewed and agreed to the above plan.      Lia Stiles, NATACHA  September 7, 2022

## 2024-09-25 ENCOUNTER — VIRTUAL VISIT (OUTPATIENT)
Dept: PSYCHOLOGY | Facility: CLINIC | Age: 39
End: 2024-09-25
Payer: COMMERCIAL

## 2024-09-25 DIAGNOSIS — F43.10 PTSD (POST-TRAUMATIC STRESS DISORDER): ICD-10-CM

## 2024-09-25 DIAGNOSIS — F41.0 PANIC DISORDER WITHOUT AGORAPHOBIA: ICD-10-CM

## 2024-09-25 DIAGNOSIS — F32.81 PMDD (PREMENSTRUAL DYSPHORIC DISORDER): ICD-10-CM

## 2024-09-25 DIAGNOSIS — F41.1 GENERALIZED ANXIETY DISORDER: ICD-10-CM

## 2024-09-25 DIAGNOSIS — F33.1 MAJOR DEPRESSIVE DISORDER, RECURRENT EPISODE, MODERATE (H): Primary | ICD-10-CM

## 2024-09-25 PROCEDURE — 90834 PSYTX W PT 45 MINUTES: CPT | Mod: 95 | Performed by: MARRIAGE & FAMILY THERAPIST

## 2024-09-25 NOTE — PROGRESS NOTES
[facility]                                     Progress Note    Patient Name: Veena Parsons  Date:  9-25-24         Service Type: Individual      Session Start Time:  10am   Session End Time:   1050am     Session Length:   50min    Session #: 67    Attendees: Carlos    Service Modality:  Video     Telemedicine Visit: The patient's condition can be safely assessed and treated via synchronous audio and visual telemedicine encounter.      Reason for Telemedicine Visit: Patient has requested telehealth visit    Originating Site (Patient Location): Patient's home        Distant Location (provider location):  On-site    Consent:  The patient/guardian has verbally consented to: the potential risks and benefits of telemedicine (video visit) versus in person care; bill my insurance or make self-payment for services provided; and responsibility for payment of non-covered services.     Mode of Communication:  Video Conference via [employer]    As the provider I attest to compliance with applicable laws and regulations related to telemedicine.      Treatment Plan- 8-14-24  CGI- 8-14-24  Phq-9 and YADI-7- See check in data  Promis- 9-11-24    DATA  Interactive Complexity: No  Crisis: No        Progress Since Last Session (Related to Symptoms / Goals / Homework):   There has been demonstrated improvement in functioning while patient has been engaged in psychotherapy/psychological service- if withdrawn the patient would deteriorate and/or relapse.      Symptoms: Client reports confronting issues with anxiety, depression and PMDD.     Homework: Achieved / completed to satisfaction  Completed in session      Episode of Care Goals: Satisfactory progress - ACTION (Actively working towards change); Intervened by reinforcing change plan / affirming steps taken       Current / Ongoing Stressors and Concerns:   -One of Anastasiia's friends (González) did move in and so far it is going well-  "Continued   -Working on communication patterns.     -Star had a hard week and was dealing with a bout of anxiety and depression. Had struggles with motivation and sleep.     -Close family friend passed over the weekend.  Star is going to speak at the . Veena and Star have been working on the speech together. Veena is not sure she is able to attend the  due to Star's friends wife who is holding a grudge about something years ago.    -Star states in session \"Veena is my Rock.\"   - Working on home projects together with more success.    -Kurtis Mcclendon and Sebas- Step sons.   -Anxious and avoidant attachment styles-  Veena feels she is anxious and Star feels he is avoidant- Continue   -Sebas continues with basic training. Feeling down about not being able to travel down for the ceremony. Sebas is doing well and sounded really good on the phone.        Treatment Objective(s) Addressed in This Session:  Safety planning / following safety plan   Patient will develop a plan to help manage PMDD  Patient will work on ways to communicate /patterns   Relational issues - Attachment styles.  Grief and loss        Intervention:   Work on speech with Star and offer support.  Talk about grief and loss together.     Follow safety plan and use crisis resources.     Participate in [employer] football as a couple.   Support Sebas with graduation from basic.   Work on self-compassion and self-esteem.           Assessments completed prior to visit:  The following assessments were completed by patient for this visit:  See data in EPIC as it is not auto populating.    PHQ2:       2024    12:45 PM 2024    12:54 AM 2024     9:55 AM 2024     9:58 AM 2024    11:42 AM 2024    10:51 AM 2024    10:46 AM   PHQ-2 (  [employer])   Q1: Little interest or pleasure in doing things 1 1 1 1 1 1 3   Q2: Feeling down, depressed or hopeless 1 1 1 1 1 1 3   PHQ-2 Score 2 2 2 2 2 2 6   Q1: Little interest or pleasure in " doing things  Several days Several days Several days Several days Several days Nearly every day   Q2: Feeling down, depressed or hopeless  Several days Several days Several days Several days Several days Nearly every day   PHQ-2 Score  2 2 2 2 2 6     PHQ9:       2/2/2023    10:13 AM 6/1/2023    10:07 AM 7/17/2023    11:15 AM 9/19/2023     7:09 PM 11/29/2023     9:44 AM 1/3/2024    10:04 AM 2/28/2024    10:46 AM   PHQ-9 SCORE   PHQ-9 Total Score MyChart    15 (Moderately severe depression) 15 (Moderately severe depression) 17 (Moderately severe depression) 13 (Moderate depression)   PHQ-9 Total Score 12 15 16 15 15 17 13     GAD2:       1/10/2024    10:51 AM 2/28/2024    10:47 AM 4/10/2024    10:52 AM 8/1/2024    12:56 PM 8/14/2024    10:01 AM 8/28/2024     9:55 AM 9/11/2024     9:55 AM   YADI-2   Feeling nervous, anxious, or on edge 1 2 1 1 1 1 1   Not being able to stop or control worrying 1 1 1 1 1 1 1   YADI-2 Total Score 2 3 2 2 2 2    2 2    2     GAD7:       11/27/2022     5:03 AM 2/2/2023    10:13 AM 6/1/2023    10:07 AM 7/17/2023    11:15 AM 8/7/2023    11:04 AM 9/19/2023     7:10 PM 2/28/2024    10:47 AM   YADI-7 SCORE   Total Score 17 (severe anxiety)    5 (mild anxiety) 21 (severe anxiety) 10 (moderate anxiety)   Total Score 17 13 12 13 5 21 10     PROMIS 10-Global Health (all questions and answers displayed):       1/10/2024    10:52 AM 2/28/2024    10:48 AM 4/10/2024    10:53 AM 8/1/2024    12:57 PM 8/14/2024    10:01 AM 8/28/2024     9:56 AM 9/11/2024     9:55 AM   PROMIS 10   In general, would you say your health is: Fair Good Good Good Good Fair Good   In general, would you say your quality of life is: Good Good Fair Fair Good Fair Fair   In general, how would you rate your physical health? Good Fair Good Good Fair Fair Fair   In general, how would you rate your mental health, including your mood and your ability to think? Fair Fair Fair Fair Fair Fair Fair   In general, how would you rate your  satisfaction with your social activities and relationships? Fair Fair Fair Poor Fair Poor Fair   In general, please rate how well you carry out your usual social activities and roles Fair Fair Fair Poor Fair Poor Fair   To what extent are you able to carry out your everyday physical activities such as walking, climbing stairs, carrying groceries, or moving a chair? Completely Mostly Mostly Mostly Moderately Mostly Moderately   In the past 7 days, how often have you been bothered by emotional problems such as feeling anxious, depressed, or irritable? Sometimes Often Often Always Often Often Often   In the past 7 days, how would you rate your fatigue on average? Moderate Moderate Moderate Moderate Moderate Severe Moderate   In the past 7 days, how would you rate your pain on average, where 0 means no pain, and 10 means worst imaginable pain? 6 6 6 6 6 7 6   In general, would you say your health is: 2 3 3 3 3 2 3   In general, would you say your quality of life is: 3 3 2 2 3 2 2   In general, how would you rate your physical health? 3 2 3 3 2 2 2   In general, how would you rate your mental health, including your mood and your ability to think? 2 2 2 2 2 2 2   In general, how would you rate your satisfaction with your social activities and relationships? 2 2 2 1 2 1 2   In general, please rate how well you carry out your usual social activities and roles. (This includes activities at home, at work and in your community, and responsibilities as a parent, child, spouse, employee, friend, etc.) 2 2 2 1 2 1 2   To what extent are you able to carry out your everyday physical activities such as walking, climbing stairs, carrying groceries, or moving a chair? 5 4 4 4 3 4 3   In the past 7 days, how often have you been bothered by emotional problems such as feeling anxious, depressed, or irritable? 3 4 4 5 4 4 4   In the past 7 days, how would you rate your fatigue on average? 3 3 3 3 3 4 3   In the past 7 days, how would you  rate your pain on average, where 0 means no pain, and 10 means worst imaginable pain? 6 6 6 6 6 7 6   Global Mental Health Score 10 9 8 6 9 7    7 8    8   Global Physical Health Score 14 12 13 13 11 10    10 11    11   PROMIS TOTAL - SUBSCORES 24 21 21 19 20 17    17 19    19     Andrew Suicide Severity Rating Scale (Lifetime/Recent)      8/3/2022     9:17 AM   Andrew Suicide Severity Rating (Lifetime/Recent)   Q1 Wish to be Dead (Lifetime) N   Q2 Non-Specific Active Suicidal Thoughts (Lifetime) N   Actual Attempt (Lifetime) N   Has subject engaged in non-suicidal self-injurious behavior? (Lifetime) N   Calculated C-SSRS Risk Score (Lifetime/Recent) No Risk Indicated         ASSESSMENT: Current Emotional / Mental Status (status of significant symptoms):   Risk status (Self / Other harm or suicidal ideation)   Patient denies current fears or concerns for personal safety.   Patient denies current or recent suicidal ideation or behaviors.    Patient denies current or recent homicidal ideation or behaviors.   Patient denies current or recent self injurious behavior or ideation.   Patient denies other safety concerns.   Patient reports there has been no change in risk factors since their last session.     Patient reports there has been no change in protective factors since their last session.     A safety and risk management plan has been developed including: Patient consented to co-developed safety plan on 11-30-22.  Safety and risk management plan was reviewed.   Patient agreed to use safety plan should any safety concerns arise.  A copy was made available to the patient. Reviewed 9-25-24- completed new safety plan through Liborio Carmona link.    Leyla Safety Plan      Creation Date: 11/30/22 Last Update Date: 4/3/24      Step 1: Warning signs:    Warning Signs    flashbacks, thoughts about I dont matter, Loss, Worsening depression, isolation, cant stop crying, relationship problems    Medical concerns     Not feeling a part of something      Step 2: Internal coping strategies - Things I can do to take my mind off my problems without contacting another person:    Strategies    Distress tolerance, going for a walk, gardening, deep breathing, stretching, physical touch    Distraction techniiqes    Focus on helpful thoughts    Meditation      Step 3: People and social settings that provide distraction:    Name Contact Information    Star Spouse    Daughter Cell phone       Places    Movie theater, pet store, coffee shop      Step 4: People whom I can ask for help during a crisis:    Name Contact Information    Star Spouse/ cell phone and live in the home      Step 5: Professionals or agencies I can contact during a crisis:    Clinician/Agency Name Phone Emergency Contact    [facility] [phone]       Local Emergency Department Emergency Department Address Emergency Department Phone    [facility] [phone]       Suicide Prevention Lifeline Phone: Call or Text 536  Crisis Text Line: Text HOME to 903676     Step 6: Making the environment safer (plan for lethal means safety):   Remove items I could harm myself with     Optional: What is most important to me and worth living for?:   Family  Spending time with daughter       LiborioHilario Safety Plan. Eleonora Capone and Danny Carmona. Used with permission of the authors.          Name: Veena Parsons  YOB: 1985  Date: November 30, 2022   My primary care provider: Francisco Nails  My primary care clinic: [facility]   My prescriber: PCP  Other care team support:  Holistic medical provider    My Triggers:    Relational problems  Loss of mother recently  Financial stress      Additional People, Places, and Things that I can access for support:   Spouse  Daughter          What is important to me and makes life worth living: Family         GREEN    Good Control  1. I feel good  2. No suicidal thoughts   3. Can  work, sleep and play      Action Steps  1. Self-care: balanced meals, exercising, sleep practices, etc.  2. Take your medications as prescribed.  3. Continue meetings with therapist and prescriber.  4.  Do the healthy things that I enjoy.             YELLO Getting Worse  I have ANY of these:  1. I do not feel good  2. Difficulty Concentrating  3. Sleep is changing  4. Increase/Change in my thoughts to hurt self and/or others, but I can still manage and not act on it.   5. Not taking care of self.               Action Steps (in addition to the above):  1. Inform your therapist and psychiatric prescriber/PCP.  2. Keep taking your medications as prescribed.    3. Turn to people you can ask for help.  4. Use internal coping strategies -see below.  5. Create safe environment: Removing items I can hurt self with              RED  Get Help  If I have ANY of these:  1. Current and uncontrollable thoughts and/or behaviors to hurt self and/or others.   2. Crazy buzzing and spinning.     Actions to manage my safety  1. Contact your emergency person Star  2. Call or Text 252  3. Call my crisis team- [facility] [phone]  3. Or Call 911 or go to the emergency room right away        My Internal Coping Strategies include the following:  take a bath, belly breathing, arts and crafts, play with my pet, use my coping box, exercise and use my coping skills    [End for Brief Safety Plan]     Safety Concerns  How To Identify Situations That Make Your Mental Health Worse:  Triggers are things that make your mental health worse.  Look at the list below to help you find your triggers and what you can do about them.     1. Identify Early Warning Signs:    Sometimes symptoms return, even when people do their best to stay well. Symptoms can develop over a short period of time with little or no warning, but most of the time they emerge gradually over several weeks.  Early warning signs are changes that people experience when a  relapse is starting. Some early warning signs are common and others are not as common.   Common Early Warning Signs:    Feeling tense or nervous, Eating less or eating more, Trouble sleeping -either too much or too little sleep, Feeling depressed or low, Feeling irritable, Feeling like not being around other people, Trouble concentrating and Urges to harm self     2. Identify action steps to take when warning signs are noticed:    Taking Action- It is important to take action if you are experiencing early warning signs of a relapse.  The faster you act, the more likely it is that you can avoid a full relapse.  It is helpful to identify several specific ways to cope with symptoms.      The following is my list of symptoms and coping strategies that I can use when they are present:    Symptom Coping Strategies   Anxiety -Talk with someone in your support system and let him or her know how you are feeling.  -Use relaxation techniques such as deep breathing or imagery.  -Use positive affirmations to counteract negative self-talk such as  I am learning to let go of worry.    Depression - Schedule your day; include activities you have to do and activities you enjoy doing.  - Get some exercise - walk, run, bike, or swim.  - Give yourself credit for even the smallest things you get done.   Sleep Difficulties   - Go to sleep at the same time every day.  - Do something relaxing before bed, such as drinking herbal tea or listening to music.  - Avoid having discussions about upsetting topics before going to bed.   Delusions   - Distract yourself from the disturbing thought by doing something that requires your attention such as a puzzle.  - Check out your beliefs by talking to someone you trust.    Hallucinations   - Use headphones to listen to music.  - Tell voices to  stop  or say to yourself,  I am safe.   - Ignore the hallucinations as much as possible; focus on other things.   Concentration Difficulties - Minimize  "distractions so there is only one thing for you to focus on at a time.    - Ask the person you are having a conversation with to slow down or repeat things you are unsure of.            Appearance:   Normal   Eye Contact:   Good   Psychomotor Behavior: Normal    Attitude:   Cooperative    Orientation:   All   Speech    Rate / Production: Normal     Volume:  Normal    Mood:    Anxious  Sad   Affect:    Worrisome   Thought Content:  Clear    Thought Form:  Goal Directed Logical    Insight:    Good      Medication Review:   No changes to current psychiatric medication(s)     Medication Compliance:   Yes     Changes in Health Issues:   None reported     Chemical Use Review:   Substance Use: Chemical use reviewed, no active concerns identified      Tobacco Use: Current tobacco use     Diagnosis:  296.32 (F33.1) Major Depressive Disorder, Recurrent Episode, Moderate _ and With mixed features  300.01 (F41.0) Panic Disorder  300.02 (F41.1) Generalized Anxiety Disorder   F43.10 PTSD  PMDD      Collateral Reports Completed:   Not Applicable    PLAN: (Patient Tasks / Therapist Tasks / Other)  Client will continue to work on the following goals:    -Learn more about emotional validation- Star will continue with podcast and follow the \"Bad Ass Counselor.\" -Continued   -Work on speech together and Star will ask Geremias if Veena can attend the .   -Support Sebas with graduation from [employer]    -Agrees to contract for safety.    -Participate in fantasy football as a couple together.         NATACHA Hernandez                                                         ______________________________________________________________________    Individual Treatment Plan    Patient's Name: Veena Parsons  YOB: 1985    Date of Creation: 22  Date Treatment Plan Last Reviewed/Revised: 24    DSM5 Diagnoses:  296.32 (F33.1) Major Depressive Disorder, Recurrent Episode, Moderate _ and With mixed features  300.01 " (F41.0) Panic Disorder  300.02 (F41.1) Generalized Anxiety Disorder   F43.10 PTSD  PMDD  Psychosocial / Contextual Factors: Some relational issues   PROMIS (reviewed every 90 days): 19    Referral / Collaboration:  Referral to another professional/service is not indicated at this time..    Anticipated number of session for this episode of care: 6-9 sessions  Anticipation frequency of session: Biweekly  Anticipated Duration of each session: 38-52 minutes  Treatment plan will be reviewed in 90 days or when goals have been changed.       MeasurableTreatment Goal(s) related to diagnosis / functional impairment(s)  Goal 1: Patient will address struggles with anxiety, depression and PMDD.    I will know I've met my goal when I am feeling less reactive through the month with the symptoms.      Objective #A (Patient Action)    Patient will work on establishing a concrete routine with self-care.  Status: Continued - Date(s): 8-14-24    Intervention(s)  Therapist will use CBT and motivational interviewing.      Objective #B  Patient will develop a plan to help manage PMDD  Status: Continued - Date(s): 8-14-24    Intervention(s)  Therapist will use solution focused therapy.    Objective #C  Patient will work on ways to communicate with narcissistic individuals.  Status: Continued - Date(s): 8-14-24    Intervention(s)  Therapist will teach communication skills.          Patient has reviewed and agreed to the above plan.      Lia Stiles, NATACHA  September 7, 2022

## 2024-10-02 ENCOUNTER — VIRTUAL VISIT (OUTPATIENT)
Dept: PSYCHOLOGY | Facility: CLINIC | Age: 39
End: 2024-10-02
Payer: COMMERCIAL

## 2024-10-02 DIAGNOSIS — F43.10 PTSD (POST-TRAUMATIC STRESS DISORDER): ICD-10-CM

## 2024-10-02 DIAGNOSIS — F41.0 PANIC DISORDER WITHOUT AGORAPHOBIA: ICD-10-CM

## 2024-10-02 DIAGNOSIS — F32.81 PMDD (PREMENSTRUAL DYSPHORIC DISORDER): ICD-10-CM

## 2024-10-02 DIAGNOSIS — F41.1 GENERALIZED ANXIETY DISORDER: ICD-10-CM

## 2024-10-02 DIAGNOSIS — F33.1 MAJOR DEPRESSIVE DISORDER, RECURRENT EPISODE, MODERATE (H): Primary | ICD-10-CM

## 2024-10-02 PROCEDURE — 90834 PSYTX W PT 45 MINUTES: CPT | Mod: 95 | Performed by: MARRIAGE & FAMILY THERAPIST

## 2024-10-02 NOTE — PROGRESS NOTES
[facility]                                     Progress Note    Patient Name: Veena Parsons  Date:  10-2-24         Service Type: Individual      Session Start Time:  2pm   Session End Time:   250pm     Session Length:   50min    Session #: 68    Attendees: Carlos    Service Modality:  Video     Telemedicine Visit: The patient's condition can be safely assessed and treated via synchronous audio and visual telemedicine encounter.      Reason for Telemedicine Visit: Patient has requested telehealth visit    Originating Site (Patient Location): Patient's home        Distant Location (provider location):  On-site    Consent:  The patient/guardian has verbally consented to: the potential risks and benefits of telemedicine (video visit) versus in person care; bill my insurance or make self-payment for services provided; and responsibility for payment of non-covered services.     Mode of Communication:  Video Conference via Spyder Lynk.     As the provider I attest to compliance with applicable laws and regulations related to telemedicine.      Treatment Plan- 8-14-24  CGI- 8-14-24  Phq-9 and YADI-7- See check in data  Promis- 9-11-24    DATA  Interactive Complexity: No  Crisis: No        Progress Since Last Session (Related to Symptoms / Goals / Homework):   There has been demonstrated improvement in functioning while patient has been engaged in psychotherapy/psychological service- if withdrawn the patient would deteriorate and/or relapse.      Symptoms: Client reports confronting issues with anxiety, depression and PMDD. Wanted to discuss some specific relationship dynamics today.    Homework: Achieved / completed to satisfaction  Completed in session      Episode of Care Goals: Satisfactory progress - ACTION (Actively working towards change); Intervened by reinforcing change plan / affirming steps taken       Current / Ongoing Stressors and Concerns:   -One of Anastasiia's friends  "(González) did move in and so far it is going well- Continued   -Went to the  together.  Star spoke and it went well.  Veena was able to give Geremias a hug. Star feels he cannot even remember the speech or being up on stage.    -Working on patching things up with a group of friends. Missing having couples friends.    -Working on home projects together with more success. Veena and Star felt they had just disappear.    -Kurtis Mcclendon and Sebas- Step sons.   -Anxious and avoidant attachment styles-  Veena feels she is anxious and Star feels he is avoidant- Continue   -Sebas continues with basic training. Feeling down about not being able to travel down for the ceremony. Sebas is doing well and sounded really good on the phone- Continued        Treatment Objective(s) Addressed in This Session:  Safety planning / following safety plan   Patient will develop a plan to help manage PMDD  Patient will work on ways to communicate /patterns   Relational issues      Intervention:   Support one another with grief and loss.   Work on connection with \"couples friends.\"   Follow safety plan and use crisis resources.     Keep in mind having one another's back.    Work on self-compassion and self-esteem.           Assessments completed prior to visit:  The following assessments were completed by patient for this visit:  See data in EPIC as it is not auto populating.    PHQ2:       2024    12:45 PM 2024    12:54 AM 2024     9:55 AM 2024     9:58 AM 2024    11:42 AM 2024    10:51 AM 2024    10:46 AM   PHQ-2 (  [employer])   Q1: Little interest or pleasure in doing things 1 1 1 1 1 1 3   Q2: Feeling down, depressed or hopeless 1 1 1 1 1 1 3   PHQ-2 Score 2 2 2 2 2 2 6   Q1: Little interest or pleasure in doing things  Several days Several days Several days Several days Several days Nearly every day   Q2: Feeling down, depressed or hopeless  Several days Several days Several days Several days Several days " Nearly every day   PHQ-2 Score  2 2 2 2 2 6     PHQ9:       2/2/2023    10:13 AM 6/1/2023    10:07 AM 7/17/2023    11:15 AM 9/19/2023     7:09 PM 11/29/2023     9:44 AM 1/3/2024    10:04 AM 2/28/2024    10:46 AM   PHQ-9 SCORE   PHQ-9 Total Score MyChart    15 (Moderately severe depression) 15 (Moderately severe depression) 17 (Moderately severe depression) 13 (Moderate depression)   PHQ-9 Total Score 12 15 16 15 15 17 13     GAD2:       1/10/2024    10:51 AM 2/28/2024    10:47 AM 4/10/2024    10:52 AM 8/1/2024    12:56 PM 8/14/2024    10:01 AM 8/28/2024     9:55 AM 9/11/2024     9:55 AM   YADI-2   Feeling nervous, anxious, or on edge 1 2 1 1 1 1 1   Not being able to stop or control worrying 1 1 1 1 1 1 1   YADI-2 Total Score 2 3 2 2 2 2    2 2    2     GAD7:       11/27/2022     5:03 AM 2/2/2023    10:13 AM 6/1/2023    10:07 AM 7/17/2023    11:15 AM 8/7/2023    11:04 AM 9/19/2023     7:10 PM 2/28/2024    10:47 AM   YADI-7 SCORE   Total Score 17 (severe anxiety)    5 (mild anxiety) 21 (severe anxiety) 10 (moderate anxiety)   Total Score 17 13 12 13 5 21 10     PROMIS 10-Global Health (all questions and answers displayed):       1/10/2024    10:52 AM 2/28/2024    10:48 AM 4/10/2024    10:53 AM 8/1/2024    12:57 PM 8/14/2024    10:01 AM 8/28/2024     9:56 AM 9/11/2024     9:55 AM   PROMIS 10   In general, would you say your health is: Fair Good Good Good Good Fair Good   In general, would you say your quality of life is: Good Good Fair Fair Good Fair Fair   In general, how would you rate your physical health? Good Fair Good Good Fair Fair Fair   In general, how would you rate your mental health, including your mood and your ability to think? Fair Fair Fair Fair Fair Fair Fair   In general, how would you rate your satisfaction with your social activities and relationships? Fair Fair Fair Poor Fair Poor Fair   In general, please rate how well you carry out your usual social activities and roles Fair Fair Fair Poor Fair  Poor Fair   To what extent are you able to carry out your everyday physical activities such as walking, climbing stairs, carrying groceries, or moving a chair? Completely Mostly Mostly Mostly Moderately Mostly Moderately   In the past 7 days, how often have you been bothered by emotional problems such as feeling anxious, depressed, or irritable? Sometimes Often Often Always Often Often Often   In the past 7 days, how would you rate your fatigue on average? Moderate Moderate Moderate Moderate Moderate Severe Moderate   In the past 7 days, how would you rate your pain on average, where 0 means no pain, and 10 means worst imaginable pain? 6 6 6 6 6 7 6   In general, would you say your health is: 2 3 3 3 3 2 3   In general, would you say your quality of life is: 3 3 2 2 3 2 2   In general, how would you rate your physical health? 3 2 3 3 2 2 2   In general, how would you rate your mental health, including your mood and your ability to think? 2 2 2 2 2 2 2   In general, how would you rate your satisfaction with your social activities and relationships? 2 2 2 1 2 1 2   In general, please rate how well you carry out your usual social activities and roles. (This includes activities at home, at work and in your community, and responsibilities as a parent, child, spouse, employee, friend, etc.) 2 2 2 1 2 1 2   To what extent are you able to carry out your everyday physical activities such as walking, climbing stairs, carrying groceries, or moving a chair? 5 4 4 4 3 4 3   In the past 7 days, how often have you been bothered by emotional problems such as feeling anxious, depressed, or irritable? 3 4 4 5 4 4 4   In the past 7 days, how would you rate your fatigue on average? 3 3 3 3 3 4 3   In the past 7 days, how would you rate your pain on average, where 0 means no pain, and 10 means worst imaginable pain? 6 6 6 6 6 7 6   Global Mental Health Score 10 9 8 6 9 7    7 8    8   Global Physical Health Score 14 12 13 13 11 10    10  11    11   PROMIS TOTAL - SUBSCORES 24 21 21 19 20 17    17 19    19     Hyde Suicide Severity Rating Scale (Lifetime/Recent)      8/3/2022     9:17 AM   Hyde Suicide Severity Rating (Lifetime/Recent)   Q1 Wish to be Dead (Lifetime) N   Q2 Non-Specific Active Suicidal Thoughts (Lifetime) N   Actual Attempt (Lifetime) N   Has subject engaged in non-suicidal self-injurious behavior? (Lifetime) N   Calculated C-SSRS Risk Score (Lifetime/Recent) No Risk Indicated         ASSESSMENT: Current Emotional / Mental Status (status of significant symptoms):   Risk status (Self / Other harm or suicidal ideation)   Patient denies current fears or concerns for personal safety.   Patient denies current or recent suicidal ideation or behaviors.    Patient denies current or recent homicidal ideation or behaviors.   Patient denies current or recent self injurious behavior or ideation.   Patient denies other safety concerns.   Patient reports there has been no change in risk factors since their last session.     Patient reports there has been no change in protective factors since their last session.     A safety and risk management plan has been developed including: Patient consented to co-developed safety plan on 11-30-22.  Safety and risk management plan was reviewed.   Patient agreed to use safety plan should any safety concerns arise.  A copy was made available to the patient. Reviewed 10-2-24- completed new safety plan through Liborio Carmona link.    Leyla Safety Plan      Creation Date: 11/30/22 Last Update Date: 4/3/24      Step 1: Warning signs:    Warning Signs    flashbacks, thoughts about I dont matter, Loss, Worsening depression, isolation, cant stop crying, relationship problems    Medical concerns    Not feeling a part of something      Step 2: Internal coping strategies - Things I can do to take my mind off my problems without contacting another person:    Strategies    Distress tolerance, going for a  walk, gardening, deep breathing, stretching, physical touch    Distraction techniiqes    Focus on helpful thoughts    Meditation      Step 3: People and social settings that provide distraction:    Name Contact Information    Star Spouse    Daughter Cell phone       Places    Movie theater, pet store, coffee shop      Step 4: People whom I can ask for help during a crisis:    Name Contact Information    Star Spouse/ cell phone and live in the home      Step 5: Professionals or agencies I can contact during a crisis:    Clinician/Agency Name Phone Emergency Contact    [facility] [phone]       Local Emergency Department Emergency Department Address Emergency Department Phone    [facility] [phone]       Suicide Prevention Lifeline Phone: Call or Text 211  Crisis Text Line: Text HOME to 828556     Step 6: Making the environment safer (plan for lethal means safety):   Remove items I could harm myself with     Optional: What is most important to me and worth living for?:   Family  Spending time with daughter       Leyla Safety Plan. Eleonora Capone and Danny Carmona. Used with permission of the authors.          Name: Veena Parsons  YOB: 1985  Date: November 30, 2022   My primary care provider: Francisco Nails  My primary care clinic: [facility]   My prescriber: PCP  Other care team support:  Holistic medical provider    My Triggers:    Relational problems  Loss of mother recently  Financial stress      Additional People, Places, and Things that I can access for support:   Spouse  Daughter          What is important to me and makes life worth living: Family         GREEN    Good Control  1. I feel good  2. No suicidal thoughts   3. Can work, sleep and play      Action Steps  1. Self-care: balanced meals, exercising, sleep practices, etc.  2. Take your medications as prescribed.  3. Continue meetings with therapist and prescriber.  4.  Do the  healthy things that I enjoy.             YELLO Getting Worse  I have ANY of these:  1. I do not feel good  2. Difficulty Concentrating  3. Sleep is changing  4. Increase/Change in my thoughts to hurt self and/or others, but I can still manage and not act on it.   5. Not taking care of self.               Action Steps (in addition to the above):  1. Inform your therapist and psychiatric prescriber/PCP.  2. Keep taking your medications as prescribed.    3. Turn to people you can ask for help.  4. Use internal coping strategies -see below.  5. Create safe environment: Removing items I can hurt self with              RED  Get Help  If I have ANY of these:  1. Current and uncontrollable thoughts and/or behaviors to hurt self and/or others.   2. Crazy buzzing and spinning.     Actions to manage my safety  1. Contact your emergency person Star  2. Call or Text 166  3. Call my crisis team- [facility] [phone]  3. Or Call 1 or go to the emergency room right away        My Internal Coping Strategies include the following:  take a bath, belly breathing, arts and crafts, play with my pet, use my coping box, exercise and use my coping skills    [End for Brief Safety Plan]     Safety Concerns  How To Identify Situations That Make Your Mental Health Worse:  Triggers are things that make your mental health worse.  Look at the list below to help you find your triggers and what you can do about them.     1. Identify Early Warning Signs:    Sometimes symptoms return, even when people do their best to stay well. Symptoms can develop over a short period of time with little or no warning, but most of the time they emerge gradually over several weeks.  Early warning signs are changes that people experience when a relapse is starting. Some early warning signs are common and others are not as common.   Common Early Warning Signs:    Feeling tense or nervous, Eating less or eating more, Trouble sleeping -either too much or too  little sleep, Feeling depressed or low, Feeling irritable, Feeling like not being around other people, Trouble concentrating and Urges to harm self     2. Identify action steps to take when warning signs are noticed:    Taking Action- It is important to take action if you are experiencing early warning signs of a relapse.  The faster you act, the more likely it is that you can avoid a full relapse.  It is helpful to identify several specific ways to cope with symptoms.      The following is my list of symptoms and coping strategies that I can use when they are present:    Symptom Coping Strategies   Anxiety -Talk with someone in your support system and let him or her know how you are feeling.  -Use relaxation techniques such as deep breathing or imagery.  -Use positive affirmations to counteract negative self-talk such as  I am learning to let go of worry.    Depression - Schedule your day; include activities you have to do and activities you enjoy doing.  - Get some exercise - walk, run, bike, or swim.  - Give yourself credit for even the smallest things you get done.   Sleep Difficulties   - Go to sleep at the same time every day.  - Do something relaxing before bed, such as drinking herbal tea or listening to music.  - Avoid having discussions about upsetting topics before going to bed.   Delusions   - Distract yourself from the disturbing thought by doing something that requires your attention such as a puzzle.  - Check out your beliefs by talking to someone you trust.    Hallucinations   - Use headphones to listen to music.  - Tell voices to  stop  or say to yourself,  I am safe.   - Ignore the hallucinations as much as possible; focus on other things.   Concentration Difficulties - Minimize distractions so there is only one thing for you to focus on at a time.    - Ask the person you are having a conversation with to slow down or repeat things you are unsure of.            Appearance:   Normal   Eye  "Contact:   Good   Psychomotor Behavior: Normal    Attitude:   Cooperative    Orientation:   All   Speech    Rate / Production: Normal     Volume:  Normal    Mood:    Anxious  Sad   Affect:    Worrisome   Thought Content:  Clear    Thought Form:  Goal Directed Logical    Insight:    Good      Medication Review:   No changes to current psychiatric medication(s)     Medication Compliance:   Yes     Changes in Health Issues:   None reported     Chemical Use Review:   Substance Use: Chemical use reviewed, no active concerns identified      Tobacco Use: Current tobacco use     Diagnosis:  296.32 (F33.1) Major Depressive Disorder, Recurrent Episode, Moderate _ and With mixed features  300.01 (F41.0) Panic Disorder  300.02 (F41.1) Generalized Anxiety Disorder   F43.10 PTSD  PMDD      Collateral Reports Completed:   Not Applicable    PLAN: (Patient Tasks / Therapist Tasks / Other)  Client will continue to work on the following goals:    -Learn more about emotional validation- Star will continue with podcast and follow the \"[employer] Ass Counselor.\" -Continued   -Work on mending relationships with group of friends.   -Support Sebas with graduation from [employer].    -Genesis for safety in today's session.   -Participate in [employer] football as a couple together.   -Keep in mind having one another's back.         Lia Stiles, [facility]                                                         ______________________________________________________________________    Individual Treatment Plan    Patient's Name: Veena Parsons  YOB: 1985    Date of Creation: 9-7-22  Date Treatment Plan Last Reviewed/Revised: 8-14-24    DSM5 Diagnoses:  296.32 (F33.1) Major Depressive Disorder, Recurrent Episode, Moderate _ and With mixed features  300.01 (F41.0) Panic Disorder  300.02 (F41.1) Generalized Anxiety Disorder   F43.10 PTSD  PMDD  Psychosocial / Contextual Factors: Some relational issues   PROMIS (reviewed every 90 days): " 19    Referral / Collaboration:  Referral to another professional/service is not indicated at this time..    Anticipated number of session for this episode of care: 6-9 sessions  Anticipation frequency of session: Biweekly  Anticipated Duration of each session: 38-52 minutes  Treatment plan will be reviewed in 90 days or when goals have been changed.       MeasurableTreatment Goal(s) related to diagnosis / functional impairment(s)  Goal 1: Patient will address struggles with anxiety, depression and PMDD.    I will know I've met my goal when I am feeling less reactive through the month with the symptoms.      Objective #A (Patient Action)    Patient will work on establishing a concrete routine with self-care.  Status: Continued - Date(s): 8-14-24    Intervention(s)  Therapist will use CBT and motivational interviewing.      Objective #B  Patient will develop a plan to help manage PMDD  Status: Continued - Date(s): 8-14-24    Intervention(s)  Therapist will use solution focused therapy.    Objective #C  Patient will work on ways to communicate with narcissistic individuals.  Status: Continued - Date(s): 8-14-24    Intervention(s)  Therapist will teach communication skills.          Patient has reviewed and agreed to the above plan.      Lia Stiles, NATACHA  September 7, 2022

## 2024-10-09 ENCOUNTER — VIRTUAL VISIT (OUTPATIENT)
Dept: PSYCHOLOGY | Facility: CLINIC | Age: 39
End: 2024-10-09
Payer: COMMERCIAL

## 2024-10-09 DIAGNOSIS — F43.10 PTSD (POST-TRAUMATIC STRESS DISORDER): ICD-10-CM

## 2024-10-09 DIAGNOSIS — F41.1 GENERALIZED ANXIETY DISORDER: ICD-10-CM

## 2024-10-09 DIAGNOSIS — F32.81 PMDD (PREMENSTRUAL DYSPHORIC DISORDER): ICD-10-CM

## 2024-10-09 DIAGNOSIS — F33.1 MAJOR DEPRESSIVE DISORDER, RECURRENT EPISODE, MODERATE (H): Primary | ICD-10-CM

## 2024-10-09 DIAGNOSIS — F41.0 PANIC DISORDER WITHOUT AGORAPHOBIA: ICD-10-CM

## 2024-10-09 PROCEDURE — 90834 PSYTX W PT 45 MINUTES: CPT | Mod: 95 | Performed by: MARRIAGE & FAMILY THERAPIST
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
## 2024-10-16 ENCOUNTER — VIRTUAL VISIT (OUTPATIENT)
Dept: PSYCHOLOGY | Facility: CLINIC | Age: 39
End: 2024-10-16
Payer: COMMERCIAL

## 2024-10-16 DIAGNOSIS — F43.10 PTSD (POST-TRAUMATIC STRESS DISORDER): ICD-10-CM

## 2024-10-16 DIAGNOSIS — F41.0 PANIC DISORDER WITHOUT AGORAPHOBIA: ICD-10-CM

## 2024-10-16 DIAGNOSIS — F32.81 PMDD (PREMENSTRUAL DYSPHORIC DISORDER): ICD-10-CM

## 2024-10-16 DIAGNOSIS — F33.1 MAJOR DEPRESSIVE DISORDER, RECURRENT EPISODE, MODERATE (H): Primary | ICD-10-CM

## 2024-10-16 DIAGNOSIS — F41.1 GENERALIZED ANXIETY DISORDER: ICD-10-CM

## 2024-10-16 PROCEDURE — 90834 PSYTX W PT 45 MINUTES: CPT | Mod: 95 | Performed by: MARRIAGE & FAMILY THERAPIST
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
## 2024-10-24 ENCOUNTER — VIRTUAL VISIT (OUTPATIENT)
Dept: PSYCHOLOGY | Facility: CLINIC | Age: 39
End: 2024-10-24
Payer: COMMERCIAL

## 2024-10-24 DIAGNOSIS — F41.0 PANIC DISORDER WITHOUT AGORAPHOBIA: ICD-10-CM

## 2024-10-24 DIAGNOSIS — F32.81 PMDD (PREMENSTRUAL DYSPHORIC DISORDER): ICD-10-CM

## 2024-10-24 DIAGNOSIS — F43.10 PTSD (POST-TRAUMATIC STRESS DISORDER): ICD-10-CM

## 2024-10-24 DIAGNOSIS — F41.1 GENERALIZED ANXIETY DISORDER: ICD-10-CM

## 2024-10-24 DIAGNOSIS — F33.1 MAJOR DEPRESSIVE DISORDER, RECURRENT EPISODE, MODERATE (H): Primary | ICD-10-CM

## 2024-10-24 PROCEDURE — 90834 PSYTX W PT 45 MINUTES: CPT | Mod: 95 | Performed by: MARRIAGE & FAMILY THERAPIST
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
## 2024-10-31 ENCOUNTER — VIRTUAL VISIT (OUTPATIENT)
Dept: PSYCHOLOGY | Facility: CLINIC | Age: 39
End: 2024-10-31
Payer: COMMERCIAL

## 2024-10-31 DIAGNOSIS — F41.1 GENERALIZED ANXIETY DISORDER: ICD-10-CM

## 2024-10-31 DIAGNOSIS — F43.10 PTSD (POST-TRAUMATIC STRESS DISORDER): ICD-10-CM

## 2024-10-31 DIAGNOSIS — F32.81 PMDD (PREMENSTRUAL DYSPHORIC DISORDER): Primary | ICD-10-CM

## 2024-10-31 DIAGNOSIS — F33.1 MAJOR DEPRESSIVE DISORDER, RECURRENT EPISODE, MODERATE (H): ICD-10-CM

## 2024-10-31 DIAGNOSIS — F41.0 PANIC DISORDER WITHOUT AGORAPHOBIA: ICD-10-CM

## 2024-10-31 PROCEDURE — 90834 PSYTX W PT 45 MINUTES: CPT | Mod: 95 | Performed by: MARRIAGE & FAMILY THERAPIST
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
## 2024-11-06 ENCOUNTER — VIRTUAL VISIT (OUTPATIENT)
Dept: PSYCHOLOGY | Facility: CLINIC | Age: 39
End: 2024-11-06
Payer: COMMERCIAL

## 2024-11-06 DIAGNOSIS — F41.0 PANIC DISORDER WITHOUT AGORAPHOBIA: ICD-10-CM

## 2024-11-06 DIAGNOSIS — F33.1 MAJOR DEPRESSIVE DISORDER, RECURRENT EPISODE, MODERATE (H): ICD-10-CM

## 2024-11-06 DIAGNOSIS — F32.81 PMDD (PREMENSTRUAL DYSPHORIC DISORDER): Primary | ICD-10-CM

## 2024-11-06 DIAGNOSIS — F41.1 GENERALIZED ANXIETY DISORDER: ICD-10-CM

## 2024-11-06 DIAGNOSIS — F43.10 PTSD (POST-TRAUMATIC STRESS DISORDER): ICD-10-CM

## 2024-11-06 PROCEDURE — 90834 PSYTX W PT 45 MINUTES: CPT | Mod: 95 | Performed by: MARRIAGE & FAMILY THERAPIST

## 2024-11-06 ASSESSMENT — PATIENT HEALTH QUESTIONNAIRE - PHQ9
SUM OF ALL RESPONSES TO PHQ QUESTIONS 1-9: 11
SUM OF ALL RESPONSES TO PHQ QUESTIONS 1-9: 11
10. IF YOU CHECKED OFF ANY PROBLEMS, HOW DIFFICULT HAVE THESE PROBLEMS MADE IT FOR YOU TO DO YOUR WORK, TAKE CARE OF THINGS AT HOME, OR GET ALONG WITH OTHER PEOPLE: VERY DIFFICULT
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
## 2024-11-14 ENCOUNTER — VIRTUAL VISIT (OUTPATIENT)
Dept: PSYCHOLOGY | Facility: CLINIC | Age: 39
End: 2024-11-14
Payer: COMMERCIAL

## 2024-11-14 DIAGNOSIS — F32.81 PMDD (PREMENSTRUAL DYSPHORIC DISORDER): Primary | ICD-10-CM

## 2024-11-14 DIAGNOSIS — F43.10 PTSD (POST-TRAUMATIC STRESS DISORDER): ICD-10-CM

## 2024-11-14 DIAGNOSIS — F41.1 GENERALIZED ANXIETY DISORDER: ICD-10-CM

## 2024-11-14 DIAGNOSIS — F33.1 MAJOR DEPRESSIVE DISORDER, RECURRENT EPISODE, MODERATE (H): ICD-10-CM

## 2024-11-14 DIAGNOSIS — F41.0 PANIC DISORDER WITHOUT AGORAPHOBIA: ICD-10-CM
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
## 2024-11-20 ENCOUNTER — VIRTUAL VISIT (OUTPATIENT)
Dept: PSYCHOLOGY | Facility: CLINIC | Age: 39
End: 2024-11-20
Payer: COMMERCIAL

## 2024-11-20 DIAGNOSIS — F33.1 MAJOR DEPRESSIVE DISORDER, RECURRENT EPISODE, MODERATE (H): ICD-10-CM

## 2024-11-20 DIAGNOSIS — F32.81 PMDD (PREMENSTRUAL DYSPHORIC DISORDER): Primary | ICD-10-CM

## 2024-11-20 DIAGNOSIS — F43.10 PTSD (POST-TRAUMATIC STRESS DISORDER): ICD-10-CM

## 2024-11-20 DIAGNOSIS — F41.0 PANIC DISORDER WITHOUT AGORAPHOBIA: ICD-10-CM

## 2024-11-20 DIAGNOSIS — F41.1 GENERALIZED ANXIETY DISORDER: ICD-10-CM

## 2024-11-20 ASSESSMENT — PATIENT HEALTH QUESTIONNAIRE - PHQ9
SUM OF ALL RESPONSES TO PHQ QUESTIONS 1-9: 11
10. IF YOU CHECKED OFF ANY PROBLEMS, HOW DIFFICULT HAVE THESE PROBLEMS MADE IT FOR YOU TO DO YOUR WORK, TAKE CARE OF THINGS AT HOME, OR GET ALONG WITH OTHER PEOPLE: EXTREMELY DIFFICULT
SUM OF ALL RESPONSES TO PHQ QUESTIONS 1-9: 11
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
## 2024-11-27 ENCOUNTER — VIRTUAL VISIT (OUTPATIENT)
Dept: PSYCHOLOGY | Facility: CLINIC | Age: 39
End: 2024-11-27
Payer: COMMERCIAL

## 2024-11-27 DIAGNOSIS — F41.0 PANIC DISORDER WITHOUT AGORAPHOBIA: ICD-10-CM

## 2024-11-27 DIAGNOSIS — F32.81 PMDD (PREMENSTRUAL DYSPHORIC DISORDER): Primary | ICD-10-CM

## 2024-11-27 DIAGNOSIS — F43.10 PTSD (POST-TRAUMATIC STRESS DISORDER): ICD-10-CM

## 2024-11-27 DIAGNOSIS — F33.1 MAJOR DEPRESSIVE DISORDER, RECURRENT EPISODE, MODERATE (H): ICD-10-CM

## 2024-11-27 DIAGNOSIS — F41.1 GENERALIZED ANXIETY DISORDER: ICD-10-CM
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
## 2024-12-03 ASSESSMENT — PATIENT HEALTH QUESTIONNAIRE - PHQ9
SUM OF ALL RESPONSES TO PHQ QUESTIONS 1-9: 17
SUM OF ALL RESPONSES TO PHQ QUESTIONS 1-9: 17
10. IF YOU CHECKED OFF ANY PROBLEMS, HOW DIFFICULT HAVE THESE PROBLEMS MADE IT FOR YOU TO DO YOUR WORK, TAKE CARE OF THINGS AT HOME, OR GET ALONG WITH OTHER PEOPLE: EXTREMELY DIFFICULT

## 2024-12-04 ENCOUNTER — VIRTUAL VISIT (OUTPATIENT)
Dept: PSYCHOLOGY | Facility: CLINIC | Age: 39
End: 2024-12-04
Payer: COMMERCIAL

## 2024-12-04 DIAGNOSIS — F33.1 MAJOR DEPRESSIVE DISORDER, RECURRENT EPISODE, MODERATE (H): ICD-10-CM

## 2024-12-04 DIAGNOSIS — F43.10 PTSD (POST-TRAUMATIC STRESS DISORDER): ICD-10-CM

## 2024-12-04 DIAGNOSIS — F32.81 PMDD (PREMENSTRUAL DYSPHORIC DISORDER): Primary | ICD-10-CM

## 2024-12-04 DIAGNOSIS — F41.0 PANIC DISORDER WITHOUT AGORAPHOBIA: ICD-10-CM

## 2024-12-04 DIAGNOSIS — F41.1 GENERALIZED ANXIETY DISORDER: ICD-10-CM
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
## 2024-12-11 ENCOUNTER — VIRTUAL VISIT (OUTPATIENT)
Dept: PSYCHOLOGY | Facility: CLINIC | Age: 39
End: 2024-12-11
Payer: COMMERCIAL

## 2024-12-11 DIAGNOSIS — F33.1 MAJOR DEPRESSIVE DISORDER, RECURRENT EPISODE, MODERATE (H): ICD-10-CM

## 2024-12-11 DIAGNOSIS — F41.0 PANIC DISORDER WITHOUT AGORAPHOBIA: ICD-10-CM

## 2024-12-11 DIAGNOSIS — F43.10 PTSD (POST-TRAUMATIC STRESS DISORDER): ICD-10-CM

## 2024-12-11 DIAGNOSIS — F32.81 PMDD (PREMENSTRUAL DYSPHORIC DISORDER): Primary | ICD-10-CM

## 2024-12-11 DIAGNOSIS — F41.1 GENERALIZED ANXIETY DISORDER: ICD-10-CM

## 2024-12-11 ASSESSMENT — PATIENT HEALTH QUESTIONNAIRE - PHQ9
10. IF YOU CHECKED OFF ANY PROBLEMS, HOW DIFFICULT HAVE THESE PROBLEMS MADE IT FOR YOU TO DO YOUR WORK, TAKE CARE OF THINGS AT HOME, OR GET ALONG WITH OTHER PEOPLE: EXTREMELY DIFFICULT
SUM OF ALL RESPONSES TO PHQ QUESTIONS 1-9: 10
SUM OF ALL RESPONSES TO PHQ QUESTIONS 1-9: 10
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
## 2024-12-18 ENCOUNTER — VIRTUAL VISIT (OUTPATIENT)
Dept: PSYCHOLOGY | Facility: CLINIC | Age: 39
End: 2024-12-18
Payer: COMMERCIAL

## 2024-12-18 DIAGNOSIS — F41.0 PANIC DISORDER WITHOUT AGORAPHOBIA: ICD-10-CM

## 2024-12-18 DIAGNOSIS — F41.1 GENERALIZED ANXIETY DISORDER: ICD-10-CM

## 2024-12-18 DIAGNOSIS — F33.1 MAJOR DEPRESSIVE DISORDER, RECURRENT EPISODE, MODERATE (H): ICD-10-CM

## 2024-12-18 DIAGNOSIS — F32.81 PMDD (PREMENSTRUAL DYSPHORIC DISORDER): Primary | ICD-10-CM

## 2024-12-18 DIAGNOSIS — F43.10 PTSD (POST-TRAUMATIC STRESS DISORDER): ICD-10-CM
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
## 2024-12-30 ENCOUNTER — MYC MEDICAL ADVICE (OUTPATIENT)
Dept: FAMILY MEDICINE | Facility: CLINIC | Age: 39
End: 2024-12-30
Payer: COMMERCIAL

## 2024-12-30 NOTE — LETTER
January 6, 2025      Veena Parsons  [address]  [place] [address]              Dear Veena,      We care about your health and have reviewed your health plan including your medical conditions, medications, and lab results.  Based on this review, it is recommended that you follow up regarding the following health topic(s):  -Depression  -Wellness (Physical) Visit   -Immunizations:       Health Maintenance Due   Topic Date Due    Pneumococcal Vaccine: Pediatrics (0 to 5 Years) and At-Risk Patients (6 to 49 Years) (1 of 2 - PCV) Never done    HEPATITIS B IMMUNIZATION (1 of 3 - 19+ 3-dose series) Never done    INFLUENZA VACCINE (1) 09/01/2024    COVID-19 Vaccine (1 - 2024-25 season) Never done      We recommend you take the following action(s):  -Complete and return the attached PHQ-9 Form.  If your total score is greater than 9, please schedule a followup appointment.  If you answer Yes to question 9, call your clinic between the hours of 8 to 5.  You may also call the Suicide Hotline at 2-633-873-CCIF (8018) any time.  -Schedule your annual physical on or after 1/3/2025.     Please call us at the [facility] [phone] (or use [facility]) to address the above recommendations.        If you are no longer a patient with us please give us the name of the clinic and/or provider you have transferred your care to so we may update our records and we will no longer reach out to you.     Thank you for trusting [facility] and we appreciate the opportunity to serve you.  We look forward to supporting your healthcare needs in the future.     Healthy Regards,     Your Health Care Team at [facility]

## 2024-12-30 NOTE — TELEPHONE ENCOUNTER
Patient Quality Outreach    Patient is due for the following:   Depression  -  PHQ-9 needed  Physical Annual Wellness Visit,  - Due after 1/3/2025      Topic Date Due    Pneumococcal Vaccine (1 of 2 - PCV) Never done    Hepatitis B Vaccine (1 of 3 - 19+ 3-dose series) Never done    Flu Vaccine (1) 09/01/2024    COVID-19 Vaccine (1 - 2024-25 season) Never done       Action(s) Taken:   Schedule a Annual Wellness Visit  Patient was assigned appropriate questionnaire to complete    Type of outreach:    Sent [employer] message.  Send letter in 1 week if not read/completed.    Questions for provider review:    None           Gilma Underwood, [facility]  Chart routed to Care Team.

## 2025-01-02 ENCOUNTER — VIRTUAL VISIT (OUTPATIENT)
Dept: PSYCHOLOGY | Facility: CLINIC | Age: 40
End: 2025-01-02
Payer: COMMERCIAL

## 2025-01-02 DIAGNOSIS — F33.1 MAJOR DEPRESSIVE DISORDER, RECURRENT EPISODE, MODERATE (H): ICD-10-CM

## 2025-01-02 DIAGNOSIS — F43.10 PTSD (POST-TRAUMATIC STRESS DISORDER): ICD-10-CM

## 2025-01-02 DIAGNOSIS — F41.1 GENERALIZED ANXIETY DISORDER: ICD-10-CM

## 2025-01-02 DIAGNOSIS — F32.81 PMDD (PREMENSTRUAL DYSPHORIC DISORDER): Primary | ICD-10-CM

## 2025-01-02 DIAGNOSIS — F41.0 PANIC DISORDER WITHOUT AGORAPHOBIA: ICD-10-CM

## 2025-01-02 ASSESSMENT — PATIENT HEALTH QUESTIONNAIRE - PHQ9
10. IF YOU CHECKED OFF ANY PROBLEMS, HOW DIFFICULT HAVE THESE PROBLEMS MADE IT FOR YOU TO DO YOUR WORK, TAKE CARE OF THINGS AT HOME, OR GET ALONG WITH OTHER PEOPLE: VERY DIFFICULT
SUM OF ALL RESPONSES TO PHQ QUESTIONS 1-9: 9
SUM OF ALL RESPONSES TO PHQ QUESTIONS 1-9: 9
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
## 2025-01-08 ENCOUNTER — VIRTUAL VISIT (OUTPATIENT)
Dept: PSYCHOLOGY | Facility: CLINIC | Age: 40
End: 2025-01-08
Payer: COMMERCIAL

## 2025-01-08 DIAGNOSIS — F43.10 PTSD (POST-TRAUMATIC STRESS DISORDER): ICD-10-CM

## 2025-01-08 DIAGNOSIS — F33.1 MAJOR DEPRESSIVE DISORDER, RECURRENT EPISODE, MODERATE (H): ICD-10-CM

## 2025-01-08 DIAGNOSIS — F41.0 PANIC DISORDER WITHOUT AGORAPHOBIA: ICD-10-CM

## 2025-01-08 DIAGNOSIS — F41.1 GENERALIZED ANXIETY DISORDER: ICD-10-CM

## 2025-01-08 DIAGNOSIS — F32.81 PMDD (PREMENSTRUAL DYSPHORIC DISORDER): Primary | ICD-10-CM
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
## 2025-01-15 ENCOUNTER — VIRTUAL VISIT (OUTPATIENT)
Dept: PSYCHOLOGY | Facility: CLINIC | Age: 40
End: 2025-01-15
Payer: COMMERCIAL

## 2025-01-15 DIAGNOSIS — F41.1 GENERALIZED ANXIETY DISORDER: ICD-10-CM

## 2025-01-15 DIAGNOSIS — F41.0 PANIC DISORDER WITHOUT AGORAPHOBIA: ICD-10-CM

## 2025-01-15 DIAGNOSIS — F32.81 PMDD (PREMENSTRUAL DYSPHORIC DISORDER): Primary | ICD-10-CM

## 2025-01-15 DIAGNOSIS — F33.1 MAJOR DEPRESSIVE DISORDER, RECURRENT EPISODE, MODERATE (H): ICD-10-CM

## 2025-01-15 DIAGNOSIS — F43.10 PTSD (POST-TRAUMATIC STRESS DISORDER): ICD-10-CM
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
## 2025-01-29 ENCOUNTER — VIRTUAL VISIT (OUTPATIENT)
Dept: PSYCHOLOGY | Facility: CLINIC | Age: 40
End: 2025-01-29
Payer: COMMERCIAL

## 2025-01-29 DIAGNOSIS — F43.10 PTSD (POST-TRAUMATIC STRESS DISORDER): ICD-10-CM

## 2025-01-29 DIAGNOSIS — F41.1 GENERALIZED ANXIETY DISORDER: ICD-10-CM

## 2025-01-29 DIAGNOSIS — F32.81 PMDD (PREMENSTRUAL DYSPHORIC DISORDER): Primary | ICD-10-CM

## 2025-01-29 DIAGNOSIS — F33.1 MAJOR DEPRESSIVE DISORDER, RECURRENT EPISODE, MODERATE (H): ICD-10-CM

## 2025-01-29 DIAGNOSIS — F41.0 PANIC DISORDER WITHOUT AGORAPHOBIA: ICD-10-CM
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
## 2025-02-05 ENCOUNTER — VIRTUAL VISIT (OUTPATIENT)
Dept: PSYCHOLOGY | Facility: CLINIC | Age: 40
End: 2025-02-05
Payer: COMMERCIAL

## 2025-02-05 DIAGNOSIS — F33.1 MAJOR DEPRESSIVE DISORDER, RECURRENT EPISODE, MODERATE (H): ICD-10-CM

## 2025-02-05 DIAGNOSIS — F32.81 PMDD (PREMENSTRUAL DYSPHORIC DISORDER): Primary | ICD-10-CM

## 2025-02-05 DIAGNOSIS — F41.1 GENERALIZED ANXIETY DISORDER: ICD-10-CM

## 2025-02-05 DIAGNOSIS — F41.0 PANIC DISORDER WITHOUT AGORAPHOBIA: ICD-10-CM

## 2025-02-05 DIAGNOSIS — F43.10 PTSD (POST-TRAUMATIC STRESS DISORDER): ICD-10-CM
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
## 2025-02-13 ENCOUNTER — VIRTUAL VISIT (OUTPATIENT)
Dept: PSYCHOLOGY | Facility: CLINIC | Age: 40
End: 2025-02-13
Payer: COMMERCIAL

## 2025-02-13 DIAGNOSIS — F33.1 MAJOR DEPRESSIVE DISORDER, RECURRENT EPISODE, MODERATE (H): ICD-10-CM

## 2025-02-13 DIAGNOSIS — F41.0 PANIC DISORDER WITHOUT AGORAPHOBIA: ICD-10-CM

## 2025-02-13 DIAGNOSIS — F41.1 GENERALIZED ANXIETY DISORDER: ICD-10-CM

## 2025-02-13 DIAGNOSIS — F43.10 PTSD (POST-TRAUMATIC STRESS DISORDER): ICD-10-CM

## 2025-02-13 DIAGNOSIS — F32.81 PMDD (PREMENSTRUAL DYSPHORIC DISORDER): Primary | ICD-10-CM
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
## 2025-02-16 ENCOUNTER — HEALTH MAINTENANCE LETTER (OUTPATIENT)
Age: 40
End: 2025-02-16

## 2025-02-19 ENCOUNTER — VIRTUAL VISIT (OUTPATIENT)
Dept: PSYCHOLOGY | Facility: CLINIC | Age: 40
End: 2025-02-19
Payer: COMMERCIAL

## 2025-02-19 DIAGNOSIS — F41.1 GENERALIZED ANXIETY DISORDER: ICD-10-CM

## 2025-02-19 DIAGNOSIS — F43.10 PTSD (POST-TRAUMATIC STRESS DISORDER): ICD-10-CM

## 2025-02-19 DIAGNOSIS — F41.0 PANIC DISORDER WITHOUT AGORAPHOBIA: ICD-10-CM

## 2025-02-19 DIAGNOSIS — F32.81 PMDD (PREMENSTRUAL DYSPHORIC DISORDER): Primary | ICD-10-CM

## 2025-02-19 DIAGNOSIS — F33.1 MAJOR DEPRESSIVE DISORDER, RECURRENT EPISODE, MODERATE (H): ICD-10-CM
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
## 2025-02-27 ENCOUNTER — VIRTUAL VISIT (OUTPATIENT)
Dept: PSYCHOLOGY | Facility: CLINIC | Age: 40
End: 2025-02-27
Payer: COMMERCIAL

## 2025-02-27 DIAGNOSIS — F41.0 PANIC DISORDER WITHOUT AGORAPHOBIA: ICD-10-CM

## 2025-02-27 DIAGNOSIS — F32.81 PMDD (PREMENSTRUAL DYSPHORIC DISORDER): Primary | ICD-10-CM

## 2025-02-27 DIAGNOSIS — F41.1 GENERALIZED ANXIETY DISORDER: ICD-10-CM

## 2025-02-27 DIAGNOSIS — F33.1 MAJOR DEPRESSIVE DISORDER, RECURRENT EPISODE, MODERATE (H): ICD-10-CM

## 2025-02-27 DIAGNOSIS — F43.10 PTSD (POST-TRAUMATIC STRESS DISORDER): ICD-10-CM
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
## 2025-03-06 ENCOUNTER — VIRTUAL VISIT (OUTPATIENT)
Dept: PSYCHOLOGY | Facility: CLINIC | Age: 40
End: 2025-03-06
Payer: COMMERCIAL

## 2025-03-06 DIAGNOSIS — F41.1 GENERALIZED ANXIETY DISORDER: ICD-10-CM

## 2025-03-06 DIAGNOSIS — F32.81 PMDD (PREMENSTRUAL DYSPHORIC DISORDER): Primary | ICD-10-CM

## 2025-03-06 DIAGNOSIS — F33.1 MAJOR DEPRESSIVE DISORDER, RECURRENT EPISODE, MODERATE (H): ICD-10-CM

## 2025-03-06 DIAGNOSIS — F43.10 PTSD (POST-TRAUMATIC STRESS DISORDER): ICD-10-CM

## 2025-03-06 DIAGNOSIS — F41.0 PANIC DISORDER WITHOUT AGORAPHOBIA: ICD-10-CM
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
## 2025-03-11 ENCOUNTER — VIRTUAL VISIT (OUTPATIENT)
Dept: PSYCHOLOGY | Facility: CLINIC | Age: 40
End: 2025-03-11
Payer: COMMERCIAL

## 2025-03-11 DIAGNOSIS — F41.1 GENERALIZED ANXIETY DISORDER: ICD-10-CM

## 2025-03-11 DIAGNOSIS — F33.1 MAJOR DEPRESSIVE DISORDER, RECURRENT EPISODE, MODERATE (H): ICD-10-CM

## 2025-03-11 DIAGNOSIS — F43.10 PTSD (POST-TRAUMATIC STRESS DISORDER): ICD-10-CM

## 2025-03-11 DIAGNOSIS — F32.81 PMDD (PREMENSTRUAL DYSPHORIC DISORDER): Primary | ICD-10-CM

## 2025-03-11 DIAGNOSIS — F41.0 PANIC DISORDER WITHOUT AGORAPHOBIA: ICD-10-CM
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
## 2025-03-19 ENCOUNTER — VIRTUAL VISIT (OUTPATIENT)
Dept: PSYCHOLOGY | Facility: CLINIC | Age: 40
End: 2025-03-19
Payer: COMMERCIAL

## 2025-03-19 DIAGNOSIS — F41.0 PANIC DISORDER WITHOUT AGORAPHOBIA: ICD-10-CM

## 2025-03-19 DIAGNOSIS — F32.81 PMDD (PREMENSTRUAL DYSPHORIC DISORDER): Primary | ICD-10-CM

## 2025-03-19 DIAGNOSIS — F41.1 GENERALIZED ANXIETY DISORDER: ICD-10-CM

## 2025-03-19 DIAGNOSIS — F33.1 MAJOR DEPRESSIVE DISORDER, RECURRENT EPISODE, MODERATE (H): ICD-10-CM

## 2025-03-19 DIAGNOSIS — F43.10 PTSD (POST-TRAUMATIC STRESS DISORDER): ICD-10-CM
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
## 2025-03-26 ENCOUNTER — VIRTUAL VISIT (OUTPATIENT)
Dept: PSYCHOLOGY | Facility: CLINIC | Age: 40
End: 2025-03-26
Payer: COMMERCIAL

## 2025-03-26 DIAGNOSIS — F41.0 PANIC DISORDER WITHOUT AGORAPHOBIA: ICD-10-CM

## 2025-03-26 DIAGNOSIS — F41.1 GENERALIZED ANXIETY DISORDER: ICD-10-CM

## 2025-03-26 DIAGNOSIS — F33.1 MAJOR DEPRESSIVE DISORDER, RECURRENT EPISODE, MODERATE (H): ICD-10-CM

## 2025-03-26 DIAGNOSIS — F43.10 PTSD (POST-TRAUMATIC STRESS DISORDER): ICD-10-CM

## 2025-03-26 DIAGNOSIS — F32.81 PMDD (PREMENSTRUAL DYSPHORIC DISORDER): Primary | ICD-10-CM
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
## 2025-03-31 ENCOUNTER — MYC MEDICAL ADVICE (OUTPATIENT)
Dept: FAMILY MEDICINE | Facility: CLINIC | Age: 40
End: 2025-03-31
Payer: COMMERCIAL

## 2025-03-31 NOTE — LETTER
[facility] [place]  [address], [address]  [place] [address]  Phone: [phone]  Fax: [phone]  April 7, 2025      Veena Parsons  [address]  [place] [address]      Dear Veena,    We care about your health and have reviewed your health plan including your medical conditions, medications, and lab results.  Based on this review, it is recommended that you follow up regarding the following health topic(s):  -Depression  -Wellness (Physical) Visit   -Immunizations:       Health Maintenance Due   Topic Date Due    Pneumococcal Vaccine: Pediatrics (0 to 5 Years) and At-Risk Patients (6 to 49 Years) (1 of 2 - PCV) Never done    HEPATITIS B IMMUNIZATION (1 of 3 - 19+ 3-dose series) Never done    INFLUENZA VACCINE (1) 09/01/2024    COVID-19 Vaccine (1 - 2024-25 season) Never done         We recommend you take the following action(s):  -schedule a WELLNESS (Physical) APPOINTMENT.  We will perform the following labs: none at this time.  -Complete and return the attached PHQ-9 Form.  If your total score is greater than 9, please schedule a followup appointment.  If you answer Yes to question 9, call your clinic between the hours of 8 to 5.  You may also call the Suicide Hotline at 7-011-755-TXPD ([phone]) any time.      Please call us at the [facility] [phone] (or use [employer] La[facility]) to address the above recommendations.        If you are no longer a patient with us please give us the name of the clinic and/or provider you have transferred your care to so we may update our records and we will no longer reach out to you.     Thank you for trusting [facility] and we appreciate the opportunity to serve you.  We look forward to supporting your healthcare needs in the future.     Healthy Regards,     Your Health Care Team at [facility]

## 2025-03-31 NOTE — TELEPHONE ENCOUNTER
Patient Quality Outreach    Patient is due for the following:   Depression  -  PHQ-9 needed  Physical Annual Wellness Visit      Topic Date Due    Pneumococcal Vaccine (1 of 2 - PCV) Never done    Hepatitis B Vaccine (1 of 3 - 19+ 3-dose series) Never done    Flu Vaccine (1) 09/01/2024    COVID-19 Vaccine (1 - 2024-25 season) Never done       Action(s) Taken:   Schedule a Annual Wellness Visit  Patient was assigned appropriate questionnaire to complete    Type of outreach:    Sent [employer] message.  Send letter in 1 week if not read/completed/returned call.    Questions for provider review:    None         Gilma Underwood, [facility]  Chart routed to Care Team.

## 2025-04-02 ENCOUNTER — VIRTUAL VISIT (OUTPATIENT)
Dept: PSYCHOLOGY | Facility: CLINIC | Age: 40
End: 2025-04-02
Payer: COMMERCIAL

## 2025-04-02 DIAGNOSIS — F32.81 PMDD (PREMENSTRUAL DYSPHORIC DISORDER): Primary | ICD-10-CM

## 2025-04-02 DIAGNOSIS — F41.0 PANIC DISORDER WITHOUT AGORAPHOBIA: ICD-10-CM

## 2025-04-02 DIAGNOSIS — F43.10 PTSD (POST-TRAUMATIC STRESS DISORDER): ICD-10-CM

## 2025-04-02 DIAGNOSIS — F33.1 MAJOR DEPRESSIVE DISORDER, RECURRENT EPISODE, MODERATE (H): ICD-10-CM

## 2025-04-02 DIAGNOSIS — F41.1 GENERALIZED ANXIETY DISORDER: ICD-10-CM
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
## 2025-04-07 NOTE — TELEPHONE ENCOUNTER
Patient Quality Outreach Summary      Summary:    Patient is due/failing the following:   PHQ-9 needed, Adult/Adolescent physical, date due: now , and   Health Maintenance Due   Topic    Hepatitis B Vaccine (1 of 3 - 19+ 3-dose series)    Flu Vaccine (1)       Type of outreach:    Sent letter.  Patient did not read but is not est patient at .    Questions for provider review:    None                                                                                                                    Gilma Underwood CMA ([facility])       Chart routed to None.

## 2025-04-10 ENCOUNTER — VIRTUAL VISIT (OUTPATIENT)
Dept: PSYCHOLOGY | Facility: CLINIC | Age: 40
End: 2025-04-10
Payer: COMMERCIAL

## 2025-04-10 DIAGNOSIS — F43.10 PTSD (POST-TRAUMATIC STRESS DISORDER): ICD-10-CM

## 2025-04-10 DIAGNOSIS — F33.1 MAJOR DEPRESSIVE DISORDER, RECURRENT EPISODE, MODERATE (H): ICD-10-CM

## 2025-04-10 DIAGNOSIS — F41.1 GENERALIZED ANXIETY DISORDER: ICD-10-CM

## 2025-04-10 DIAGNOSIS — F41.0 PANIC DISORDER WITHOUT AGORAPHOBIA: ICD-10-CM

## 2025-04-10 DIAGNOSIS — F32.81 PMDD (PREMENSTRUAL DYSPHORIC DISORDER): Primary | ICD-10-CM
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
## 2025-04-16 ENCOUNTER — VIRTUAL VISIT (OUTPATIENT)
Dept: PSYCHOLOGY | Facility: CLINIC | Age: 40
End: 2025-04-16
Payer: COMMERCIAL

## 2025-04-16 DIAGNOSIS — F41.0 PANIC DISORDER WITHOUT AGORAPHOBIA: ICD-10-CM

## 2025-04-16 DIAGNOSIS — F41.1 GENERALIZED ANXIETY DISORDER: ICD-10-CM

## 2025-04-16 DIAGNOSIS — F43.10 PTSD (POST-TRAUMATIC STRESS DISORDER): ICD-10-CM

## 2025-04-16 DIAGNOSIS — F33.1 MAJOR DEPRESSIVE DISORDER, RECURRENT EPISODE, MODERATE (H): ICD-10-CM

## 2025-04-16 DIAGNOSIS — F32.81 PMDD (PREMENSTRUAL DYSPHORIC DISORDER): Primary | ICD-10-CM
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
## 2025-04-23 ENCOUNTER — VIRTUAL VISIT (OUTPATIENT)
Dept: PSYCHOLOGY | Facility: CLINIC | Age: 40
End: 2025-04-23
Payer: COMMERCIAL

## 2025-04-23 DIAGNOSIS — F41.1 GENERALIZED ANXIETY DISORDER: ICD-10-CM

## 2025-04-23 DIAGNOSIS — F32.81 PMDD (PREMENSTRUAL DYSPHORIC DISORDER): Primary | ICD-10-CM

## 2025-04-23 DIAGNOSIS — F41.0 PANIC DISORDER WITHOUT AGORAPHOBIA: ICD-10-CM

## 2025-04-23 DIAGNOSIS — F43.10 PTSD (POST-TRAUMATIC STRESS DISORDER): ICD-10-CM

## 2025-04-23 DIAGNOSIS — F33.1 MAJOR DEPRESSIVE DISORDER, RECURRENT EPISODE, MODERATE (H): ICD-10-CM
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
## 2025-04-30 ENCOUNTER — VIRTUAL VISIT (OUTPATIENT)
Dept: PSYCHOLOGY | Facility: CLINIC | Age: 40
End: 2025-04-30
Payer: COMMERCIAL

## 2025-04-30 DIAGNOSIS — F43.10 PTSD (POST-TRAUMATIC STRESS DISORDER): ICD-10-CM

## 2025-04-30 DIAGNOSIS — F41.1 GENERALIZED ANXIETY DISORDER: ICD-10-CM

## 2025-04-30 DIAGNOSIS — F32.81 PMDD (PREMENSTRUAL DYSPHORIC DISORDER): Primary | ICD-10-CM

## 2025-04-30 DIAGNOSIS — F41.0 PANIC DISORDER WITHOUT AGORAPHOBIA: ICD-10-CM

## 2025-04-30 DIAGNOSIS — F33.1 MAJOR DEPRESSIVE DISORDER, RECURRENT EPISODE, MODERATE (H): ICD-10-CM
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
## 2025-05-07 ENCOUNTER — VIRTUAL VISIT (OUTPATIENT)
Dept: PSYCHOLOGY | Facility: CLINIC | Age: 40
End: 2025-05-07
Payer: COMMERCIAL

## 2025-05-07 DIAGNOSIS — F32.81 PMDD (PREMENSTRUAL DYSPHORIC DISORDER): Primary | ICD-10-CM

## 2025-05-07 DIAGNOSIS — F33.1 MAJOR DEPRESSIVE DISORDER, RECURRENT EPISODE, MODERATE (H): ICD-10-CM

## 2025-05-07 DIAGNOSIS — F41.1 GENERALIZED ANXIETY DISORDER: ICD-10-CM

## 2025-05-07 DIAGNOSIS — F41.0 PANIC DISORDER WITHOUT AGORAPHOBIA: ICD-10-CM

## 2025-05-07 DIAGNOSIS — F43.10 PTSD (POST-TRAUMATIC STRESS DISORDER): ICD-10-CM
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
## 2025-05-14 ENCOUNTER — VIRTUAL VISIT (OUTPATIENT)
Dept: PSYCHOLOGY | Facility: CLINIC | Age: 40
End: 2025-05-14
Payer: COMMERCIAL

## 2025-05-14 DIAGNOSIS — F41.1 GENERALIZED ANXIETY DISORDER: ICD-10-CM

## 2025-05-14 DIAGNOSIS — F33.1 MAJOR DEPRESSIVE DISORDER, RECURRENT EPISODE, MODERATE (H): ICD-10-CM

## 2025-05-14 DIAGNOSIS — F43.10 PTSD (POST-TRAUMATIC STRESS DISORDER): ICD-10-CM

## 2025-05-14 DIAGNOSIS — F41.0 PANIC DISORDER WITHOUT AGORAPHOBIA: ICD-10-CM

## 2025-05-14 DIAGNOSIS — F32.81 PMDD (PREMENSTRUAL DYSPHORIC DISORDER): Primary | ICD-10-CM
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
## 2025-05-21 ENCOUNTER — VIRTUAL VISIT (OUTPATIENT)
Dept: PSYCHOLOGY | Facility: CLINIC | Age: 40
End: 2025-05-21
Payer: COMMERCIAL

## 2025-05-21 DIAGNOSIS — F32.81 PMDD (PREMENSTRUAL DYSPHORIC DISORDER): Primary | ICD-10-CM

## 2025-05-21 DIAGNOSIS — F43.10 PTSD (POST-TRAUMATIC STRESS DISORDER): ICD-10-CM

## 2025-05-21 DIAGNOSIS — F33.1 MAJOR DEPRESSIVE DISORDER, RECURRENT EPISODE, MODERATE (H): ICD-10-CM

## 2025-05-21 DIAGNOSIS — F41.1 GENERALIZED ANXIETY DISORDER: ICD-10-CM

## 2025-05-21 DIAGNOSIS — F41.0 PANIC DISORDER WITHOUT AGORAPHOBIA: ICD-10-CM
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
## 2025-05-28 ENCOUNTER — VIRTUAL VISIT (OUTPATIENT)
Dept: PSYCHOLOGY | Facility: CLINIC | Age: 40
End: 2025-05-28
Payer: COMMERCIAL

## 2025-05-28 DIAGNOSIS — F33.1 MAJOR DEPRESSIVE DISORDER, RECURRENT EPISODE, MODERATE (H): ICD-10-CM

## 2025-05-28 DIAGNOSIS — F41.1 GENERALIZED ANXIETY DISORDER: ICD-10-CM

## 2025-05-28 DIAGNOSIS — F43.10 PTSD (POST-TRAUMATIC STRESS DISORDER): ICD-10-CM

## 2025-05-28 DIAGNOSIS — F41.0 PANIC DISORDER WITHOUT AGORAPHOBIA: ICD-10-CM

## 2025-05-28 DIAGNOSIS — F32.81 PMDD (PREMENSTRUAL DYSPHORIC DISORDER): Primary | ICD-10-CM
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
## 2025-06-04 ENCOUNTER — VIRTUAL VISIT (OUTPATIENT)
Dept: PSYCHOLOGY | Facility: CLINIC | Age: 40
End: 2025-06-04
Payer: COMMERCIAL

## 2025-06-04 DIAGNOSIS — F41.0 PANIC DISORDER WITHOUT AGORAPHOBIA: ICD-10-CM

## 2025-06-04 DIAGNOSIS — F43.10 PTSD (POST-TRAUMATIC STRESS DISORDER): ICD-10-CM

## 2025-06-04 DIAGNOSIS — F41.1 GENERALIZED ANXIETY DISORDER: ICD-10-CM

## 2025-06-04 DIAGNOSIS — F32.81 PMDD (PREMENSTRUAL DYSPHORIC DISORDER): Primary | ICD-10-CM

## 2025-06-04 DIAGNOSIS — F33.1 MAJOR DEPRESSIVE DISORDER, RECURRENT EPISODE, MODERATE (H): ICD-10-CM
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
## 2025-06-11 ENCOUNTER — VIRTUAL VISIT (OUTPATIENT)
Dept: PSYCHOLOGY | Facility: CLINIC | Age: 40
End: 2025-06-11
Payer: COMMERCIAL

## 2025-06-11 DIAGNOSIS — F41.0 PANIC DISORDER WITHOUT AGORAPHOBIA: ICD-10-CM

## 2025-06-11 DIAGNOSIS — F41.1 GENERALIZED ANXIETY DISORDER: ICD-10-CM

## 2025-06-11 DIAGNOSIS — F33.1 MAJOR DEPRESSIVE DISORDER, RECURRENT EPISODE, MODERATE (H): ICD-10-CM

## 2025-06-11 DIAGNOSIS — F32.81 PMDD (PREMENSTRUAL DYSPHORIC DISORDER): Primary | ICD-10-CM

## 2025-06-11 DIAGNOSIS — F43.10 PTSD (POST-TRAUMATIC STRESS DISORDER): ICD-10-CM
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
## 2025-06-18 ENCOUNTER — VIRTUAL VISIT (OUTPATIENT)
Dept: PSYCHOLOGY | Facility: CLINIC | Age: 40
End: 2025-06-18
Payer: COMMERCIAL

## 2025-06-18 DIAGNOSIS — F41.1 GENERALIZED ANXIETY DISORDER: ICD-10-CM

## 2025-06-18 DIAGNOSIS — F43.10 PTSD (POST-TRAUMATIC STRESS DISORDER): ICD-10-CM

## 2025-06-18 DIAGNOSIS — F33.1 MAJOR DEPRESSIVE DISORDER, RECURRENT EPISODE, MODERATE (H): ICD-10-CM

## 2025-06-18 DIAGNOSIS — F41.0 PANIC DISORDER WITHOUT AGORAPHOBIA: ICD-10-CM

## 2025-06-18 DIAGNOSIS — F32.81 PMDD (PREMENSTRUAL DYSPHORIC DISORDER): Primary | ICD-10-CM
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
## 2025-06-26 ENCOUNTER — VIRTUAL VISIT (OUTPATIENT)
Dept: PSYCHOLOGY | Facility: CLINIC | Age: 40
End: 2025-06-26
Payer: COMMERCIAL

## 2025-06-26 DIAGNOSIS — F41.0 PANIC DISORDER WITHOUT AGORAPHOBIA: ICD-10-CM

## 2025-06-26 DIAGNOSIS — F33.1 MAJOR DEPRESSIVE DISORDER, RECURRENT EPISODE, MODERATE (H): ICD-10-CM

## 2025-06-26 DIAGNOSIS — F43.10 PTSD (POST-TRAUMATIC STRESS DISORDER): ICD-10-CM

## 2025-06-26 DIAGNOSIS — F41.1 GENERALIZED ANXIETY DISORDER: ICD-10-CM

## 2025-06-26 DIAGNOSIS — F32.81 PMDD (PREMENSTRUAL DYSPHORIC DISORDER): Primary | ICD-10-CM
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
## 2025-07-09 ENCOUNTER — VIRTUAL VISIT (OUTPATIENT)
Dept: PSYCHOLOGY | Facility: CLINIC | Age: 40
End: 2025-07-09
Payer: COMMERCIAL

## 2025-07-09 DIAGNOSIS — F41.0 PANIC DISORDER WITHOUT AGORAPHOBIA: ICD-10-CM

## 2025-07-09 DIAGNOSIS — F41.1 GENERALIZED ANXIETY DISORDER: ICD-10-CM

## 2025-07-09 DIAGNOSIS — F33.1 MAJOR DEPRESSIVE DISORDER, RECURRENT EPISODE, MODERATE (H): ICD-10-CM

## 2025-07-09 DIAGNOSIS — F32.81 PMDD (PREMENSTRUAL DYSPHORIC DISORDER): Primary | ICD-10-CM

## 2025-07-09 DIAGNOSIS — F43.10 PTSD (POST-TRAUMATIC STRESS DISORDER): ICD-10-CM
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
## 2025-07-16 ENCOUNTER — VIRTUAL VISIT (OUTPATIENT)
Dept: PSYCHOLOGY | Facility: CLINIC | Age: 40
End: 2025-07-16
Payer: COMMERCIAL

## 2025-07-16 DIAGNOSIS — F33.1 MAJOR DEPRESSIVE DISORDER, RECURRENT EPISODE, MODERATE (H): ICD-10-CM

## 2025-07-16 DIAGNOSIS — F41.1 GENERALIZED ANXIETY DISORDER: ICD-10-CM

## 2025-07-16 DIAGNOSIS — F41.0 PANIC DISORDER WITHOUT AGORAPHOBIA: ICD-10-CM

## 2025-07-16 DIAGNOSIS — F43.10 PTSD (POST-TRAUMATIC STRESS DISORDER): ICD-10-CM

## 2025-07-16 DIAGNOSIS — F32.81 PMDD (PREMENSTRUAL DYSPHORIC DISORDER): Primary | ICD-10-CM
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
## 2025-07-20 ENCOUNTER — HEALTH MAINTENANCE LETTER (OUTPATIENT)
Age: 40
End: 2025-07-20

## 2025-07-21 ENCOUNTER — VIRTUAL VISIT (OUTPATIENT)
Dept: PSYCHOLOGY | Facility: CLINIC | Age: 40
End: 2025-07-21
Payer: COMMERCIAL

## 2025-07-21 DIAGNOSIS — F33.1 MAJOR DEPRESSIVE DISORDER, RECURRENT EPISODE, MODERATE (H): ICD-10-CM

## 2025-07-21 DIAGNOSIS — F41.0 PANIC DISORDER WITHOUT AGORAPHOBIA: ICD-10-CM

## 2025-07-21 DIAGNOSIS — F41.1 GENERALIZED ANXIETY DISORDER: ICD-10-CM

## 2025-07-21 DIAGNOSIS — F43.10 PTSD (POST-TRAUMATIC STRESS DISORDER): ICD-10-CM

## 2025-07-21 DIAGNOSIS — F32.81 PMDD (PREMENSTRUAL DYSPHORIC DISORDER): Primary | ICD-10-CM
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
## 2025-07-30 ENCOUNTER — VIRTUAL VISIT (OUTPATIENT)
Dept: PSYCHOLOGY | Facility: CLINIC | Age: 40
End: 2025-07-30
Payer: COMMERCIAL

## 2025-07-30 DIAGNOSIS — F32.81 PMDD (PREMENSTRUAL DYSPHORIC DISORDER): Primary | ICD-10-CM

## 2025-07-30 DIAGNOSIS — F33.1 MAJOR DEPRESSIVE DISORDER, RECURRENT EPISODE, MODERATE (H): ICD-10-CM

## 2025-07-30 DIAGNOSIS — F41.1 GENERALIZED ANXIETY DISORDER: ICD-10-CM

## 2025-07-30 DIAGNOSIS — F41.0 PANIC DISORDER WITHOUT AGORAPHOBIA: ICD-10-CM

## 2025-07-30 DIAGNOSIS — F43.10 PTSD (POST-TRAUMATIC STRESS DISORDER): ICD-10-CM
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
## 2025-08-06 ENCOUNTER — VIRTUAL VISIT (OUTPATIENT)
Dept: PSYCHOLOGY | Facility: CLINIC | Age: 40
End: 2025-08-06
Payer: COMMERCIAL

## 2025-08-06 DIAGNOSIS — F41.0 PANIC DISORDER WITHOUT AGORAPHOBIA: ICD-10-CM

## 2025-08-06 DIAGNOSIS — F32.81 PMDD (PREMENSTRUAL DYSPHORIC DISORDER): Primary | ICD-10-CM

## 2025-08-06 DIAGNOSIS — F41.1 GENERALIZED ANXIETY DISORDER: ICD-10-CM

## 2025-08-06 DIAGNOSIS — F33.1 MAJOR DEPRESSIVE DISORDER, RECURRENT EPISODE, MODERATE (H): ICD-10-CM

## 2025-08-13 ENCOUNTER — VIRTUAL VISIT (OUTPATIENT)
Dept: PSYCHOLOGY | Facility: CLINIC | Age: 40
End: 2025-08-13
Payer: COMMERCIAL

## 2025-08-13 DIAGNOSIS — F43.10 PTSD (POST-TRAUMATIC STRESS DISORDER): ICD-10-CM

## 2025-08-13 DIAGNOSIS — F41.1 GENERALIZED ANXIETY DISORDER: ICD-10-CM

## 2025-08-13 DIAGNOSIS — F41.0 PANIC DISORDER WITHOUT AGORAPHOBIA: ICD-10-CM

## 2025-08-13 DIAGNOSIS — F32.81 PMDD (PREMENSTRUAL DYSPHORIC DISORDER): Primary | ICD-10-CM

## 2025-08-13 DIAGNOSIS — F33.1 MAJOR DEPRESSIVE DISORDER, RECURRENT EPISODE, MODERATE (H): ICD-10-CM

## 2025-08-20 ENCOUNTER — VIRTUAL VISIT (OUTPATIENT)
Dept: PSYCHOLOGY | Facility: CLINIC | Age: 40
End: 2025-08-20
Payer: COMMERCIAL

## 2025-08-20 DIAGNOSIS — F43.10 PTSD (POST-TRAUMATIC STRESS DISORDER): ICD-10-CM

## 2025-08-20 DIAGNOSIS — F41.1 GENERALIZED ANXIETY DISORDER: ICD-10-CM

## 2025-08-20 DIAGNOSIS — F32.81 PMDD (PREMENSTRUAL DYSPHORIC DISORDER): Primary | ICD-10-CM

## 2025-08-20 DIAGNOSIS — F33.1 MAJOR DEPRESSIVE DISORDER, RECURRENT EPISODE, MODERATE (H): ICD-10-CM

## 2025-08-20 DIAGNOSIS — F41.0 PANIC DISORDER WITHOUT AGORAPHOBIA: ICD-10-CM

## 2025-09-04 ENCOUNTER — VIRTUAL VISIT (OUTPATIENT)
Dept: PSYCHOLOGY | Facility: CLINIC | Age: 40
End: 2025-09-04
Payer: COMMERCIAL

## 2025-09-04 DIAGNOSIS — F32.81 PMDD (PREMENSTRUAL DYSPHORIC DISORDER): Primary | ICD-10-CM

## 2025-09-04 DIAGNOSIS — F33.1 MAJOR DEPRESSIVE DISORDER, RECURRENT EPISODE, MODERATE (H): ICD-10-CM

## 2025-09-04 DIAGNOSIS — F43.10 PTSD (POST-TRAUMATIC STRESS DISORDER): ICD-10-CM

## 2025-09-04 DIAGNOSIS — F41.0 PANIC DISORDER WITHOUT AGORAPHOBIA: ICD-10-CM

## 2025-09-04 DIAGNOSIS — F41.1 GENERALIZED ANXIETY DISORDER: ICD-10-CM
